# Patient Record
Sex: FEMALE | Race: WHITE | NOT HISPANIC OR LATINO | URBAN - METROPOLITAN AREA
[De-identification: names, ages, dates, MRNs, and addresses within clinical notes are randomized per-mention and may not be internally consistent; named-entity substitution may affect disease eponyms.]

---

## 2017-04-13 ENCOUNTER — APPOINTMENT (RX ONLY)
Dept: URBAN - METROPOLITAN AREA CLINIC 23 | Facility: CLINIC | Age: 77
Setting detail: DERMATOLOGY
End: 2017-04-13

## 2017-04-13 DIAGNOSIS — L57.0 ACTINIC KERATOSIS: ICD-10-CM

## 2017-04-13 DIAGNOSIS — Z85.828 PERSONAL HISTORY OF OTHER MALIGNANT NEOPLASM OF SKIN: ICD-10-CM

## 2017-04-13 DIAGNOSIS — L81.4 OTHER MELANIN HYPERPIGMENTATION: ICD-10-CM

## 2017-04-13 DIAGNOSIS — D22 MELANOCYTIC NEVI: ICD-10-CM

## 2017-04-13 DIAGNOSIS — L82.1 OTHER SEBORRHEIC KERATOSIS: ICD-10-CM

## 2017-04-13 PROBLEM — E78.5 HYPERLIPIDEMIA, UNSPECIFIED: Status: ACTIVE | Noted: 2017-04-13

## 2017-04-13 PROBLEM — D22.5 MELANOCYTIC NEVI OF TRUNK: Status: ACTIVE | Noted: 2017-04-13

## 2017-04-13 PROBLEM — L55.1 SUNBURN OF SECOND DEGREE: Status: ACTIVE | Noted: 2017-04-13

## 2017-04-13 PROBLEM — Z85.3 PERSONAL HISTORY OF MALIGNANT NEOPLASM OF BREAST: Status: ACTIVE | Noted: 2017-04-13

## 2017-04-13 PROBLEM — J30.1 ALLERGIC RHINITIS DUE TO POLLEN: Status: ACTIVE | Noted: 2017-04-13

## 2017-04-13 PROCEDURE — ? DEFER

## 2017-04-13 PROCEDURE — ? LIQUID NITROGEN

## 2017-04-13 PROCEDURE — 17000 DESTRUCT PREMALG LESION: CPT

## 2017-04-13 PROCEDURE — ? COUNSELING

## 2017-04-13 PROCEDURE — 17003 DESTRUCT PREMALG LES 2-14: CPT

## 2017-04-13 PROCEDURE — ? OTHER

## 2017-04-13 PROCEDURE — 99213 OFFICE O/P EST LOW 20 MIN: CPT | Mod: 25

## 2017-04-13 ASSESSMENT — LOCATION SIMPLE DESCRIPTION DERM
LOCATION SIMPLE: RIGHT FOREHEAD
LOCATION SIMPLE: RIGHT FOREARM
LOCATION SIMPLE: LEFT UPPER BACK
LOCATION SIMPLE: RIGHT UPPER BACK
LOCATION SIMPLE: LEFT BUTTOCK
LOCATION SIMPLE: LEFT FOREARM
LOCATION SIMPLE: LEFT FOREHEAD
LOCATION SIMPLE: RIGHT CHEEK
LOCATION SIMPLE: LEFT PRETIBIAL REGION
LOCATION SIMPLE: LEFT CHEEK
LOCATION SIMPLE: CHEST
LOCATION SIMPLE: RIGHT FOREARM
LOCATION SIMPLE: LEFT WRIST
LOCATION SIMPLE: UPPER BACK
LOCATION SIMPLE: NOSE
LOCATION SIMPLE: RIGHT PRETIBIAL REGION

## 2017-04-13 ASSESSMENT — LOCATION ZONE DERM
LOCATION ZONE: NOSE
LOCATION ZONE: LEG
LOCATION ZONE: FACE
LOCATION ZONE: TRUNK
LOCATION ZONE: ARM
LOCATION ZONE: ARM

## 2017-04-13 ASSESSMENT — LOCATION DETAILED DESCRIPTION DERM
LOCATION DETAILED: LEFT DISTAL DORSAL FOREARM
LOCATION DETAILED: NASAL TIP
LOCATION DETAILED: SUPERIOR THORACIC SPINE
LOCATION DETAILED: LEFT CENTRAL BUCCAL CHEEK
LOCATION DETAILED: RIGHT SUPERIOR MEDIAL UPPER BACK
LOCATION DETAILED: LEFT INFERIOR FOREHEAD
LOCATION DETAILED: RIGHT DISTAL PRETIBIAL REGION
LOCATION DETAILED: RIGHT DISTAL DORSAL FOREARM
LOCATION DETAILED: LEFT INFERIOR MEDIAL UPPER BACK
LOCATION DETAILED: RIGHT DISTAL DORSAL FOREARM
LOCATION DETAILED: RIGHT PROXIMAL DORSAL FOREARM
LOCATION DETAILED: LEFT DORSAL WRIST
LOCATION DETAILED: LEFT PROXIMAL PRETIBIAL REGION
LOCATION DETAILED: LEFT FOREHEAD
LOCATION DETAILED: RIGHT CENTRAL BUCCAL CHEEK
LOCATION DETAILED: UPPER STERNUM
LOCATION DETAILED: RIGHT FOREHEAD
LOCATION DETAILED: LEFT BUTTOCK

## 2017-04-13 NOTE — PROCEDURE: DEFER
Instructions (Optional): Pt declines treatment today due to Easter holiday
Procedure To Be Performed At Next Visit: Liquid nitrogen
Detail Level: Detailed

## 2017-04-13 NOTE — PROCEDURE: OTHER
Other (Free Text): LN
Detail Level: Simple
Other (Free Text): Photo of sites to cryo
Note Text (......Xxx Chief Complaint.): This diagnosis correlates with the

## 2017-04-13 NOTE — PROCEDURE: LIQUID NITROGEN
Render Post-Care Instructions In Note?: no
Number Of Freeze-Thaw Cycles: 1 freeze-thaw cycle
Post-Care Instructions: I reviewed with the patient in detail post-care instructions. Patient is to wear sunprotection, and avoid picking at any of the treated lesions. Pt may apply Vaseline to crusted or scabbing areas.
Duration Of Freeze Thaw-Cycle (Seconds): 4
Detail Level: Simple
Consent: The patient's consent was obtained including but not limited to risks of crusting, scabbing, blistering, scarring, darker or lighter pigmentary change, recurrence, incomplete removal and infection.

## 2017-05-03 ENCOUNTER — APPOINTMENT (RX ONLY)
Dept: URBAN - METROPOLITAN AREA CLINIC 23 | Facility: CLINIC | Age: 77
Setting detail: DERMATOLOGY
End: 2017-05-03

## 2017-05-03 DIAGNOSIS — L57.0 ACTINIC KERATOSIS: ICD-10-CM

## 2017-05-03 PROBLEM — L55.1 SUNBURN OF SECOND DEGREE: Status: ACTIVE | Noted: 2017-05-03

## 2017-05-03 PROCEDURE — 17003 DESTRUCT PREMALG LES 2-14: CPT

## 2017-05-03 PROCEDURE — 17000 DESTRUCT PREMALG LESION: CPT

## 2017-05-03 PROCEDURE — ? LIQUID NITROGEN

## 2017-05-03 PROCEDURE — ? COUNSELING

## 2017-05-03 ASSESSMENT — LOCATION SIMPLE DESCRIPTION DERM
LOCATION SIMPLE: NOSE
LOCATION SIMPLE: RIGHT FOREARM
LOCATION SIMPLE: RIGHT FOREHEAD

## 2017-05-03 ASSESSMENT — LOCATION DETAILED DESCRIPTION DERM
LOCATION DETAILED: NASAL SUPRATIP
LOCATION DETAILED: RIGHT LATERAL FOREHEAD
LOCATION DETAILED: RIGHT DISTAL DORSAL FOREARM

## 2017-05-03 ASSESSMENT — LOCATION ZONE DERM
LOCATION ZONE: FACE
LOCATION ZONE: NOSE
LOCATION ZONE: ARM

## 2017-05-03 NOTE — PROCEDURE: LIQUID NITROGEN
Post-Care Instructions: I reviewed with the patient in detail post-care instructions. Patient is to wear sunprotection, and avoid picking at any of the treated lesions. Pt may apply Vaseline to crusted or scabbing areas.
Consent: The patient's consent was obtained including but not limited to risks of crusting, scabbing, blistering, scarring, darker or lighter pigmentary change, recurrence, incomplete removal and infection.
Number Of Freeze-Thaw Cycles: 1 freeze-thaw cycle
Detail Level: Simple
Render Post-Care Instructions In Note?: no
Duration Of Freeze Thaw-Cycle (Seconds): 4

## 2017-07-28 PROBLEM — I77.9 BILATERAL CAROTID ARTERY DISEASE (HCC): Status: ACTIVE | Noted: 2017-07-28

## 2017-08-29 PROBLEM — M25.571 CHRONIC PAIN OF RIGHT ANKLE: Status: ACTIVE | Noted: 2017-08-29

## 2017-08-29 PROBLEM — G89.29 CHRONIC PAIN OF RIGHT ANKLE: Status: ACTIVE | Noted: 2017-08-29

## 2017-10-17 ENCOUNTER — APPOINTMENT (RX ONLY)
Dept: URBAN - METROPOLITAN AREA CLINIC 23 | Facility: CLINIC | Age: 77
Setting detail: DERMATOLOGY
End: 2017-10-17

## 2017-10-17 DIAGNOSIS — L82.1 OTHER SEBORRHEIC KERATOSIS: ICD-10-CM

## 2017-10-17 DIAGNOSIS — L81.4 OTHER MELANIN HYPERPIGMENTATION: ICD-10-CM

## 2017-10-17 DIAGNOSIS — D22 MELANOCYTIC NEVI: ICD-10-CM

## 2017-10-17 DIAGNOSIS — Z85.828 PERSONAL HISTORY OF OTHER MALIGNANT NEOPLASM OF SKIN: ICD-10-CM

## 2017-10-17 DIAGNOSIS — D485 NEOPLASM OF UNCERTAIN BEHAVIOR OF SKIN: ICD-10-CM

## 2017-10-17 PROBLEM — D48.5 NEOPLASM OF UNCERTAIN BEHAVIOR OF SKIN: Status: ACTIVE | Noted: 2017-10-17

## 2017-10-17 PROBLEM — L85.3 XEROSIS CUTIS: Status: ACTIVE | Noted: 2017-10-17

## 2017-10-17 PROBLEM — M12.9 ARTHROPATHY, UNSPECIFIED: Status: ACTIVE | Noted: 2017-10-17

## 2017-10-17 PROBLEM — J30.1 ALLERGIC RHINITIS DUE TO POLLEN: Status: ACTIVE | Noted: 2017-10-17

## 2017-10-17 PROBLEM — D22.5 MELANOCYTIC NEVI OF TRUNK: Status: ACTIVE | Noted: 2017-10-17

## 2017-10-17 PROCEDURE — ? COUNSELING

## 2017-10-17 PROCEDURE — 11101: CPT

## 2017-10-17 PROCEDURE — ? BIOPSY BY SHAVE METHOD

## 2017-10-17 PROCEDURE — 99214 OFFICE O/P EST MOD 30 MIN: CPT | Mod: 25

## 2017-10-17 PROCEDURE — 11100: CPT

## 2017-10-17 ASSESSMENT — LOCATION DETAILED DESCRIPTION DERM
LOCATION DETAILED: RIGHT CENTRAL BUCCAL CHEEK
LOCATION DETAILED: LEFT INFERIOR FOREHEAD
LOCATION DETAILED: RIGHT DISTAL PRETIBIAL REGION
LOCATION DETAILED: LEFT POSTERIOR SHOULDER
LOCATION DETAILED: LEFT CENTRAL BUCCAL CHEEK
LOCATION DETAILED: LEFT DISTAL DORSAL FOREARM
LOCATION DETAILED: UPPER STERNUM
LOCATION DETAILED: LEFT BUTTOCK
LOCATION DETAILED: RIGHT PROXIMAL DORSAL FOREARM
LOCATION DETAILED: LEFT FOREHEAD
LOCATION DETAILED: LEFT INFERIOR MEDIAL MALAR CHEEK
LOCATION DETAILED: LEFT INFERIOR MEDIAL UPPER BACK
LOCATION DETAILED: SUPERIOR THORACIC SPINE
LOCATION DETAILED: RIGHT SUPERIOR MEDIAL UPPER BACK
LOCATION DETAILED: LEFT DORSAL WRIST

## 2017-10-17 ASSESSMENT — LOCATION SIMPLE DESCRIPTION DERM
LOCATION SIMPLE: RIGHT CHEEK
LOCATION SIMPLE: LEFT SHOULDER
LOCATION SIMPLE: LEFT UPPER BACK
LOCATION SIMPLE: LEFT WRIST
LOCATION SIMPLE: LEFT BUTTOCK
LOCATION SIMPLE: LEFT FOREARM
LOCATION SIMPLE: LEFT CHEEK
LOCATION SIMPLE: RIGHT UPPER BACK
LOCATION SIMPLE: CHEST
LOCATION SIMPLE: UPPER BACK
LOCATION SIMPLE: RIGHT FOREARM
LOCATION SIMPLE: LEFT FOREHEAD
LOCATION SIMPLE: RIGHT PRETIBIAL REGION

## 2017-10-17 ASSESSMENT — LOCATION ZONE DERM
LOCATION ZONE: FACE
LOCATION ZONE: LEG
LOCATION ZONE: ARM
LOCATION ZONE: TRUNK

## 2017-10-17 NOTE — PROCEDURE: BIOPSY BY SHAVE METHOD
Size Of Lesion In Cm: 0
Electrodesiccation Text: The wound bed was treated with electrodesiccation after the biopsy was performed.
Detail Level: Detailed
Bill For Surgical Tray: no
Cryotherapy Text: The wound bed was treated with cryotherapy after the biopsy was performed.
Dressing: bandage
Billing Type: Third-Party Bill
Anesthesia Volume In Cc: 0.5
Wound Care: Petrolatum
Anesthesia Type: 1% lidocaine with 1:100,000 epinephrine and a 1:6 solution of 8.4% sodium bicarbonate
Silver Nitrate Text: The wound bed was treated with silver nitrate after the biopsy was performed.
Hemostasis: Aluminum Chloride
Type Of Destruction Used: Curettage
Biopsy Type: H and E
Curettage Text: The wound bed was treated with curettage after the biopsy was performed.
Electrodesiccation And Curettage Text: The wound bed was treated with electrodesiccation and curettage after the biopsy was performed.
Biopsy Method: Dermablade

## 2017-10-23 ENCOUNTER — RX ONLY (OUTPATIENT)
Age: 77
Setting detail: RX ONLY
End: 2017-10-23

## 2017-10-23 RX ORDER — FLUOROURACIL 2 G/40G
CREAM TOPICAL
Qty: 1 | Refills: 1 | Status: ERX

## 2018-04-17 ENCOUNTER — APPOINTMENT (RX ONLY)
Dept: URBAN - METROPOLITAN AREA CLINIC 23 | Facility: CLINIC | Age: 78
Setting detail: DERMATOLOGY
End: 2018-04-17

## 2018-04-17 DIAGNOSIS — D485 NEOPLASM OF UNCERTAIN BEHAVIOR OF SKIN: ICD-10-CM

## 2018-04-17 DIAGNOSIS — D22 MELANOCYTIC NEVI: ICD-10-CM

## 2018-04-17 DIAGNOSIS — L738 OTHER SPECIFIED DISEASES OF HAIR AND HAIR FOLLICLES: ICD-10-CM

## 2018-04-17 DIAGNOSIS — L73.9 FOLLICULAR DISORDER, UNSPECIFIED: ICD-10-CM

## 2018-04-17 DIAGNOSIS — L57.0 ACTINIC KERATOSIS: ICD-10-CM

## 2018-04-17 DIAGNOSIS — L663 OTHER SPECIFIED DISEASES OF HAIR AND HAIR FOLLICLES: ICD-10-CM

## 2018-04-17 DIAGNOSIS — L82.1 OTHER SEBORRHEIC KERATOSIS: ICD-10-CM

## 2018-04-17 DIAGNOSIS — L81.4 OTHER MELANIN HYPERPIGMENTATION: ICD-10-CM

## 2018-04-17 DIAGNOSIS — Z85.828 PERSONAL HISTORY OF OTHER MALIGNANT NEOPLASM OF SKIN: ICD-10-CM

## 2018-04-17 PROBLEM — D48.5 NEOPLASM OF UNCERTAIN BEHAVIOR OF SKIN: Status: ACTIVE | Noted: 2018-04-17

## 2018-04-17 PROBLEM — L85.3 XEROSIS CUTIS: Status: ACTIVE | Noted: 2018-04-17

## 2018-04-17 PROBLEM — D22.5 MELANOCYTIC NEVI OF TRUNK: Status: ACTIVE | Noted: 2018-04-17

## 2018-04-17 PROBLEM — M12.9 ARTHROPATHY, UNSPECIFIED: Status: ACTIVE | Noted: 2018-04-17

## 2018-04-17 PROBLEM — L02.02 FURUNCLE OF FACE: Status: ACTIVE | Noted: 2018-04-17

## 2018-04-17 PROBLEM — L55.1 SUNBURN OF SECOND DEGREE: Status: ACTIVE | Noted: 2018-04-17

## 2018-04-17 PROBLEM — I10 ESSENTIAL (PRIMARY) HYPERTENSION: Status: ACTIVE | Noted: 2018-04-17

## 2018-04-17 PROCEDURE — 17000 DESTRUCT PREMALG LESION: CPT

## 2018-04-17 PROCEDURE — 11100: CPT | Mod: 59

## 2018-04-17 PROCEDURE — ? BIOPSY BY SHAVE METHOD

## 2018-04-17 PROCEDURE — ? LIQUID NITROGEN

## 2018-04-17 PROCEDURE — ? COUNSELING

## 2018-04-17 PROCEDURE — 99213 OFFICE O/P EST LOW 20 MIN: CPT | Mod: 25

## 2018-04-17 ASSESSMENT — LOCATION DETAILED DESCRIPTION DERM
LOCATION DETAILED: LEFT INFERIOR CENTRAL MALAR CHEEK
LOCATION DETAILED: LEFT MEDIAL MALAR CHEEK
LOCATION DETAILED: LEFT DORSAL WRIST
LOCATION DETAILED: LEFT DISTAL DORSAL FOREARM
LOCATION DETAILED: UPPER STERNUM
LOCATION DETAILED: LEFT INFERIOR FOREHEAD
LOCATION DETAILED: RIGHT PROXIMAL DORSAL FOREARM
LOCATION DETAILED: LEFT BUTTOCK
LOCATION DETAILED: RIGHT DISTAL PRETIBIAL REGION
LOCATION DETAILED: SUPERIOR THORACIC SPINE
LOCATION DETAILED: LEFT MID-UPPER BACK
LOCATION DETAILED: LEFT SUPERIOR MEDIAL BUCCAL CHEEK
LOCATION DETAILED: RIGHT CENTRAL BUCCAL CHEEK
LOCATION DETAILED: RIGHT SUPERIOR MEDIAL UPPER BACK
LOCATION DETAILED: LEFT INFERIOR MEDIAL UPPER BACK
LOCATION DETAILED: LEFT FOREHEAD
LOCATION DETAILED: LEFT CENTRAL BUCCAL CHEEK
LOCATION DETAILED: RIGHT SUPERIOR UPPER BACK

## 2018-04-17 ASSESSMENT — LOCATION SIMPLE DESCRIPTION DERM
LOCATION SIMPLE: RIGHT UPPER BACK
LOCATION SIMPLE: CHEST
LOCATION SIMPLE: LEFT FOREHEAD
LOCATION SIMPLE: RIGHT PRETIBIAL REGION
LOCATION SIMPLE: RIGHT FOREARM
LOCATION SIMPLE: LEFT UPPER BACK
LOCATION SIMPLE: RIGHT CHEEK
LOCATION SIMPLE: LEFT BUTTOCK
LOCATION SIMPLE: LEFT WRIST
LOCATION SIMPLE: UPPER BACK
LOCATION SIMPLE: LEFT FOREARM
LOCATION SIMPLE: LEFT CHEEK

## 2018-04-17 ASSESSMENT — LOCATION ZONE DERM
LOCATION ZONE: LEG
LOCATION ZONE: TRUNK
LOCATION ZONE: ARM
LOCATION ZONE: FACE

## 2018-04-17 NOTE — PROCEDURE: LIQUID NITROGEN
Consent: The patient's consent was obtained including but not limited to risks of crusting, scabbing, blistering, scarring, darker or lighter pigmentary change, recurrence, incomplete removal and infection.
Render Post-Care Instructions In Note?: no
Duration Of Freeze Thaw-Cycle (Seconds): 5
Post-Care Instructions: I reviewed with the patient in detail post-care instructions. Patient is to wear sunprotection, and avoid picking at any of the treated lesions. Pt may apply Vaseline to crusted or scabbing areas.
Detail Level: Simple
Number Of Freeze-Thaw Cycles: 2 freeze-thaw cycles

## 2018-04-17 NOTE — PROCEDURE: BIOPSY BY SHAVE METHOD
Destruction After The Procedure: No
Electrodesiccation And Curettage Text: The wound bed was treated with electrodesiccation and curettage after the biopsy was performed.
Biopsy Method: Dermablade
X Size Of Lesion In Cm: 0
Cryotherapy Text: The wound bed was treated with cryotherapy after the biopsy was performed.
Anesthesia Type: 1% lidocaine with 1:100,000 epinephrine and a 1:6 solution of 8.4% sodium bicarbonate
Wound Care: Petrolatum
Type Of Destruction Used: Curettage
Was A Bandage Applied: Yes
Anesthesia Volume In Cc: 0.5
Electrodesiccation Text: The wound bed was treated with electrodesiccation after the biopsy was performed.
Billing Type: Third-Party Bill
Biopsy Type: H and E
Curettage Text: The wound bed was treated with curettage after the biopsy was performed.
Dressing: bandage
Detail Level: Detailed
Silver Nitrate Text: The wound bed was treated with silver nitrate after the biopsy was performed.
Hemostasis: Aluminum Chloride

## 2018-10-16 ENCOUNTER — APPOINTMENT (RX ONLY)
Dept: URBAN - METROPOLITAN AREA CLINIC 23 | Facility: CLINIC | Age: 78
Setting detail: DERMATOLOGY
End: 2018-10-16

## 2018-10-16 DIAGNOSIS — L81.4 OTHER MELANIN HYPERPIGMENTATION: ICD-10-CM

## 2018-10-16 DIAGNOSIS — Z85.828 PERSONAL HISTORY OF OTHER MALIGNANT NEOPLASM OF SKIN: ICD-10-CM

## 2018-10-16 DIAGNOSIS — L57.0 ACTINIC KERATOSIS: ICD-10-CM

## 2018-10-16 DIAGNOSIS — L82.1 OTHER SEBORRHEIC KERATOSIS: ICD-10-CM

## 2018-10-16 DIAGNOSIS — D22 MELANOCYTIC NEVI: ICD-10-CM

## 2018-10-16 PROBLEM — I10 ESSENTIAL (PRIMARY) HYPERTENSION: Status: ACTIVE | Noted: 2018-10-16

## 2018-10-16 PROBLEM — Z85.3 PERSONAL HISTORY OF MALIGNANT NEOPLASM OF BREAST: Status: ACTIVE | Noted: 2018-10-16

## 2018-10-16 PROBLEM — L85.3 XEROSIS CUTIS: Status: ACTIVE | Noted: 2018-10-16

## 2018-10-16 PROBLEM — L55.1 SUNBURN OF SECOND DEGREE: Status: ACTIVE | Noted: 2018-10-16

## 2018-10-16 PROBLEM — J30.1 ALLERGIC RHINITIS DUE TO POLLEN: Status: ACTIVE | Noted: 2018-10-16

## 2018-10-16 PROBLEM — D22.5 MELANOCYTIC NEVI OF TRUNK: Status: ACTIVE | Noted: 2018-10-16

## 2018-10-16 PROBLEM — M12.9 ARTHROPATHY, UNSPECIFIED: Status: ACTIVE | Noted: 2018-10-16

## 2018-10-16 PROCEDURE — 17000 DESTRUCT PREMALG LESION: CPT

## 2018-10-16 PROCEDURE — ? LIQUID NITROGEN

## 2018-10-16 PROCEDURE — ? COUNSELING

## 2018-10-16 PROCEDURE — 17003 DESTRUCT PREMALG LES 2-14: CPT

## 2018-10-16 PROCEDURE — 99214 OFFICE O/P EST MOD 30 MIN: CPT | Mod: 25

## 2018-10-16 ASSESSMENT — LOCATION DETAILED DESCRIPTION DERM
LOCATION DETAILED: LEFT CENTRAL BUCCAL CHEEK
LOCATION DETAILED: RIGHT DISTAL PRETIBIAL REGION
LOCATION DETAILED: SUPERIOR THORACIC SPINE
LOCATION DETAILED: RIGHT SUPERIOR MEDIAL UPPER BACK
LOCATION DETAILED: LEFT SUPERIOR MEDIAL BUCCAL CHEEK
LOCATION DETAILED: 2ND WEB SPACE RIGHT HAND
LOCATION DETAILED: UPPER STERNUM
LOCATION DETAILED: RIGHT CENTRAL BUCCAL CHEEK
LOCATION DETAILED: LEFT BUTTOCK
LOCATION DETAILED: LEFT INFERIOR MEDIAL UPPER BACK
LOCATION DETAILED: NASAL SUPRATIP
LOCATION DETAILED: LEFT DISTAL DORSAL FOREARM
LOCATION DETAILED: RIGHT INFERIOR CENTRAL MALAR CHEEK
LOCATION DETAILED: LEFT FOREHEAD
LOCATION DETAILED: RIGHT PROXIMAL DORSAL FOREARM
LOCATION DETAILED: LEFT INFERIOR FOREHEAD
LOCATION DETAILED: LEFT DORSAL WRIST

## 2018-10-16 ASSESSMENT — LOCATION SIMPLE DESCRIPTION DERM
LOCATION SIMPLE: LEFT FOREHEAD
LOCATION SIMPLE: LEFT BUTTOCK
LOCATION SIMPLE: RIGHT HAND
LOCATION SIMPLE: LEFT FOREARM
LOCATION SIMPLE: LEFT CHEEK
LOCATION SIMPLE: LEFT WRIST
LOCATION SIMPLE: UPPER BACK
LOCATION SIMPLE: NOSE
LOCATION SIMPLE: LEFT UPPER BACK
LOCATION SIMPLE: RIGHT FOREARM
LOCATION SIMPLE: CHEST
LOCATION SIMPLE: RIGHT CHEEK
LOCATION SIMPLE: RIGHT UPPER BACK
LOCATION SIMPLE: RIGHT PRETIBIAL REGION

## 2018-10-16 ASSESSMENT — LOCATION ZONE DERM
LOCATION ZONE: TRUNK
LOCATION ZONE: ARM
LOCATION ZONE: LEG
LOCATION ZONE: HAND
LOCATION ZONE: FACE
LOCATION ZONE: NOSE

## 2018-10-16 NOTE — PROCEDURE: LIQUID NITROGEN
Consent: The patient's consent was obtained including but not limited to risks of crusting, scabbing, blistering, scarring, darker or lighter pigmentary change, recurrence, incomplete removal and infection.
Duration Of Freeze Thaw-Cycle (Seconds): 4
Render Post-Care Instructions In Note?: no
Number Of Freeze-Thaw Cycles: 1 freeze-thaw cycle
Detail Level: Simple
Post-Care Instructions: I reviewed with the patient in detail post-care instructions. Patient is to wear sunprotection, and avoid picking at any of the treated lesions. Pt may apply Vaseline to crusted or scabbing areas.

## 2019-04-16 ENCOUNTER — APPOINTMENT (RX ONLY)
Dept: URBAN - METROPOLITAN AREA CLINIC 23 | Facility: CLINIC | Age: 79
Setting detail: DERMATOLOGY
End: 2019-04-16

## 2019-04-16 DIAGNOSIS — L81.4 OTHER MELANIN HYPERPIGMENTATION: ICD-10-CM

## 2019-04-16 DIAGNOSIS — Z85.828 PERSONAL HISTORY OF OTHER MALIGNANT NEOPLASM OF SKIN: ICD-10-CM

## 2019-04-16 DIAGNOSIS — D485 NEOPLASM OF UNCERTAIN BEHAVIOR OF SKIN: ICD-10-CM

## 2019-04-16 DIAGNOSIS — D22 MELANOCYTIC NEVI: ICD-10-CM

## 2019-04-16 DIAGNOSIS — L82.1 OTHER SEBORRHEIC KERATOSIS: ICD-10-CM

## 2019-04-16 PROBLEM — L57.0 ACTINIC KERATOSIS: Status: ACTIVE | Noted: 2019-04-16

## 2019-04-16 PROBLEM — D48.5 NEOPLASM OF UNCERTAIN BEHAVIOR OF SKIN: Status: ACTIVE | Noted: 2019-04-16

## 2019-04-16 PROBLEM — D22.5 MELANOCYTIC NEVI OF TRUNK: Status: ACTIVE | Noted: 2019-04-16

## 2019-04-16 PROBLEM — Z85.3 PERSONAL HISTORY OF MALIGNANT NEOPLASM OF BREAST: Status: ACTIVE | Noted: 2019-04-16

## 2019-04-16 PROBLEM — J30.1 ALLERGIC RHINITIS DUE TO POLLEN: Status: ACTIVE | Noted: 2019-04-16

## 2019-04-16 PROBLEM — M12.9 ARTHROPATHY, UNSPECIFIED: Status: ACTIVE | Noted: 2019-04-16

## 2019-04-16 PROCEDURE — ? BIOPSY BY SHAVE METHOD

## 2019-04-16 PROCEDURE — 11103 TANGNTL BX SKIN EA SEP/ADDL: CPT

## 2019-04-16 PROCEDURE — 11102 TANGNTL BX SKIN SINGLE LES: CPT

## 2019-04-16 PROCEDURE — 99213 OFFICE O/P EST LOW 20 MIN: CPT | Mod: 25

## 2019-04-16 PROCEDURE — ? COUNSELING

## 2019-04-16 ASSESSMENT — LOCATION DETAILED DESCRIPTION DERM
LOCATION DETAILED: RIGHT CENTRAL ZYGOMA
LOCATION DETAILED: LEFT FOREHEAD
LOCATION DETAILED: UPPER STERNUM
LOCATION DETAILED: RIGHT PROXIMAL DORSAL FOREARM
LOCATION DETAILED: LEFT DISTAL DORSAL FOREARM
LOCATION DETAILED: LEFT SUPERIOR MEDIAL BUCCAL CHEEK
LOCATION DETAILED: LEFT INFERIOR MEDIAL UPPER BACK
LOCATION DETAILED: RIGHT SUPERIOR MEDIAL UPPER BACK
LOCATION DETAILED: LEFT BUTTOCK
LOCATION DETAILED: LEFT CENTRAL BUCCAL CHEEK
LOCATION DETAILED: SUPERIOR THORACIC SPINE
LOCATION DETAILED: LEFT INFERIOR FOREHEAD
LOCATION DETAILED: LEFT SUPERIOR MEDIAL UPPER BACK
LOCATION DETAILED: LEFT DORSAL WRIST
LOCATION DETAILED: RIGHT CENTRAL BUCCAL CHEEK

## 2019-04-16 ASSESSMENT — LOCATION SIMPLE DESCRIPTION DERM
LOCATION SIMPLE: LEFT UPPER BACK
LOCATION SIMPLE: UPPER BACK
LOCATION SIMPLE: RIGHT ZYGOMA
LOCATION SIMPLE: LEFT BUTTOCK
LOCATION SIMPLE: LEFT WRIST
LOCATION SIMPLE: LEFT FOREARM
LOCATION SIMPLE: LEFT FOREHEAD
LOCATION SIMPLE: CHEST
LOCATION SIMPLE: RIGHT FOREARM
LOCATION SIMPLE: RIGHT UPPER BACK
LOCATION SIMPLE: RIGHT CHEEK
LOCATION SIMPLE: LEFT CHEEK

## 2019-04-16 ASSESSMENT — LOCATION ZONE DERM
LOCATION ZONE: TRUNK
LOCATION ZONE: FACE
LOCATION ZONE: ARM

## 2019-04-16 NOTE — PROCEDURE: BIOPSY BY SHAVE METHOD
Cryotherapy Text: The wound bed was treated with cryotherapy after the biopsy was performed.
Electrodesiccation Text: The wound bed was treated with electrodesiccation after the biopsy was performed.
Destruction After The Procedure: No
Biopsy Type: H and E
Hemostasis: Aluminum Chloride
Type Of Destruction Used: Curettage
X Size Of Lesion In Cm: 0
Was A Bandage Applied: Yes
Silver Nitrate Text: The wound bed was treated with silver nitrate after the biopsy was performed.
Wound Care: Petrolatum
Curettage Text: The wound bed was treated with curettage after the biopsy was performed.
Anesthesia Volume In Cc: 0.5
Anesthesia Type: 1% lidocaine with 1:100,000 epinephrine and a 1:6 solution of 8.4% sodium bicarbonate
Biopsy Method: Dermablade
Billing Type: Third-Party Bill
Electrodesiccation And Curettage Text: The wound bed was treated with electrodesiccation and curettage after the biopsy was performed.
Accession #: [id number]
Depth Of Biopsy: dermis
Detail Level: Detailed
Dressing: bandage

## 2019-05-09 ENCOUNTER — APPOINTMENT (RX ONLY)
Dept: URBAN - METROPOLITAN AREA CLINIC 23 | Facility: CLINIC | Age: 79
Setting detail: DERMATOLOGY
End: 2019-05-09

## 2019-05-09 PROBLEM — C44.519 BASAL CELL CARCINOMA OF SKIN OF OTHER PART OF TRUNK: Status: ACTIVE | Noted: 2019-05-09

## 2019-05-09 PROBLEM — C44.319 BASAL CELL CARCINOMA OF SKIN OF OTHER PARTS OF FACE: Status: ACTIVE | Noted: 2019-05-09

## 2019-05-09 PROBLEM — E78.5 HYPERLIPIDEMIA, UNSPECIFIED: Status: ACTIVE | Noted: 2019-05-09

## 2019-05-09 PROCEDURE — ? OTHER

## 2019-05-09 PROCEDURE — 17281 DSTR MAL LS F/E/E/N/L/M .6-1: CPT

## 2019-05-09 PROCEDURE — 17262 DSTRJ MAL LES T/A/L 1.1-2.0: CPT

## 2019-05-09 PROCEDURE — ? CURETTAGE AND DESTRUCTION

## 2019-05-09 NOTE — PROCEDURE: CURETTAGE AND DESTRUCTION
Biopsy Photograph Reviewed: Yes
Add Intralesional Injection: No
Detail Level: Detailed
Post-Care Instructions: I reviewed with the patient in detail post-care instructions. Patient is to keep the area dry for 48 hours, and not to engage in any swimming until the area is healed. Should the patient develop any fevers, chills, bleeding, severe pain patient will contact the office immediately.
Bill As A Line Item Or As Units: Line Item
Size Of Lesion In Cm: 0.8
Size Of Lesion In Cm: 0.6
Size Of Lesion After Curettage: 0.7
Number Of Curettages: 3
What Was Performed First?: Curettage
Cautery Type: electrodesiccation
Additional Information: (Optional): The wound was cleaned, and a pressure dressing was applied.  The patient received detailed post-op instructions.
Size Of Lesion After Curettage: 1.2
Consent was obtained from the patient. The risks, benefits and alternatives to therapy were discussed in detail. Specifically, the risks of infection, scarring, bleeding, prolonged wound healing, nerve injury, incomplete removal, allergy to anesthesia and recurrence were addressed. Alternatives to ED&C, such as: surgical removal and XRT were also discussed.  Prior to the procedure, the treatment site was clearly identified and confirmed by the patient. All components of Universal Protocol/PAUSE Rule completed.
Anesthesia Type: 1% lidocaine with 1:100,000 epinephrine and a 1:10 solution of 8.4% sodium bicarbonate
Total Volume (Ccs): 1

## 2019-05-09 NOTE — PROCEDURE: OTHER
Other (Free Text): She desires the simplest procedure possible on her face
Detail Level: Simple
Note Text (......Xxx Chief Complaint.): This diagnosis correlates with the

## 2019-10-15 ENCOUNTER — APPOINTMENT (RX ONLY)
Dept: URBAN - METROPOLITAN AREA CLINIC 23 | Facility: CLINIC | Age: 79
Setting detail: DERMATOLOGY
End: 2019-10-15

## 2019-10-15 DIAGNOSIS — L81.4 OTHER MELANIN HYPERPIGMENTATION: ICD-10-CM

## 2019-10-15 DIAGNOSIS — L82.1 OTHER SEBORRHEIC KERATOSIS: ICD-10-CM

## 2019-10-15 DIAGNOSIS — Z85.828 PERSONAL HISTORY OF OTHER MALIGNANT NEOPLASM OF SKIN: ICD-10-CM

## 2019-10-15 DIAGNOSIS — D485 NEOPLASM OF UNCERTAIN BEHAVIOR OF SKIN: ICD-10-CM

## 2019-10-15 DIAGNOSIS — L57.0 ACTINIC KERATOSIS: ICD-10-CM

## 2019-10-15 DIAGNOSIS — D22 MELANOCYTIC NEVI: ICD-10-CM

## 2019-10-15 DIAGNOSIS — L70.8 OTHER ACNE: ICD-10-CM

## 2019-10-15 PROBLEM — D22.5 MELANOCYTIC NEVI OF TRUNK: Status: ACTIVE | Noted: 2019-10-15

## 2019-10-15 PROBLEM — I10 ESSENTIAL (PRIMARY) HYPERTENSION: Status: ACTIVE | Noted: 2019-10-15

## 2019-10-15 PROBLEM — L55.1 SUNBURN OF SECOND DEGREE: Status: ACTIVE | Noted: 2019-10-15

## 2019-10-15 PROBLEM — L85.3 XEROSIS CUTIS: Status: ACTIVE | Noted: 2019-10-15

## 2019-10-15 PROBLEM — D48.5 NEOPLASM OF UNCERTAIN BEHAVIOR OF SKIN: Status: ACTIVE | Noted: 2019-10-15

## 2019-10-15 PROCEDURE — ? LIQUID NITROGEN

## 2019-10-15 PROCEDURE — ? OTHER

## 2019-10-15 PROCEDURE — 17003 DESTRUCT PREMALG LES 2-14: CPT

## 2019-10-15 PROCEDURE — ? BIOPSY BY SHAVE METHOD

## 2019-10-15 PROCEDURE — ? COUNSELING

## 2019-10-15 PROCEDURE — 17000 DESTRUCT PREMALG LESION: CPT | Mod: 59

## 2019-10-15 PROCEDURE — 99214 OFFICE O/P EST MOD 30 MIN: CPT | Mod: 25

## 2019-10-15 PROCEDURE — 11102 TANGNTL BX SKIN SINGLE LES: CPT

## 2019-10-15 ASSESSMENT — LOCATION SIMPLE DESCRIPTION DERM
LOCATION SIMPLE: RIGHT TEMPLE
LOCATION SIMPLE: SUBMENTAL CHIN
LOCATION SIMPLE: LEFT CHEEK
LOCATION SIMPLE: RIGHT CHEEK
LOCATION SIMPLE: RIGHT PRETIBIAL REGION
LOCATION SIMPLE: LEFT BUTTOCK
LOCATION SIMPLE: UPPER BACK
LOCATION SIMPLE: LEFT HAND
LOCATION SIMPLE: LEFT WRIST
LOCATION SIMPLE: RIGHT UPPER BACK
LOCATION SIMPLE: RIGHT FOREARM
LOCATION SIMPLE: LEFT UPPER BACK
LOCATION SIMPLE: LEFT FOREARM
LOCATION SIMPLE: LEFT FOREHEAD
LOCATION SIMPLE: CHEST

## 2019-10-15 ASSESSMENT — LOCATION DETAILED DESCRIPTION DERM
LOCATION DETAILED: LEFT FOREHEAD
LOCATION DETAILED: LEFT DORSAL WRIST
LOCATION DETAILED: RIGHT SUPERIOR MEDIAL UPPER BACK
LOCATION DETAILED: RIGHT PROXIMAL DORSAL FOREARM
LOCATION DETAILED: UPPER STERNUM
LOCATION DETAILED: LEFT DISTAL DORSAL FOREARM
LOCATION DETAILED: RIGHT DISTAL PRETIBIAL REGION
LOCATION DETAILED: RIGHT CENTRAL TEMPLE
LOCATION DETAILED: LEFT CENTRAL BUCCAL CHEEK
LOCATION DETAILED: LEFT INFERIOR FOREHEAD
LOCATION DETAILED: RIGHT DISTAL RADIAL DORSAL FOREARM
LOCATION DETAILED: LEFT SUPERIOR MEDIAL BUCCAL CHEEK
LOCATION DETAILED: LEFT RADIAL DORSAL HAND
LOCATION DETAILED: SUPERIOR THORACIC SPINE
LOCATION DETAILED: LEFT BUTTOCK
LOCATION DETAILED: RIGHT CENTRAL BUCCAL CHEEK
LOCATION DETAILED: SUBMENTAL CHIN
LOCATION DETAILED: LEFT ULNAR DORSAL HAND
LOCATION DETAILED: LEFT INFERIOR MEDIAL UPPER BACK

## 2019-10-15 ASSESSMENT — LOCATION ZONE DERM
LOCATION ZONE: ARM
LOCATION ZONE: LEG
LOCATION ZONE: TRUNK
LOCATION ZONE: HAND
LOCATION ZONE: FACE

## 2019-10-15 NOTE — PROCEDURE: OTHER
Detail Level: Simple
Other (Free Text): Leave area alone but don’t forget it’s there. Clinically looks like an inflamed hair follicle
Note Text (......Xxx Chief Complaint.): This diagnosis correlates with the

## 2019-10-15 NOTE — PROCEDURE: BIOPSY BY SHAVE METHOD
Wound Care: Petrolatum
Destruction After The Procedure: No
Biopsy Method: Dermablade
Biopsy Type: H and E
Silver Nitrate Text: The wound bed was treated with silver nitrate after the biopsy was performed.
Curettage Text: The wound bed was treated with curettage after the biopsy was performed.
Was A Bandage Applied: Yes
Depth Of Biopsy: dermis
Detail Level: Detailed
Anesthesia Volume In Cc: 0.5
Hemostasis: Aluminum Chloride
Accession #: [id number]
Dressing: bandage
Anticipated Plan (Based On Presumed Biopsy Results): MOHS with [facility]
Additional Anesthesia Volume In Cc (Will Not Render If 0): 0
Electrodesiccation And Curettage Text: The wound bed was treated with electrodesiccation and curettage after the biopsy was performed.
Cryotherapy Text: The wound bed was treated with cryotherapy after the biopsy was performed.
Billing Type: Third-Party Bill
Anesthesia Type: 1% lidocaine with 1:100,000 epinephrine and a 1:6 solution of 8.4% sodium bicarbonate
Type Of Destruction Used: Curettage
Electrodesiccation Text: The wound bed was treated with electrodesiccation after the biopsy was performed.

## 2019-10-15 NOTE — PROCEDURE: LIQUID NITROGEN
Render Post-Care Instructions In Note?: no
Consent: The patient's consent was obtained including but not limited to risks of crusting, scabbing, blistering, scarring, darker or lighter pigmentary change, recurrence, incomplete removal and infection.
Number Of Freeze-Thaw Cycles: 1 freeze-thaw cycle
Duration Of Freeze Thaw-Cycle (Seconds): 4
Detail Level: Simple
Post-Care Instructions: I reviewed with the patient in detail post-care instructions. Patient is to wear sunprotection, and avoid picking at any of the treated lesions. Pt may apply Vaseline to crusted or scabbing areas.

## 2019-11-21 ENCOUNTER — APPOINTMENT (RX ONLY)
Dept: URBAN - METROPOLITAN AREA CLINIC 24 | Facility: CLINIC | Age: 79
Setting detail: DERMATOLOGY
End: 2019-11-21

## 2019-11-21 PROBLEM — C44.319 BASAL CELL CARCINOMA OF SKIN OF OTHER PARTS OF FACE: Status: ACTIVE | Noted: 2019-11-21

## 2019-11-21 PROBLEM — L85.3 XEROSIS CUTIS: Status: ACTIVE | Noted: 2019-11-21

## 2019-11-21 PROCEDURE — ? EXCISION

## 2019-11-21 PROCEDURE — 12052 INTMD RPR FACE/MM 2.6-5.0 CM: CPT

## 2019-11-21 PROCEDURE — ? OTHER

## 2019-11-21 PROCEDURE — 11642 EXC F/E/E/N/L MAL+MRG 1.1-2: CPT

## 2019-11-21 NOTE — PROCEDURE: EXCISION
Did You Provide Opioid Counseling: No
Complex Repair And Skin Substitute Graft Text: The defect edges were debeveled with a #15 scalpel blade.  The primary defect was closed partially with a complex linear closure.  Given the location of the remaining defect, shape of the defect and the proximity to free margins a skin substitute graft was deemed most appropriate to repair the remaining defect.  The graft was trimmed to fit the size of the remaining defect.  The graft was then placed in the primary defect, oriented appropriately, and sutured into place.
O-T Plasty Text: The defect edges were debeveled with a #15 scalpel blade.  Given the location of the defect, shape of the defect and the proximity to free margins an O-T plasty was deemed most appropriate.  Using a sterile surgical marker, an appropriate O-T plasty was drawn incorporating the defect and placing the expected incisions within the relaxed skin tension lines where possible.    The area thus outlined was incised deep to adipose tissue with a #15 scalpel blade.  The skin margins were undermined to an appropriate distance in all directions utilizing iris scissors.
Cheek Interpolation Flap Text: A decision was made to reconstruct the defect utilizing an interpolation axial flap and a staged reconstruction.  A telfa template was made of the defect.  This telfa template was then used to outline the Cheek Interpolation flap.  The donor area for the pedicle flap was then injected with anesthesia.  The flap was excised through the skin and subcutaneous tissue down to the layer of the underlying musculature.  The interpolation flap was carefully excised within this deep plane to maintain its blood supply.  The edges of the donor site were undermined.   The donor site was closed in a primary fashion.  The pedicle was then rotated into position and sutured.  Once the tube was sutured into place, adequate blood supply was confirmed with blanching and refill.  The pedicle was then wrapped with xeroform gauze and dressed appropriately with a telfa and gauze bandage to ensure continued blood supply and protect the attached pedicle.
X Size Of Lesion In Cm (Optional): 0
Lazy S Intermediate Repair Preamble Text (Leave Blank If You Do Not Want): Undermining was performed with blunt dissection.
Billing Type: Third-Party Bill
Complex Repair And Transposition Flap Text: The defect edges were debeveled with a #15 scalpel blade.  The primary defect was closed partially with a complex linear closure.  Given the location of the remaining defect, shape of the defect and the proximity to free margins a transposition flap was deemed most appropriate for complete closure of the defect.  Using a sterile surgical marker, an appropriate advancement flap was drawn incorporating the defect and placing the expected incisions within the relaxed skin tension lines where possible.    The area thus outlined was incised deep to adipose tissue with a #15 scalpel blade.  The skin margins were undermined to an appropriate distance in all directions utilizing iris scissors.
Complex Repair And Split-Thickness Skin Graft Text: The defect edges were debeveled with a #15 scalpel blade.  The primary defect was closed partially with a complex linear closure.  Given the location of the defect, shape of the defect and the proximity to free margins a split thickness skin graft was deemed most appropriate to repair the remaining defect.  The graft was trimmed to fit the size of the remaining defect.  The graft was then placed in the primary defect, oriented appropriately, and sutured into place.
Complex Repair And Rhombic Flap Text: The defect edges were debeveled with a #15 scalpel blade.  The primary defect was closed partially with a complex linear closure.  Given the location of the remaining defect, shape of the defect and the proximity to free margins a rhombic flap was deemed most appropriate for complete closure of the defect.  Using a sterile surgical marker, an appropriate advancement flap was drawn incorporating the defect and placing the expected incisions within the relaxed skin tension lines where possible.    The area thus outlined was incised deep to adipose tissue with a #15 scalpel blade.  The skin margins were undermined to an appropriate distance in all directions utilizing iris scissors.
Ftsg Text: The defect edges were debeveled with a #15 scalpel blade.  Given the location of the defect, shape of the defect and the proximity to free margins a full thickness skin graft was deemed most appropriate.  Using a sterile surgical marker, the primary defect shape was transferred to the donor site. The area thus outlined was incised deep to adipose tissue with a #15 scalpel blade.  The harvested graft was then trimmed of adipose tissue until only dermis and epidermis was left.  The skin margins of the secondary defect were undermined to an appropriate distance in all directions utilizing iris scissors.  The secondary defect was closed with interrupted buried subcutaneous sutures.  The skin edges were then re-apposed with running  sutures.  The skin graft was then placed in the primary defect and oriented appropriately.
Lazy S Complex Repair Preamble Text (Leave Blank If You Do Not Want): Extensive wide undermining was performed.
Complex Repair And Tissue Cultured Epidermal Autograft Text: The defect edges were debeveled with a #15 scalpel blade.  The primary defect was closed partially with a complex linear closure.  Given the location of the defect, shape of the defect and the proximity to free margins an tissue cultured epidermal autograft was deemed most appropriate to repair the remaining defect.  The graft was trimmed to fit the size of the remaining defect.  The graft was then placed in the primary defect, oriented appropriately, and sutured into place.
Lip Wedge Excision Repair Text: Given the location of the defect and the proximity to free margins a full thickness wedge repair was deemed most appropriate.  Using a sterile surgical marker, the appropriate repair was drawn incorporating the defect and placing the expected incisions perpendicular to the vermilion border.  The vermilion border was also meticulously outlined to ensure appropriate reapproximation during the repair.  The area thus outlined was incised through and through with a #15 scalpel blade.  The muscularis and dermis were reaproximated with deep sutures following hemostasis. Care was taken to realign the vermilion border before proceeding with the superficial closure.  Once the vermilion was realigned the superfical and mucosal closure was finished.
Complex Repair And Dorsal Nasal Flap Text: The defect edges were debeveled with a #15 scalpel blade.  The primary defect was closed partially with a complex linear closure.  Given the location of the remaining defect, shape of the defect and the proximity to free margins a dorsal nasal flap was deemed most appropriate for complete closure of the defect.  Using a sterile surgical marker, an appropriate flap was drawn incorporating the defect and placing the expected incisions within the relaxed skin tension lines where possible.    The area thus outlined was incised deep to adipose tissue with a #15 scalpel blade.  The skin margins were undermined to an appropriate distance in all directions utilizing iris scissors.
Skin Substitute Text: The defect edges were debeveled with a #15 scalpel blade.  Given the location of the defect, shape of the defect and the proximity to free margins a skin substitute graft was deemed most appropriate.  The graft material was trimmed to fit the size of the defect. The graft was then placed in the primary defect and oriented appropriately.
Scalpel Size: 15 blade
Advancement Flap (Double) Text: The defect edges were debeveled with a #15 scalpel blade.  Given the location of the defect and the proximity to free margins a double advancement flap was deemed most appropriate.  Using a sterile surgical marker, the appropriate advancement flaps were drawn incorporating the defect and placing the expected incisions within the relaxed skin tension lines where possible.    The area thus outlined was incised deep to adipose tissue with a #15 scalpel blade.  The skin margins were undermined to an appropriate distance in all directions utilizing iris scissors.
Complex Repair And Rotation Flap Text: The defect edges were debeveled with a #15 scalpel blade.  The primary defect was closed partially with a complex linear closure.  Given the location of the remaining defect, shape of the defect and the proximity to free margins a rotation flap was deemed most appropriate for complete closure of the defect.  Using a sterile surgical marker, an appropriate advancement flap was drawn incorporating the defect and placing the expected incisions within the relaxed skin tension lines where possible.    The area thus outlined was incised deep to adipose tissue with a #15 scalpel blade.  The skin margins were undermined to an appropriate distance in all directions utilizing iris scissors.
Paramedian Forehead Flap Text: A decision was made to reconstruct the defect utilizing an interpolation axial flap and a staged reconstruction.  A telfa template was made of the defect.  This telfa template was then used to outline the paramedian forehead pedicle flap.  The donor area for the pedicle flap was then injected with anesthesia.  The flap was excised through the skin and subcutaneous tissue down to the layer of the underlying musculature.  The pedicle flap was carefully excised within this deep plane to maintain its blood supply.  The edges of the donor site were undermined.   The donor site was closed in a primary fashion.  The pedicle was then rotated into position and sutured.  Once the tube was sutured into place, adequate blood supply was confirmed with blanching and refill.  The pedicle was then wrapped with xeroform gauze and dressed appropriately with a telfa and gauze bandage to ensure continued blood supply and protect the attached pedicle.
Hatchet Flap Text: The defect edges were debeveled with a #15 scalpel blade.  Given the location of the defect, shape of the defect and the proximity to free margins a hatchet flap was deemed most appropriate.  Using a sterile surgical marker, an appropriate hatchet flap was drawn incorporating the defect and placing the expected incisions within the relaxed skin tension lines where possible.    The area thus outlined was incised deep to adipose tissue with a #15 scalpel blade.  The skin margins were undermined to an appropriate distance in all directions utilizing iris scissors.
O-L Flap Text: The defect edges were debeveled with a #15 scalpel blade.  Given the location of the defect, shape of the defect and the proximity to free margins an O-L flap was deemed most appropriate.  Using a sterile surgical marker, an appropriate advancement flap was drawn incorporating the defect and placing the expected incisions within the relaxed skin tension lines where possible.    The area thus outlined was incised deep to adipose tissue with a #15 scalpel blade.  The skin margins were undermined to an appropriate distance in all directions utilizing iris scissors.
Island Pedicle Flap-Requiring Vessel Identification Text: The defect edges were debeveled with a #15 scalpel blade.  Given the location of the defect, shape of the defect and the proximity to free margins an island pedicle advancement flap was deemed most appropriate.  Using a sterile surgical marker, an appropriate advancement flap was drawn, based on the axial vessel mentioned above, incorporating the defect, outlining the appropriate donor tissue and placing the expected incisions within the relaxed skin tension lines where possible.    The area thus outlined was incised deep to adipose tissue with a #15 scalpel blade.  The skin margins were undermined to an appropriate distance in all directions around the primary defect and laterally outward around the island pedicle utilizing iris scissors.  There was minimal undermining beneath the pedicle flap.
Anesthesia Type: 1% lidocaine with 1:100,000 epinephrine and 408mcg clindamycin/ml and a 1:10 solution of 8.4% sodium bicarbonate
Banner Transposition Flap Text: The defect edges were debeveled with a #15 scalpel blade.  Given the location of the defect and the proximity to free margins a Banner transposition flap was deemed most appropriate.  Using a sterile surgical marker, an appropriate flap drawn around the defect. The area thus outlined was incised deep to adipose tissue with a #15 scalpel blade.  The skin margins were undermined to an appropriate distance in all directions utilizing iris scissors.
Complex Repair And O-T Advancement Flap Text: The defect edges were debeveled with a #15 scalpel blade.  The primary defect was closed partially with a complex linear closure.  Given the location of the remaining defect, shape of the defect and the proximity to free margins an O-T advancement flap was deemed most appropriate for complete closure of the defect.  Using a sterile surgical marker, an appropriate advancement flap was drawn incorporating the defect and placing the expected incisions within the relaxed skin tension lines where possible.    The area thus outlined was incised deep to adipose tissue with a #15 scalpel blade.  The skin margins were undermined to an appropriate distance in all directions utilizing iris scissors.
Complex Repair And Xenograft Text: The defect edges were debeveled with a #15 scalpel blade.  The primary defect was closed partially with a complex linear closure.  Given the location of the defect, shape of the defect and the proximity to free margins a xenograft was deemed most appropriate to repair the remaining defect.  The graft was trimmed to fit the size of the remaining defect.  The graft was then placed in the primary defect, oriented appropriately, and sutured into place.
Crescentic Advancement Flap Text: The defect edges were debeveled with a #15 scalpel blade.  Given the location of the defect and the proximity to free margins a crescentic advancement flap was deemed most appropriate.  Using a sterile surgical marker, the appropriate advancement flap was drawn incorporating the defect and placing the expected incisions within the relaxed skin tension lines where possible.    The area thus outlined was incised deep to adipose tissue with a #15 scalpel blade.  The skin margins were undermined to an appropriate distance in all directions utilizing iris scissors.
Excision Depth: adipose tissue
Purse String (Simple) Text: Given the location of the defect and the characteristics of the surrounding skin a purse string simple closure was deemed most appropriate.  Undermining was performed circumferentially around the surgical defect.  A purse string suture was then placed and tightened.
Epidermal Closure Graft Donor Site (Optional): simple interrupted
V-Y Plasty Text: The defect edges were debeveled with a #15 scalpel blade.  Given the location of the defect, shape of the defect and the proximity to free margins an V-Y advancement flap was deemed most appropriate.  Using a sterile surgical marker, an appropriate advancement flap was drawn incorporating the defect and placing the expected incisions within the relaxed skin tension lines where possible.    The area thus outlined was incised deep to adipose tissue with a #15 scalpel blade.  The skin margins were undermined to an appropriate distance in all directions utilizing iris scissors.
Dermal Autograft Text: The defect edges were debeveled with a #15 scalpel blade.  Given the location of the defect, shape of the defect and the proximity to free margins a dermal autograft was deemed most appropriate.  Using a sterile surgical marker, the primary defect shape was transferred to the donor site. The area thus outlined was incised deep to adipose tissue with a #15 scalpel blade.  The harvested graft was then trimmed of adipose and epidermal tissue until only dermis was left.  The skin graft was then placed in the primary defect and oriented appropriately.
Show Curettage Variables: Yes
Elliptical Excision Additional Text (Leave Blank If You Do Not Want): The margin was drawn around the clinically apparent lesion.  An elliptical shape was then drawn on the skin incorporating the lesion and margins.  Incisions were then made along these lines to the appropriate tissue plane and the lesion was extirpated.
Melolabial Interpolation Flap Text: A decision was made to reconstruct the defect utilizing an interpolation axial flap and a staged reconstruction.  A telfa template was made of the defect.  This telfa template was then used to outline the melolabial interpolation flap.  The donor area for the pedicle flap was then injected with anesthesia.  The flap was excised through the skin and subcutaneous tissue down to the layer of the underlying musculature.  The pedicle flap was carefully excised within this deep plane to maintain its blood supply.  The edges of the donor site were undermined.   The donor site was closed in a primary fashion.  The pedicle was then rotated into position and sutured.  Once the tube was sutured into place, adequate blood supply was confirmed with blanching and refill.  The pedicle was then wrapped with xeroform gauze and dressed appropriately with a telfa and gauze bandage to ensure continued blood supply and protect the attached pedicle.
Complex Repair And Modified Advancement Flap Text: The defect edges were debeveled with a #15 scalpel blade.  The primary defect was closed partially with a complex linear closure.  Given the location of the remaining defect, shape of the defect and the proximity to free margins a modified advancement flap was deemed most appropriate for complete closure of the defect.  Using a sterile surgical marker, an appropriate advancement flap was drawn incorporating the defect and placing the expected incisions within the relaxed skin tension lines where possible.    The area thus outlined was incised deep to adipose tissue with a #15 scalpel blade.  The skin margins were undermined to an appropriate distance in all directions utilizing iris scissors.
Accession #: [id number]
Hemostasis: Electrocautery
Advancement-Rotation Flap Text: The defect edges were debeveled with a #15 scalpel blade.  Given the location of the defect, shape of the defect and the proximity to free margins an advancement-rotation flap was deemed most appropriate.  Using a sterile surgical marker, an appropriate flap was drawn incorporating the defect and placing the expected incisions within the relaxed skin tension lines where possible. The area thus outlined was incised deep to adipose tissue with a #15 scalpel blade.  The skin margins were undermined to an appropriate distance in all directions utilizing iris scissors.
Intermediate / Complex Repair - Final Wound Length In Cm: 4
Rotation Flap Text: The defect edges were debeveled with a #15 scalpel blade.  Given the location of the defect, shape of the defect and the proximity to free margins a rotation flap was deemed most appropriate.  Using a sterile surgical marker, an appropriate rotation flap was drawn incorporating the defect and placing the expected incisions within the relaxed skin tension lines where possible.    The area thus outlined was incised deep to adipose tissue with a #15 scalpel blade.  The skin margins were undermined to an appropriate distance in all directions utilizing iris scissors.
Composite Graft Text: The defect edges were debeveled with a #15 scalpel blade.  Given the location of the defect, shape of the defect, the proximity to free margins and the fact the defect was full thickness a composite graft was deemed most appropriate.  The defect was outline and then transferred to the donor site.  A full thickness graft was then excised from the donor site. The graft was then placed in the primary defect, oriented appropriately and then sutured into place.  The secondary defect was then repaired using a primary closure.
Interpolation Flap Text: A decision was made to reconstruct the defect utilizing an interpolation axial flap and a staged reconstruction.  A telfa template was made of the defect.  This telfa template was then used to outline the interpolation flap.  The donor area for the pedicle flap was then injected with anesthesia.  The flap was excised through the skin and subcutaneous tissue down to the layer of the underlying musculature.  The interpolation flap was carefully excised within this deep plane to maintain its blood supply.  The edges of the donor site were undermined.   The donor site was closed in a primary fashion.  The pedicle was then rotated into position and sutured.  Once the tube was sutured into place, adequate blood supply was confirmed with blanching and refill.  The pedicle was then wrapped with xeroform gauze and dressed appropriately with a telfa and gauze bandage to ensure continued blood supply and protect the attached pedicle.
Epidermal Closure: running
Repair Performed By Another Provider Text (Leave Blank If You Do Not Want): After the tissue was excised the defect was repaired by another provider.
Bi-Rhombic Flap Text: The defect edges were debeveled with a #15 scalpel blade.  Given the location of the defect and the proximity to free margins a bi-rhombic flap was deemed most appropriate.  Using a sterile surgical marker, an appropriate rhombic flap was drawn incorporating the defect. The area thus outlined was incised deep to adipose tissue with a #15 scalpel blade.  The skin margins were undermined to an appropriate distance in all directions utilizing iris scissors.
Complex Repair And Double Advancement Flap Text: The defect edges were debeveled with a #15 scalpel blade.  The primary defect was closed partially with a complex linear closure.  Given the location of the remaining defect, shape of the defect and the proximity to free margins a double advancement flap was deemed most appropriate for complete closure of the defect.  Using a sterile surgical marker, an appropriate advancement flap was drawn incorporating the defect and placing the expected incisions within the relaxed skin tension lines where possible.    The area thus outlined was incised deep to adipose tissue with a #15 scalpel blade.  The skin margins were undermined to an appropriate distance in all directions utilizing iris scissors.
S Plasty Text: Given the location and shape of the defect, and the orientation of relaxed skin tension lines, an S-plasty was deemed most appropriate for repair.  Using a sterile surgical marker, the appropriate outline of the S-plasty was drawn, incorporating the defect and placing the expected incisions within the relaxed skin tension lines where possible.  The area thus outlined was incised deep to adipose tissue with a #15 scalpel blade.  The skin margins were undermined to an appropriate distance in all directions utilizing iris scissors. The skin flaps were advanced over the defect.  The opposing margins were then approximated with interrupted buried subcutaneous sutures.
Muscle Hinge Flap Text: The defect edges were debeveled with a #15 scalpel blade.  Given the size, depth and location of the defect and the proximity to free margins a muscle hinge flap was deemed most appropriate.  Using a sterile surgical marker, an appropriate hinge flap was drawn incorporating the defect. The area thus outlined was incised with a #15 scalpel blade.  The skin margins were undermined to an appropriate distance in all directions utilizing iris scissors.
Mucosal Advancement Flap Text: Given the location of the defect, shape of the defect and the proximity to free margins a mucosal advancement flap was deemed most appropriate. Incisions were made with a 15 blade scalpel in the appropriate fashion along the cutaneous vermilion border and the mucosal lip. The remaining actinically damaged mucosal tissue was excised.  The mucosal advancement flap was then elevated to the gingival sulcus with care taken to preserve the neurovascular structures and advanced into the primary defect. Care was taken to ensure that precise realignment of the vermilion border was achieved.
Double O-Z Flap Text: The defect edges were debeveled with a #15 scalpel blade.  Given the location of the defect, shape of the defect and the proximity to free margins a Double O-Z flap was deemed most appropriate.  Using a sterile surgical marker, an appropriate transposition flap was drawn incorporating the defect and placing the expected incisions within the relaxed skin tension lines where possible. The area thus outlined was incised deep to adipose tissue with a #15 scalpel blade.  The skin margins were undermined to an appropriate distance in all directions utilizing iris scissors.
Trilobed Flap Text: The defect edges were debeveled with a #15 scalpel blade.  Given the location of the defect and the proximity to free margins a trilobed flap was deemed most appropriate.  Using a sterile surgical marker, an appropriate trilobed flap drawn around the defect.    The area thus outlined was incised deep to adipose tissue with a #15 scalpel blade.  The skin margins were undermined to an appropriate distance in all directions utilizing iris scissors.
Bilobed Flap Text: The defect edges were debeveled with a #15 scalpel blade.  Given the location of the defect and the proximity to free margins a bilobe flap was deemed most appropriate.  Using a sterile surgical marker, an appropriate bilobe flap drawn around the defect.    The area thus outlined was incised deep to adipose tissue with a #15 scalpel blade.  The skin margins were undermined to an appropriate distance in all directions utilizing iris scissors.
Advancement Flap (Single) Text: The defect edges were debeveled with a #15 scalpel blade.  Given the location of the defect and the proximity to free margins a single advancement flap was deemed most appropriate.  Using a sterile surgical marker, an appropriate advancement flap was drawn incorporating the defect and placing the expected incisions within the relaxed skin tension lines where possible.    The area thus outlined was incised deep to adipose tissue with a #15 scalpel blade.  The skin margins were undermined to an appropriate distance in all directions utilizing iris scissors.
Cartilage Graft Text: The defect edges were debeveled with a #15 scalpel blade.  Given the location of the defect, shape of the defect, the fact the defect involved a full thickness cartilage defect a cartilage graft was deemed most appropriate.  An appropriate donor site was identified, cleansed, and anesthetized. The cartilage graft was then harvested and transferred to the recipient site, oriented appropriately and then sutured into place.  The secondary defect was then repaired using a primary closure.
Additional Anesthesia Volume In Cc: 6
Where Do You Want The Question To Include Opioid Counseling Located?: Case Summary Tab
Positioning (Leave Blank If You Do Not Want): The patient was placed in a comfortable position exposing the surgical site.
Z Plasty Text: The lesion was extirpated to the level of the fat with a #15 scalpel blade.  Given the location of the defect, shape of the defect and the proximity to free margins a Z-plasty was deemed most appropriate for repair.  Using a sterile surgical marker, the appropriate transposition arms of the Z-plasty were drawn incorporating the defect and placing the expected incisions within the relaxed skin tension lines where possible.    The area thus outlined was incised deep to adipose tissue with a #15 scalpel blade.  The skin margins were undermined to an appropriate distance in all directions utilizing iris scissors.  The opposing transposition arms were then transposed into place in opposite direction and anchored with interrupted buried subcutaneous sutures.
Epidermal Sutures: 5-0 Prolene
Saucerization Excision Additional Text (Leave Blank If You Do Not Want): The margin was drawn around the clinically apparent lesion.  Incisions were then made along these lines, in a tangential fashion, to the appropriate tissue plane and the lesion was extirpated.
Mercedes Flap Text: The defect edges were debeveled with a #15 scalpel blade.  Given the location of the defect, shape of the defect and the proximity to free margins a Mercedes flap was deemed most appropriate.  Using a sterile surgical marker, an appropriate advancement flap was drawn incorporating the defect and placing the expected incisions within the relaxed skin tension lines where possible. The area thus outlined was incised deep to adipose tissue with a #15 scalpel blade.  The skin margins were undermined to an appropriate distance in all directions utilizing iris scissors.
O-T Advancement Flap Text: The defect edges were debeveled with a #15 scalpel blade.  Given the location of the defect, shape of the defect and the proximity to free margins an O-T advancement flap was deemed most appropriate.  Using a sterile surgical marker, an appropriate advancement flap was drawn incorporating the defect and placing the expected incisions within the relaxed skin tension lines where possible.    The area thus outlined was incised deep to adipose tissue with a #15 scalpel blade.  The skin margins were undermined to an appropriate distance in all directions utilizing iris scissors.
Detail Level: Detailed
Dorsal Nasal Flap Text: The defect edges were debeveled with a #15 scalpel blade.  Given the location of the defect and the proximity to free margins a dorsal nasal flap was deemed most appropriate.  Using a sterile surgical marker, an appropriate dorsal nasal flap was drawn around the defect.    The area thus outlined was incised deep to adipose tissue with a #15 scalpel blade.  The skin margins were undermined to an appropriate distance in all directions utilizing iris scissors.
Complex Repair And A-T Advancement Flap Text: The defect edges were debeveled with a #15 scalpel blade.  The primary defect was closed partially with a complex linear closure.  Given the location of the remaining defect, shape of the defect and the proximity to free margins an A-T advancement flap was deemed most appropriate for complete closure of the defect.  Using a sterile surgical marker, an appropriate advancement flap was drawn incorporating the defect and placing the expected incisions within the relaxed skin tension lines where possible.    The area thus outlined was incised deep to adipose tissue with a #15 scalpel blade.  The skin margins were undermined to an appropriate distance in all directions utilizing iris scissors.
Consent was obtained from the patient. The risks and benefits to therapy were discussed in detail. Specifically, the risks of infection, scarring, bleeding, prolonged wound healing, incomplete removal, allergy to anesthesia, nerve injury and recurrence were addressed. Prior to the procedure, the treatment site was clearly identified and confirmed by the patient. All components of Universal Protocol/PAUSE Rule completed.
Posterior Auricular Interpolation Flap Text: A decision was made to reconstruct the defect utilizing an interpolation axial flap and a staged reconstruction.  A telfa template was made of the defect.  This telfa template was then used to outline the posterior auricular interpolation flap.  The donor area for the pedicle flap was then injected with anesthesia.  The flap was excised through the skin and subcutaneous tissue down to the layer of the underlying musculature.  The pedicle flap was carefully excised within this deep plane to maintain its blood supply.  The edges of the donor site were undermined.   The donor site was closed in a primary fashion.  The pedicle was then rotated into position and sutured.  Once the tube was sutured into place, adequate blood supply was confirmed with blanching and refill.  The pedicle was then wrapped with xeroform gauze and dressed appropriately with a telfa and gauze bandage to ensure continued blood supply and protect the attached pedicle.
Complex Repair And Melolabial Flap Text: The defect edges were debeveled with a #15 scalpel blade.  The primary defect was closed partially with a complex linear closure.  Given the location of the remaining defect, shape of the defect and the proximity to free margins a melolabial flap was deemed most appropriate for complete closure of the defect.  Using a sterile surgical marker, an appropriate advancement flap was drawn incorporating the defect and placing the expected incisions within the relaxed skin tension lines where possible.    The area thus outlined was incised deep to adipose tissue with a #15 scalpel blade.  The skin margins were undermined to an appropriate distance in all directions utilizing iris scissors.
Partial Purse String (Intermediate) Text: Given the location of the defect and the characteristics of the surrounding skin an intermediate purse string closure was deemed most appropriate.  Undermining was performed circumferentially around the surgical defect.  A purse string suture was then placed and tightened. Wound tension of the circular defect prevented complete closure of the wound.
Size Of Lesion In Cm: 0.8
Deep Sutures: 5-0 Vicryl
No Repair - Repaired With Adjacent Surgical Defect Text (Leave Blank If You Do Not Want): After the excision the defect was repaired concurrently with another surgical defect which was in close approximation.
Purse String (Intermediate) Text: Given the location of the defect and the characteristics of the surrounding skin a purse string intermediate closure was deemed most appropriate.  Undermining was performed circumfirentially around the surgical defect.  A purse string suture was then placed and tightened.
Information: Selecting Yes will display possible errors in your note based on the variables you have selected. This validation is only offered as a suggestion for you. PLEASE NOTE THAT THE VALIDATION TEXT WILL BE REMOVED WHEN YOU FINALIZE YOUR NOTE. IF YOU WANT TO FAX A PRELIMINARY NOTE YOU WILL NEED TO TOGGLE THIS TO 'NO' IF YOU DO NOT WANT IT IN YOUR FAXED NOTE.
Mastoid Interpolation Flap Text: A decision was made to reconstruct the defect utilizing an interpolation axial flap and a staged reconstruction.  A telfa template was made of the defect.  This telfa template was then used to outline the mastoid interpolation flap.  The donor area for the pedicle flap was then injected with anesthesia.  The flap was excised through the skin and subcutaneous tissue down to the layer of the underlying musculature.  The pedicle flap was carefully excised within this deep plane to maintain its blood supply.  The edges of the donor site were undermined.   The donor site was closed in a primary fashion.  The pedicle was then rotated into position and sutured.  Once the tube was sutured into place, adequate blood supply was confirmed with blanching and refill.  The pedicle was then wrapped with xeroform gauze and dressed appropriately with a telfa and gauze bandage to ensure continued blood supply and protect the attached pedicle.
Wound Care: Petrolatum
Complex Repair And Dermal Autograft Text: The defect edges were debeveled with a #15 scalpel blade.  The primary defect was closed partially with a complex linear closure.  Given the location of the defect, shape of the defect and the proximity to free margins an dermal autograft was deemed most appropriate to repair the remaining defect.  The graft was trimmed to fit the size of the remaining defect.  The graft was then placed in the primary defect, oriented appropriately, and sutured into place.
Slit Excision Additional Text (Leave Blank If You Do Not Want): A linear line was drawn on the skin overlying the lesion. An incision was made slowly until the lesion was visualized.  Once visualized, the lesion was removed with blunt dissection.
Post-Care Instructions: I reviewed with the patient in detail post-care instructions. Patient is not to engage in any heavy lifting, exercise, or swimming for the next 14 days. Should the patient develop any fevers, chills, bleeding, severe pain patient will contact the office immediately.
Keystone Flap Text: The defect edges were debeveled with a #15 scalpel blade.  Given the location of the defect, shape of the defect a keystone flap was deemed most appropriate.  Using a sterile surgical marker, an appropriate keystone flap was drawn incorporating the defect, outlining the appropriate donor tissue and placing the expected incisions within the relaxed skin tension lines where possible. The area thus outlined was incised deep to adipose tissue with a #15 scalpel blade.  The skin margins were undermined to an appropriate distance in all directions around the primary defect and laterally outward around the flap utilizing iris scissors.
Chonodrocutaneous Helical Advancement Flap Text: The defect edges were debeveled with a #15 scalpel blade.  Given the location of the defect and the proximity to free margins a chondrocutaneous helical advancement flap was deemed most appropriate.  Using a sterile surgical marker, the appropriate advancement flap was drawn incorporating the defect and placing the expected incisions within the relaxed skin tension lines where possible.    The area thus outlined was incised deep to adipose tissue with a #15 scalpel blade.  The skin margins were undermined to an appropriate distance in all directions utilizing iris scissors.
Melolabial Transposition Flap Text: The defect edges were debeveled with a #15 scalpel blade.  Given the location of the defect and the proximity to free margins a melolabial flap was deemed most appropriate.  Using a sterile surgical marker, an appropriate melolabial transposition flap was drawn incorporating the defect.    The area thus outlined was incised deep to adipose tissue with a #15 scalpel blade.  The skin margins were undermined to an appropriate distance in all directions utilizing iris scissors.
Pre-Excision Curettage Text (Leave Blank If You Do Not Want): Prior to drawing the surgical margin the visible lesion was removed with electrodesiccation and curettage to clearly define the lesion size.
Bilobed Transposition Flap Text: The defect edges were debeveled with a #15 scalpel blade.  Given the location of the defect and the proximity to free margins a bilobed transposition flap was deemed most appropriate.  Using a sterile surgical marker, an appropriate bilobe flap drawn around the defect.    The area thus outlined was incised deep to adipose tissue with a #15 scalpel blade.  The skin margins were undermined to an appropriate distance in all directions utilizing iris scissors.
Modified Advancement Flap Text: The defect edges were debeveled with a #15 scalpel blade.  Given the location of the defect, shape of the defect and the proximity to free margins a modified advancement flap was deemed most appropriate.  Using a sterile surgical marker, an appropriate advancement flap was drawn incorporating the defect and placing the expected incisions within the relaxed skin tension lines where possible.    The area thus outlined was incised deep to adipose tissue with a #15 scalpel blade.  The skin margins were undermined to an appropriate distance in all directions utilizing iris scissors.
Excisional Biopsy Additional Text (Leave Blank If You Do Not Want): The margin was drawn around the clinically apparent lesion. An elliptical shape was then drawn on the skin incorporating the lesion and margins.  Incisions were then made along these lines to the appropriate tissue plane and the lesion was extirpated.
Island Pedicle Flap With Canthal Suspension Text: The defect edges were debeveled with a #15 scalpel blade.  Given the location of the defect, shape of the defect and the proximity to free margins an island pedicle advancement flap was deemed most appropriate.  Using a sterile surgical marker, an appropriate advancement flap was drawn incorporating the defect, outlining the appropriate donor tissue and placing the expected incisions within the relaxed skin tension lines where possible. The area thus outlined was incised deep to adipose tissue with a #15 scalpel blade.  The skin margins were undermined to an appropriate distance in all directions around the primary defect and laterally outward around the island pedicle utilizing iris scissors.  There was minimal undermining beneath the pedicle flap. A suspension suture was placed in the canthal tendon to prevent tension and prevent ectropion.
Complex Repair And M Plasty Text: The defect edges were debeveled with a #15 scalpel blade.  The primary defect was closed partially with a complex linear closure.  Given the location of the remaining defect, shape of the defect and the proximity to free margins an M plasty was deemed most appropriate for complete closure of the defect.  Using a sterile surgical marker, an appropriate advancement flap was drawn incorporating the defect and placing the expected incisions within the relaxed skin tension lines where possible.    The area thus outlined was incised deep to adipose tissue with a #15 scalpel blade.  The skin margins were undermined to an appropriate distance in all directions utilizing iris scissors.
Complex Repair And Single Advancement Flap Text: The defect edges were debeveled with a #15 scalpel blade.  The primary defect was closed partially with a complex linear closure.  Given the location of the remaining defect, shape of the defect and the proximity to free margins a single advancement flap was deemed most appropriate for complete closure of the defect.  Using a sterile surgical marker, an appropriate advancement flap was drawn incorporating the defect and placing the expected incisions within the relaxed skin tension lines where possible.    The area thus outlined was incised deep to adipose tissue with a #15 scalpel blade.  The skin margins were undermined to an appropriate distance in all directions utilizing iris scissors.
Fusiform Excision Additional Text (Leave Blank If You Do Not Want): The margin was drawn around the clinically apparent lesion.  A fusiform shape was then drawn on the skin incorporating the lesion and margins.  Incisions were then made along these lines to the appropriate tissue plane and the lesion was extirpated.
Perilesional Excision Additional Text (Leave Blank If You Do Not Want): The margin was drawn around the clinically apparent lesion. Incisions were then made along these lines to the appropriate tissue plane and the lesion was extirpated.
Dressing: pressure dressing
Star Wedge Flap Text: The defect edges were debeveled with a #15 scalpel blade.  Given the location of the defect, shape of the defect and the proximity to free margins a star wedge flap was deemed most appropriate.  Using a sterile surgical marker, an appropriate rotation flap was drawn incorporating the defect and placing the expected incisions within the relaxed skin tension lines where possible. The area thus outlined was incised deep to adipose tissue with a #15 scalpel blade.  The skin margins were undermined to an appropriate distance in all directions utilizing iris scissors.
Double O-Z Plasty Text: The defect edges were debeveled with a #15 scalpel blade.  Given the location of the defect, shape of the defect and the proximity to free margins a Double O-Z plasty (double transposition flap) was deemed most appropriate.  Using a sterile surgical marker, the appropriate transposition flaps were drawn incorporating the defect and placing the expected incisions within the relaxed skin tension lines where possible. The area thus outlined was incised deep to adipose tissue with a #15 scalpel blade.  The skin margins were undermined to an appropriate distance in all directions utilizing iris scissors.  Hemostasis was achieved with electrocautery.  The flaps were then transposed into place, one clockwise and the other counterclockwise, and anchored with interrupted buried subcutaneous sutures.
Rhombic Flap Text: The defect edges were debeveled with a #15 scalpel blade.  Given the location of the defect and the proximity to free margins a rhombic flap was deemed most appropriate.  Using a sterile surgical marker, an appropriate rhombic flap was drawn incorporating the defect.    The area thus outlined was incised deep to adipose tissue with a #15 scalpel blade.  The skin margins were undermined to an appropriate distance in all directions utilizing iris scissors.
Xenograft Text: The defect edges were debeveled with a #15 scalpel blade.  Given the location of the defect, shape of the defect and the proximity to free margins a xenograft was deemed most appropriate.  The graft was then trimmed to fit the size of the defect.  The graft was then placed in the primary defect and oriented appropriately.
Complex Repair And Epidermal Autograft Text: The defect edges were debeveled with a #15 scalpel blade.  The primary defect was closed partially with a complex linear closure.  Given the location of the defect, shape of the defect and the proximity to free margins an epidermal autograft was deemed most appropriate to repair the remaining defect.  The graft was trimmed to fit the size of the remaining defect.  The graft was then placed in the primary defect, oriented appropriately, and sutured into place.
Helical Rim Advancement Flap Text: The defect edges were debeveled with a #15 blade scalpel.  Given the location of the defect and the proximity to free margins (helical rim) a double helical rim advancement flap was deemed most appropriate.  Using a sterile surgical marker, the appropriate advancement flaps were drawn incorporating the defect and placing the expected incisions between the helical rim and antihelix where possible.  The area thus outlined was incised through and through with a #15 scalpel blade.  With a skin hook and iris scissors, the flaps were gently and sharply undermined and freed up.
Partial Purse String (Simple) Text: Given the location of the defect and the characteristics of the surrounding skin a simple purse string closure was deemed most appropriate.  Undermining was performed circumferentially around the surgical defect.  A purse string suture was then placed and tightened. Wound tension of the circular defect prevented complete closure of the wound.
Size Of Margin In Cm: 0.3
Complex Repair And W Plasty Text: The defect edges were debeveled with a #15 scalpel blade.  The primary defect was closed partially with a complex linear closure.  Given the location of the remaining defect, shape of the defect and the proximity to free margins a W plasty was deemed most appropriate for complete closure of the defect.  Using a sterile surgical marker, an appropriate advancement flap was drawn incorporating the defect and placing the expected incisions within the relaxed skin tension lines where possible.    The area thus outlined was incised deep to adipose tissue with a #15 scalpel blade.  The skin margins were undermined to an appropriate distance in all directions utilizing iris scissors.
Double Island Pedicle Flap Text: The defect edges were debeveled with a #15 scalpel blade.  Given the location of the defect, shape of the defect and the proximity to free margins a double island pedicle advancement flap was deemed most appropriate.  Using a sterile surgical marker, an appropriate advancement flap was drawn incorporating the defect, outlining the appropriate donor tissue and placing the expected incisions within the relaxed skin tension lines where possible.    The area thus outlined was incised deep to adipose tissue with a #15 scalpel blade.  The skin margins were undermined to an appropriate distance in all directions around the primary defect and laterally outward around the island pedicle utilizing iris scissors.  There was minimal undermining beneath the pedicle flap.
Transposition Flap Text: The defect edges were debeveled with a #15 scalpel blade.  Given the location of the defect and the proximity to free margins a transposition flap was deemed most appropriate.  Using a sterile surgical marker, an appropriate transposition flap was drawn incorporating the defect.    The area thus outlined was incised deep to adipose tissue with a #15 scalpel blade.  The skin margins were undermined to an appropriate distance in all directions utilizing iris scissors.
W Plasty Text: The lesion was extirpated to the level of the fat with a #15 scalpel blade.  Given the location of the defect, shape of the defect and the proximity to free margins a W-plasty was deemed most appropriate for repair.  Using a sterile surgical marker, the appropriate transposition arms of the W-plasty were drawn incorporating the defect and placing the expected incisions within the relaxed skin tension lines where possible.    The area thus outlined was incised deep to adipose tissue with a #15 scalpel blade.  The skin margins were undermined to an appropriate distance in all directions utilizing iris scissors.  The opposing transposition arms were then transposed into place in opposite direction and anchored with interrupted buried subcutaneous sutures.
A-T Advancement Flap Text: The defect edges were debeveled with a #15 scalpel blade.  Given the location of the defect, shape of the defect and the proximity to free margins an A-T advancement flap was deemed most appropriate.  Using a sterile surgical marker, an appropriate advancement flap was drawn incorporating the defect and placing the expected incisions within the relaxed skin tension lines where possible.    The area thus outlined was incised deep to adipose tissue with a #15 scalpel blade.  The skin margins were undermined to an appropriate distance in all directions utilizing iris scissors.
Estimated Blood Loss (Cc): minimal
Rhomboid Transposition Flap Text: The defect edges were debeveled with a #15 scalpel blade.  Given the location of the defect and the proximity to free margins a rhomboid transposition flap was deemed most appropriate.  Using a sterile surgical marker, an appropriate rhomboid flap was drawn incorporating the defect.    The area thus outlined was incised deep to adipose tissue with a #15 scalpel blade.  The skin margins were undermined to an appropriate distance in all directions utilizing iris scissors.
Cheek-To-Nose Interpolation Flap Text: A decision was made to reconstruct the defect utilizing an interpolation axial flap and a staged reconstruction.  A telfa template was made of the defect.  This telfa template was then used to outline the Cheek-To-Nose Interpolation flap.  The donor area for the pedicle flap was then injected with anesthesia.  The flap was excised through the skin and subcutaneous tissue down to the layer of the underlying musculature.  The interpolation flap was carefully excised within this deep plane to maintain its blood supply.  The edges of the donor site were undermined.   The donor site was closed in a primary fashion.  The pedicle was then rotated into position and sutured.  Once the tube was sutured into place, adequate blood supply was confirmed with blanching and refill.  The pedicle was then wrapped with xeroform gauze and dressed appropriately with a telfa and gauze bandage to ensure continued blood supply and protect the attached pedicle.
O-Z Flap Text: The defect edges were debeveled with a #15 scalpel blade.  Given the location of the defect, shape of the defect and the proximity to free margins an O-Z flap was deemed most appropriate.  Using a sterile surgical marker, an appropriate transposition flap was drawn incorporating the defect and placing the expected incisions within the relaxed skin tension lines where possible. The area thus outlined was incised deep to adipose tissue with a #15 scalpel blade.  The skin margins were undermined to an appropriate distance in all directions utilizing iris scissors.
Tissue Cultured Epidermal Autograft Text: The defect edges were debeveled with a #15 scalpel blade.  Given the location of the defect, shape of the defect and the proximity to free margins a tissue cultured epidermal autograft was deemed most appropriate.  The graft was then trimmed to fit the size of the defect.  The graft was then placed in the primary defect and oriented appropriately.
Path Notes (To The Dermatopathologist): Please check margins.
Split-Thickness Skin Graft Text: The defect edges were debeveled with a #15 scalpel blade.  Given the location of the defect, shape of the defect and the proximity to free margins a split thickness skin graft was deemed most appropriate.  Using a sterile surgical marker, the primary defect shape was transferred to the donor site. The split thickness graft was then harvested.  The skin graft was then placed in the primary defect and oriented appropriately.
Alar Island Pedicle Flap Text: The defect edges were debeveled with a #15 scalpel blade.  Given the location of the defect, shape of the defect and the proximity to the alar rim an island pedicle advancement flap was deemed most appropriate.  Using a sterile surgical marker, an appropriate advancement flap was drawn incorporating the defect, outlining the appropriate donor tissue and placing the expected incisions within the nasal ala running parallel to the alar rim. The area thus outlined was incised with a #15 scalpel blade.  The skin margins were undermined minimally to an appropriate distance in all directions around the primary defect and laterally outward around the island pedicle utilizing iris scissors.  There was minimal undermining beneath the pedicle flap.
Complex Repair And Z Plasty Text: The defect edges were debeveled with a #15 scalpel blade.  The primary defect was closed partially with a complex linear closure.  Given the location of the remaining defect, shape of the defect and the proximity to free margins a Z plasty was deemed most appropriate for complete closure of the defect.  Using a sterile surgical marker, an appropriate advancement flap was drawn incorporating the defect and placing the expected incisions within the relaxed skin tension lines where possible.    The area thus outlined was incised deep to adipose tissue with a #15 scalpel blade.  The skin margins were undermined to an appropriate distance in all directions utilizing iris scissors.
Complex Repair And O-L Flap Text: The defect edges were debeveled with a #15 scalpel blade.  The primary defect was closed partially with a complex linear closure.  Given the location of the remaining defect, shape of the defect and the proximity to free margins an O-L flap was deemed most appropriate for complete closure of the defect.  Using a sterile surgical marker, an appropriate flap was drawn incorporating the defect and placing the expected incisions within the relaxed skin tension lines where possible.    The area thus outlined was incised deep to adipose tissue with a #15 scalpel blade.  The skin margins were undermined to an appropriate distance in all directions utilizing iris scissors.
Suture Removal: 14 days
Excision Method: Elliptical
Bilateral Helical Rim Advancement Flap Text: The defect edges were debeveled with a #15 blade scalpel.  Given the location of the defect and the proximity to free margins (helical rim) a bilateral helical rim advancement flap was deemed most appropriate.  Using a sterile surgical marker, the appropriate advancement flaps were drawn incorporating the defect and placing the expected incisions between the helical rim and antihelix where possible.  The area thus outlined was incised through and through with a #15 scalpel blade.  With a skin hook and iris scissors, the flaps were gently and sharply undermined and freed up.
Repair Type: Intermediate
Curvilinear Excision Additional Text (Leave Blank If You Do Not Want): The margin was drawn around the clinically apparent lesion.  A curvilinear shape was then drawn on the skin incorporating the lesion and margins.  Incisions were then made along these lines to the appropriate tissue plane and the lesion was extirpated.
Island Pedicle Flap Text: The defect edges were debeveled with a #15 scalpel blade.  Given the location of the defect, shape of the defect and the proximity to free margins an island pedicle advancement flap was deemed most appropriate.  Using a sterile surgical marker, an appropriate advancement flap was drawn incorporating the defect, outlining the appropriate donor tissue and placing the expected incisions within the relaxed skin tension lines where possible.    The area thus outlined was incised deep to adipose tissue with a #15 scalpel blade.  The skin margins were undermined to an appropriate distance in all directions around the primary defect and laterally outward around the island pedicle utilizing iris scissors.  There was minimal undermining beneath the pedicle flap.
O-Z Plasty Text: The defect edges were debeveled with a #15 scalpel blade.  Given the location of the defect, shape of the defect and the proximity to free margins an O-Z plasty (double transposition flap) was deemed most appropriate.  Using a sterile surgical marker, the appropriate transposition flaps were drawn incorporating the defect and placing the expected incisions within the relaxed skin tension lines where possible.    The area thus outlined was incised deep to adipose tissue with a #15 scalpel blade.  The skin margins were undermined to an appropriate distance in all directions utilizing iris scissors.  Hemostasis was achieved with electrocautery.  The flaps were then transposed into place, one clockwise and the other counterclockwise, and anchored with interrupted buried subcutaneous sutures.
Epidermal Autograft Text: The defect edges were debeveled with a #15 scalpel blade.  Given the location of the defect, shape of the defect and the proximity to free margins an epidermal autograft was deemed most appropriate.  Using a sterile surgical marker, the primary defect shape was transferred to the donor site. The epidermal graft was then harvested.  The skin graft was then placed in the primary defect and oriented appropriately.
Complex Repair And Bilobe Flap Text: The defect edges were debeveled with a #15 scalpel blade.  The primary defect was closed partially with a complex linear closure.  Given the location of the remaining defect, shape of the defect and the proximity to free margins a bilobe flap was deemed most appropriate for complete closure of the defect.  Using a sterile surgical marker, an appropriate advancement flap was drawn incorporating the defect and placing the expected incisions within the relaxed skin tension lines where possible.    The area thus outlined was incised deep to adipose tissue with a #15 scalpel blade.  The skin margins were undermined to an appropriate distance in all directions utilizing iris scissors.
Spiral Flap Text: The defect edges were debeveled with a #15 scalpel blade.  Given the location of the defect, shape of the defect and the proximity to free margins a spiral flap was deemed most appropriate.  Using a sterile surgical marker, an appropriate rotation flap was drawn incorporating the defect and placing the expected incisions within the relaxed skin tension lines where possible. The area thus outlined was incised deep to adipose tissue with a #15 scalpel blade.  The skin margins were undermined to an appropriate distance in all directions utilizing iris scissors.
Ear Star Wedge Flap Text: The defect edges were debeveled with a #15 blade scalpel.  Given the location of the defect and the proximity to free margins (helical rim) an ear star wedge flap was deemed most appropriate.  Using a sterile surgical marker, the appropriate flap was drawn incorporating the defect and placing the expected incisions between the helical rim and antihelix where possible.  The area thus outlined was incised through and through with a #15 scalpel blade.
H Plasty Text: Given the location of the defect, shape of the defect and the proximity to free margins a H-plasty was deemed most appropriate for repair.  Using a sterile surgical marker, the appropriate advancement arms of the H-plasty were drawn incorporating the defect and placing the expected incisions within the relaxed skin tension lines where possible. The area thus outlined was incised deep to adipose tissue with a #15 scalpel blade. The skin margins were undermined to an appropriate distance in all directions utilizing iris scissors.  The opposing advancement arms were then advanced into place in opposite direction and anchored with interrupted buried subcutaneous sutures.
V-Y Flap Text: The defect edges were debeveled with a #15 scalpel blade.  Given the location of the defect, shape of the defect and the proximity to free margins a V-Y flap was deemed most appropriate.  Using a sterile surgical marker, an appropriate advancement flap was drawn incorporating the defect and placing the expected incisions within the relaxed skin tension lines where possible.    The area thus outlined was incised deep to adipose tissue with a #15 scalpel blade.  The skin margins were undermined to an appropriate distance in all directions utilizing iris scissors.
Anesthesia Type: 1% lidocaine with epinephrine and a 1:10 solution of 8.4% sodium bicarbonate
Complex Repair And Double M Plasty Text: The defect edges were debeveled with a #15 scalpel blade.  The primary defect was closed partially with a complex linear closure.  Given the location of the remaining defect, shape of the defect and the proximity to free margins a double M plasty was deemed most appropriate for complete closure of the defect.  Using a sterile surgical marker, an appropriate advancement flap was drawn incorporating the defect and placing the expected incisions within the relaxed skin tension lines where possible.    The area thus outlined was incised deep to adipose tissue with a #15 scalpel blade.  The skin margins were undermined to an appropriate distance in all directions utilizing iris scissors.
Complex Repair And V-Y Plasty Text: The defect edges were debeveled with a #15 scalpel blade.  The primary defect was closed partially with a complex linear closure.  Given the location of the remaining defect, shape of the defect and the proximity to free margins a V-Y plasty was deemed most appropriate for complete closure of the defect.  Using a sterile surgical marker, an appropriate advancement flap was drawn incorporating the defect and placing the expected incisions within the relaxed skin tension lines where possible.    The area thus outlined was incised deep to adipose tissue with a #15 scalpel blade.  The skin margins were undermined to an appropriate distance in all directions utilizing iris scissors.
Burow's Advancement Flap Text: The defect edges were debeveled with a #15 scalpel blade.  Given the location of the defect and the proximity to free margins a Burow's advancement flap was deemed most appropriate.  Using a sterile surgical marker, the appropriate advancement flap was drawn incorporating the defect and placing the expected incisions within the relaxed skin tension lines where possible.    The area thus outlined was incised deep to adipose tissue with a #15 scalpel blade.  The skin margins were undermined to an appropriate distance in all directions utilizing iris scissors.
Complex Repair And Ftsg Text: The defect edges were debeveled with a #15 scalpel blade.  The primary defect was closed partially with a complex linear closure.  Given the location of the defect, shape of the defect and the proximity to free margins a full thickness skin graft was deemed most appropriate to repair the remaining defect.  The graft was trimmed to fit the size of the remaining defect.  The graft was then placed in the primary defect, oriented appropriately, and sutured into place.
Home Suture Removal Text: Patient was provided a home suture removal kit and will remove their sutures at home.  If they have any questions or difficulties they will call the office.

## 2019-11-21 NOTE — PROCEDURE: OTHER
Other (Free Text): Discussed mohs she would prefer a less time intensive procedure
Note Text (......Xxx Chief Complaint.): This diagnosis correlates with the
Detail Level: Simple

## 2019-11-27 ENCOUNTER — APPOINTMENT (RX ONLY)
Dept: URBAN - METROPOLITAN AREA CLINIC 23 | Facility: CLINIC | Age: 79
Setting detail: DERMATOLOGY
End: 2019-11-27

## 2019-11-27 DIAGNOSIS — Z48.01 ENCOUNTER FOR CHANGE OR REMOVAL OF SURGICAL WOUND DRESSING: ICD-10-CM

## 2019-11-27 PROCEDURE — ? SUTURE REMOVAL (GLOBAL PERIOD)

## 2019-11-27 ASSESSMENT — LOCATION SIMPLE DESCRIPTION DERM: LOCATION SIMPLE: RIGHT TEMPLE

## 2019-11-27 ASSESSMENT — LOCATION ZONE DERM: LOCATION ZONE: FACE

## 2019-11-27 ASSESSMENT — LOCATION DETAILED DESCRIPTION DERM: LOCATION DETAILED: RIGHT MID TEMPLE

## 2019-11-27 NOTE — PROCEDURE: SUTURE REMOVAL (GLOBAL PERIOD)
Add 77844 Cpt? (Important Note: In 2017 The Use Of 29202 Is Being Tracked By Cms To Determine Future Global Period Reimbursement For Global Periods): no
Detail Level: Detailed

## 2020-06-30 ENCOUNTER — APPOINTMENT (RX ONLY)
Dept: URBAN - METROPOLITAN AREA CLINIC 23 | Facility: CLINIC | Age: 80
Setting detail: DERMATOLOGY
End: 2020-06-30

## 2020-06-30 DIAGNOSIS — L82.1 OTHER SEBORRHEIC KERATOSIS: ICD-10-CM

## 2020-06-30 DIAGNOSIS — D22 MELANOCYTIC NEVI: ICD-10-CM

## 2020-06-30 DIAGNOSIS — D485 NEOPLASM OF UNCERTAIN BEHAVIOR OF SKIN: ICD-10-CM

## 2020-06-30 DIAGNOSIS — Z85.828 PERSONAL HISTORY OF OTHER MALIGNANT NEOPLASM OF SKIN: ICD-10-CM

## 2020-06-30 DIAGNOSIS — L81.4 OTHER MELANIN HYPERPIGMENTATION: ICD-10-CM

## 2020-06-30 PROBLEM — Z85.3 PERSONAL HISTORY OF MALIGNANT NEOPLASM OF BREAST: Status: ACTIVE | Noted: 2020-06-30

## 2020-06-30 PROBLEM — L57.0 ACTINIC KERATOSIS: Status: ACTIVE | Noted: 2020-06-30

## 2020-06-30 PROBLEM — I10 ESSENTIAL (PRIMARY) HYPERTENSION: Status: ACTIVE | Noted: 2020-06-30

## 2020-06-30 PROBLEM — D22.5 MELANOCYTIC NEVI OF TRUNK: Status: ACTIVE | Noted: 2020-06-30

## 2020-06-30 PROBLEM — D48.5 NEOPLASM OF UNCERTAIN BEHAVIOR OF SKIN: Status: ACTIVE | Noted: 2020-06-30

## 2020-06-30 PROBLEM — J30.1 ALLERGIC RHINITIS DUE TO POLLEN: Status: ACTIVE | Noted: 2020-06-30

## 2020-06-30 PROBLEM — E78.5 HYPERLIPIDEMIA, UNSPECIFIED: Status: ACTIVE | Noted: 2020-06-30

## 2020-06-30 PROCEDURE — ? BIOPSY BY SHAVE METHOD

## 2020-06-30 PROCEDURE — 99213 OFFICE O/P EST LOW 20 MIN: CPT | Mod: 25

## 2020-06-30 PROCEDURE — 69100 BIOPSY OF EXTERNAL EAR: CPT

## 2020-06-30 PROCEDURE — ? COUNSELING

## 2020-06-30 ASSESSMENT — LOCATION DETAILED DESCRIPTION DERM
LOCATION DETAILED: LEFT SUPERIOR MEDIAL BUCCAL CHEEK
LOCATION DETAILED: LEFT DORSAL WRIST
LOCATION DETAILED: RIGHT CENTRAL TEMPLE
LOCATION DETAILED: LEFT BUTTOCK
LOCATION DETAILED: RIGHT SUPERIOR MEDIAL UPPER BACK
LOCATION DETAILED: SUPERIOR THORACIC SPINE
LOCATION DETAILED: LEFT FOREHEAD
LOCATION DETAILED: UPPER STERNUM
LOCATION DETAILED: LEFT INFERIOR MEDIAL UPPER BACK
LOCATION DETAILED: RIGHT DISTAL PRETIBIAL REGION
LOCATION DETAILED: RIGHT CENTRAL BUCCAL CHEEK
LOCATION DETAILED: RIGHT PROXIMAL DORSAL FOREARM
LOCATION DETAILED: LEFT CENTRAL BUCCAL CHEEK
LOCATION DETAILED: RIGHT ANTIHELIX
LOCATION DETAILED: LEFT INFERIOR FOREHEAD
LOCATION DETAILED: LEFT DISTAL DORSAL FOREARM

## 2020-06-30 ASSESSMENT — LOCATION SIMPLE DESCRIPTION DERM
LOCATION SIMPLE: LEFT WRIST
LOCATION SIMPLE: RIGHT EAR
LOCATION SIMPLE: RIGHT PRETIBIAL REGION
LOCATION SIMPLE: RIGHT UPPER BACK
LOCATION SIMPLE: LEFT FOREHEAD
LOCATION SIMPLE: LEFT FOREARM
LOCATION SIMPLE: LEFT CHEEK
LOCATION SIMPLE: RIGHT FOREARM
LOCATION SIMPLE: LEFT BUTTOCK
LOCATION SIMPLE: LEFT UPPER BACK
LOCATION SIMPLE: UPPER BACK
LOCATION SIMPLE: RIGHT CHEEK
LOCATION SIMPLE: CHEST
LOCATION SIMPLE: RIGHT TEMPLE

## 2020-06-30 ASSESSMENT — LOCATION ZONE DERM
LOCATION ZONE: TRUNK
LOCATION ZONE: FACE
LOCATION ZONE: EAR
LOCATION ZONE: LEG
LOCATION ZONE: ARM

## 2020-06-30 NOTE — PROCEDURE: BIOPSY BY SHAVE METHOD
Validate Note Data (See Information Below): Yes
Anesthesia Volume In Cc: 0.5
Hide Accession Number?: No
Electrodesiccation Text: The wound bed was treated with electrodesiccation after the biopsy was performed.
Silver Nitrate Text: The wound bed was treated with silver nitrate after the biopsy was performed.
Accession #: [id number]
Wound Care: Petrolatum
Hemostasis: Aluminum Chloride
Billing Type: Third-Party Bill
Curettage Text: The wound bed was treated with curettage after the biopsy was performed.
Size Of Lesion In Cm: 0
Information: Selecting Yes will display possible errors in your note based on the variables you have selected. This validation is only offered as a suggestion for you. PLEASE NOTE THAT THE VALIDATION TEXT WILL BE REMOVED WHEN YOU FINALIZE YOUR NOTE. IF YOU WANT TO FAX A PRELIMINARY NOTE YOU WILL NEED TO TOGGLE THIS TO 'NO' IF YOU DO NOT WANT IT IN YOUR FAXED NOTE.
Cryotherapy Text: The wound bed was treated with cryotherapy after the biopsy was performed.
Detail Level: Detailed
Dressing: bandage
Type Of Destruction Used: Curettage
Electrodesiccation And Curettage Text: The wound bed was treated with electrodesiccation and curettage after the biopsy was performed.
Anesthesia Type: 1% lidocaine with 1:100,000 epinephrine and a 1:6 solution of 8.4% sodium bicarbonate
Biopsy Method: Dermablade
Depth Of Biopsy: dermis
Biopsy Type: H and E

## 2020-09-01 ENCOUNTER — APPOINTMENT (RX ONLY)
Dept: URBAN - METROPOLITAN AREA CLINIC 23 | Facility: CLINIC | Age: 80
Setting detail: DERMATOLOGY
End: 2020-09-01

## 2020-09-01 PROBLEM — C44.212 BASAL CELL CARCINOMA OF SKIN OF RIGHT EAR AND EXTERNAL AURICULAR CANAL: Status: ACTIVE | Noted: 2020-09-01

## 2020-09-01 PROBLEM — L55.1 SUNBURN OF SECOND DEGREE: Status: ACTIVE | Noted: 2020-09-01

## 2020-09-01 PROCEDURE — 13152 CMPLX RPR E/N/E/L 2.6-7.5 CM: CPT

## 2020-09-01 PROCEDURE — 17311 MOHS 1 STAGE H/N/HF/G: CPT

## 2020-09-01 PROCEDURE — ? MOHS SURGERY

## 2020-09-01 PROCEDURE — 17312 MOHS ADDL STAGE: CPT

## 2020-09-01 NOTE — PROCEDURE: MOHS SURGERY
Quadrants Reporting?: 0
O-Z Plasty Text: The defect edges were debeveled with a #15 scalpel blade.  Given the location of the defect, shape of the defect and the proximity to free margins an O-Z plasty (double transposition flap) was deemed most appropriate.  Using a sterile surgical marker, the appropriate transposition flaps were drawn incorporating the defect and placing the expected incisions within the relaxed skin tension lines where possible.    The area thus outlined was incised deep to adipose tissue with a #15 scalpel blade.  The skin margins were undermined to an appropriate distance in all directions utilizing iris scissors.  Hemostasis was achieved with electrocautery.  The flaps were then transposed into place, one clockwise and the other counterclockwise, and anchored with interrupted buried subcutaneous sutures.
Closure 3 Information: This tab is for additional flaps and grafts above and beyond our usual structured repairs.  Please note if you enter information here it will not currently bill and you will need to add the billing information manually.
Oculoplastic Surgeon Procedure Text (C): After obtaining clear surgical margins the patient was sent to oculoplastics for surgical repair.  The patient understands they will receive post-surgical care and follow-up from the referring physician's office.
Asc Procedure Text (A): After obtaining clear surgical margins the patient was sent to an ASC for surgical repair.  The patient understands they will receive post-surgical care and follow-up from the ASC physician.
Quadrant Reporting?: no
Hatchet Flap Text: The defect edges were debeveled with a #15 scalpel blade.  Given the location of the defect, shape of the defect and the proximity to free margins a hatchet flap was deemed most appropriate.  Using a sterile surgical marker, an appropriate hatchet flap was drawn incorporating the defect and placing the expected incisions within the relaxed skin tension lines where possible.    The area thus outlined was incised deep to adipose tissue with a #15 scalpel blade.  The skin margins were undermined to an appropriate distance in all directions utilizing iris scissors.
Surgeon/Pathologist Verbiage (Will Incorporate Name Of Surgeon From Intro If Not Blank): operated in two distinct and integrated capacities as the surgeon and pathologist.
Surgeon: Martin Carrizales MD
Rhomboid Transposition Flap Text: The defect edges were debeveled with a #15 scalpel blade.  Given the location of the defect and the proximity to free margins a rhomboid transposition flap was deemed most appropriate.  Using a sterile surgical marker, an appropriate rhomboid flap was drawn incorporating the defect.    The area thus outlined was incised deep to adipose tissue with a #15 scalpel blade.  The skin margins were undermined to an appropriate distance in all directions utilizing iris scissors.
Intermediate Repair Preamble Text (Leave Blank If You Do Not Want): Undermining was performed with blunt dissection.
Stage 3: Additional Anesthesia Type: 1% lidocaine with epinephrine
Subsequent Stages Histo Method Verbiage: Using a similar technique to that described above, a thin layer of tissue was removed from all areas where tumor was visible on the previous stage.  The tissue was again oriented, mapped, dyed, and processed as above.
Mid-Level Procedure Text (B): After obtaining clear surgical margins the patient was sent to a mid-level provider for surgical repair.  The patient understands they will receive post-surgical care and follow-up from the mid-level provider.
Show Special Stain Variables In The Stage Tabs: Yes
Alar Island Pedicle Flap Text: The defect edges were debeveled with a #15 scalpel blade.  Given the location of the defect, shape of the defect and the proximity to the alar rim an island pedicle advancement flap was deemed most appropriate.  Using a sterile surgical marker, an appropriate advancement flap was drawn incorporating the defect, outlining the appropriate donor tissue and placing the expected incisions within the nasal ala running parallel to the alar rim. The area thus outlined was incised with a #15 scalpel blade.  The skin margins were undermined minimally to an appropriate distance in all directions around the primary defect and laterally outward around the island pedicle utilizing iris scissors.  There was minimal undermining beneath the pedicle flap.
Retention Suture Text: Retention sutures were placed to support the closure and prevent dehiscence.
Bilobed Flap Text: The defect edges were debeveled with a #15 scalpel blade.  Given the location of the defect and the proximity to free margins a bilobe flap was deemed most appropriate.  Using a sterile surgical marker, an appropriate bilobe flap drawn around the defect.    The area thus outlined was incised deep to adipose tissue with a #15 scalpel blade.  The skin margins were undermined to an appropriate distance in all directions utilizing iris scissors.
Island Pedicle Flap Text: The defect edges were debeveled with a #15 scalpel blade.  Given the location of the defect, shape of the defect and the proximity to free margins an island pedicle advancement flap was deemed most appropriate.  Using a sterile surgical marker, an appropriate advancement flap was drawn incorporating the defect, outlining the appropriate donor tissue and placing the expected incisions within the relaxed skin tension lines where possible.    The area thus outlined was incised deep to adipose tissue with a #15 scalpel blade.  The skin margins were undermined to an appropriate distance in all directions around the primary defect and laterally outward around the island pedicle utilizing iris scissors.  There was minimal undermining beneath the pedicle flap.
Hemigard Postcare Instructions: The HEMIGARD strips are to remain completely dry for at least 5-7 days.
Bcc Histology Text: There were numerous aggregates of basaloid cells.
O-T Advancement Flap Text: The defect edges were debeveled with a #15 scalpel blade.  Given the location of the defect, shape of the defect and the proximity to free margins an O-T advancement flap was deemed most appropriate.  Using a sterile surgical marker, an appropriate advancement flap was drawn incorporating the defect and placing the expected incisions within the relaxed skin tension lines where possible.    The area thus outlined was incised deep to adipose tissue with a #15 scalpel blade.  The skin margins were undermined to an appropriate distance in all directions utilizing iris scissors.
Plastic Surgeon Procedure Text (A): After obtaining clear surgical margins the patient was sent to plastics for surgical repair.  The patient understands they will receive post-surgical care and follow-up from the referring physician's office.
Special Stains Stage 1 - Results: Base On Clearance Noted Above
V-Y Flap Text: The defect edges were debeveled with a #15 scalpel blade.  Given the location of the defect, shape of the defect and the proximity to free margins a V-Y flap was deemed most appropriate.  Using a sterile surgical marker, an appropriate advancement flap was drawn incorporating the defect and placing the expected incisions within the relaxed skin tension lines where possible.    The area thus outlined was incised deep to adipose tissue with a #15 scalpel blade.  The skin margins were undermined to an appropriate distance in all directions utilizing iris scissors.
Referring Physician (Optional): Tee
Mart-1 - Negative Histology Text: MART-1 staining demonstrates a normal density and pattern of melanocytes along the dermal-epidermal junction. The surgical margins are negative for tumor cells.
Bilateral Helical Rim Advancement Flap Text: The defect edges were debeveled with a #15 blade scalpel.  Given the location of the defect and the proximity to free margins (helical rim) a bilateral helical rim advancement flap was deemed most appropriate.  Using a sterile surgical marker, the appropriate advancement flaps were drawn incorporating the defect and placing the expected incisions between the helical rim and antihelix where possible.  The area thus outlined was incised through and through with a #15 scalpel blade.  With a skin hook and iris scissors, the flaps were gently and sharply undermined and freed up.
Burow's Graft Text: The defect edges were debeveled with a #15 scalpel blade.  Given the location of the defect, shape of the defect, the proximity to free margins and the presence of a standing cone deformity a Burow's skin graft was deemed most appropriate. The standing cone was removed and this tissue was then trimmed to the shape of the primary defect. The adipose tissue was also removed until only dermis and epidermis were left.  The skin margins of the secondary defect were undermined to an appropriate distance in all directions utilizing iris scissors.  The secondary defect was closed with interrupted buried subcutaneous sutures.  The skin edges were then re-apposed with running  sutures.  The skin graft was then placed in the primary defect and oriented appropriately.
Otolaryngologist Procedure Text (B): After obtaining clear surgical margins the patient was sent to otolaryngology for surgical repair.  The patient understands they will receive post-surgical care and follow-up from the referring physician's office.
Paramedian Forehead Flap Text: A decision was made to reconstruct the defect utilizing an interpolation axial flap and a staged reconstruction.  A telfa template was made of the defect.  This telfa template was then used to outline the paramedian forehead pedicle flap.  The donor area for the pedicle flap was then injected with anesthesia.  The flap was excised through the skin and subcutaneous tissue down to the layer of the underlying musculature.  The pedicle flap was carefully excised within this deep plane to maintain its blood supply.  The edges of the donor site were undermined.   The donor site was closed in a primary fashion.  The pedicle was then rotated into position and sutured.  Once the tube was sutured into place, adequate blood supply was confirmed with blanching and refill.  The pedicle was then wrapped with xeroform gauze and dressed appropriately with a telfa and gauze bandage to ensure continued blood supply and protect the attached pedicle.
Epidermal Autograft Text: The defect edges were debeveled with a #15 scalpel blade.  Given the location of the defect, shape of the defect and the proximity to free margins an epidermal autograft was deemed most appropriate.  Using a sterile surgical marker, the primary defect shape was transferred to the donor site. The epidermal graft was then harvested.  The skin graft was then placed in the primary defect and oriented appropriately.
Z Plasty Text: The lesion was extirpated to the level of the fat with a #15 scalpel blade.  Given the location of the defect, shape of the defect and the proximity to free margins a Z-plasty was deemed most appropriate for repair.  Using a sterile surgical marker, the appropriate transposition arms of the Z-plasty were drawn incorporating the defect and placing the expected incisions within the relaxed skin tension lines where possible.    The area thus outlined was incised deep to adipose tissue with a #15 scalpel blade.  The skin margins were undermined to an appropriate distance in all directions utilizing iris scissors.  The opposing transposition arms were then transposed into place in opposite direction and anchored with interrupted buried subcutaneous sutures.
Provider Procedure Text (A): After obtaining clear surgical margins the defect was repaired by another provider.
Epidermal Closure: running and interrupted
Previous Accession (Optional): [id number]
Partial Purse String (Simple) Text: Given the location of the defect and the characteristics of the surrounding skin a simple purse string closure was deemed most appropriate.  Undermining was performed circumfirentially around the surgical defect.  A purse string suture was then placed and tightened. Wound tension only allowed a partial closure of the circular defect.
Was The Patient On Physician Recommended Anticoagulation Therapy?: Please Select the Appropriate Response
A-T Advancement Flap Text: The defect edges were debeveled with a #15 scalpel blade.  Given the location of the defect, shape of the defect and the proximity to free margins an A-T advancement flap was deemed most appropriate.  Using a sterile surgical marker, an appropriate advancement flap was drawn incorporating the defect and placing the expected incisions within the relaxed skin tension lines where possible.    The area thus outlined was incised deep to adipose tissue with a #15 scalpel blade.  The skin margins were undermined to an appropriate distance in all directions utilizing iris scissors.
No Repair - Repaired With Adjacent Surgical Defect Text (Leave Blank If You Do Not Want): After obtaining clear surgical margins the defect was repaired concurrently with another surgical defect which was in close approximation.
Estimated Blood Loss (Cc): minimal
Where Do You Want The Question To Include Opioid Counseling Located?: Case Summary Tab
H Plasty Text: Given the location of the defect, shape of the defect and the proximity to free margins a H-plasty was deemed most appropriate for repair.  Using a sterile surgical marker, the appropriate advancement arms of the H-plasty were drawn incorporating the defect and placing the expected incisions within the relaxed skin tension lines where possible. The area thus outlined was incised deep to adipose tissue with a #15 scalpel blade. The skin margins were undermined to an appropriate distance in all directions utilizing iris scissors.  The opposing advancement arms were then advanced into place in opposite direction and anchored with interrupted buried subcutaneous sutures.
Donor Site Anesthesia Type: same as repair anesthesia
Unna Boot Text: An Unna boot was placed to help immobilize the limb and facilitate more rapid healing.
Suturegard Intro: Intraoperative tissue expansion was performed, utilizing the SUTUREGARD device, in order to reduce wound tension.
Anesthesia Volume In Cc: 2
Complex Requirements: Involvement Of Free Margin?: Helical Rim
Epidermal Closure Graft Donor Site (Optional): running
Crescentic Complex Repair Preamble Text (Leave Blank If You Do Not Want): Extensive wide undermining was performed.
Consent 1/Introductory Paragraph: The rationale for Mohs was explained to the patient and consent was obtained. The risks, benefits and alternatives to therapy were discussed in detail. Specifically, the risks of infection, scarring, bleeding, prolonged wound healing, incomplete removal, allergy to anesthesia, nerve injury (both sensory which can be permanent and motor which is permanent) and recurrence were addressed. Prior to the procedure, the treatment site was clearly identified and confirmed by the patient.
Spiral Flap Text: The defect edges were debeveled with a #15 scalpel blade.  Given the location of the defect, shape of the defect and the proximity to free margins a spiral flap was deemed most appropriate.  Using a sterile surgical marker, an appropriate rotation flap was drawn incorporating the defect and placing the expected incisions within the relaxed skin tension lines where possible. The area thus outlined was incised deep to adipose tissue with a #15 scalpel blade.  The skin margins were undermined to an appropriate distance in all directions utilizing iris scissors.
Crescentic Advancement Flap Text: The defect edges were debeveled with a #15 scalpel blade.  Given the location of the defect and the proximity to free margins a crescentic advancement flap was deemed most appropriate.  Using a sterile surgical marker, the appropriate advancement flap was drawn incorporating the defect and placing the expected incisions within the relaxed skin tension lines where possible.    The area thus outlined was incised deep to adipose tissue with a #15 scalpel blade.  The skin margins were undermined to an appropriate distance in all directions utilizing iris scissors.
Consent 2/Introductory Paragraph: Mohs surgery was explained to the patient and consent was obtained. The risks, benefits and alternatives to therapy were discussed in detail. Specifically, the risks of infection, scarring, bleeding, prolonged wound healing, incomplete removal, allergy to anesthesia, nerve injury and recurrence were addressed. Prior to the procedure, the treatment site was clearly identified and confirmed by the patient. All components of Universal Protocol/PAUSE Rule completed.
Ear Wedge Repair Text: A wedge excision was completed by carrying down an excision through the full thickness of the ear and cartilage with an inward facing Burow's triangle. The wound was then closed in a layered fashion.
Rotation Flap Text: The defect edges were debeveled with a #15 scalpel blade.  Given the location of the defect, shape of the defect and the proximity to free margins a rotation flap was deemed most appropriate.  Using a sterile surgical marker, an appropriate rotation flap was drawn incorporating the defect and placing the expected incisions within the relaxed skin tension lines where possible.    The area thus outlined was incised deep to adipose tissue with a #15 scalpel blade.  The skin margins were undermined to an appropriate distance in all directions utilizing iris scissors.
Interpolation Flap Text: A decision was made to reconstruct the defect utilizing an interpolation axial flap and a staged reconstruction.  A telfa template was made of the defect.  This telfa template was then used to outline the interpolation flap.  The donor area for the pedicle flap was then injected with anesthesia.  The flap was excised through the skin and subcutaneous tissue down to the layer of the underlying musculature.  The interpolation flap was carefully excised within this deep plane to maintain its blood supply.  The edges of the donor site were undermined.   The donor site was closed in a primary fashion.  The pedicle was then rotated into position and sutured.  Once the tube was sutured into place, adequate blood supply was confirmed with blanching and refill.  The pedicle was then wrapped with xeroform gauze and dressed appropriately with a telfa and gauze bandage to ensure continued blood supply and protect the attached pedicle.
Anesthesia Type: 1% lidocaine with 1:100,000 epinephrine
Complex Repair And Graft Additional Text (Will Appearing After The Standard Complex Repair Text): The complex repair was not sufficient to completely close the primary defect. The remaining additional defect was repaired with the graft mentioned below.
Transposition Flap Text: The defect edges were debeveled with a #15 scalpel blade.  Given the location of the defect and the proximity to free margins a transposition flap was deemed most appropriate.  Using a sterile surgical marker, an appropriate transposition flap was drawn incorporating the defect.    The area thus outlined was incised deep to adipose tissue with a #15 scalpel blade.  The skin margins were undermined to an appropriate distance in all directions utilizing iris scissors.
Location Indication Override (Is Already Calculated Based On Selected Body Location): Area H
Area L Indication Text: Tumors in this location are included in Area L (trunk and extremities).  Mohs surgery is indicated for larger tumors, 2 cm or larger, in these anatomic locations.
Peng Advancement Flap Text: The defect edges were debeveled with a #15 scalpel blade.  Given the location of the defect, shape of the defect and the proximity to free margins a Peng advancement flap was deemed most appropriate.  Using a sterile surgical marker, an appropriate advancement flap was drawn incorporating the defect and placing the expected incisions within the relaxed skin tension lines where possible. The area thus outlined was incised deep to adipose tissue with a #15 scalpel blade.  The skin margins were undermined to an appropriate distance in all directions utilizing iris scissors.
Mohs Method Verbiage: An incision at a 45 degree angle following the standard Mohs approach was done and the specimen was harvested as a microscopic controlled layer.
Area H Indication Text: Tumors in this location are included in Area H (eyelids, eyebrows, nose, lips, chin, ear, pre-auricular, post-auricular, temple, genitalia, hands, feet, ankles and areola).  Tissue conservation is critical in these anatomic locations.
Advancement-Rotation Flap Text: The defect edges were debeveled with a #15 scalpel blade.  Given the location of the defect, shape of the defect and the proximity to free margins an advancement-rotation flap was deemed most appropriate.  Using a sterile surgical marker, an appropriate flap was drawn incorporating the defect and placing the expected incisions within the relaxed skin tension lines where possible. The area thus outlined was incised deep to adipose tissue with a #15 scalpel blade.  The skin margins were undermined to an appropriate distance in all directions utilizing iris scissors.
Consent (Nose)/Introductory Paragraph: The rationale for Mohs was explained to the patient and consent was obtained. The risks, benefits and alternatives to therapy were discussed in detail. Specifically, the risks of nasal deformity, changes in the flow of air through the nose, infection, scarring, bleeding, prolonged wound healing, incomplete removal, allergy to anesthesia, nerve injury and recurrence were addressed. Prior to the procedure, the treatment site was clearly identified and confirmed by the patient. All components of Universal Protocol/PAUSE Rule completed.
Bi-Rhombic Flap Text: The defect edges were debeveled with a #15 scalpel blade.  Given the location of the defect and the proximity to free margins a bi-rhombic flap was deemed most appropriate.  Using a sterile surgical marker, an appropriate rhombic flap was drawn incorporating the defect. The area thus outlined was incised deep to adipose tissue with a #15 scalpel blade.  The skin margins were undermined to an appropriate distance in all directions utilizing iris scissors.
Advancement Flap (Double) Text: The defect edges were debeveled with a #15 scalpel blade.  Given the location of the defect and the proximity to free margins a double advancement flap was deemed most appropriate.  Using a sterile surgical marker, the appropriate advancement flaps were drawn incorporating the defect and placing the expected incisions within the relaxed skin tension lines where possible.    The area thus outlined was incised deep to adipose tissue with a #15 scalpel blade.  The skin margins were undermined to an appropriate distance in all directions utilizing iris scissors.
Tarsorrhaphy Text: A tarsorrhaphy was performed using Frost sutures.
Ear Star Wedge Flap Text: The defect edges were debeveled with a #15 blade scalpel.  Given the location of the defect and the proximity to free margins (helical rim) an ear star wedge flap was deemed most appropriate.  Using a sterile surgical marker, the appropriate flap was drawn incorporating the defect and placing the expected incisions between the helical rim and antihelix where possible.  The area thus outlined was incised through and through with a #15 scalpel blade.
Cartilage Graft Text: The defect edges were debeveled with a #15 scalpel blade.  Given the location of the defect, shape of the defect, the fact the defect involved a full thickness cartilage defect a cartilage graft was deemed most appropriate.  An appropriate donor site was identified, cleansed, and anesthetized. The cartilage graft was then harvested and transferred to the recipient site, oriented appropriately and then sutured into place.  The secondary defect was then repaired using a primary closure.
Consent Type: Consent 1 (Standard)
Bcc Infiltrative Histology Text: There were numerous aggregates of basaloid cells demonstrating an infiltrative pattern.
Surgeon Performing Repair (Optional): Dr Carrizales
Anesthesia Volume In Cc: 2.5
Hemostasis: Electrocautery
Dressing (No Sutures): dry sterile dressing
Cheiloplasty (Less Than 50%) Text: A decision was made to reconstruct the defect with a  cheiloplasty.  The defect was undermined extensively.  Additional obicularis oris muscle was excised with a 15 blade scalpel.  The defect was converted into a full thickness wedge, of less than 50% of the vertical height of the lip, to facilite a better cosmetic result.  Small vessels were then tied off with 5-0 monocyrl. The obicularis oris, superficial fascia, adipose and dermis were then reapproximated.  After the deeper layers were approximated the epidermis was reapproximated with particular care given to realign the vermillion border.
Cheek Interpolation Flap Text: A decision was made to reconstruct the defect utilizing an interpolation axial flap and a staged reconstruction.  A telfa template was made of the defect.  This telfa template was then used to outline the Cheek Interpolation flap.  The donor area for the pedicle flap was then injected with anesthesia.  The flap was excised through the skin and subcutaneous tissue down to the layer of the underlying musculature.  The interpolation flap was carefully excised within this deep plane to maintain its blood supply.  The edges of the donor site were undermined.   The donor site was closed in a primary fashion.  The pedicle was then rotated into position and sutured.  Once the tube was sutured into place, adequate blood supply was confirmed with blanching and refill.  The pedicle was then wrapped with xeroform gauze and dressed appropriately with a telfa and gauze bandage to ensure continued blood supply and protect the attached pedicle.
Alternatives Discussed Intro (Do Not Add Period): I discussed alternative treatments to Mohs surgery and specifically discussed the risks and benefits of
Information: Selecting Yes will display possible errors in your note based on the variables you have selected. This validation is only offered as a suggestion for you. PLEASE NOTE THAT THE VALIDATION TEXT WILL BE REMOVED WHEN YOU FINALIZE YOUR NOTE. IF YOU WANT TO FAX A PRELIMINARY NOTE YOU WILL NEED TO TOGGLE THIS TO 'NO' IF YOU DO NOT WANT IT IN YOUR FAXED NOTE.
Number Of Stages: 3
Epidermal Sutures: 5-0 Fast Absorbing Gut
V-Y Plasty Text: The defect edges were debeveled with a #15 scalpel blade.  Given the location of the defect, shape of the defect and the proximity to free margins an V-Y advancement flap was deemed most appropriate.  Using a sterile surgical marker, an appropriate advancement flap was drawn incorporating the defect and placing the expected incisions within the relaxed skin tension lines where possible.    The area thus outlined was incised deep to adipose tissue with a #15 scalpel blade.  The skin margins were undermined to an appropriate distance in all directions utilizing iris scissors.
Helical Rim Text: The closure involved the helical rim.
Partial Purse String (Intermediate) Text: Given the location of the defect and the characteristics of the surrounding skin an intermediate purse string closure was deemed most appropriate.  Undermining was performed circumfirentially around the surgical defect.  A purse string suture was then placed and tightened. Wound tension only allowed a partial closure of the circular defect.
Dermal Autograft Text: The defect edges were debeveled with a #15 scalpel blade.  Given the location of the defect, shape of the defect and the proximity to free margins a dermal autograft was deemed most appropriate.  Using a sterile surgical marker, the primary defect shape was transferred to the donor site. The area thus outlined was incised deep to adipose tissue with a #15 scalpel blade.  The harvested graft was then trimmed of adipose and epidermal tissue until only dermis was left.  The skin graft was then placed in the primary defect and oriented appropriately.
Island Pedicle Flap-Requiring Vessel Identification Text: The defect edges were debeveled with a #15 scalpel blade.  Given the location of the defect, shape of the defect and the proximity to free margins an island pedicle advancement flap was deemed most appropriate.  Using a sterile surgical marker, an appropriate advancement flap was drawn, based on the axial vessel mentioned above, incorporating the defect, outlining the appropriate donor tissue and placing the expected incisions within the relaxed skin tension lines where possible.    The area thus outlined was incised deep to adipose tissue with a #15 scalpel blade.  The skin margins were undermined to an appropriate distance in all directions around the primary defect and laterally outward around the island pedicle utilizing iris scissors.  There was minimal undermining beneath the pedicle flap.
Home Suture Removal Text: Patient was provided instructions on removing sutures and will remove their sutures at home.  If they have any questions or difficulties they will call the office.
O-L Flap Text: The defect edges were debeveled with a #15 scalpel blade.  Given the location of the defect, shape of the defect and the proximity to free margins an O-L flap was deemed most appropriate.  Using a sterile surgical marker, an appropriate advancement flap was drawn incorporating the defect and placing the expected incisions within the relaxed skin tension lines where possible.    The area thus outlined was incised deep to adipose tissue with a #15 scalpel blade.  The skin margins were undermined to an appropriate distance in all directions utilizing iris scissors.
Same Histology In Subsequent Stages Text: The pattern and morphology of the tumor is as described in the first stage.
Graft Donor Site Epidermal Sutures (Optional): 5-0 Prolene
Muscle Hinge Flap Text: The defect edges were debeveled with a #15 scalpel blade.  Given the size, depth and location of the defect and the proximity to free margins a muscle hinge flap was deemed most appropriate.  Using a sterile surgical marker, an appropriate hinge flap was drawn incorporating the defect. The area thus outlined was incised with a #15 scalpel blade.  The skin margins were undermined to an appropriate distance in all directions utilizing iris scissors.
Graft Donor Site Dermal Sutures (Optional): 3-0 Vicryl
Full Thickness Lip Wedge Repair (Flap) Text: Given the location of the defect and the proximity to free margins a full thickness wedge repair was deemed most appropriate.  Using a sterile surgical marker, the appropriate repair was drawn incorporating the defect and placing the expected incisions perpendicular to the vermillion border.  The vermillion border was also meticulously outlined to ensure appropriate reapproximation during the repair.  The area thus outlined was incised through and through with a #15 scalpel blade.  The muscularis and dermis were reaproximated with deep sutures following hemostasis. Care was taken to realign the vermillion border before proceeding with the superficial closure.  Once the vermillion was realigned the superfical and mucosal closure was finished.
Melolabial Interpolation Flap Text: A decision was made to reconstruct the defect utilizing an interpolation axial flap and a staged reconstruction.  A telfa template was made of the defect.  This telfa template was then used to outline the melolabial interpolation flap.  The donor area for the pedicle flap was then injected with anesthesia.  The flap was excised through the skin and subcutaneous tissue down to the layer of the underlying musculature.  The pedicle flap was carefully excised within this deep plane to maintain its blood supply.  The edges of the donor site were undermined.   The donor site was closed in a primary fashion.  The pedicle was then rotated into position and sutured.  Once the tube was sutured into place, adequate blood supply was confirmed with blanching and refill.  The pedicle was then wrapped with xeroform gauze and dressed appropriately with a telfa and gauze bandage to ensure continued blood supply and protect the attached pedicle.
Wound Care (No Sutures): Petrolatum
Nostril Rim Text: The closure involved the nostril rim.
Xenograft Text: The defect edges were debeveled with a #15 scalpel blade.  Given the location of the defect, shape of the defect and the proximity to free margins a xenograft was deemed most appropriate.  The graft was then trimmed to fit the size of the defect.  The graft was then placed in the primary defect and oriented appropriately.
Consent (Spinal Accessory)/Introductory Paragraph: The rationale for Mohs was explained to the patient and consent was obtained. The risks, benefits and alternatives to therapy were discussed in detail. Specifically, the risks of damage to the spinal accessory nerve, infection, scarring, bleeding, prolonged wound healing, incomplete removal, allergy to anesthesia, and recurrence were addressed. Prior to the procedure, the treatment site was clearly identified and confirmed by the patient. All components of Universal Protocol/PAUSE Rule completed.
Complex Repair And Flap Additional Text (Will Appearing After The Standard Complex Repair Text): The complex repair was not sufficient to completely close the primary defect. The remaining additional defect was repaired with the flap mentioned below.
Trilobed Flap Text: The defect edges were debeveled with a #15 scalpel blade.  Given the location of the defect and the proximity to free margins a trilobed flap was deemed most appropriate.  Using a sterile surgical marker, an appropriate trilobed flap drawn around the defect.    The area thus outlined was incised deep to adipose tissue with a #15 scalpel blade.  The skin margins were undermined to an appropriate distance in all directions utilizing iris scissors.
Composite Graft Text: The defect edges were debeveled with a #15 scalpel blade.  Given the location of the defect, shape of the defect, the proximity to free margins and the fact the defect was full thickness a composite graft was deemed most appropriate.  The defect was outline and then transferred to the donor site.  A full thickness graft was then excised from the donor site. The graft was then placed in the primary defect, oriented appropriately and then sutured into place.  The secondary defect was then repaired using a primary closure.
Cheiloplasty (Complex) Text: A decision was made to reconstruct the defect with a  cheiloplasty.  The defect was undermined extensively.  Additional obicularis oris muscle was excised with a 15 blade scalpel.  The defect was converted into a full thickness wedge to facilite a better cosmetic result.  Small vessels were then tied off with 5-0 monocyrl. The obicularis oris, superficial fascia, adipose and dermis were then reapproximated.  After the deeper layers were approximated the epidermis was reapproximated with particular care given to realign the vermillion border.
Retention Suture Bite Size: 3 mm
Island Pedicle Flap With Canthal Suspension Text: The defect edges were debeveled with a #15 scalpel blade.  Given the location of the defect, shape of the defect and the proximity to free margins an island pedicle advancement flap was deemed most appropriate.  Using a sterile surgical marker, an appropriate advancement flap was drawn incorporating the defect, outlining the appropriate donor tissue and placing the expected incisions within the relaxed skin tension lines where possible. The area thus outlined was incised deep to adipose tissue with a #15 scalpel blade.  The skin margins were undermined to an appropriate distance in all directions around the primary defect and laterally outward around the island pedicle utilizing iris scissors.  There was minimal undermining beneath the pedicle flap. A suspension suture was placed in the canthal tendon to prevent tension and prevent ectropion.
Hemigard Retention Suture: 2-0 Nylon
Length To Time In Minutes Device Was In Place: 10
Mart-1 - Positive Histology Text: MART-1 staining demonstrates areas of higher density and clustering of melanocytes with Pagetoid spread upwards within the epidermis. The surgical margins are positive for tumor cells.
Cheek-To-Nose Interpolation Flap Text: A decision was made to reconstruct the defect utilizing an interpolation axial flap and a staged reconstruction.  A telfa template was made of the defect.  This telfa template was then used to outline the Cheek-To-Nose Interpolation flap.  The donor area for the pedicle flap was then injected with anesthesia.  The flap was excised through the skin and subcutaneous tissue down to the layer of the underlying musculature.  The interpolation flap was carefully excised within this deep plane to maintain its blood supply.  The edges of the donor site were undermined.   The donor site was closed in a primary fashion.  The pedicle was then rotated into position and sutured.  Once the tube was sutured into place, adequate blood supply was confirmed with blanching and refill.  The pedicle was then wrapped with xeroform gauze and dressed appropriately with a telfa and gauze bandage to ensure continued blood supply and protect the attached pedicle.
Mercedes Flap Text: The defect edges were debeveled with a #15 scalpel blade.  Given the location of the defect, shape of the defect and the proximity to free margins a Mercedes flap was deemed most appropriate.  Using a sterile surgical marker, an appropriate advancement flap was drawn incorporating the defect and placing the expected incisions within the relaxed skin tension lines where possible. The area thus outlined was incised deep to adipose tissue with a #15 scalpel blade.  The skin margins were undermined to an appropriate distance in all directions utilizing iris scissors.
Primary Defect Length In Cm (Final Defect Size - Required For Flaps/Grafts): 3.2
Localized Dermabrasion With Wire Brush Text: The patient was draped in routine manner.  Localized dermabrasion using 3 x 17 mm wire brush was performed in routine manner to papillary dermis. This spot dermabrasion is being performed to complete skin cancer reconstruction. It also will eliminate the other sun damaged precancerous cells that are known to be part of the regional effect of a lifetime's worth of sun exposure. This localized dermabrasion is therapeutic and should not be considered cosmetic in any regard.
Debridement Text: The wound edges were debrided prior to proceeding with the closure to facilitate wound healing.
Skin Substitute Text: The defect edges were debeveled with a #15 scalpel blade.  Given the location of the defect, shape of the defect and the proximity to free margins a skin substitute graft was deemed most appropriate.  The graft material was trimmed to fit the size of the defect. The graft was then placed in the primary defect and oriented appropriately.
Consent (Ear)/Introductory Paragraph: The rationale for Mohs was explained to the patient and consent was obtained. The risks, benefits and alternatives to therapy were discussed in detail. Specifically, the risks of ear deformity, infection, scarring, bleeding, prolonged wound healing, incomplete removal, allergy to anesthesia, nerve injury and recurrence were addressed. Prior to the procedure, the treatment site was clearly identified and confirmed by the patient. All components of Universal Protocol/PAUSE Rule completed.
Tumor Depth: Less than 6mm from granular layer and no invasion beyond the subcutaneous fat
Melolabial Transposition Flap Text: The defect edges were debeveled with a #15 scalpel blade.  Given the location of the defect and the proximity to free margins a melolabial flap was deemed most appropriate.  Using a sterile surgical marker, an appropriate melolabial transposition flap was drawn incorporating the defect.    The area thus outlined was incised deep to adipose tissue with a #15 scalpel blade.  The skin margins were undermined to an appropriate distance in all directions utilizing iris scissors.
Advancement Flap (Single) Text: The defect edges were debeveled with a #15 scalpel blade.  Given the location of the defect and the proximity to free margins a single advancement flap was deemed most appropriate.  Using a sterile surgical marker, an appropriate advancement flap was drawn incorporating the defect and placing the expected incisions within the relaxed skin tension lines where possible.    The area thus outlined was incised deep to adipose tissue with a #15 scalpel blade.  The skin margins were undermined to an appropriate distance in all directions utilizing iris scissors.
Mauc Instructions: By selecting yes to the question below the MAUC number will be added into the note.  This will be calculated automatically based on the diagnosis chosen, the size entered, the body zone selected (H,M,L) and the specific indications you chose. You will also have the option to override the Mohs AUC if you disagree with the automatically calculated number and this option is found in the Case Summary tab.
Simple / Intermediate / Complex Repair - Final Wound Length In Cm: 4.2
No Residual Tumor Seen Histology Text: There were no malignant cells seen in the sections examined.
Eye Protection Verbiage: Before proceeding with the stage, a plastic scleral shield was inserted. The globe was anesthetized with a few drops of 1% lidocaine with 1:100,000 epinephrine. Then, an appropriate sized scleral shield was chosen and coated with lacrilube ointment. The shield was gently inserted and left in place for the duration of each stage. After the stage was completed, the shield was gently removed.
Mastoid Interpolation Flap Text: A decision was made to reconstruct the defect utilizing an interpolation axial flap and a staged reconstruction.  A telfa template was made of the defect.  This telfa template was then used to outline the mastoid interpolation flap.  The donor area for the pedicle flap was then injected with anesthesia.  The flap was excised through the skin and subcutaneous tissue down to the layer of the underlying musculature.  The pedicle flap was carefully excised within this deep plane to maintain its blood supply.  The edges of the donor site were undermined.   The donor site was closed in a primary fashion.  The pedicle was then rotated into position and sutured.  Once the tube was sutured into place, adequate blood supply was confirmed with blanching and refill.  The pedicle was then wrapped with xeroform gauze and dressed appropriately with a telfa and gauze bandage to ensure continued blood supply and protect the attached pedicle.
Modified Advancement Flap Text: The defect edges were debeveled with a #15 scalpel blade.  Given the location of the defect, shape of the defect and the proximity to free margins a modified advancement flap was deemed most appropriate.  Using a sterile surgical marker, an appropriate advancement flap was drawn incorporating the defect and placing the expected incisions within the relaxed skin tension lines where possible.    The area thus outlined was incised deep to adipose tissue with a #15 scalpel blade.  The skin margins were undermined to an appropriate distance in all directions utilizing iris scissors.
O-T Plasty Text: The defect edges were debeveled with a #15 scalpel blade.  Given the location of the defect, shape of the defect and the proximity to free margins an O-T plasty was deemed most appropriate.  Using a sterile surgical marker, an appropriate O-T plasty was drawn incorporating the defect and placing the expected incisions within the relaxed skin tension lines where possible.    The area thus outlined was incised deep to adipose tissue with a #15 scalpel blade.  The skin margins were undermined to an appropriate distance in all directions utilizing iris scissors.
Dorsal Nasal Flap Text: The defect edges were debeveled with a #15 scalpel blade.  Given the location of the defect and the proximity to free margins a dorsal nasal flap was deemed most appropriate.  Using a sterile surgical marker, an appropriate dorsal nasal flap was drawn around the defect.    The area thus outlined was incised deep to adipose tissue with a #15 scalpel blade.  The skin margins were undermined to an appropriate distance in all directions utilizing iris scissors.
Ftsg Text: The defect edges were debeveled with a #15 scalpel blade.  Given the location of the defect, shape of the defect and the proximity to free margins a full thickness skin graft was deemed most appropriate.  Using a sterile surgical marker, the primary defect shape was transferred to the donor site. The area thus outlined was incised deep to adipose tissue with a #15 scalpel blade.  The harvested graft was then trimmed of adipose tissue until only dermis and epidermis was left.  The skin margins of the secondary defect were undermined to an appropriate distance in all directions utilizing iris scissors.  The secondary defect was closed with interrupted buried subcutaneous sutures.  The skin edges were then re-apposed with running  sutures.  The skin graft was then placed in the primary defect and oriented appropriately.
Purse String (Simple) Text: Given the location of the defect and the characteristics of the surrounding skin a pursestring closure was deemed most appropriate.  Undermining was performed circumfirentially around the surgical defect.  A purstring suture was then placed and tightened.
Non-Graft Cartilage Fenestration Text: The cartilage was fenestrated with a 2mm punch biopsy to help facilitate healing.
O-Z Flap Text: The defect edges were debeveled with a #15 scalpel blade.  Given the location of the defect, shape of the defect and the proximity to free margins an O-Z flap was deemed most appropriate.  Using a sterile surgical marker, an appropriate transposition flap was drawn incorporating the defect and placing the expected incisions within the relaxed skin tension lines where possible. The area thus outlined was incised deep to adipose tissue with a #15 scalpel blade.  The skin margins were undermined to an appropriate distance in all directions utilizing iris scissors.
Mohs Case Number: [id number]
Number Of Hemigard Strips Per Side: 1
Detail Level: Detailed
Consent (Lip)/Introductory Paragraph: The rationale for Mohs was explained to the patient and consent was obtained. The risks, benefits and alternatives to therapy were discussed in detail. Specifically, the risks of lip deformity, changes in the oral aperture, infection, scarring, bleeding, prolonged wound healing, incomplete removal, allergy to anesthesia, nerve injury and recurrence were addressed. Prior to the procedure, the treatment site was clearly identified and confirmed by the patient. All components of Universal Protocol/PAUSE Rule completed.
Consent (Scalp)/Introductory Paragraph: The rationale for Mohs was explained to the patient and consent was obtained. The risks, benefits and alternatives to therapy were discussed in detail. Specifically, the risks of changes in hair growth pattern secondary to repair, infection, scarring, bleeding, prolonged wound healing, incomplete removal, allergy to anesthesia, nerve injury and recurrence were addressed. Prior to the procedure, the treatment site was clearly identified and confirmed by the patient. All components of Universal Protocol/PAUSE Rule completed.
Post-Care Instructions: I reviewed with the patient in detail post-care instructions. Patient is not to engage in any heavy lifting, exercise, or swimming for the next 14 days. Should the patient develop any fevers, chills, bleeding, severe pain patient will contact the office immediately.
Tumor Debulked?: curette
Area M Indication Text: Tumors in this location are included in Area M (cheek, forehead, scalp, neck, jawline and pretibial skin).  Mohs surgery is indicated for tumors 1 cm or larger in these anatomic locations.
Mucosal Advancement Flap Text: Given the location of the defect, shape of the defect and the proximity to free margins a mucosal advancement flap was deemed most appropriate. Incisions were made with a 15 blade scalpel in the appropriate fashion along the cutaneous vermillion border and the mucosal lip. The remaining actinically damaged mucosal tissue was excised.  The mucosal advancement flap was then elevated to the gingival sulcus with care taken to preserve the neurovascular structures and advanced into the primary defect. Care was taken to ensure that precise realignment of the vermillion border was achieved.
W Plasty Text: The lesion was extirpated to the level of the fat with a #15 scalpel blade.  Given the location of the defect, shape of the defect and the proximity to free margins a W-plasty was deemed most appropriate for repair.  Using a sterile surgical marker, the appropriate transposition arms of the W-plasty were drawn incorporating the defect and placing the expected incisions within the relaxed skin tension lines where possible.    The area thus outlined was incised deep to adipose tissue with a #15 scalpel blade.  The skin margins were undermined to an appropriate distance in all directions utilizing iris scissors.  The opposing transposition arms were then transposed into place in opposite direction and anchored with interrupted buried subcutaneous sutures.
Manual Repair Warning Statement: We plan on removing the manually selected variable below in favor of our much easier automatic structured text blocks found in the previous tab. We decided to do this to help make the flow better and give you the full power of structured data. Manual selection is never going to be ideal in our platform and I would encourage you to avoid using manual selection from this point on, especially since I will be sunsetting this feature. It is important that you do one of two things with the customized text below. First, you can save all of the text in a word file so you can have it for future reference. Second, transfer the text to the appropriate area in the Library tab. Lastly, if there is a flap or graft type which we do not have you need to let us know right away so I can add it in before the variable is hidden. No need to panic, we plan to give you roughly 6 months to make the change.
Repair Anesthesia Method: local infiltration
Primary Defect Width In Cm (Final Defect Size - Required For Flaps/Grafts): 1.1
Postop Diagnosis: same
Secondary Intention Text (Leave Blank If You Do Not Want): The defect will heal with secondary intention.
Hemigard Intro: Due to skin fragility and wound tension, it was decided to use HEMIGARD adhesive retention suture devices to permit a linear closure. The skin was cleaned and dried for a 6cm distance away from the wound. Excessive hair, if present, was removed to allow for adhesion.
Deep Sutures: 5-0 Vicryl
Closure 2 Information: This tab is for additional flaps and grafts, including complex repair and grafts and complex repair and flaps. You can also specify a different location for the additional defect, if the location is the same you do not need to select a new one. We will insert the automated text for the repair you select below just as we do for solitary flaps and grafts. Please note that at this time if you select a location with a different insurance zone you will need to override the ICD10 and CPT if appropriate.
Bilobed Transposition Flap Text: The defect edges were debeveled with a #15 scalpel blade.  Given the location of the defect and the proximity to free margins a bilobed transposition flap was deemed most appropriate.  Using a sterile surgical marker, an appropriate bilobe flap drawn around the defect.    The area thus outlined was incised deep to adipose tissue with a #15 scalpel blade.  The skin margins were undermined to an appropriate distance in all directions utilizing iris scissors.
Consent 3/Introductory Paragraph: I gave the patient a chance to ask questions they had about the procedure.  Following this I explained the Mohs procedure and consent was obtained. The risks, benefits and alternatives to therapy were discussed in detail. Specifically, the risks of infection, scarring, bleeding, prolonged wound healing, incomplete removal, allergy to anesthesia, nerve injury and recurrence were addressed. Prior to the procedure, the treatment site was clearly identified and confirmed by the patient. All components of Universal Protocol/PAUSE Rule completed.
Tissue Cultured Epidermal Autograft Text: The defect edges were debeveled with a #15 scalpel blade.  Given the location of the defect, shape of the defect and the proximity to free margins a tissue cultured epidermal autograft was deemed most appropriate.  The graft was then trimmed to fit the size of the defect.  The graft was then placed in the primary defect and oriented appropriately.
Mohs Rapid Report Verbiage: The area of clinically evident tumor was marked with skin marking ink and appropriately hatched.  The initial incision was made following the Mohs approach through the skin.  The specimen was taken to the lab, divided into the necessary number of pieces, chromacoded and processed according to the Mohs protocol.  This was repeated in successive stages until a tumor free defect was achieved.
Consent (Temporal Branch)/Introductory Paragraph: The rationale for Mohs was explained to the patient and consent was obtained. The risks, benefits and alternatives to therapy were discussed in detail. Specifically, the risks of damage to the temporal branch of the facial nerve, infection, scarring, bleeding, prolonged wound healing, incomplete removal, allergy to anesthesia, and recurrence were addressed. Prior to the procedure, the treatment site was clearly identified and confirmed by the patient. All components of Universal Protocol/PAUSE Rule completed.
Repair Type: Complex Repair
Rhombic Flap Text: The defect edges were debeveled with a #15 scalpel blade.  Given the location of the defect and the proximity to free margins a rhombic flap was deemed most appropriate.  Using a sterile surgical marker, an appropriate for rhombic flap was drawn incorporating the defect.    The area thus outlined was incised deep to adipose tissue with a #15 scalpel blade.  The skin margins were undermined to an appropriate distance in all directions utilizing iris scissors.
Mohs Histo Method Verbiage: The section(s) were then chromacoded and processed in the Mohs lab using the Mohs protocol and submitted for frozen section.
Keystone Flap Text: The defect edges were debeveled with a #15 scalpel blade.  Given the location of the defect, shape of the defect a keystone flap was deemed most appropriate.  Using a sterile surgical marker, an appropriate keystone flap was drawn incorporating the defect, outlining the appropriate donor tissue and placing the expected incisions within the relaxed skin tension lines where possible. The area thus outlined was incised deep to adipose tissue with a #15 scalpel blade.  The skin margins were undermined to an appropriate distance in all directions around the primary defect and laterally outward around the flap utilizing iris scissors.
Inflammation Suggestive Of Cancer Camouflage Histology Text: There was a dense lymphocytic infiltrate which prevented adequate histologic evaluation of adjacent structures.
Double Island Pedicle Flap Text: The defect edges were debeveled with a #15 scalpel blade.  Given the location of the defect, shape of the defect and the proximity to free margins a double island pedicle advancement flap was deemed most appropriate.  Using a sterile surgical marker, an appropriate advancement flap was drawn incorporating the defect, outlining the appropriate donor tissue and placing the expected incisions within the relaxed skin tension lines where possible.    The area thus outlined was incised deep to adipose tissue with a #15 scalpel blade.  The skin margins were undermined to an appropriate distance in all directions around the primary defect and laterally outward around the island pedicle utilizing iris scissors.  There was minimal undermining beneath the pedicle flap.
Graft Cartilage Fenestration Text: The cartilage was fenestrated with a 2mm punch biopsy to help facilitate graft survival and healing.
Suturegard Body: The suture ends were repeatedly re-tightened and re-clamped to achieve the desired tissue expansion.
Zygomaticofacial Flap Text: Given the location of the defect, shape of the defect and the proximity to free margins a zygomaticofacial flap was deemed most appropriate for repair.  Using a sterile surgical marker, the appropriate flap was drawn incorporating the defect and placing the expected incisions within the relaxed skin tension lines where possible. The area thus outlined was incised deep to adipose tissue with a #15 scalpel blade with preservation of a vascular pedicle.  The skin margins were undermined to an appropriate distance in all directions utilizing iris scissors.  The flap was then placed into the defect and anchored with interrupted buried subcutaneous sutures.
Medical Necessity Statement: Based on my medical judgement, Mohs surgery is the most appropriate treatment for this cancer compared to other treatments.
Undermining Type: Entire Wound
Chonodrocutaneous Helical Advancement Flap Text: The defect edges were debeveled with a #15 scalpel blade.  Given the location of the defect and the proximity to free margins a chondrocutaneous helical advancement flap was deemed most appropriate.  Using a sterile surgical marker, the appropriate advancement flap was drawn incorporating the defect and placing the expected incisions within the relaxed skin tension lines where possible.    The area thus outlined was incised deep to adipose tissue with a #15 scalpel blade.  The skin margins were undermined to an appropriate distance in all directions utilizing iris scissors.
Split-Thickness Skin Graft Text: The defect edges were debeveled with a #15 scalpel blade.  Given the location of the defect, shape of the defect and the proximity to free margins a split thickness skin graft was deemed most appropriate.  Using a sterile surgical marker, the primary defect shape was transferred to the donor site. The split thickness graft was then harvested.  The skin graft was then placed in the primary defect and oriented appropriately.
Staging Info: By selecting yes to the question above you will include information on AJCC 8 tumor staging in your Mohs note. Information on tumor staging will be automatically added for SCCs on the head and neck. AJCC 8 includes tumor size, tumor depth, perineural involvement and bone invasion.
Posterior Auricular Interpolation Flap Text: A decision was made to reconstruct the defect utilizing an interpolation axial flap and a staged reconstruction.  A telfa template was made of the defect.  This telfa template was then used to outline the posterior auricular interpolation flap.  The donor area for the pedicle flap was then injected with anesthesia.  The flap was excised through the skin and subcutaneous tissue down to the layer of the underlying musculature.  The pedicle flap was carefully excised within this deep plane to maintain its blood supply.  The edges of the donor site were undermined.   The donor site was closed in a primary fashion.  The pedicle was then rotated into position and sutured.  Once the tube was sutured into place, adequate blood supply was confirmed with blanching and refill.  The pedicle was then wrapped with xeroform gauze and dressed appropriately with a telfa and gauze bandage to ensure continued blood supply and protect the attached pedicle.
Dressing: pressure dressing
Banner Transposition Flap Text: The defect edges were debeveled with a #15 scalpel blade.  Given the location of the defect and the proximity to free margins a Banner transposition flap was deemed most appropriate.  Using a sterile surgical marker, an appropriate flap drawn around the defect. The area thus outlined was incised deep to adipose tissue with a #15 scalpel blade.  The skin margins were undermined to an appropriate distance in all directions utilizing iris scissors.
Purse String (Intermediate) Text: Given the location of the defect and the characteristics of the surrounding skin a pursestring intermediate closure was deemed most appropriate.  Undermining was performed circumfirentially around the surgical defect.  A purstring suture was then placed and tightened.
Burow's Advancement Flap Text: The defect edges were debeveled with a #15 scalpel blade.  Given the location of the defect and the proximity to free margins a Burow's advancement flap was deemed most appropriate.  Using a sterile surgical marker, the appropriate advancement flap was drawn incorporating the defect and placing the expected incisions within the relaxed skin tension lines where possible.    The area thus outlined was incised deep to adipose tissue with a #15 scalpel blade.  The skin margins were undermined to an appropriate distance in all directions utilizing iris scissors.
Star Wedge Flap Text: The defect edges were debeveled with a #15 scalpel blade.  Given the location of the defect, shape of the defect and the proximity to free margins a star wedge flap was deemed most appropriate.  Using a sterile surgical marker, an appropriate rotation flap was drawn incorporating the defect and placing the expected incisions within the relaxed skin tension lines where possible. The area thus outlined was incised deep to adipose tissue with a #15 scalpel blade.  The skin margins were undermined to an appropriate distance in all directions utilizing iris scissors.
Pain Refusal Text: I offered to prescribe pain medication but the patient refused to take this medication.
Consent (Near Eyelid Margin)/Introductory Paragraph: The rationale for Mohs was explained to the patient and consent was obtained. The risks, benefits and alternatives to therapy were discussed in detail. Specifically, the risks of ectropion or eyelid deformity, infection, scarring, bleeding, prolonged wound healing, incomplete removal, allergy to anesthesia, nerve injury and recurrence were addressed. Prior to the procedure, the treatment site was clearly identified and confirmed by the patient. All components of Universal Protocol/PAUSE Rule completed.
Name Of Cartilage Exposed (Optional): auricular cartilage
Double O-Z Plasty Text: The defect edges were debeveled with a #15 scalpel blade.  Given the location of the defect, shape of the defect and the proximity to free margins a Double O-Z plasty (double transposition flap) was deemed most appropriate.  Using a sterile surgical marker, the appropriate transposition flaps were drawn incorporating the defect and placing the expected incisions within the relaxed skin tension lines where possible. The area thus outlined was incised deep to adipose tissue with a #15 scalpel blade.  The skin margins were undermined to an appropriate distance in all directions utilizing iris scissors.  Hemostasis was achieved with electrocautery.  The flaps were then transposed into place, one clockwise and the other counterclockwise, and anchored with interrupted buried subcutaneous sutures.
Complex Requirements: Exposure Of Vital Structure?: Cartilage
Double O-Z Flap Text: The defect edges were debeveled with a #15 scalpel blade.  Given the location of the defect, shape of the defect and the proximity to free margins a Double O-Z flap was deemed most appropriate.  Using a sterile surgical marker, an appropriate transposition flap was drawn incorporating the defect and placing the expected incisions within the relaxed skin tension lines where possible. The area thus outlined was incised deep to adipose tissue with a #15 scalpel blade.  The skin margins were undermined to an appropriate distance in all directions utilizing iris scissors.
Vermilion Border Text: The closure involved the vermilion border.
Consent (Marginal Mandibular)/Introductory Paragraph: The rationale for Mohs was explained to the patient and consent was obtained. The risks, benefits and alternatives to therapy were discussed in detail. Specifically, the risks of damage to the marginal mandibular branch of the facial nerve, infection, scarring, bleeding, prolonged wound healing, incomplete removal, allergy to anesthesia, and recurrence were addressed. Prior to the procedure, the treatment site was clearly identified and confirmed by the patient. All components of Universal Protocol/PAUSE Rule completed.
Helical Rim Advancement Flap Text: The defect edges were debeveled with a #15 blade scalpel.  Given the location of the defect and the proximity to free margins (helical rim) a double helical rim advancement flap was deemed most appropriate.  Using a sterile surgical marker, the appropriate advancement flaps were drawn incorporating the defect and placing the expected incisions between the helical rim and antihelix where possible.  The area thus outlined was incised through and through with a #15 scalpel blade.  With a skin hook and iris scissors, the flaps were gently and sharply undermined and freed up.

## 2020-09-02 ENCOUNTER — APPOINTMENT (RX ONLY)
Dept: URBAN - METROPOLITAN AREA CLINIC 24 | Facility: CLINIC | Age: 80
Setting detail: DERMATOLOGY
End: 2020-09-02

## 2020-09-02 DIAGNOSIS — L76.21 POSTPROCEDURAL HEMORRHAGE OF SKIN AND SUBCUTANEOUS TISSUE FOLLOWING A DERMATOLOGIC PROCEDURE: ICD-10-CM

## 2020-09-02 PROCEDURE — ? OTHER

## 2020-09-02 PROCEDURE — 99024 POSTOP FOLLOW-UP VISIT: CPT

## 2020-09-02 ASSESSMENT — LOCATION DETAILED DESCRIPTION DERM: LOCATION DETAILED: RIGHT ANTIHELIX

## 2020-09-02 ASSESSMENT — LOCATION ZONE DERM: LOCATION ZONE: EAR

## 2020-09-02 ASSESSMENT — LOCATION SIMPLE DESCRIPTION DERM: LOCATION SIMPLE: RIGHT EAR

## 2020-09-02 NOTE — PROCEDURE: OTHER
Other (Free Text): Aluminum chloride used and new pressure dressing applied.
Note Text (......Xxx Chief Complaint.): This diagnosis correlates with the
Detail Level: Detailed

## 2020-09-03 ENCOUNTER — APPOINTMENT (RX ONLY)
Dept: URBAN - METROPOLITAN AREA CLINIC 23 | Facility: CLINIC | Age: 80
Setting detail: DERMATOLOGY
End: 2020-09-03

## 2020-09-03 DIAGNOSIS — Z48.01 ENCOUNTER FOR CHANGE OR REMOVAL OF SURGICAL WOUND DRESSING: ICD-10-CM

## 2020-09-03 PROCEDURE — 99024 POSTOP FOLLOW-UP VISIT: CPT

## 2020-09-15 ENCOUNTER — APPOINTMENT (RX ONLY)
Dept: URBAN - METROPOLITAN AREA CLINIC 23 | Facility: CLINIC | Age: 80
Setting detail: DERMATOLOGY
End: 2020-09-15

## 2020-09-15 VITALS — TEMPERATURE: 97.6 F

## 2020-09-15 DIAGNOSIS — Z48.01 ENCOUNTER FOR CHANGE OR REMOVAL OF SURGICAL WOUND DRESSING: ICD-10-CM

## 2020-09-15 PROCEDURE — 99024 POSTOP FOLLOW-UP VISIT: CPT

## 2020-09-15 ASSESSMENT — LOCATION SIMPLE DESCRIPTION DERM: LOCATION SIMPLE: RIGHT EAR

## 2020-09-15 ASSESSMENT — LOCATION ZONE DERM: LOCATION ZONE: EAR

## 2020-09-15 ASSESSMENT — LOCATION DETAILED DESCRIPTION DERM: LOCATION DETAILED: RIGHT SCAPHA

## 2020-12-22 ENCOUNTER — APPOINTMENT (RX ONLY)
Dept: URBAN - METROPOLITAN AREA CLINIC 23 | Facility: CLINIC | Age: 80
Setting detail: DERMATOLOGY
End: 2020-12-22

## 2020-12-22 DIAGNOSIS — D22 MELANOCYTIC NEVI: ICD-10-CM

## 2020-12-22 DIAGNOSIS — Z85.828 PERSONAL HISTORY OF OTHER MALIGNANT NEOPLASM OF SKIN: ICD-10-CM

## 2020-12-22 DIAGNOSIS — L82.1 OTHER SEBORRHEIC KERATOSIS: ICD-10-CM

## 2020-12-22 DIAGNOSIS — D485 NEOPLASM OF UNCERTAIN BEHAVIOR OF SKIN: ICD-10-CM

## 2020-12-22 DIAGNOSIS — L81.4 OTHER MELANIN HYPERPIGMENTATION: ICD-10-CM

## 2020-12-22 PROBLEM — D22.5 MELANOCYTIC NEVI OF TRUNK: Status: ACTIVE | Noted: 2020-12-22

## 2020-12-22 PROBLEM — D48.5 NEOPLASM OF UNCERTAIN BEHAVIOR OF SKIN: Status: ACTIVE | Noted: 2020-12-22

## 2020-12-22 PROBLEM — M12.9 ARTHROPATHY, UNSPECIFIED: Status: ACTIVE | Noted: 2020-12-22

## 2020-12-22 PROCEDURE — ? COUNSELING

## 2020-12-22 PROCEDURE — 99213 OFFICE O/P EST LOW 20 MIN: CPT | Mod: 25

## 2020-12-22 PROCEDURE — 11102 TANGNTL BX SKIN SINGLE LES: CPT

## 2020-12-22 PROCEDURE — ? BIOPSY BY SHAVE METHOD

## 2020-12-22 ASSESSMENT — LOCATION SIMPLE DESCRIPTION DERM
LOCATION SIMPLE: RIGHT CHEEK
LOCATION SIMPLE: UPPER BACK
LOCATION SIMPLE: RIGHT UPPER BACK
LOCATION SIMPLE: CHEST
LOCATION SIMPLE: LEFT FOREHEAD
LOCATION SIMPLE: RIGHT EAR
LOCATION SIMPLE: RIGHT PRETIBIAL REGION
LOCATION SIMPLE: LEFT CHEEK
LOCATION SIMPLE: RIGHT TEMPLE
LOCATION SIMPLE: RIGHT FOREARM
LOCATION SIMPLE: LEFT FOREARM
LOCATION SIMPLE: LEFT WRIST
LOCATION SIMPLE: LEFT UPPER BACK
LOCATION SIMPLE: LEFT BUTTOCK

## 2020-12-22 ASSESSMENT — LOCATION DETAILED DESCRIPTION DERM
LOCATION DETAILED: RIGHT SUPERIOR MEDIAL UPPER BACK
LOCATION DETAILED: LEFT INFERIOR MEDIAL UPPER BACK
LOCATION DETAILED: LEFT INFERIOR FOREHEAD
LOCATION DETAILED: LEFT FOREHEAD
LOCATION DETAILED: RIGHT CENTRAL BUCCAL CHEEK
LOCATION DETAILED: LEFT INFERIOR MEDIAL FOREHEAD
LOCATION DETAILED: RIGHT ANTIHELIX
LOCATION DETAILED: RIGHT CENTRAL TEMPLE
LOCATION DETAILED: SUPERIOR THORACIC SPINE
LOCATION DETAILED: LEFT CENTRAL BUCCAL CHEEK
LOCATION DETAILED: RIGHT DISTAL PRETIBIAL REGION
LOCATION DETAILED: LEFT DISTAL DORSAL FOREARM
LOCATION DETAILED: LEFT SUPERIOR MEDIAL BUCCAL CHEEK
LOCATION DETAILED: RIGHT PROXIMAL DORSAL FOREARM
LOCATION DETAILED: LEFT BUTTOCK
LOCATION DETAILED: UPPER STERNUM
LOCATION DETAILED: LEFT DORSAL WRIST

## 2020-12-22 ASSESSMENT — LOCATION ZONE DERM
LOCATION ZONE: TRUNK
LOCATION ZONE: ARM
LOCATION ZONE: EAR
LOCATION ZONE: LEG
LOCATION ZONE: FACE

## 2020-12-22 NOTE — PROCEDURE: BIOPSY BY SHAVE METHOD
Destruction After The Procedure: No
Hemostasis: Aluminum Chloride
Was A Bandage Applied: Yes
X Size Of Lesion In Cm: 0
Information: Selecting Yes will display possible errors in your note based on the variables you have selected. This validation is only offered as a suggestion for you. PLEASE NOTE THAT THE VALIDATION TEXT WILL BE REMOVED WHEN YOU FINALIZE YOUR NOTE. IF YOU WANT TO FAX A PRELIMINARY NOTE YOU WILL NEED TO TOGGLE THIS TO 'NO' IF YOU DO NOT WANT IT IN YOUR FAXED NOTE.
Electrodesiccation Text: The wound bed was treated with electrodesiccation after the biopsy was performed.
Accession #: [id number]
Electrodesiccation And Curettage Text: The wound bed was treated with electrodesiccation and curettage after the biopsy was performed.
Biopsy Method: Dermablade
Anesthesia Volume In Cc: 0.5
Biopsy Type: H and E
Silver Nitrate Text: The wound bed was treated with silver nitrate after the biopsy was performed.
Depth Of Biopsy: dermis
Billing Type: Third-Party Bill
Anesthesia Type: 1% lidocaine with 1:100,000 epinephrine and a 1:6 solution of 8.4% sodium bicarbonate
Curettage Text: The wound bed was treated with curettage after the biopsy was performed.
Dressing: bandage
Wound Care: Petrolatum
Detail Level: Detailed
Type Of Destruction Used: Curettage
Cryotherapy Text: The wound bed was treated with cryotherapy after the biopsy was performed.

## 2020-12-29 ENCOUNTER — RX ONLY (OUTPATIENT)
Age: 80
Setting detail: RX ONLY
End: 2020-12-29

## 2020-12-29 RX ORDER — CLINDAMYCIN HYDROCHLORIDE 300 MG/1
CAPSULE ORAL
Qty: 4 | Refills: 0 | Status: ERX | COMMUNITY
Start: 2020-12-29

## 2021-01-19 ENCOUNTER — APPOINTMENT (RX ONLY)
Dept: URBAN - METROPOLITAN AREA CLINIC 23 | Facility: CLINIC | Age: 81
Setting detail: DERMATOLOGY
End: 2021-01-19

## 2021-01-19 PROBLEM — C44.319 BASAL CELL CARCINOMA OF SKIN OF OTHER PARTS OF FACE: Status: ACTIVE | Noted: 2021-01-19

## 2021-01-19 PROCEDURE — 12052 INTMD RPR FACE/MM 2.6-5.0 CM: CPT

## 2021-01-19 PROCEDURE — ? MOHS SURGERY

## 2021-01-19 PROCEDURE — 17311 MOHS 1 STAGE H/N/HF/G: CPT

## 2021-01-19 NOTE — PROCEDURE: MOHS SURGERY
Include Mohsaiq Anticoagulation Items In The Validation Algorithm?: Yes
Referred To Otolaryngology For Closure Text (Leave Blank If You Do Not Want): After obtaining clear surgical margins the patient was sent to otolaryngology for surgical repair.  The patient understands they will receive post-surgical care and follow-up from the referring physician's office.
Mart-1 - Negative Histology Text: MART-1 staining demonstrates a normal density and pattern of melanocytes along the dermal-epidermal junction. The surgical margins are negative for tumor cells.
M-Plasty Intermediate Repair Preamble Text (Leave Blank If You Do Not Want): Undermining was performed with blunt dissection.
Additional Anesthesia Volume In Cc: 0
Is There Clinical Or Radiological Involvement Of A Named Nerve (Microscopic Perineural Invasion Handled In Stages Tab)?: No
Asc Procedure Text (B): After obtaining clear surgical margins the patient was sent to an ASC for surgical repair.  The patient understands they will receive post-surgical care and follow-up from the ASC physician.
Manual Repair Warning Statement: We plan on removing the manually selected variable below in favor of our much easier automatic structured text blocks found in the previous tab. We decided to do this to help make the flow better and give you the full power of structured data. Manual selection is never going to be ideal in our platform and I would encourage you to avoid using manual selection from this point on, especially since I will be sunsetting this feature. It is important that you do one of two things with the customized text below. First, you can save all of the text in a word file so you can have it for future reference. Second, transfer the text to the appropriate area in the Library tab. Lastly, if there is a flap or graft type which we do not have you need to let us know right away so I can add it in before the variable is hidden. No need to panic, we plan to give you roughly 6 months to make the change.
Stage 10: Additional Anesthesia Type: 1% lidocaine with epinephrine
Mauc Instructions: By selecting yes to the question below the MAUC number will be added into the note.  This will be calculated automatically based on the diagnosis chosen, the size entered, the body zone selected (H,M,L) and the specific indications you chose. You will also have the option to override the Mohs AUC if you disagree with the automatically calculated number and this option is found in the Case Summary tab.
Consent 3/Introductory Paragraph: I gave the patient a chance to ask questions they had about the procedure.  Following this I explained the Mohs procedure and consent was obtained. The risks, benefits and alternatives to therapy were discussed in detail. Specifically, the risks of infection, scarring, bleeding, prolonged wound healing, incomplete removal, allergy to anesthesia, nerve injury and recurrence were addressed. Prior to the procedure, the treatment site was clearly identified and confirmed by the patient. All components of Universal Protocol/PAUSE Rule completed.
Special Stains Stage 3 - Results: Base On Clearance Noted Above
Burow's Advancement Flap Text: The defect edges were debeveled with a #15 scalpel blade.  Given the location of the defect and the proximity to free margins a Burow's advancement flap was deemed most appropriate.  Using a sterile surgical marker, the appropriate advancement flap was drawn incorporating the defect and placing the expected incisions within the relaxed skin tension lines where possible.    The area thus outlined was incised deep to adipose tissue with a #15 scalpel blade.  The skin margins were undermined to an appropriate distance in all directions utilizing iris scissors.
Postop Diagnosis: same
Orbicularis Oris Muscle Flap Text: The defect edges were debeveled with a #15 scalpel blade.  Given that the defect affected the competency of the oral sphincter an obicularis oris muscle flap was deemed most appropriate to restore this competency and normal muscle function.  Using a sterile surgical marker, an appropriate flap was drawn incorporating the defect. The area thus outlined was incised with a #15 scalpel blade.
Consent (Spinal Accessory)/Introductory Paragraph: The rationale for Mohs was explained to the patient and consent was obtained. The risks, benefits and alternatives to therapy were discussed in detail. Specifically, the risks of damage to the spinal accessory nerve, infection, scarring, bleeding, prolonged wound healing, incomplete removal, allergy to anesthesia, and recurrence were addressed. Prior to the procedure, the treatment site was clearly identified and confirmed by the patient. All components of Universal Protocol/PAUSE Rule completed.
Rotation Flap Text: The defect edges were debeveled with a #15 scalpel blade.  Given the location of the defect, shape of the defect and the proximity to free margins a rotation flap was deemed most appropriate.  Using a sterile surgical marker, an appropriate rotation flap was drawn incorporating the defect and placing the expected incisions within the relaxed skin tension lines where possible.    The area thus outlined was incised deep to adipose tissue with a #15 scalpel blade.  The skin margins were undermined to an appropriate distance in all directions utilizing iris scissors.
Alternatives Discussed Intro (Do Not Add Period): I discussed alternative treatments to Mohs surgery and specifically discussed the risks and benefits of
Provider Procedure Text (C): After obtaining clear surgical margins the defect was repaired by another provider.
Hemigard Intro: Due to skin fragility and wound tension, it was decided to use HEMIGARD adhesive retention suture devices to permit a linear closure. The skin was cleaned and dried for a 6cm distance away from the wound. Excessive hair, if present, was removed to allow for adhesion.
M-Plasty Complex Repair Preamble Text (Leave Blank If You Do Not Want): Extensive wide undermining was performed.
Ear Wedge Repair Text: A wedge excision was completed by carrying down an excision through the full thickness of the ear and cartilage with an inward facing Burow's triangle. The wound was then closed in a layered fashion.
Interpolation Flap Text: A decision was made to reconstruct the defect utilizing an interpolation axial flap and a staged reconstruction.  A telfa template was made of the defect.  This telfa template was then used to outline the interpolation flap.  The donor area for the pedicle flap was then injected with anesthesia.  The flap was excised through the skin and subcutaneous tissue down to the layer of the underlying musculature.  The interpolation flap was carefully excised within this deep plane to maintain its blood supply.  The edges of the donor site were undermined.   The donor site was closed in a primary fashion.  The pedicle was then rotated into position and sutured.  Once the tube was sutured into place, adequate blood supply was confirmed with blanching and refill.  The pedicle was then wrapped with xeroform gauze and dressed appropriately with a telfa and gauze bandage to ensure continued blood supply and protect the attached pedicle.
V-Y Plasty Text: The defect edges were debeveled with a #15 scalpel blade.  Given the location of the defect, shape of the defect and the proximity to free margins an V-Y advancement flap was deemed most appropriate.  Using a sterile surgical marker, an appropriate advancement flap was drawn incorporating the defect and placing the expected incisions within the relaxed skin tension lines where possible.    The area thus outlined was incised deep to adipose tissue with a #15 scalpel blade.  The skin margins were undermined to an appropriate distance in all directions utilizing iris scissors.
Mid-Level Procedure Text (D): After obtaining clear surgical margins the patient was sent to a mid-level provider for surgical repair.  The patient understands they will receive post-surgical care and follow-up from the mid-level provider.
Alar Island Pedicle Flap Text: The defect edges were debeveled with a #15 scalpel blade.  Given the location of the defect, shape of the defect and the proximity to the alar rim an island pedicle advancement flap was deemed most appropriate.  Using a sterile surgical marker, an appropriate advancement flap was drawn incorporating the defect, outlining the appropriate donor tissue and placing the expected incisions within the nasal ala running parallel to the alar rim. The area thus outlined was incised with a #15 scalpel blade.  The skin margins were undermined minimally to an appropriate distance in all directions around the primary defect and laterally outward around the island pedicle utilizing iris scissors.  There was minimal undermining beneath the pedicle flap.
Surgeon/Pathologist Verbiage (Will Incorporate Name Of Surgeon From Intro If Not Blank): operated in two distinct and integrated capacities as the surgeon and pathologist.
Location Indication Override (Is Already Calculated Based On Selected Body Location): Area M
Tumor Depth: Less than 6mm from granular layer and no invasion beyond the subcutaneous fat
Mohs Case Number: [id number]
Plastic Surgeon Procedure Text (E): After obtaining clear surgical margins the patient was sent to plastics for surgical repair.  The patient understands they will receive post-surgical care and follow-up from the referring physician's office.
Partial Purse String (Intermediate) Text: Given the location of the defect and the characteristics of the surrounding skin an intermediate purse string closure was deemed most appropriate.  Undermining was performed circumfirentially around the surgical defect.  A purse string suture was then placed and tightened. Wound tension only allowed a partial closure of the circular defect.
Helical Rim Advancement Flap Text: The defect edges were debeveled with a #15 blade scalpel.  Given the location of the defect and the proximity to free margins (helical rim) a double helical rim advancement flap was deemed most appropriate.  Using a sterile surgical marker, the appropriate advancement flaps were drawn incorporating the defect and placing the expected incisions between the helical rim and antihelix where possible.  The area thus outlined was incised through and through with a #15 scalpel blade.  With a skin hook and iris scissors, the flaps were gently and sharply undermined and freed up.
Advancement-Rotation Flap Text: The defect edges were debeveled with a #15 scalpel blade.  Given the location of the defect, shape of the defect and the proximity to free margins an advancement-rotation flap was deemed most appropriate.  Using a sterile surgical marker, an appropriate flap was drawn incorporating the defect and placing the expected incisions within the relaxed skin tension lines where possible. The area thus outlined was incised deep to adipose tissue with a #15 scalpel blade.  The skin margins were undermined to an appropriate distance in all directions utilizing iris scissors.
Helical Rim Text: The closure involved the helical rim.
Graft Donor Site Dermal Sutures (Optional): 3-0 Vicryl
Oculoplastic Surgeon Procedure Text (C): After obtaining clear surgical margins the patient was sent to oculoplastics for surgical repair.  The patient understands they will receive post-surgical care and follow-up from the referring physician's office.
Debridement Text: The wound edges were debrided prior to proceeding with the closure to facilitate wound healing.
Area M Indication Text: Tumors in this location are included in Area M (cheek, forehead, scalp, neck, jawline and pretibial skin).  Mohs surgery is indicated for tumors 1 cm or larger in these anatomic locations.
A-T Advancement Flap Text: The defect edges were debeveled with a #15 scalpel blade.  Given the location of the defect, shape of the defect and the proximity to free margins an A-T advancement flap was deemed most appropriate.  Using a sterile surgical marker, an appropriate advancement flap was drawn incorporating the defect and placing the expected incisions within the relaxed skin tension lines where possible.    The area thus outlined was incised deep to adipose tissue with a #15 scalpel blade.  The skin margins were undermined to an appropriate distance in all directions utilizing iris scissors.
Previous Accession (Optional): [id number]
Graft Cartilage Fenestration Text: The cartilage was fenestrated with a 2mm punch biopsy to help facilitate graft survival and healing.
Repair Hemostasis (Optional): Electrocautery
Repair Anesthesia Method: local infiltration
Number Of Hemigard Strips Per Side: 1
Inflammation Suggestive Of Cancer Camouflage Histology Text: There was a dense lymphocytic infiltrate which prevented adequate histologic evaluation of adjacent structures.
Consent (Nose)/Introductory Paragraph: The rationale for Mohs was explained to the patient and consent was obtained. The risks, benefits and alternatives to therapy were discussed in detail. Specifically, the risks of nasal deformity, changes in the flow of air through the nose, infection, scarring, bleeding, prolonged wound healing, incomplete removal, allergy to anesthesia, nerve injury and recurrence were addressed. Prior to the procedure, the treatment site was clearly identified and confirmed by the patient. All components of Universal Protocol/PAUSE Rule completed.
Transposition Flap Text: The defect edges were debeveled with a #15 scalpel blade.  Given the location of the defect and the proximity to free margins a transposition flap was deemed most appropriate.  Using a sterile surgical marker, an appropriate transposition flap was drawn incorporating the defect.    The area thus outlined was incised deep to adipose tissue with a #15 scalpel blade.  The skin margins were undermined to an appropriate distance in all directions utilizing iris scissors.
Trilobed Flap Text: The defect edges were debeveled with a #15 scalpel blade.  Given the location of the defect and the proximity to free margins a trilobed flap was deemed most appropriate.  Using a sterile surgical marker, an appropriate trilobed flap drawn around the defect.    The area thus outlined was incised deep to adipose tissue with a #15 scalpel blade.  The skin margins were undermined to an appropriate distance in all directions utilizing iris scissors.
Surgeon: Martin Carrizales MD
Primary Defect Length In Cm (Final Defect Size - Required For Flaps/Grafts): 0.9
Complex Repair And Flap Additional Text (Will Appearing After The Standard Complex Repair Text): The complex repair was not sufficient to completely close the primary defect. The remaining additional defect was repaired with the flap mentioned below.
Split-Thickness Skin Graft Text: The defect edges were debeveled with a #15 scalpel blade.  Given the location of the defect, shape of the defect and the proximity to free margins a split thickness skin graft was deemed most appropriate.  Using a sterile surgical marker, the primary defect shape was transferred to the donor site. The split thickness graft was then harvested.  The skin graft was then placed in the primary defect and oriented appropriately.
Posterior Auricular Interpolation Flap Text: A decision was made to reconstruct the defect utilizing an interpolation axial flap and a staged reconstruction.  A telfa template was made of the defect.  This telfa template was then used to outline the posterior auricular interpolation flap.  The donor area for the pedicle flap was then injected with anesthesia.  The flap was excised through the skin and subcutaneous tissue down to the layer of the underlying musculature.  The pedicle flap was carefully excised within this deep plane to maintain its blood supply.  The edges of the donor site were undermined.   The donor site was closed in a primary fashion.  The pedicle was then rotated into position and sutured.  Once the tube was sutured into place, adequate blood supply was confirmed with blanching and refill.  The pedicle was then wrapped with xeroform gauze and dressed appropriately with a telfa and gauze bandage to ensure continued blood supply and protect the attached pedicle.
Z Plasty Text: The lesion was extirpated to the level of the fat with a #15 scalpel blade.  Given the location of the defect, shape of the defect and the proximity to free margins a Z-plasty was deemed most appropriate for repair.  Using a sterile surgical marker, the appropriate transposition arms of the Z-plasty were drawn incorporating the defect and placing the expected incisions within the relaxed skin tension lines where possible.    The area thus outlined was incised deep to adipose tissue with a #15 scalpel blade.  The skin margins were undermined to an appropriate distance in all directions utilizing iris scissors.  The opposing transposition arms were then transposed into place in opposite direction and anchored with interrupted buried subcutaneous sutures.
Keystone Flap Text: The defect edges were debeveled with a #15 scalpel blade.  Given the location of the defect, shape of the defect a keystone flap was deemed most appropriate.  Using a sterile surgical marker, an appropriate keystone flap was drawn incorporating the defect, outlining the appropriate donor tissue and placing the expected incisions within the relaxed skin tension lines where possible. The area thus outlined was incised deep to adipose tissue with a #15 scalpel blade.  The skin margins were undermined to an appropriate distance in all directions around the primary defect and laterally outward around the flap utilizing iris scissors.
Consent (Near Eyelid Margin)/Introductory Paragraph: The rationale for Mohs was explained to the patient and consent was obtained. The risks, benefits and alternatives to therapy were discussed in detail. Specifically, the risks of ectropion or eyelid deformity, infection, scarring, bleeding, prolonged wound healing, incomplete removal, allergy to anesthesia, nerve injury and recurrence were addressed. Prior to the procedure, the treatment site was clearly identified and confirmed by the patient. All components of Universal Protocol/PAUSE Rule completed.
Spiral Flap Text: The defect edges were debeveled with a #15 scalpel blade.  Given the location of the defect, shape of the defect and the proximity to free margins a spiral flap was deemed most appropriate.  Using a sterile surgical marker, an appropriate rotation flap was drawn incorporating the defect and placing the expected incisions within the relaxed skin tension lines where possible. The area thus outlined was incised deep to adipose tissue with a #15 scalpel blade.  The skin margins were undermined to an appropriate distance in all directions utilizing iris scissors.
Same Histology In Subsequent Stages Text: The pattern and morphology of the tumor is as described in the first stage.
Melolabial Transposition Flap Text: The defect edges were debeveled with a #15 scalpel blade.  Given the location of the defect and the proximity to free margins a melolabial flap was deemed most appropriate.  Using a sterile surgical marker, an appropriate melolabial transposition flap was drawn incorporating the defect.    The area thus outlined was incised deep to adipose tissue with a #15 scalpel blade.  The skin margins were undermined to an appropriate distance in all directions utilizing iris scissors.
Melolabial Interpolation Flap Text: A decision was made to reconstruct the defect utilizing an interpolation axial flap and a staged reconstruction.  A telfa template was made of the defect.  This telfa template was then used to outline the melolabial interpolation flap.  The donor area for the pedicle flap was then injected with anesthesia.  The flap was excised through the skin and subcutaneous tissue down to the layer of the underlying musculature.  The pedicle flap was carefully excised within this deep plane to maintain its blood supply.  The edges of the donor site were undermined.   The donor site was closed in a primary fashion.  The pedicle was then rotated into position and sutured.  Once the tube was sutured into place, adequate blood supply was confirmed with blanching and refill.  The pedicle was then wrapped with xeroform gauze and dressed appropriately with a telfa and gauze bandage to ensure continued blood supply and protect the attached pedicle.
Suturegard Intro: Intraoperative tissue expansion was performed, utilizing the SUTUREGARD device, in order to reduce wound tension.
H Plasty Text: Given the location of the defect, shape of the defect and the proximity to free margins a H-plasty was deemed most appropriate for repair.  Using a sterile surgical marker, the appropriate advancement arms of the H-plasty were drawn incorporating the defect and placing the expected incisions within the relaxed skin tension lines where possible. The area thus outlined was incised deep to adipose tissue with a #15 scalpel blade. The skin margins were undermined to an appropriate distance in all directions utilizing iris scissors.  The opposing advancement arms were then advanced into place in opposite direction and anchored with interrupted buried subcutaneous sutures.
Double Island Pedicle Flap Text: The defect edges were debeveled with a #15 scalpel blade.  Given the location of the defect, shape of the defect and the proximity to free margins a double island pedicle advancement flap was deemed most appropriate.  Using a sterile surgical marker, an appropriate advancement flap was drawn incorporating the defect, outlining the appropriate donor tissue and placing the expected incisions within the relaxed skin tension lines where possible.    The area thus outlined was incised deep to adipose tissue with a #15 scalpel blade.  The skin margins were undermined to an appropriate distance in all directions around the primary defect and laterally outward around the island pedicle utilizing iris scissors.  There was minimal undermining beneath the pedicle flap.
Mohs Histo Method Verbiage: The section(s) were then chromacoded and processed in the Mohs lab using the Mohs protocol and submitted for frozen section.
Full Thickness Lip Wedge Repair (Flap) Text: Given the location of the defect and the proximity to free margins a full thickness wedge repair was deemed most appropriate.  Using a sterile surgical marker, the appropriate repair was drawn incorporating the defect and placing the expected incisions perpendicular to the vermillion border.  The vermillion border was also meticulously outlined to ensure appropriate reapproximation during the repair.  The area thus outlined was incised through and through with a #15 scalpel blade.  The muscularis and dermis were reaproximated with deep sutures following hemostasis. Care was taken to realign the vermillion border before proceeding with the superficial closure.  Once the vermillion was realigned the superfical and mucosal closure was finished.
Epidermal Closure Graft Donor Site (Optional): running
Vermilion Border Text: The closure involved the vermilion border.
Localized Dermabrasion With Wire Brush Text: The patient was draped in routine manner.  Localized dermabrasion using 3 x 17 mm wire brush was performed in routine manner to papillary dermis. This spot dermabrasion is being performed to complete skin cancer reconstruction. It also will eliminate the other sun damaged precancerous cells that are known to be part of the regional effect of a lifetime's worth of sun exposure. This localized dermabrasion is therapeutic and should not be considered cosmetic in any regard.
Skin Substitute Text: The defect edges were debeveled with a #15 scalpel blade.  Given the location of the defect, shape of the defect and the proximity to free margins a skin substitute graft was deemed most appropriate.  The graft material was trimmed to fit the size of the defect. The graft was then placed in the primary defect and oriented appropriately.
Primary Defect Width In Cm (Final Defect Size - Required For Flaps/Grafts): 0.6
Consent Type: Consent 1 (Standard)
O-T Advancement Flap Text: The defect edges were debeveled with a #15 scalpel blade.  Given the location of the defect, shape of the defect and the proximity to free margins an O-T advancement flap was deemed most appropriate.  Using a sterile surgical marker, an appropriate advancement flap was drawn incorporating the defect and placing the expected incisions within the relaxed skin tension lines where possible.    The area thus outlined was incised deep to adipose tissue with a #15 scalpel blade.  The skin margins were undermined to an appropriate distance in all directions utilizing iris scissors.
Subsequent Stages Histo Method Verbiage: Using a similar technique to that described above, a thin layer of tissue was removed from all areas where tumor was visible on the previous stage.  The tissue was again oriented, mapped, dyed, and processed as above.
Hemigard Retention Suture: 2-0 Nylon
Bilateral Helical Rim Advancement Flap Text: The defect edges were debeveled with a #15 blade scalpel.  Given the location of the defect and the proximity to free margins (helical rim) a bilateral helical rim advancement flap was deemed most appropriate.  Using a sterile surgical marker, the appropriate advancement flaps were drawn incorporating the defect and placing the expected incisions between the helical rim and antihelix where possible.  The area thus outlined was incised through and through with a #15 scalpel blade.  With a skin hook and iris scissors, the flaps were gently and sharply undermined and freed up.
Bcc Histology Text: There were numerous aggregates of basaloid cells.
Area L Indication Text: Tumors in this location are included in Area L (trunk and extremities).  Mohs surgery is indicated for larger tumors, 2 cm or larger, in these anatomic locations.
Bilobed Flap Text: The defect edges were debeveled with a #15 scalpel blade.  Given the location of the defect and the proximity to free margins a bilobe flap was deemed most appropriate.  Using a sterile surgical marker, an appropriate bilobe flap drawn around the defect.    The area thus outlined was incised deep to adipose tissue with a #15 scalpel blade.  The skin margins were undermined to an appropriate distance in all directions utilizing iris scissors.
Secondary Intention Text (Leave Blank If You Do Not Want): The defect will heal with secondary intention.
Dressing (No Sutures): dry sterile dressing
Dressing: pressure dressing
Burow's Graft Text: The defect edges were debeveled with a #15 scalpel blade.  Given the location of the defect, shape of the defect, the proximity to free margins and the presence of a standing cone deformity a Burow's skin graft was deemed most appropriate. The standing cone was removed and this tissue was then trimmed to the shape of the primary defect. The adipose tissue was also removed until only dermis and epidermis were left.  The skin margins of the secondary defect were undermined to an appropriate distance in all directions utilizing iris scissors.  The secondary defect was closed with interrupted buried subcutaneous sutures.  The skin edges were then re-apposed with running  sutures.  The skin graft was then placed in the primary defect and oriented appropriately.
Consent (Lip)/Introductory Paragraph: The rationale for Mohs was explained to the patient and consent was obtained. The risks, benefits and alternatives to therapy were discussed in detail. Specifically, the risks of lip deformity, changes in the oral aperture, infection, scarring, bleeding, prolonged wound healing, incomplete removal, allergy to anesthesia, nerve injury and recurrence were addressed. Prior to the procedure, the treatment site was clearly identified and confirmed by the patient. All components of Universal Protocol/PAUSE Rule completed.
Muscle Hinge Flap Text: The defect edges were debeveled with a #15 scalpel blade.  Given the size, depth and location of the defect and the proximity to free margins a muscle hinge flap was deemed most appropriate.  Using a sterile surgical marker, an appropriate hinge flap was drawn incorporating the defect. The area thus outlined was incised with a #15 scalpel blade.  The skin margins were undermined to an appropriate distance in all directions utilizing iris scissors.
Mucosal Advancement Flap Text: Given the location of the defect, shape of the defect and the proximity to free margins a mucosal advancement flap was deemed most appropriate. Incisions were made with a 15 blade scalpel in the appropriate fashion along the cutaneous vermillion border and the mucosal lip. The remaining actinically damaged mucosal tissue was excised.  The mucosal advancement flap was then elevated to the gingival sulcus with care taken to preserve the neurovascular structures and advanced into the primary defect. Care was taken to ensure that precise realignment of the vermillion border was achieved.
Double O-Z Flap Text: The defect edges were debeveled with a #15 scalpel blade.  Given the location of the defect, shape of the defect and the proximity to free margins a Double O-Z flap was deemed most appropriate.  Using a sterile surgical marker, an appropriate transposition flap was drawn incorporating the defect and placing the expected incisions within the relaxed skin tension lines where possible. The area thus outlined was incised deep to adipose tissue with a #15 scalpel blade.  The skin margins were undermined to an appropriate distance in all directions utilizing iris scissors.
Complex Repair And Graft Additional Text (Will Appearing After The Standard Complex Repair Text): The complex repair was not sufficient to completely close the primary defect. The remaining additional defect was repaired with the graft mentioned below.
Purse String (Simple) Text: Given the location of the defect and the characteristics of the surrounding skin a pursestring closure was deemed most appropriate.  Undermining was performed circumfirentially around the surgical defect.  A purstring suture was then placed and tightened.
Paramedian Forehead Flap Text: A decision was made to reconstruct the defect utilizing an interpolation axial flap and a staged reconstruction.  A telfa template was made of the defect.  This telfa template was then used to outline the paramedian forehead pedicle flap.  The donor area for the pedicle flap was then injected with anesthesia.  The flap was excised through the skin and subcutaneous tissue down to the layer of the underlying musculature.  The pedicle flap was carefully excised within this deep plane to maintain its blood supply.  The edges of the donor site were undermined.   The donor site was closed in a primary fashion.  The pedicle was then rotated into position and sutured.  Once the tube was sutured into place, adequate blood supply was confirmed with blanching and refill.  The pedicle was then wrapped with xeroform gauze and dressed appropriately with a telfa and gauze bandage to ensure continued blood supply and protect the attached pedicle.
Zygomaticofacial Flap Text: Given the location of the defect, shape of the defect and the proximity to free margins a zygomaticofacial flap was deemed most appropriate for repair.  Using a sterile surgical marker, the appropriate flap was drawn incorporating the defect and placing the expected incisions within the relaxed skin tension lines where possible. The area thus outlined was incised deep to adipose tissue with a #15 scalpel blade with preservation of a vascular pedicle.  The skin margins were undermined to an appropriate distance in all directions utilizing iris scissors.  The flap was then placed into the defect and anchored with interrupted buried subcutaneous sutures.
O-T Plasty Text: The defect edges were debeveled with a #15 scalpel blade.  Given the location of the defect, shape of the defect and the proximity to free margins an O-T plasty was deemed most appropriate.  Using a sterile surgical marker, an appropriate O-T plasty was drawn incorporating the defect and placing the expected incisions within the relaxed skin tension lines where possible.    The area thus outlined was incised deep to adipose tissue with a #15 scalpel blade.  The skin margins were undermined to an appropriate distance in all directions utilizing iris scissors.
Dorsal Nasal Flap Text: The defect edges were debeveled with a #15 scalpel blade.  Given the location of the defect and the proximity to free margins a dorsal nasal flap was deemed most appropriate.  Using a sterile surgical marker, an appropriate dorsal nasal flap was drawn around the defect.    The area thus outlined was incised deep to adipose tissue with a #15 scalpel blade.  The skin margins were undermined to an appropriate distance in all directions utilizing iris scissors.
Estimated Blood Loss (Cc): minimal
Anesthesia Volume In Cc: 0.8
Epidermal Autograft Text: The defect edges were debeveled with a #15 scalpel blade.  Given the location of the defect, shape of the defect and the proximity to free margins an epidermal autograft was deemed most appropriate.  Using a sterile surgical marker, the primary defect shape was transferred to the donor site. The epidermal graft was then harvested.  The skin graft was then placed in the primary defect and oriented appropriately.
Island Pedicle Flap-Requiring Vessel Identification Text: The defect edges were debeveled with a #15 scalpel blade.  Given the location of the defect, shape of the defect and the proximity to free margins an island pedicle advancement flap was deemed most appropriate.  Using a sterile surgical marker, an appropriate advancement flap was drawn, based on the axial vessel mentioned above, incorporating the defect, outlining the appropriate donor tissue and placing the expected incisions within the relaxed skin tension lines where possible.    The area thus outlined was incised deep to adipose tissue with a #15 scalpel blade.  The skin margins were undermined to an appropriate distance in all directions around the primary defect and laterally outward around the island pedicle utilizing iris scissors.  There was minimal undermining beneath the pedicle flap.
Simple / Intermediate / Complex Repair - Final Wound Length In Cm: 2.7
Graft Donor Site Epidermal Sutures (Optional): 5-0 Prolene
Retention Suture Text: Retention sutures were placed to support the closure and prevent dehiscence.
Ftsg Text: The defect edges were debeveled with a #15 scalpel blade.  Given the location of the defect, shape of the defect and the proximity to free margins a full thickness skin graft was deemed most appropriate.  Using a sterile surgical marker, the primary defect shape was transferred to the donor site. The area thus outlined was incised deep to adipose tissue with a #15 scalpel blade.  The harvested graft was then trimmed of adipose tissue until only dermis and epidermis was left.  The skin margins of the secondary defect were undermined to an appropriate distance in all directions utilizing iris scissors.  The secondary defect was closed with interrupted buried subcutaneous sutures.  The skin edges were then re-apposed with running  sutures.  The skin graft was then placed in the primary defect and oriented appropriately.
Mastoid Interpolation Flap Text: A decision was made to reconstruct the defect utilizing an interpolation axial flap and a staged reconstruction.  A telfa template was made of the defect.  This telfa template was then used to outline the mastoid interpolation flap.  The donor area for the pedicle flap was then injected with anesthesia.  The flap was excised through the skin and subcutaneous tissue down to the layer of the underlying musculature.  The pedicle flap was carefully excised within this deep plane to maintain its blood supply.  The edges of the donor site were undermined.   The donor site was closed in a primary fashion.  The pedicle was then rotated into position and sutured.  Once the tube was sutured into place, adequate blood supply was confirmed with blanching and refill.  The pedicle was then wrapped with xeroform gauze and dressed appropriately with a telfa and gauze bandage to ensure continued blood supply and protect the attached pedicle.
W Plasty Text: The lesion was extirpated to the level of the fat with a #15 scalpel blade.  Given the location of the defect, shape of the defect and the proximity to free margins a W-plasty was deemed most appropriate for repair.  Using a sterile surgical marker, the appropriate transposition arms of the W-plasty were drawn incorporating the defect and placing the expected incisions within the relaxed skin tension lines where possible.    The area thus outlined was incised deep to adipose tissue with a #15 scalpel blade.  The skin margins were undermined to an appropriate distance in all directions utilizing iris scissors.  The opposing transposition arms were then transposed into place in opposite direction and anchored with interrupted buried subcutaneous sutures.
Wound Care (No Sutures): Petrolatum
Cartilage Graft Text: The defect edges were debeveled with a #15 scalpel blade.  Given the location of the defect, shape of the defect, the fact the defect involved a full thickness cartilage defect a cartilage graft was deemed most appropriate.  An appropriate donor site was identified, cleansed, and anesthetized. The cartilage graft was then harvested and transferred to the recipient site, oriented appropriately and then sutured into place.  The secondary defect was then repaired using a primary closure.
Nostril Rim Text: The closure involved the nostril rim.
Tarsorrhaphy Text: A tarsorrhaphy was performed using Frost sutures.
Referring Physician (Optional): Tee
Tissue Cultured Epidermal Autograft Text: The defect edges were debeveled with a #15 scalpel blade.  Given the location of the defect, shape of the defect and the proximity to free margins a tissue cultured epidermal autograft was deemed most appropriate.  The graft was then trimmed to fit the size of the defect.  The graft was then placed in the primary defect and oriented appropriately.
Brow Lift Text: A midfrontal incision was made medially to the defect to allow access to the tissues just superior to the left eyebrow. Following careful dissection inferiorly in a supraperiosteal plane to the level of the left eyebrow, several 3-0 monocryl sutures were used to resuspend the eyebrow orbicularis oculi muscular unit to the superior frontal bone periosteum. This resulted in an appropriate reapproximation of static eyebrow symmetry and correction of the left brow ptosis.
Unna Boot Text: An Unna boot was placed to help immobilize the limb and facilitate more rapid healing.
Is There Documentation In The Chart Showing Discussion Of Changes With Another Physician?: Please Select the Appropriate Response
O-L Flap Text: The defect edges were debeveled with a #15 scalpel blade.  Given the location of the defect, shape of the defect and the proximity to free margins an O-L flap was deemed most appropriate.  Using a sterile surgical marker, an appropriate advancement flap was drawn incorporating the defect and placing the expected incisions within the relaxed skin tension lines where possible.    The area thus outlined was incised deep to adipose tissue with a #15 scalpel blade.  The skin margins were undermined to an appropriate distance in all directions utilizing iris scissors.
Advancement Flap (Single) Text: The defect edges were debeveled with a #15 scalpel blade.  Given the location of the defect and the proximity to free margins a single advancement flap was deemed most appropriate.  Using a sterile surgical marker, an appropriate advancement flap was drawn incorporating the defect and placing the expected incisions within the relaxed skin tension lines where possible.    The area thus outlined was incised deep to adipose tissue with a #15 scalpel blade.  The skin margins were undermined to an appropriate distance in all directions utilizing iris scissors.
No Repair - Repaired With Adjacent Surgical Defect Text (Leave Blank If You Do Not Want): After obtaining clear surgical margins the defect was repaired concurrently with another surgical defect which was in close approximation.
Anesthesia Volume In Cc: 2
Mohs Rapid Report Verbiage: The area of clinically evident tumor was marked with skin marking ink and appropriately hatched.  The initial incision was made following the Mohs approach through the skin.  The specimen was taken to the lab, divided into the necessary number of pieces, chromacoded and processed according to the Mohs protocol.  This was repeated in successive stages until a tumor free defect was achieved.
Ear Star Wedge Flap Text: The defect edges were debeveled with a #15 blade scalpel.  Given the location of the defect and the proximity to free margins (helical rim) an ear star wedge flap was deemed most appropriate.  Using a sterile surgical marker, the appropriate flap was drawn incorporating the defect and placing the expected incisions between the helical rim and antihelix where possible.  The area thus outlined was incised through and through with a #15 scalpel blade.
Tumor Debulked?: curette
Bilobed Transposition Flap Text: The defect edges were debeveled with a #15 scalpel blade.  Given the location of the defect and the proximity to free margins a bilobed transposition flap was deemed most appropriate.  Using a sterile surgical marker, an appropriate bilobe flap drawn around the defect.    The area thus outlined was incised deep to adipose tissue with a #15 scalpel blade.  The skin margins were undermined to an appropriate distance in all directions utilizing iris scissors.
Nasal Turnover Hinge Flap Text: The defect edges were debeveled with a #15 scalpel blade.  Given the size, depth, location of the defect and the defect being full thickness a nasal turnover hinge flap was deemed most appropriate.  Using a sterile surgical marker, an appropriate hinge flap was drawn incorporating the defect. The area thus outlined was incised with a #15 scalpel blade. The flap was designed to recreate the nasal mucosal lining and the alar rim. The skin margins were undermined to an appropriate distance in all directions utilizing iris scissors.
Consent (Scalp)/Introductory Paragraph: The rationale for Mohs was explained to the patient and consent was obtained. The risks, benefits and alternatives to therapy were discussed in detail. Specifically, the risks of changes in hair growth pattern secondary to repair, infection, scarring, bleeding, prolonged wound healing, incomplete removal, allergy to anesthesia, nerve injury and recurrence were addressed. Prior to the procedure, the treatment site was clearly identified and confirmed by the patient. All components of Universal Protocol/PAUSE Rule completed.
Cheiloplasty (Less Than 50%) Text: A decision was made to reconstruct the defect with a  cheiloplasty.  The defect was undermined extensively.  Additional obicularis oris muscle was excised with a 15 blade scalpel.  The defect was converted into a full thickness wedge, of less than 50% of the vertical height of the lip, to facilite a better cosmetic result.  Small vessels were then tied off with 5-0 monocyrl. The obicularis oris, superficial fascia, adipose and dermis were then reapproximated.  After the deeper layers were approximated the epidermis was reapproximated with particular care given to realign the vermillion border.
Cheek Interpolation Flap Text: A decision was made to reconstruct the defect utilizing an interpolation axial flap and a staged reconstruction.  A telfa template was made of the defect.  This telfa template was then used to outline the Cheek Interpolation flap.  The donor area for the pedicle flap was then injected with anesthesia.  The flap was excised through the skin and subcutaneous tissue down to the layer of the underlying musculature.  The interpolation flap was carefully excised within this deep plane to maintain its blood supply.  The edges of the donor site were undermined.   The donor site was closed in a primary fashion.  The pedicle was then rotated into position and sutured.  Once the tube was sutured into place, adequate blood supply was confirmed with blanching and refill.  The pedicle was then wrapped with xeroform gauze and dressed appropriately with a telfa and gauze bandage to ensure continued blood supply and protect the attached pedicle.
Consent (Temporal Branch)/Introductory Paragraph: The rationale for Mohs was explained to the patient and consent was obtained. The risks, benefits and alternatives to therapy were discussed in detail. Specifically, the risks of damage to the temporal branch of the facial nerve, infection, scarring, bleeding, prolonged wound healing, incomplete removal, allergy to anesthesia, and recurrence were addressed. Prior to the procedure, the treatment site was clearly identified and confirmed by the patient. All components of Universal Protocol/PAUSE Rule completed.
Closure 4 Information: This tab is for additional flaps and grafts above and beyond our usual structured repairs.  Please note if you enter information here it will not currently bill and you will need to add the billing information manually.
Peng Advancement Flap Text: The defect edges were debeveled with a #15 scalpel blade.  Given the location of the defect, shape of the defect and the proximity to free margins a Peng advancement flap was deemed most appropriate.  Using a sterile surgical marker, an appropriate advancement flap was drawn incorporating the defect and placing the expected incisions within the relaxed skin tension lines where possible. The area thus outlined was incised deep to adipose tissue with a #15 scalpel blade.  The skin margins were undermined to an appropriate distance in all directions utilizing iris scissors.
V-Y Flap Text: The defect edges were debeveled with a #15 scalpel blade.  Given the location of the defect, shape of the defect and the proximity to free margins a V-Y flap was deemed most appropriate.  Using a sterile surgical marker, an appropriate advancement flap was drawn incorporating the defect and placing the expected incisions within the relaxed skin tension lines where possible.    The area thus outlined was incised deep to adipose tissue with a #15 scalpel blade.  The skin margins were undermined to an appropriate distance in all directions utilizing iris scissors.
Consent 1/Introductory Paragraph: The rationale for Mohs was explained to the patient and consent was obtained. The risks, benefits and alternatives to therapy were discussed in detail. Specifically, the risks of infection, scarring, bleeding, prolonged wound healing, incomplete removal, allergy to anesthesia, nerve injury (both sensory which can be permanent and motor which is permanent) and recurrence were addressed. Prior to the procedure, the treatment site was clearly identified and confirmed by the patient.
Mercedes Flap Text: The defect edges were debeveled with a #15 scalpel blade.  Given the location of the defect, shape of the defect and the proximity to free margins a Mercedes flap was deemed most appropriate.  Using a sterile surgical marker, an appropriate advancement flap was drawn incorporating the defect and placing the expected incisions within the relaxed skin tension lines where possible. The area thus outlined was incised deep to adipose tissue with a #15 scalpel blade.  The skin margins were undermined to an appropriate distance in all directions utilizing iris scissors.
Purse String (Intermediate) Text: Given the location of the defect and the characteristics of the surrounding skin a pursestring intermediate closure was deemed most appropriate.  Undermining was performed circumfirentially around the surgical defect.  A purstring suture was then placed and tightened.
Information: Selecting Yes will display possible errors in your note based on the variables you have selected. This validation is only offered as a suggestion for you. PLEASE NOTE THAT THE VALIDATION TEXT WILL BE REMOVED WHEN YOU FINALIZE YOUR NOTE. IF YOU WANT TO FAX A PRELIMINARY NOTE YOU WILL NEED TO TOGGLE THIS TO 'NO' IF YOU DO NOT WANT IT IN YOUR FAXED NOTE.
Bcc Infiltrative Histology Text: There were numerous aggregates of basaloid cells demonstrating an infiltrative pattern.
O-Z Plasty Text: The defect edges were debeveled with a #15 scalpel blade.  Given the location of the defect, shape of the defect and the proximity to free margins an O-Z plasty (double transposition flap) was deemed most appropriate.  Using a sterile surgical marker, the appropriate transposition flaps were drawn incorporating the defect and placing the expected incisions within the relaxed skin tension lines where possible.    The area thus outlined was incised deep to adipose tissue with a #15 scalpel blade.  The skin margins were undermined to an appropriate distance in all directions utilizing iris scissors.  Hemostasis was achieved with electrocautery.  The flaps were then transposed into place, one clockwise and the other counterclockwise, and anchored with interrupted buried subcutaneous sutures.
Island Pedicle Flap Text: The defect edges were debeveled with a #15 scalpel blade.  Given the location of the defect, shape of the defect and the proximity to free margins an island pedicle advancement flap was deemed most appropriate.  Using a sterile surgical marker, an appropriate advancement flap was drawn incorporating the defect, outlining the appropriate donor tissue and placing the expected incisions within the relaxed skin tension lines where possible.    The area thus outlined was incised deep to adipose tissue with a #15 scalpel blade.  The skin margins were undermined to an appropriate distance in all directions around the primary defect and laterally outward around the island pedicle utilizing iris scissors.  There was minimal undermining beneath the pedicle flap.
Eye Protection Verbiage: Before proceeding with the stage, a plastic scleral shield was inserted. The globe was anesthetized with a few drops of 1% lidocaine with 1:100,000 epinephrine. Then, an appropriate sized scleral shield was chosen and coated with lacrilube ointment. The shield was gently inserted and left in place for the duration of each stage. After the stage was completed, the shield was gently removed.
Repair Type: Intermediate Layered Repair
Deep Sutures: 5-0 Vicryl
Rhomboid Transposition Flap Text: The defect edges were debeveled with a #15 scalpel blade.  Given the location of the defect and the proximity to free margins a rhomboid transposition flap was deemed most appropriate.  Using a sterile surgical marker, an appropriate rhomboid flap was drawn incorporating the defect.    The area thus outlined was incised deep to adipose tissue with a #15 scalpel blade.  The skin margins were undermined to an appropriate distance in all directions utilizing iris scissors.
Dermal Autograft Text: The defect edges were debeveled with a #15 scalpel blade.  Given the location of the defect, shape of the defect and the proximity to free margins a dermal autograft was deemed most appropriate.  Using a sterile surgical marker, the primary defect shape was transferred to the donor site. The area thus outlined was incised deep to adipose tissue with a #15 scalpel blade.  The harvested graft was then trimmed of adipose and epidermal tissue until only dermis was left.  The skin graft was then placed in the primary defect and oriented appropriately.
Mart-1 - Positive Histology Text: MART-1 staining demonstrates areas of higher density and clustering of melanocytes with Pagetoid spread upwards within the epidermis. The surgical margins are positive for tumor cells.
Closure 2 Information: This tab is for additional flaps and grafts, including complex repair and grafts and complex repair and flaps. You can also specify a different location for the additional defect, if the location is the same you do not need to select a new one. We will insert the automated text for the repair you select below just as we do for solitary flaps and grafts. Please note that at this time if you select a location with a different insurance zone you will need to override the ICD10 and CPT if appropriate.
Hemigard Postcare Instructions: The HEMIGARD strips are to remain completely dry for at least 5-7 days.
Chonodrocutaneous Helical Advancement Flap Text: The defect edges were debeveled with a #15 scalpel blade.  Given the location of the defect and the proximity to free margins a chondrocutaneous helical advancement flap was deemed most appropriate.  Using a sterile surgical marker, the appropriate advancement flap was drawn incorporating the defect and placing the expected incisions within the relaxed skin tension lines where possible.    The area thus outlined was incised deep to adipose tissue with a #15 scalpel blade.  The skin margins were undermined to an appropriate distance in all directions utilizing iris scissors.
Retention Suture Bite Size: 3 mm
Composite Graft Text: The defect edges were debeveled with a #15 scalpel blade.  Given the location of the defect, shape of the defect, the proximity to free margins and the fact the defect was full thickness a composite graft was deemed most appropriate.  The defect was outline and then transferred to the donor site.  A full thickness graft was then excised from the donor site. The graft was then placed in the primary defect, oriented appropriately and then sutured into place.  The secondary defect was then repaired using a primary closure.
Xenograft Text: The defect edges were debeveled with a #15 scalpel blade.  Given the location of the defect, shape of the defect and the proximity to free margins a xenograft was deemed most appropriate.  The graft was then trimmed to fit the size of the defect.  The graft was then placed in the primary defect and oriented appropriately.
Home Suture Removal Text: Patient was provided instructions on removing sutures and will remove their sutures at home.  If they have any questions or difficulties they will call the office.
O-Z Flap Text: The defect edges were debeveled with a #15 scalpel blade.  Given the location of the defect, shape of the defect and the proximity to free margins an O-Z flap was deemed most appropriate.  Using a sterile surgical marker, an appropriate transposition flap was drawn incorporating the defect and placing the expected incisions within the relaxed skin tension lines where possible. The area thus outlined was incised deep to adipose tissue with a #15 scalpel blade.  The skin margins were undermined to an appropriate distance in all directions utilizing iris scissors.
Post-Care Instructions: I reviewed with the patient in detail post-care instructions. Patient is not to engage in any heavy lifting, exercise, or swimming for the next 14 days. Should the patient develop any fevers, chills, bleeding, severe pain patient will contact the office immediately.
Advancement Flap (Double) Text: The defect edges were debeveled with a #15 scalpel blade.  Given the location of the defect and the proximity to free margins a double advancement flap was deemed most appropriate.  Using a sterile surgical marker, the appropriate advancement flaps were drawn incorporating the defect and placing the expected incisions within the relaxed skin tension lines where possible.    The area thus outlined was incised deep to adipose tissue with a #15 scalpel blade.  The skin margins were undermined to an appropriate distance in all directions utilizing iris scissors.
Staging Info: By selecting yes to the question above you will include information on AJCC 8 tumor staging in your Mohs note. Information on tumor staging will be automatically added for SCCs on the head and neck. AJCC 8 includes tumor size, tumor depth, perineural involvement and bone invasion.
Banner Transposition Flap Text: The defect edges were debeveled with a #15 scalpel blade.  Given the location of the defect and the proximity to free margins a Banner transposition flap was deemed most appropriate.  Using a sterile surgical marker, an appropriate flap drawn around the defect. The area thus outlined was incised deep to adipose tissue with a #15 scalpel blade.  The skin margins were undermined to an appropriate distance in all directions utilizing iris scissors.
Consent 2/Introductory Paragraph: Mohs surgery was explained to the patient and consent was obtained. The risks, benefits and alternatives to therapy were discussed in detail. Specifically, the risks of infection, scarring, bleeding, prolonged wound healing, incomplete removal, allergy to anesthesia, nerve injury and recurrence were addressed. Prior to the procedure, the treatment site was clearly identified and confirmed by the patient. All components of Universal Protocol/PAUSE Rule completed.
Where Do You Want The Question To Include Opioid Counseling Located?: Case Summary Tab
Nasalis-Muscle-Based Myocutaneous Island Pedicle Flap Text: Using a #15 blade, an incision was made around the donor flap to the level of the nasalis muscle. Wide lateral undermining was then performed in both the subcutaneous plane above the nasalis muscle, and in a submuscular plane just above periosteum. This allowed the formation of a free nasalis muscle axial pedicle (based on the angular artery) which was still attached to the actual cutaneous flap, increasing its mobility and vascular viability. Hemostasis was obtained with pinpoint electrocoagulation. The flap was mobilized into position and the pivotal anchor points positioned and stabilized with buried interrupted sutures. Subcutaneous and dermal tissues were closed in a multilayered fashion with sutures. Tissue redundancies were excised, and the epidermal edges were apposed without significant tension and sutured with sutures.
Detail Level: Detailed
Surgeon Performing Repair (Optional): Dr Carrizales
Consent (Marginal Mandibular)/Introductory Paragraph: The rationale for Mohs was explained to the patient and consent was obtained. The risks, benefits and alternatives to therapy were discussed in detail. Specifically, the risks of damage to the marginal mandibular branch of the facial nerve, infection, scarring, bleeding, prolonged wound healing, incomplete removal, allergy to anesthesia, and recurrence were addressed. Prior to the procedure, the treatment site was clearly identified and confirmed by the patient. All components of Universal Protocol/PAUSE Rule completed.
Hatchet Flap Text: The defect edges were debeveled with a #15 scalpel blade.  Given the location of the defect, shape of the defect and the proximity to free margins a hatchet flap was deemed most appropriate.  Using a sterile surgical marker, an appropriate hatchet flap was drawn incorporating the defect and placing the expected incisions within the relaxed skin tension lines where possible.    The area thus outlined was incised deep to adipose tissue with a #15 scalpel blade.  The skin margins were undermined to an appropriate distance in all directions utilizing iris scissors.
Suturegard Body: The suture ends were repeatedly re-tightened and re-clamped to achieve the desired tissue expansion.
Undermining Type: Entire Wound
Cheiloplasty (Complex) Text: A decision was made to reconstruct the defect with a  cheiloplasty.  The defect was undermined extensively.  Additional obicularis oris muscle was excised with a 15 blade scalpel.  The defect was converted into a full thickness wedge to facilite a better cosmetic result.  Small vessels were then tied off with 5-0 monocyrl. The obicularis oris, superficial fascia, adipose and dermis were then reapproximated.  After the deeper layers were approximated the epidermis was reapproximated with particular care given to realign the vermillion border.
Cheek-To-Nose Interpolation Flap Text: A decision was made to reconstruct the defect utilizing an interpolation axial flap and a staged reconstruction.  A telfa template was made of the defect.  This telfa template was then used to outline the Cheek-To-Nose Interpolation flap.  The donor area for the pedicle flap was then injected with anesthesia.  The flap was excised through the skin and subcutaneous tissue down to the layer of the underlying musculature.  The interpolation flap was carefully excised within this deep plane to maintain its blood supply.  The edges of the donor site were undermined.   The donor site was closed in a primary fashion.  The pedicle was then rotated into position and sutured.  Once the tube was sutured into place, adequate blood supply was confirmed with blanching and refill.  The pedicle was then wrapped with xeroform gauze and dressed appropriately with a telfa and gauze bandage to ensure continued blood supply and protect the attached pedicle.
Double O-Z Plasty Text: The defect edges were debeveled with a #15 scalpel blade.  Given the location of the defect, shape of the defect and the proximity to free margins a Double O-Z plasty (double transposition flap) was deemed most appropriate.  Using a sterile surgical marker, the appropriate transposition flaps were drawn incorporating the defect and placing the expected incisions within the relaxed skin tension lines where possible. The area thus outlined was incised deep to adipose tissue with a #15 scalpel blade.  The skin margins were undermined to an appropriate distance in all directions utilizing iris scissors.  Hemostasis was achieved with electrocautery.  The flaps were then transposed into place, one clockwise and the other counterclockwise, and anchored with interrupted buried subcutaneous sutures.
Medical Necessity Statement: Based on my medical judgement, Mohs surgery is the most appropriate treatment for this cancer compared to other treatments.
Modified Advancement Flap Text: The defect edges were debeveled with a #15 scalpel blade.  Given the location of the defect, shape of the defect and the proximity to free margins a modified advancement flap was deemed most appropriate.  Using a sterile surgical marker, an appropriate advancement flap was drawn incorporating the defect and placing the expected incisions within the relaxed skin tension lines where possible.    The area thus outlined was incised deep to adipose tissue with a #15 scalpel blade.  The skin margins were undermined to an appropriate distance in all directions utilizing iris scissors.
Partial Purse String (Simple) Text: Given the location of the defect and the characteristics of the surrounding skin a simple purse string closure was deemed most appropriate.  Undermining was performed circumfirentially around the surgical defect.  A purse string suture was then placed and tightened. Wound tension only allowed a partial closure of the circular defect.
Island Pedicle Flap With Canthal Suspension Text: The defect edges were debeveled with a #15 scalpel blade.  Given the location of the defect, shape of the defect and the proximity to free margins an island pedicle advancement flap was deemed most appropriate.  Using a sterile surgical marker, an appropriate advancement flap was drawn incorporating the defect, outlining the appropriate donor tissue and placing the expected incisions within the relaxed skin tension lines where possible. The area thus outlined was incised deep to adipose tissue with a #15 scalpel blade.  The skin margins were undermined to an appropriate distance in all directions around the primary defect and laterally outward around the island pedicle utilizing iris scissors.  There was minimal undermining beneath the pedicle flap. A suspension suture was placed in the canthal tendon to prevent tension and prevent ectropion.
Mohs Method Verbiage: An incision at a 45 degree angle following the standard Mohs approach was done and the specimen was harvested as a microscopic controlled layer.
Epidermal Sutures: 5-0 Plain Gut
Length To Time In Minutes Device Was In Place: 10
Pain Refusal Text: I offered to prescribe pain medication but the patient refused to take this medication.
Bi-Rhombic Flap Text: The defect edges were debeveled with a #15 scalpel blade.  Given the location of the defect and the proximity to free margins a bi-rhombic flap was deemed most appropriate.  Using a sterile surgical marker, an appropriate rhombic flap was drawn incorporating the defect. The area thus outlined was incised deep to adipose tissue with a #15 scalpel blade.  The skin margins were undermined to an appropriate distance in all directions utilizing iris scissors.
Donor Site Anesthesia Type: same as repair anesthesia
Crescentic Advancement Flap Text: The defect edges were debeveled with a #15 scalpel blade.  Given the location of the defect and the proximity to free margins a crescentic advancement flap was deemed most appropriate.  Using a sterile surgical marker, the appropriate advancement flap was drawn incorporating the defect and placing the expected incisions within the relaxed skin tension lines where possible.    The area thus outlined was incised deep to adipose tissue with a #15 scalpel blade.  The skin margins were undermined to an appropriate distance in all directions utilizing iris scissors.
Non-Graft Cartilage Fenestration Text: The cartilage was fenestrated with a 2mm punch biopsy to help facilitate healing.
Area H Indication Text: Tumors in this location are included in Area H (eyelids, eyebrows, nose, lips, chin, ear, pre-auricular, post-auricular, temple, genitalia, hands, feet, ankles and areola).  Tissue conservation is critical in these anatomic locations.
No Residual Tumor Seen Histology Text: There were no malignant cells seen in the sections examined.
Rhombic Flap Text: The defect edges were debeveled with a #15 scalpel blade.  Given the location of the defect and the proximity to free margins a rhombic flap was deemed most appropriate.  Using a sterile surgical marker, an appropriate for rhombic flap was drawn incorporating the defect.    The area thus outlined was incised deep to adipose tissue with a #15 scalpel blade.  The skin margins were undermined to an appropriate distance in all directions utilizing iris scissors.
Consent (Ear)/Introductory Paragraph: The rationale for Mohs was explained to the patient and consent was obtained. The risks, benefits and alternatives to therapy were discussed in detail. Specifically, the risks of ear deformity, infection, scarring, bleeding, prolonged wound healing, incomplete removal, allergy to anesthesia, nerve injury and recurrence were addressed. Prior to the procedure, the treatment site was clearly identified and confirmed by the patient. All components of Universal Protocol/PAUSE Rule completed.
Star Wedge Flap Text: The defect edges were debeveled with a #15 scalpel blade.  Given the location of the defect, shape of the defect and the proximity to free margins a star wedge flap was deemed most appropriate.  Using a sterile surgical marker, an appropriate rotation flap was drawn incorporating the defect and placing the expected incisions within the relaxed skin tension lines where possible. The area thus outlined was incised deep to adipose tissue with a #15 scalpel blade.  The skin margins were undermined to an appropriate distance in all directions utilizing iris scissors.

## 2021-06-24 ENCOUNTER — APPOINTMENT (RX ONLY)
Dept: URBAN - METROPOLITAN AREA CLINIC 23 | Facility: CLINIC | Age: 81
Setting detail: DERMATOLOGY
End: 2021-06-24

## 2021-06-24 DIAGNOSIS — D485 NEOPLASM OF UNCERTAIN BEHAVIOR OF SKIN: ICD-10-CM

## 2021-06-24 DIAGNOSIS — L57.8 OTHER SKIN CHANGES DUE TO CHRONIC EXPOSURE TO NONIONIZING RADIATION: ICD-10-CM

## 2021-06-24 DIAGNOSIS — D22 MELANOCYTIC NEVI: ICD-10-CM

## 2021-06-24 DIAGNOSIS — Z85.828 PERSONAL HISTORY OF OTHER MALIGNANT NEOPLASM OF SKIN: ICD-10-CM

## 2021-06-24 DIAGNOSIS — L82.1 OTHER SEBORRHEIC KERATOSIS: ICD-10-CM

## 2021-06-24 PROBLEM — I10 ESSENTIAL (PRIMARY) HYPERTENSION: Status: ACTIVE | Noted: 2021-06-24

## 2021-06-24 PROBLEM — D48.5 NEOPLASM OF UNCERTAIN BEHAVIOR OF SKIN: Status: ACTIVE | Noted: 2021-06-24

## 2021-06-24 PROBLEM — L85.3 XEROSIS CUTIS: Status: ACTIVE | Noted: 2021-06-24

## 2021-06-24 PROBLEM — E78.5 HYPERLIPIDEMIA, UNSPECIFIED: Status: ACTIVE | Noted: 2021-06-24

## 2021-06-24 PROBLEM — D22.5 MELANOCYTIC NEVI OF TRUNK: Status: ACTIVE | Noted: 2021-06-24

## 2021-06-24 PROCEDURE — 11103 TANGNTL BX SKIN EA SEP/ADDL: CPT

## 2021-06-24 PROCEDURE — ? BIOPSY BY SHAVE METHOD

## 2021-06-24 PROCEDURE — ? COUNSELING

## 2021-06-24 PROCEDURE — 11102 TANGNTL BX SKIN SINGLE LES: CPT

## 2021-06-24 PROCEDURE — 99213 OFFICE O/P EST LOW 20 MIN: CPT | Mod: 25

## 2021-06-24 ASSESSMENT — LOCATION DETAILED DESCRIPTION DERM
LOCATION DETAILED: RIGHT ANTIHELIX
LOCATION DETAILED: RIGHT CENTRAL BUCCAL CHEEK
LOCATION DETAILED: LEFT SUPERIOR MEDIAL UPPER BACK
LOCATION DETAILED: LEFT INFERIOR MEDIAL UPPER BACK
LOCATION DETAILED: UPPER STERNUM
LOCATION DETAILED: MID TRAPEZIAL NECK
LOCATION DETAILED: LEFT SUPERIOR MEDIAL BUCCAL CHEEK
LOCATION DETAILED: LEFT CENTRAL BUCCAL CHEEK
LOCATION DETAILED: RIGHT CENTRAL TEMPLE
LOCATION DETAILED: LEFT LATERAL SUBMANDIBULAR CHEEK
LOCATION DETAILED: LEFT BUTTOCK
LOCATION DETAILED: LEFT INFERIOR MEDIAL FOREHEAD
LOCATION DETAILED: LEFT DORSAL WRIST
LOCATION DETAILED: LEFT INFERIOR FOREHEAD
LOCATION DETAILED: LEFT FOREHEAD
LOCATION DETAILED: RIGHT RIB CAGE

## 2021-06-24 ASSESSMENT — LOCATION SIMPLE DESCRIPTION DERM
LOCATION SIMPLE: LEFT UPPER BACK
LOCATION SIMPLE: LEFT CHEEK
LOCATION SIMPLE: CHEST
LOCATION SIMPLE: RIGHT EAR
LOCATION SIMPLE: TRAPEZIAL NECK
LOCATION SIMPLE: LEFT WRIST
LOCATION SIMPLE: LEFT FOREHEAD
LOCATION SIMPLE: RIGHT CHEEK
LOCATION SIMPLE: RIGHT TEMPLE
LOCATION SIMPLE: LEFT BUTTOCK
LOCATION SIMPLE: ABDOMEN

## 2021-06-24 ASSESSMENT — LOCATION ZONE DERM
LOCATION ZONE: ARM
LOCATION ZONE: TRUNK
LOCATION ZONE: NECK
LOCATION ZONE: FACE
LOCATION ZONE: EAR

## 2021-06-24 NOTE — PROCEDURE: BIOPSY BY SHAVE METHOD
Size Of Lesion In Cm: 0
Detail Level: Detailed
Destruction After The Procedure: No
Curettage Text: The wound bed was treated with curettage after the biopsy was performed.
Dressing: bandage
Anesthesia Volume In Cc: 0.5
Billing Type: Third-Party Bill
Was A Bandage Applied: Yes
Electrodesiccation Text: The wound bed was treated with electrodesiccation after the biopsy was performed.
Hemostasis: Aluminum Chloride
Biopsy Type: H and E
Electrodesiccation And Curettage Text: The wound bed was treated with electrodesiccation and curettage after the biopsy was performed.
Information: Selecting Yes will display possible errors in your note based on the variables you have selected. This validation is only offered as a suggestion for you. PLEASE NOTE THAT THE VALIDATION TEXT WILL BE REMOVED WHEN YOU FINALIZE YOUR NOTE. IF YOU WANT TO FAX A PRELIMINARY NOTE YOU WILL NEED TO TOGGLE THIS TO 'NO' IF YOU DO NOT WANT IT IN YOUR FAXED NOTE.
Accession #: [id number]
Silver Nitrate Text: The wound bed was treated with silver nitrate after the biopsy was performed.
Biopsy Method: Dermablade
Depth Of Biopsy: dermis
Wound Care: Petrolatum
Anesthesia Type: 1% lidocaine with 1:100,000 epinephrine and a 1:6 solution of 8.4% sodium bicarbonate
Cryotherapy Text: The wound bed was treated with cryotherapy after the biopsy was performed.
Type Of Destruction Used: Curettage

## 2021-06-30 ENCOUNTER — RX ONLY (OUTPATIENT)
Age: 81
Setting detail: RX ONLY
End: 2021-06-30

## 2021-06-30 RX ORDER — CLINDAMYCIN HYDROCHLORIDE 300 MG/1
CAPSULE ORAL
Qty: 4 | Refills: 0 | Status: ERX | COMMUNITY
Start: 2021-06-30

## 2021-07-13 ENCOUNTER — APPOINTMENT (RX ONLY)
Dept: URBAN - METROPOLITAN AREA CLINIC 23 | Facility: CLINIC | Age: 81
Setting detail: DERMATOLOGY
End: 2021-07-13

## 2021-07-13 PROBLEM — C44.319 BASAL CELL CARCINOMA OF SKIN OF OTHER PARTS OF FACE: Status: ACTIVE | Noted: 2021-07-13

## 2021-07-13 PROCEDURE — 12052 INTMD RPR FACE/MM 2.6-5.0 CM: CPT

## 2021-07-13 PROCEDURE — ? EXCISION

## 2021-07-13 PROCEDURE — 11642 EXC F/E/E/N/L MAL+MRG 1.1-2: CPT

## 2021-07-13 NOTE — PROCEDURE: EXCISION
Where Do You Want The Question To Include Opioid Counseling Located?: Case Summary Tab
Validate Note Data (See Information Below): Yes
Anesthesia Type: 1% lidocaine with epinephrine and a 1:10 solution of 8.4% sodium bicarbonate
Validate Previous Accession (Can Hide Previous Accession In Settings Tab): No
Detail Level: Detailed
Excision Method: Elliptical
O-T Plasty Text: The defect edges were debeveled with a #15 scalpel blade.  Given the location of the defect, shape of the defect and the proximity to free margins an O-T plasty was deemed most appropriate.  Using a sterile surgical marker, an appropriate O-T plasty was drawn incorporating the defect and placing the expected incisions within the relaxed skin tension lines where possible.    The area thus outlined was incised deep to adipose tissue with a #15 scalpel blade.  The skin margins were undermined to an appropriate distance in all directions utilizing iris scissors.
Nostril Rim Text: The closure involved the nostril rim.
Dorsal Nasal Flap Text: The defect edges were debeveled with a #15 scalpel blade.  Given the location of the defect and the proximity to free margins a dorsal nasal flap was deemed most appropriate.  Using a sterile surgical marker, an appropriate dorsal nasal flap was drawn around the defect.    The area thus outlined was incised deep to adipose tissue with a #15 scalpel blade.  The skin margins were undermined to an appropriate distance in all directions utilizing iris scissors.
Helical Rim Advancement Flap Text: The defect edges were debeveled with a #15 blade scalpel.  Given the location of the defect and the proximity to free margins (helical rim) a double helical rim advancement flap was deemed most appropriate.  Using a sterile surgical marker, the appropriate advancement flaps were drawn incorporating the defect and placing the expected incisions between the helical rim and antihelix where possible.  The area thus outlined was incised through and through with a #15 scalpel blade.  With a skin hook and iris scissors, the flaps were gently and sharply undermined and freed up.
Nasal Turnover Hinge Flap Text: The defect edges were debeveled with a #15 scalpel blade.  Given the size, depth, location of the defect and the defect being full thickness a nasal turnover hinge flap was deemed most appropriate.  Using a sterile surgical marker, an appropriate hinge flap was drawn incorporating the defect. The area thus outlined was incised with a #15 scalpel blade. The flap was designed to recreate the nasal mucosal lining and the alar rim. The skin margins were undermined to an appropriate distance in all directions utilizing iris scissors.
Hemigard Postcare Instructions: The HEMIGARD strips are to remain completely dry for at least 5-7 days.
Advancement-Rotation Flap Text: The defect edges were debeveled with a #15 scalpel blade.  Given the location of the defect, shape of the defect and the proximity to free margins an advancement-rotation flap was deemed most appropriate.  Using a sterile surgical marker, an appropriate flap was drawn incorporating the defect and placing the expected incisions within the relaxed skin tension lines where possible. The area thus outlined was incised deep to adipose tissue with a #15 scalpel blade.  The skin margins were undermined to an appropriate distance in all directions utilizing iris scissors.
Crescentic Advancement Flap Text: The defect edges were debeveled with a #15 scalpel blade.  Given the location of the defect and the proximity to free margins a crescentic advancement flap was deemed most appropriate.  Using a sterile surgical marker, the appropriate advancement flap was drawn incorporating the defect and placing the expected incisions within the relaxed skin tension lines where possible.    The area thus outlined was incised deep to adipose tissue with a #15 scalpel blade.  The skin margins were undermined to an appropriate distance in all directions utilizing iris scissors.
Curvilinear Excision Additional Text (Leave Blank If You Do Not Want): The margin was drawn around the clinically apparent lesion.  A curvilinear shape was then drawn on the skin incorporating the lesion and margins.  Incisions were then made along these lines to the appropriate tissue plane and the lesion was extirpated.
Lazy S Complex Repair Preamble Text (Leave Blank If You Do Not Want): Extensive wide undermining was performed.
Retention Suture Text: Retention sutures were placed to support the closure and prevent dehiscence.
Complex Repair And A-T Advancement Flap Text: The defect edges were debeveled with a #15 scalpel blade.  The primary defect was closed partially with a complex linear closure.  Given the location of the remaining defect, shape of the defect and the proximity to free margins an A-T advancement flap was deemed most appropriate for complete closure of the defect.  Using a sterile surgical marker, an appropriate advancement flap was drawn incorporating the defect and placing the expected incisions within the relaxed skin tension lines where possible.    The area thus outlined was incised deep to adipose tissue with a #15 scalpel blade.  The skin margins were undermined to an appropriate distance in all directions utilizing iris scissors.
Purse String (Intermediate) Text: Given the location of the defect and the characteristics of the surrounding skin a pursestring intermediate closure was deemed most appropriate.  Undermining was performed circumfirentially around the surgical defect.  A purstring suture was then placed and tightened.
Cartilage Graft Text: The defect edges were debeveled with a #15 scalpel blade.  Given the location of the defect, shape of the defect, the fact the defect involved a full thickness cartilage defect a cartilage graft was deemed most appropriate.  An appropriate donor site was identified, cleansed, and anesthetized. The cartilage graft was then harvested and transferred to the recipient site, oriented appropriately and then sutured into place.  The secondary defect was then repaired using a primary closure.
Complex Repair And Skin Substitute Graft Text: The defect edges were debeveled with a #15 scalpel blade.  The primary defect was closed partially with a complex linear closure.  Given the location of the remaining defect, shape of the defect and the proximity to free margins a skin substitute graft was deemed most appropriate to repair the remaining defect.  The graft was trimmed to fit the size of the remaining defect.  The graft was then placed in the primary defect, oriented appropriately, and sutured into place.
Complex Repair And Ftsg Text: The defect edges were debeveled with a #15 scalpel blade.  The primary defect was closed partially with a complex linear closure.  Given the location of the defect, shape of the defect and the proximity to free margins a full thickness skin graft was deemed most appropriate to repair the remaining defect.  The graft was trimmed to fit the size of the remaining defect.  The graft was then placed in the primary defect, oriented appropriately, and sutured into place.
Complex Repair And Transposition Flap Text: The defect edges were debeveled with a #15 scalpel blade.  The primary defect was closed partially with a complex linear closure.  Given the location of the remaining defect, shape of the defect and the proximity to free margins a transposition flap was deemed most appropriate for complete closure of the defect.  Using a sterile surgical marker, an appropriate advancement flap was drawn incorporating the defect and placing the expected incisions within the relaxed skin tension lines where possible.    The area thus outlined was incised deep to adipose tissue with a #15 scalpel blade.  The skin margins were undermined to an appropriate distance in all directions utilizing iris scissors.
Suturegard Body: The suture ends were repeatedly re-tightened and re-clamped to achieve the desired tissue expansion.
V-Y Plasty Text: The defect edges were debeveled with a #15 scalpel blade.  Given the location of the defect, shape of the defect and the proximity to free margins an V-Y advancement flap was deemed most appropriate.  Using a sterile surgical marker, an appropriate advancement flap was drawn incorporating the defect and placing the expected incisions within the relaxed skin tension lines where possible.    The area thus outlined was incised deep to adipose tissue with a #15 scalpel blade.  The skin margins were undermined to an appropriate distance in all directions utilizing iris scissors.
Alar Island Pedicle Flap Text: The defect edges were debeveled with a #15 scalpel blade.  Given the location of the defect, shape of the defect and the proximity to the alar rim an island pedicle advancement flap was deemed most appropriate.  Using a sterile surgical marker, an appropriate advancement flap was drawn incorporating the defect, outlining the appropriate donor tissue and placing the expected incisions within the nasal ala running parallel to the alar rim. The area thus outlined was incised with a #15 scalpel blade.  The skin margins were undermined minimally to an appropriate distance in all directions around the primary defect and laterally outward around the island pedicle utilizing iris scissors.  There was minimal undermining beneath the pedicle flap.
Banner Transposition Flap Text: The defect edges were debeveled with a #15 scalpel blade.  Given the location of the defect and the proximity to free margins a Banner transposition flap was deemed most appropriate.  Using a sterile surgical marker, an appropriate flap drawn around the defect. The area thus outlined was incised deep to adipose tissue with a #15 scalpel blade.  The skin margins were undermined to an appropriate distance in all directions utilizing iris scissors.
Melolabial Transposition Flap Text: The defect edges were debeveled with a #15 scalpel blade.  Given the location of the defect and the proximity to free margins a melolabial flap was deemed most appropriate.  Using a sterile surgical marker, an appropriate melolabial transposition flap was drawn incorporating the defect.    The area thus outlined was incised deep to adipose tissue with a #15 scalpel blade.  The skin margins were undermined to an appropriate distance in all directions utilizing iris scissors.
Size Of Lesion In Cm: 0.6
Interpolation Flap Text: A decision was made to reconstruct the defect utilizing an interpolation axial flap and a staged reconstruction.  A telfa template was made of the defect.  This telfa template was then used to outline the interpolation flap.  The donor area for the pedicle flap was then injected with anesthesia.  The flap was excised through the skin and subcutaneous tissue down to the layer of the underlying musculature.  The interpolation flap was carefully excised within this deep plane to maintain its blood supply.  The edges of the donor site were undermined.   The donor site was closed in a primary fashion.  The pedicle was then rotated into position and sutured.  Once the tube was sutured into place, adequate blood supply was confirmed with blanching and refill.  The pedicle was then wrapped with xeroform gauze and dressed appropriately with a telfa and gauze bandage to ensure continued blood supply and protect the attached pedicle.
O-L Flap Text: The defect edges were debeveled with a #15 scalpel blade.  Given the location of the defect, shape of the defect and the proximity to free margins an O-L flap was deemed most appropriate.  Using a sterile surgical marker, an appropriate advancement flap was drawn incorporating the defect and placing the expected incisions within the relaxed skin tension lines where possible.    The area thus outlined was incised deep to adipose tissue with a #15 scalpel blade.  The skin margins were undermined to an appropriate distance in all directions utilizing iris scissors.
Advancement Flap (Single) Text: The defect edges were debeveled with a #15 scalpel blade.  Given the location of the defect and the proximity to free margins a single advancement flap was deemed most appropriate.  Using a sterile surgical marker, an appropriate advancement flap was drawn incorporating the defect and placing the expected incisions within the relaxed skin tension lines where possible.    The area thus outlined was incised deep to adipose tissue with a #15 scalpel blade.  The skin margins were undermined to an appropriate distance in all directions utilizing iris scissors.
Home Suture Removal Text: Patient was provided a home suture removal kit and will remove their sutures at home.  If they have any questions or difficulties they will call the office.
Saucerization Excision Additional Text (Leave Blank If You Do Not Want): The margin was drawn around the clinically apparent lesion.  Incisions were then made along these lines, in a tangential fashion, to the appropriate tissue plane and the lesion was extirpated.
Crescentic Intermediate Repair Preamble Text (Leave Blank If You Do Not Want): Undermining was performed with blunt dissection.
Staged Advancement Flap Text: The defect edges were debeveled with a #15 scalpel blade.  Given the location of the defect, shape of the defect and the proximity to free margins a staged advancement flap was deemed most appropriate.  Using a sterile surgical marker, an appropriate advancement flap was drawn incorporating the defect and placing the expected incisions within the relaxed skin tension lines where possible. The area thus outlined was incised deep to adipose tissue with a #15 scalpel blade.  The skin margins were undermined to an appropriate distance in all directions utilizing iris scissors.
Mucosal Advancement Flap Text: Given the location of the defect, shape of the defect and the proximity to free margins a mucosal advancement flap was deemed most appropriate. Incisions were made with a 15 blade scalpel in the appropriate fashion along the cutaneous vermillion border and the mucosal lip. The remaining actinically damaged mucosal tissue was excised.  The mucosal advancement flap was then elevated to the gingival sulcus with care taken to preserve the neurovascular structures and advanced into the primary defect. Care was taken to ensure that precise realignment of the vermillion border was achieved.
Hemigard Retention Suture: 2-0 Nylon
Retention Suture Bite Size: 3 mm
Complex Repair And Double M Plasty Text: The defect edges were debeveled with a #15 scalpel blade.  The primary defect was closed partially with a complex linear closure.  Given the location of the remaining defect, shape of the defect and the proximity to free margins a double M plasty was deemed most appropriate for complete closure of the defect.  Using a sterile surgical marker, an appropriate advancement flap was drawn incorporating the defect and placing the expected incisions within the relaxed skin tension lines where possible.    The area thus outlined was incised deep to adipose tissue with a #15 scalpel blade.  The skin margins were undermined to an appropriate distance in all directions utilizing iris scissors.
Pre-Excision Curettage Text (Leave Blank If You Do Not Want): Prior to drawing the surgical margin the visible lesion was removed with electrodesiccation and curettage to clearly define the lesion size.
Complex Repair And Epidermal Autograft Text: The defect edges were debeveled with a #15 scalpel blade.  The primary defect was closed partially with a complex linear closure.  Given the location of the defect, shape of the defect and the proximity to free margins an epidermal autograft was deemed most appropriate to repair the remaining defect.  The graft was trimmed to fit the size of the remaining defect.  The graft was then placed in the primary defect, oriented appropriately, and sutured into place.
Complex Repair And Bilobe Flap Text: The defect edges were debeveled with a #15 scalpel blade.  The primary defect was closed partially with a complex linear closure.  Given the location of the remaining defect, shape of the defect and the proximity to free margins a bilobe flap was deemed most appropriate for complete closure of the defect.  Using a sterile surgical marker, an appropriate advancement flap was drawn incorporating the defect and placing the expected incisions within the relaxed skin tension lines where possible.    The area thus outlined was incised deep to adipose tissue with a #15 scalpel blade.  The skin margins were undermined to an appropriate distance in all directions utilizing iris scissors.
Partial Purse String (Simple) Text: Given the location of the defect and the characteristics of the surrounding skin a simple purse string closure was deemed most appropriate.  Undermining was performed circumferentially around the surgical defect.  A purse string suture was then placed and tightened. Wound tension of the circular defect prevented complete closure of the wound.
Dermal Autograft Text: The defect edges were debeveled with a #15 scalpel blade.  Given the location of the defect, shape of the defect and the proximity to free margins a dermal autograft was deemed most appropriate.  Using a sterile surgical marker, the primary defect shape was transferred to the donor site. The area thus outlined was incised deep to adipose tissue with a #15 scalpel blade.  The harvested graft was then trimmed of adipose and epidermal tissue until only dermis was left.  The skin graft was then placed in the primary defect and oriented appropriately.
Repair Performed By Another Provider Text (Leave Blank If You Do Not Want): After the tissue was excised the defect was repaired by another provider.
Fusiform Excision Additional Text (Leave Blank If You Do Not Want): The margin was drawn around the clinically apparent lesion.  A fusiform shape was then drawn on the skin incorporating the lesion and margins.  Incisions were then made along these lines to the appropriate tissue plane and the lesion was extirpated.
Composite Graft Text: The defect edges were debeveled with a #15 scalpel blade.  Given the location of the defect, shape of the defect, the proximity to free margins and the fact the defect was full thickness a composite graft was deemed most appropriate.  The defect was outline and then transferred to the donor site.  A full thickness graft was then excised from the donor site. The graft was then placed in the primary defect, oriented appropriately and then sutured into place.  The secondary defect was then repaired using a primary closure.
Complex Repair And Burow's Graft Text: The defect edges were debeveled with a #15 scalpel blade.  The primary defect was closed partially with a complex linear closure.  Given the location of the defect, shape of the defect, the proximity to free margins and the presence of a standing cone deformity a Burow's graft was deemed most appropriate to repair the remaining defect.  The graft was trimmed to fit the size of the remaining defect.  The graft was then placed in the primary defect, oriented appropriately, and sutured into place.
Complex Repair And V-Y Plasty Text: The defect edges were debeveled with a #15 scalpel blade.  The primary defect was closed partially with a complex linear closure.  Given the location of the remaining defect, shape of the defect and the proximity to free margins a V-Y plasty was deemed most appropriate for complete closure of the defect.  Using a sterile surgical marker, an appropriate advancement flap was drawn incorporating the defect and placing the expected incisions within the relaxed skin tension lines where possible.    The area thus outlined was incised deep to adipose tissue with a #15 scalpel blade.  The skin margins were undermined to an appropriate distance in all directions utilizing iris scissors.
Complex Repair And O-T Advancement Flap Text: The defect edges were debeveled with a #15 scalpel blade.  The primary defect was closed partially with a complex linear closure.  Given the location of the remaining defect, shape of the defect and the proximity to free margins an O-T advancement flap was deemed most appropriate for complete closure of the defect.  Using a sterile surgical marker, an appropriate advancement flap was drawn incorporating the defect and placing the expected incisions within the relaxed skin tension lines where possible.    The area thus outlined was incised deep to adipose tissue with a #15 scalpel blade.  The skin margins were undermined to an appropriate distance in all directions utilizing iris scissors.
Purse String (Simple) Text: Given the location of the defect and the characteristics of the surrounding skin a purse string simple closure was deemed most appropriate.  Undermining was performed circumferentially around the surgical defect.  A purse string suture was then placed and tightened.
Primary Defect Length (In Cm): 0
X Size Of Lesion In Cm (Optional): 0.4
Double Island Pedicle Flap Text: The defect edges were debeveled with a #15 scalpel blade.  Given the location of the defect, shape of the defect and the proximity to free margins a double island pedicle advancement flap was deemed most appropriate.  Using a sterile surgical marker, an appropriate advancement flap was drawn incorporating the defect, outlining the appropriate donor tissue and placing the expected incisions within the relaxed skin tension lines where possible.    The area thus outlined was incised deep to adipose tissue with a #15 scalpel blade.  The skin margins were undermined to an appropriate distance in all directions around the primary defect and laterally outward around the island pedicle utilizing iris scissors.  There was minimal undermining beneath the pedicle flap.
Bilobed Flap Text: The defect edges were debeveled with a #15 scalpel blade.  Given the location of the defect and the proximity to free margins a bilobe flap was deemed most appropriate.  Using a sterile surgical marker, an appropriate bilobe flap drawn around the defect.    The area thus outlined was incised deep to adipose tissue with a #15 scalpel blade.  The skin margins were undermined to an appropriate distance in all directions utilizing iris scissors.
Rhombic Flap Text: The defect edges were debeveled with a #15 scalpel blade.  Given the location of the defect and the proximity to free margins a rhombic flap was deemed most appropriate.  Using a sterile surgical marker, an appropriate rhombic flap was drawn incorporating the defect.    The area thus outlined was incised deep to adipose tissue with a #15 scalpel blade.  The skin margins were undermined to an appropriate distance in all directions utilizing iris scissors.
Star Wedge Flap Text: The defect edges were debeveled with a #15 scalpel blade.  Given the location of the defect, shape of the defect and the proximity to free margins a star wedge flap was deemed most appropriate.  Using a sterile surgical marker, an appropriate rotation flap was drawn incorporating the defect and placing the expected incisions within the relaxed skin tension lines where possible. The area thus outlined was incised deep to adipose tissue with a #15 scalpel blade.  The skin margins were undermined to an appropriate distance in all directions utilizing iris scissors.
Intermediate / Complex Repair - Final Wound Length In Cm: 3
Melolabial Interpolation Flap Text: A decision was made to reconstruct the defect utilizing an interpolation axial flap and a staged reconstruction.  A telfa template was made of the defect.  This telfa template was then used to outline the melolabial interpolation flap.  The donor area for the pedicle flap was then injected with anesthesia.  The flap was excised through the skin and subcutaneous tissue down to the layer of the underlying musculature.  The pedicle flap was carefully excised within this deep plane to maintain its blood supply.  The edges of the donor site were undermined.   The donor site was closed in a primary fashion.  The pedicle was then rotated into position and sutured.  Once the tube was sutured into place, adequate blood supply was confirmed with blanching and refill.  The pedicle was then wrapped with xeroform gauze and dressed appropriately with a telfa and gauze bandage to ensure continued blood supply and protect the attached pedicle.
H Plasty Text: Given the location of the defect, shape of the defect and the proximity to free margins a H-plasty was deemed most appropriate for repair.  Using a sterile surgical marker, the appropriate advancement arms of the H-plasty were drawn incorporating the defect and placing the expected incisions within the relaxed skin tension lines where possible. The area thus outlined was incised deep to adipose tissue with a #15 scalpel blade. The skin margins were undermined to an appropriate distance in all directions utilizing iris scissors.  The opposing advancement arms were then advanced into place in opposite direction and anchored with interrupted buried subcutaneous sutures.
Peng Advancement Flap Text: The defect edges were debeveled with a #15 scalpel blade.  Given the location of the defect, shape of the defect and the proximity to free margins a Peng advancement flap was deemed most appropriate.  Using a sterile surgical marker, an appropriate advancement flap was drawn incorporating the defect and placing the expected incisions within the relaxed skin tension lines where possible. The area thus outlined was incised deep to adipose tissue with a #15 scalpel blade.  The skin margins were undermined to an appropriate distance in all directions utilizing iris scissors.
Advancement Flap (Double) Text: The defect edges were debeveled with a #15 scalpel blade.  Given the location of the defect and the proximity to free margins a double advancement flap was deemed most appropriate.  Using a sterile surgical marker, the appropriate advancement flaps were drawn incorporating the defect and placing the expected incisions within the relaxed skin tension lines where possible.    The area thus outlined was incised deep to adipose tissue with a #15 scalpel blade.  The skin margins were undermined to an appropriate distance in all directions utilizing iris scissors.
Slit Excision Additional Text (Leave Blank If You Do Not Want): A linear line was drawn on the skin overlying the lesion. An incision was made slowly until the lesion was visualized.  Once visualized, the lesion was removed with blunt dissection.
Scalpel Size: 15 blade
O-Z Flap Text: The defect edges were debeveled with a #15 scalpel blade.  Given the location of the defect, shape of the defect and the proximity to free margins an O-Z flap was deemed most appropriate.  Using a sterile surgical marker, an appropriate transposition flap was drawn incorporating the defect and placing the expected incisions within the relaxed skin tension lines where possible. The area thus outlined was incised deep to adipose tissue with a #15 scalpel blade.  The skin margins were undermined to an appropriate distance in all directions utilizing iris scissors.
Complex Repair And W Plasty Text: The defect edges were debeveled with a #15 scalpel blade.  The primary defect was closed partially with a complex linear closure.  Given the location of the remaining defect, shape of the defect and the proximity to free margins a W plasty was deemed most appropriate for complete closure of the defect.  Using a sterile surgical marker, an appropriate advancement flap was drawn incorporating the defect and placing the expected incisions within the relaxed skin tension lines where possible.    The area thus outlined was incised deep to adipose tissue with a #15 scalpel blade.  The skin margins were undermined to an appropriate distance in all directions utilizing iris scissors.
Complex Repair And Melolabial Flap Text: The defect edges were debeveled with a #15 scalpel blade.  The primary defect was closed partially with a complex linear closure.  Given the location of the remaining defect, shape of the defect and the proximity to free margins a melolabial flap was deemed most appropriate for complete closure of the defect.  Using a sterile surgical marker, an appropriate advancement flap was drawn incorporating the defect and placing the expected incisions within the relaxed skin tension lines where possible.    The area thus outlined was incised deep to adipose tissue with a #15 scalpel blade.  The skin margins were undermined to an appropriate distance in all directions utilizing iris scissors.
Epidermal Closure Graft Donor Site (Optional): simple interrupted
Complex Repair And Dermal Autograft Text: The defect edges were debeveled with a #15 scalpel blade.  The primary defect was closed partially with a complex linear closure.  Given the location of the defect, shape of the defect and the proximity to free margins an dermal autograft was deemed most appropriate to repair the remaining defect.  The graft was trimmed to fit the size of the remaining defect.  The graft was then placed in the primary defect, oriented appropriately, and sutured into place.
Estimated Blood Loss (Cc): minimal
Complex Repair And Single Advancement Flap Text: The defect edges were debeveled with a #15 scalpel blade.  The primary defect was closed partially with a complex linear closure.  Given the location of the remaining defect, shape of the defect and the proximity to free margins a single advancement flap was deemed most appropriate for complete closure of the defect.  Using a sterile surgical marker, an appropriate advancement flap was drawn incorporating the defect and placing the expected incisions within the relaxed skin tension lines where possible.    The area thus outlined was incised deep to adipose tissue with a #15 scalpel blade.  The skin margins were undermined to an appropriate distance in all directions utilizing iris scissors.
Skin Substitute Text: The defect edges were debeveled with a #15 scalpel blade.  Given the location of the defect, shape of the defect and the proximity to free margins a skin substitute graft was deemed most appropriate.  The graft material was trimmed to fit the size of the defect. The graft was then placed in the primary defect and oriented appropriately.
Ftsg Text: The defect edges were debeveled with a #15 scalpel blade.  Given the location of the defect, shape of the defect and the proximity to free margins a full thickness skin graft was deemed most appropriate.  Using a sterile surgical marker, the primary defect shape was transferred to the donor site. The area thus outlined was incised deep to adipose tissue with a #15 scalpel blade.  The harvested graft was then trimmed of adipose tissue until only dermis and epidermis was left.  The skin margins of the secondary defect were undermined to an appropriate distance in all directions utilizing iris scissors.  The secondary defect was closed with interrupted buried subcutaneous sutures.  The skin edges were then re-apposed with running  sutures.  The skin graft was then placed in the primary defect and oriented appropriately.
Paramedian Forehead Flap Text: A decision was made to reconstruct the defect utilizing an interpolation axial flap and a staged reconstruction.  A telfa template was made of the defect.  This telfa template was then used to outline the paramedian forehead pedicle flap.  The donor area for the pedicle flap was then injected with anesthesia.  The flap was excised through the skin and subcutaneous tissue down to the layer of the underlying musculature.  The pedicle flap was carefully excised within this deep plane to maintain its blood supply.  The edges of the donor site were undermined.   The donor site was closed in a primary fashion.  The pedicle was then rotated into position and sutured.  Once the tube was sutured into place, adequate blood supply was confirmed with blanching and refill.  The pedicle was then wrapped with xeroform gauze and dressed appropriately with a telfa and gauze bandage to ensure continued blood supply and protect the attached pedicle.
Zygomaticofacial Flap Text: Given the location of the defect, shape of the defect and the proximity to free margins a zygomaticofacial flap was deemed most appropriate for repair.  Using a sterile surgical marker, the appropriate flap was drawn incorporating the defect and placing the expected incisions within the relaxed skin tension lines where possible. The area thus outlined was incised deep to adipose tissue with a #15 scalpel blade with preservation of a vascular pedicle.  The skin margins were undermined to an appropriate distance in all directions utilizing iris scissors.  The flap was then placed into the defect and anchored with interrupted buried subcutaneous sutures.
Debridement Text: The wound edges were debrided prior to proceeding with the closure to facilitate wound healing.
Complex Repair And M Plasty Text: The defect edges were debeveled with a #15 scalpel blade.  The primary defect was closed partially with a complex linear closure.  Given the location of the remaining defect, shape of the defect and the proximity to free margins an M plasty was deemed most appropriate for complete closure of the defect.  Using a sterile surgical marker, an appropriate advancement flap was drawn incorporating the defect and placing the expected incisions within the relaxed skin tension lines where possible.    The area thus outlined was incised deep to adipose tissue with a #15 scalpel blade.  The skin margins were undermined to an appropriate distance in all directions utilizing iris scissors.
Complex Repair And O-L Flap Text: The defect edges were debeveled with a #15 scalpel blade.  The primary defect was closed partially with a complex linear closure.  Given the location of the remaining defect, shape of the defect and the proximity to free margins an O-L flap was deemed most appropriate for complete closure of the defect.  Using a sterile surgical marker, an appropriate flap was drawn incorporating the defect and placing the expected incisions within the relaxed skin tension lines where possible.    The area thus outlined was incised deep to adipose tissue with a #15 scalpel blade.  The skin margins were undermined to an appropriate distance in all directions utilizing iris scissors.
Partial Purse String (Intermediate) Text: Given the location of the defect and the characteristics of the surrounding skin an intermediate purse string closure was deemed most appropriate.  Undermining was performed circumferentially around the surgical defect.  A purse string suture was then placed and tightened. Wound tension of the circular defect prevented complete closure of the wound.
Epidermal Autograft Text: The defect edges were debeveled with a #15 scalpel blade.  Given the location of the defect, shape of the defect and the proximity to free margins an epidermal autograft was deemed most appropriate.  Using a sterile surgical marker, the primary defect shape was transferred to the donor site. The epidermal graft was then harvested.  The skin graft was then placed in the primary defect and oriented appropriately.
Size Of Margin In Cm: 0.3
Accession #: [id number]
Number Of Hemigard Strips Per Side: 1
Length To Time In Minutes Device Was In Place: 10
Rhomboid Transposition Flap Text: The defect edges were debeveled with a #15 scalpel blade.  Given the location of the defect and the proximity to free margins a rhomboid transposition flap was deemed most appropriate.  Using a sterile surgical marker, an appropriate rhomboid flap was drawn incorporating the defect.    The area thus outlined was incised deep to adipose tissue with a #15 scalpel blade.  The skin margins were undermined to an appropriate distance in all directions utilizing iris scissors.
Transposition Flap Text: The defect edges were debeveled with a #15 scalpel blade.  Given the location of the defect and the proximity to free margins a transposition flap was deemed most appropriate.  Using a sterile surgical marker, an appropriate transposition flap was drawn incorporating the defect.    The area thus outlined was incised deep to adipose tissue with a #15 scalpel blade.  The skin margins were undermined to an appropriate distance in all directions utilizing iris scissors.
Mastoid Interpolation Flap Text: A decision was made to reconstruct the defect utilizing an interpolation axial flap and a staged reconstruction.  A telfa template was made of the defect.  This telfa template was then used to outline the mastoid interpolation flap.  The donor area for the pedicle flap was then injected with anesthesia.  The flap was excised through the skin and subcutaneous tissue down to the layer of the underlying musculature.  The pedicle flap was carefully excised within this deep plane to maintain its blood supply.  The edges of the donor site were undermined.   The donor site was closed in a primary fashion.  The pedicle was then rotated into position and sutured.  Once the tube was sutured into place, adequate blood supply was confirmed with blanching and refill.  The pedicle was then wrapped with xeroform gauze and dressed appropriately with a telfa and gauze bandage to ensure continued blood supply and protect the attached pedicle.
W Plasty Text: The lesion was extirpated to the level of the fat with a #15 scalpel blade.  Given the location of the defect, shape of the defect and the proximity to free margins a W-plasty was deemed most appropriate for repair.  Using a sterile surgical marker, the appropriate transposition arms of the W-plasty were drawn incorporating the defect and placing the expected incisions within the relaxed skin tension lines where possible.    The area thus outlined was incised deep to adipose tissue with a #15 scalpel blade.  The skin margins were undermined to an appropriate distance in all directions utilizing iris scissors.  The opposing transposition arms were then transposed into place in opposite direction and anchored with interrupted buried subcutaneous sutures.
Excision Depth: adipose tissue
Island Pedicle Flap-Requiring Vessel Identification Text: The defect edges were debeveled with a #15 scalpel blade.  Given the location of the defect, shape of the defect and the proximity to free margins an island pedicle advancement flap was deemed most appropriate.  Using a sterile surgical marker, an appropriate advancement flap was drawn, based on the axial vessel mentioned above, incorporating the defect, outlining the appropriate donor tissue and placing the expected incisions within the relaxed skin tension lines where possible.    The area thus outlined was incised deep to adipose tissue with a #15 scalpel blade.  The skin margins were undermined to an appropriate distance in all directions around the primary defect and laterally outward around the island pedicle utilizing iris scissors.  There was minimal undermining beneath the pedicle flap.
Helical Rim Text: The closure involved the helical rim.
Billing Type: Third-Party Bill
Bilobed Transposition Flap Text: The defect edges were debeveled with a #15 scalpel blade.  Given the location of the defect and the proximity to free margins a bilobed transposition flap was deemed most appropriate.  Using a sterile surgical marker, an appropriate bilobe flap drawn around the defect.    The area thus outlined was incised deep to adipose tissue with a #15 scalpel blade.  The skin margins were undermined to an appropriate distance in all directions utilizing iris scissors.
Excisional Biopsy Additional Text (Leave Blank If You Do Not Want): The margin was drawn around the clinically apparent lesion.  An elliptical shape was then drawn on the skin incorporating the lesion and margins.  Incisions were then made along these lines to the appropriate tissue plane and the lesion was extirpated.
Double O-Z Flap Text: The defect edges were debeveled with a #15 scalpel blade.  Given the location of the defect, shape of the defect and the proximity to free margins a Double O-Z flap was deemed most appropriate.  Using a sterile surgical marker, an appropriate transposition flap was drawn incorporating the defect and placing the expected incisions within the relaxed skin tension lines where possible. The area thus outlined was incised deep to adipose tissue with a #15 scalpel blade.  The skin margins were undermined to an appropriate distance in all directions utilizing iris scissors.
Hatchet Flap Text: The defect edges were debeveled with a #15 scalpel blade.  Given the location of the defect, shape of the defect and the proximity to free margins a hatchet flap was deemed most appropriate.  Using a sterile surgical marker, an appropriate hatchet flap was drawn incorporating the defect and placing the expected incisions within the relaxed skin tension lines where possible.    The area thus outlined was incised deep to adipose tissue with a #15 scalpel blade.  The skin margins were undermined to an appropriate distance in all directions utilizing iris scissors.
Hemostasis: Electrodesiccation
Burow's Advancement Flap Text: The defect edges were debeveled with a #15 scalpel blade.  Given the location of the defect and the proximity to free margins a Burow's advancement flap was deemed most appropriate.  Using a sterile surgical marker, the appropriate advancement flap was drawn incorporating the defect and placing the expected incisions within the relaxed skin tension lines where possible.    The area thus outlined was incised deep to adipose tissue with a #15 scalpel blade.  The skin margins were undermined to an appropriate distance in all directions utilizing iris scissors.
Complex Repair And Z Plasty Text: The defect edges were debeveled with a #15 scalpel blade.  The primary defect was closed partially with a complex linear closure.  Given the location of the remaining defect, shape of the defect and the proximity to free margins a Z plasty was deemed most appropriate for complete closure of the defect.  Using a sterile surgical marker, an appropriate advancement flap was drawn incorporating the defect and placing the expected incisions within the relaxed skin tension lines where possible.    The area thus outlined was incised deep to adipose tissue with a #15 scalpel blade.  The skin margins were undermined to an appropriate distance in all directions utilizing iris scissors.
Complex Repair And Rotation Flap Text: The defect edges were debeveled with a #15 scalpel blade.  The primary defect was closed partially with a complex linear closure.  Given the location of the remaining defect, shape of the defect and the proximity to free margins a rotation flap was deemed most appropriate for complete closure of the defect.  Using a sterile surgical marker, an appropriate advancement flap was drawn incorporating the defect and placing the expected incisions within the relaxed skin tension lines where possible.    The area thus outlined was incised deep to adipose tissue with a #15 scalpel blade.  The skin margins were undermined to an appropriate distance in all directions utilizing iris scissors.
Complex Repair And Double Advancement Flap Text: The defect edges were debeveled with a #15 scalpel blade.  The primary defect was closed partially with a complex linear closure.  Given the location of the remaining defect, shape of the defect and the proximity to free margins a double advancement flap was deemed most appropriate for complete closure of the defect.  Using a sterile surgical marker, an appropriate advancement flap was drawn incorporating the defect and placing the expected incisions within the relaxed skin tension lines where possible.    The area thus outlined was incised deep to adipose tissue with a #15 scalpel blade.  The skin margins were undermined to an appropriate distance in all directions utilizing iris scissors.
Tissue Cultured Epidermal Autograft Text: The defect edges were debeveled with a #15 scalpel blade.  Given the location of the defect, shape of the defect and the proximity to free margins a tissue cultured epidermal autograft was deemed most appropriate.  The graft was then trimmed to fit the size of the defect.  The graft was then placed in the primary defect and oriented appropriately.
Complex Repair And Tissue Cultured Epidermal Autograft Text: The defect edges were debeveled with a #15 scalpel blade.  The primary defect was closed partially with a complex linear closure.  Given the location of the defect, shape of the defect and the proximity to free margins an tissue cultured epidermal autograft was deemed most appropriate to repair the remaining defect.  The graft was trimmed to fit the size of the remaining defect.  The graft was then placed in the primary defect, oriented appropriately, and sutured into place.
Undermining Type: Entire Wound
Split-Thickness Skin Graft Text: The defect edges were debeveled with a #15 scalpel blade.  Given the location of the defect, shape of the defect and the proximity to free margins a split thickness skin graft was deemed most appropriate.  Using a sterile surgical marker, the primary defect shape was transferred to the donor site. The split thickness graft was then harvested.  The skin graft was then placed in the primary defect and oriented appropriately.
Lip Wedge Excision Repair Text: Given the location of the defect and the proximity to free margins a full thickness wedge repair was deemed most appropriate.  Using a sterile surgical marker, the appropriate repair was drawn incorporating the defect and placing the expected incisions perpendicular to the vermillion border.  The vermillion border was also meticulously outlined to ensure appropriate reapproximation during the repair.  The area thus outlined was incised through and through with a #15 scalpel blade.  The muscularis and dermis were reaproximated with deep sutures following hemostasis. Care was taken to realign the vermillion border before proceeding with the superficial closure.  Once the vermillion was realigned the superfical and mucosal closure was finished.
Cheek Interpolation Flap Text: A decision was made to reconstruct the defect utilizing an interpolation axial flap and a staged reconstruction.  A telfa template was made of the defect.  This telfa template was then used to outline the Cheek Interpolation flap.  The donor area for the pedicle flap was then injected with anesthesia.  The flap was excised through the skin and subcutaneous tissue down to the layer of the underlying musculature.  The interpolation flap was carefully excised within this deep plane to maintain its blood supply.  The edges of the donor site were undermined.   The donor site was closed in a primary fashion.  The pedicle was then rotated into position and sutured.  Once the tube was sutured into place, adequate blood supply was confirmed with blanching and refill.  The pedicle was then wrapped with xeroform gauze and dressed appropriately with a telfa and gauze bandage to ensure continued blood supply and protect the attached pedicle.
O-Z Plasty Text: The defect edges were debeveled with a #15 scalpel blade.  Given the location of the defect, shape of the defect and the proximity to free margins an O-Z plasty (double transposition flap) was deemed most appropriate.  Using a sterile surgical marker, the appropriate transposition flaps were drawn incorporating the defect and placing the expected incisions within the relaxed skin tension lines where possible.    The area thus outlined was incised deep to adipose tissue with a #15 scalpel blade.  The skin margins were undermined to an appropriate distance in all directions utilizing iris scissors.  Hemostasis was achieved with electrocautery.  The flaps were then transposed into place, one clockwise and the other counterclockwise, and anchored with interrupted buried subcutaneous sutures.
Island Pedicle Flap Text: The defect edges were debeveled with a #15 scalpel blade.  Given the location of the defect, shape of the defect and the proximity to free margins an island pedicle advancement flap was deemed most appropriate.  Using a sterile surgical marker, an appropriate advancement flap was drawn incorporating the defect, outlining the appropriate donor tissue and placing the expected incisions within the relaxed skin tension lines where possible.    The area thus outlined was incised deep to adipose tissue with a #15 scalpel blade.  The skin margins were undermined to an appropriate distance in all directions around the primary defect and laterally outward around the island pedicle utilizing iris scissors.  There was minimal undermining beneath the pedicle flap.
Repair Type: Intermediate
A-T Advancement Flap Text: The defect edges were debeveled with a #15 scalpel blade.  Given the location of the defect, shape of the defect and the proximity to free margins an A-T advancement flap was deemed most appropriate.  Using a sterile surgical marker, an appropriate advancement flap was drawn incorporating the defect and placing the expected incisions within the relaxed skin tension lines where possible.    The area thus outlined was incised deep to adipose tissue with a #15 scalpel blade.  The skin margins were undermined to an appropriate distance in all directions utilizing iris scissors.
Dressing: pressure dressing
Bilateral Helical Rim Advancement Flap Text: The defect edges were debeveled with a #15 blade scalpel.  Given the location of the defect and the proximity to free margins (helical rim) a bilateral helical rim advancement flap was deemed most appropriate.  Using a sterile surgical marker, the appropriate advancement flaps were drawn incorporating the defect and placing the expected incisions between the helical rim and antihelix where possible.  The area thus outlined was incised through and through with a #15 scalpel blade.  With a skin hook and iris scissors, the flaps were gently and sharply undermined and freed up.
Mercedes Flap Text: The defect edges were debeveled with a #15 scalpel blade.  Given the location of the defect, shape of the defect and the proximity to free margins a Mercedes flap was deemed most appropriate.  Using a sterile surgical marker, an appropriate advancement flap was drawn incorporating the defect and placing the expected incisions within the relaxed skin tension lines where possible. The area thus outlined was incised deep to adipose tissue with a #15 scalpel blade.  The skin margins were undermined to an appropriate distance in all directions utilizing iris scissors.
Epidermal Closure: running
Nasalis-Muscle-Based Myocutaneous Island Pedicle Flap Text: Using a #15 blade, an incision was made around the donor flap to the level of the nasalis muscle. Wide lateral undermining was then performed in both the subcutaneous plane above the nasalis muscle, and in a submuscular plane just above periosteum. This allowed the formation of a free nasalis muscle axial pedicle (based on the angular artery) which was still attached to the actual cutaneous flap, increasing its mobility and vascular viability. Hemostasis was obtained with pinpoint electrocoagulation. The flap was mobilized into position and the pivotal anchor points positioned and stabilized with buried interrupted sutures. Subcutaneous and dermal tissues were closed in a multilayered fashion with sutures. Tissue redundancies were excised, and the epidermal edges were apposed without significant tension and sutured with sutures.
Epidermal Sutures: 5-0 Prolene
Rotation Flap Text: The defect edges were debeveled with a #15 scalpel blade.  Given the location of the defect, shape of the defect and the proximity to free margins a rotation flap was deemed most appropriate.  Using a sterile surgical marker, an appropriate rotation flap was drawn incorporating the defect and placing the expected incisions within the relaxed skin tension lines where possible.    The area thus outlined was incised deep to adipose tissue with a #15 scalpel blade.  The skin margins were undermined to an appropriate distance in all directions utilizing iris scissors.
Anesthesia Volume In Cc: 6
Posterior Auricular Interpolation Flap Text: A decision was made to reconstruct the defect utilizing an interpolation axial flap and a staged reconstruction.  A telfa template was made of the defect.  This telfa template was then used to outline the posterior auricular interpolation flap.  The donor area for the pedicle flap was then injected with anesthesia.  The flap was excised through the skin and subcutaneous tissue down to the layer of the underlying musculature.  The pedicle flap was carefully excised within this deep plane to maintain its blood supply.  The edges of the donor site were undermined.   The donor site was closed in a primary fashion.  The pedicle was then rotated into position and sutured.  Once the tube was sutured into place, adequate blood supply was confirmed with blanching and refill.  The pedicle was then wrapped with xeroform gauze and dressed appropriately with a telfa and gauze bandage to ensure continued blood supply and protect the attached pedicle.
Muscle Hinge Flap Text: The defect edges were debeveled with a #15 scalpel blade.  Given the size, depth and location of the defect and the proximity to free margins a muscle hinge flap was deemed most appropriate.  Using a sterile surgical marker, an appropriate hinge flap was drawn incorporating the defect. The area thus outlined was incised with a #15 scalpel blade.  The skin margins were undermined to an appropriate distance in all directions utilizing iris scissors.
Path Notes (To The Dermatopathologist): Please check Margins.
Z Plasty Text: The lesion was extirpated to the level of the fat with a #15 scalpel blade.  Given the location of the defect, shape of the defect and the proximity to free margins a Z-plasty was deemed most appropriate for repair.  Using a sterile surgical marker, the appropriate transposition arms of the Z-plasty were drawn incorporating the defect and placing the expected incisions within the relaxed skin tension lines where possible.    The area thus outlined was incised deep to adipose tissue with a #15 scalpel blade.  The skin margins were undermined to an appropriate distance in all directions utilizing iris scissors.  The opposing transposition arms were then transposed into place in opposite direction and anchored with interrupted buried subcutaneous sutures.
Keystone Flap Text: The defect edges were debeveled with a #15 scalpel blade.  Given the location of the defect, shape of the defect a keystone flap was deemed most appropriate.  Using a sterile surgical marker, an appropriate keystone flap was drawn incorporating the defect, outlining the appropriate donor tissue and placing the expected incisions within the relaxed skin tension lines where possible. The area thus outlined was incised deep to adipose tissue with a #15 scalpel blade.  The skin margins were undermined to an appropriate distance in all directions around the primary defect and laterally outward around the flap utilizing iris scissors.
Vermilion Border Text: The closure involved the vermilion border.
Trilobed Flap Text: The defect edges were debeveled with a #15 scalpel blade.  Given the location of the defect and the proximity to free margins a trilobed flap was deemed most appropriate.  Using a sterile surgical marker, an appropriate trilobed flap drawn around the defect.    The area thus outlined was incised deep to adipose tissue with a #15 scalpel blade.  The skin margins were undermined to an appropriate distance in all directions utilizing iris scissors.
Bi-Rhombic Flap Text: The defect edges were debeveled with a #15 scalpel blade.  Given the location of the defect and the proximity to free margins a bi-rhombic flap was deemed most appropriate.  Using a sterile surgical marker, an appropriate rhombic flap was drawn incorporating the defect. The area thus outlined was incised deep to adipose tissue with a #15 scalpel blade.  The skin margins were undermined to an appropriate distance in all directions utilizing iris scissors.
Hemigard Intro: Due to skin fragility and wound tension, it was decided to use HEMIGARD adhesive retention suture devices to permit a linear closure. The skin was cleaned and dried for a 6cm distance away from the wound. Excessive hair, if present, was removed to allow for adhesion.
V-Y Flap Text: The defect edges were debeveled with a #15 scalpel blade.  Given the location of the defect, shape of the defect and the proximity to free margins a V-Y flap was deemed most appropriate.  Using a sterile surgical marker, an appropriate advancement flap was drawn incorporating the defect and placing the expected incisions within the relaxed skin tension lines where possible.    The area thus outlined was incised deep to adipose tissue with a #15 scalpel blade.  The skin margins were undermined to an appropriate distance in all directions utilizing iris scissors.
Eye Clamp Note Details: An eye clamp was used during the procedure.
Chonodrocutaneous Helical Advancement Flap Text: The defect edges were debeveled with a #15 scalpel blade.  Given the location of the defect and the proximity to free margins a chondrocutaneous helical advancement flap was deemed most appropriate.  Using a sterile surgical marker, the appropriate advancement flap was drawn incorporating the defect and placing the expected incisions within the relaxed skin tension lines where possible.    The area thus outlined was incised deep to adipose tissue with a #15 scalpel blade.  The skin margins were undermined to an appropriate distance in all directions utilizing iris scissors.
Perilesional Excision Additional Text (Leave Blank If You Do Not Want): The margin was drawn around the clinically apparent lesion. Incisions were then made along these lines to the appropriate tissue plane and the lesion was extirpated.
Complex Repair And Rhombic Flap Text: The defect edges were debeveled with a #15 scalpel blade.  The primary defect was closed partially with a complex linear closure.  Given the location of the remaining defect, shape of the defect and the proximity to free margins a rhombic flap was deemed most appropriate for complete closure of the defect.  Using a sterile surgical marker, an appropriate advancement flap was drawn incorporating the defect and placing the expected incisions within the relaxed skin tension lines where possible.    The area thus outlined was incised deep to adipose tissue with a #15 scalpel blade.  The skin margins were undermined to an appropriate distance in all directions utilizing iris scissors.
Complex Repair And Modified Advancement Flap Text: The defect edges were debeveled with a #15 scalpel blade.  The primary defect was closed partially with a complex linear closure.  Given the location of the remaining defect, shape of the defect and the proximity to free margins a modified advancement flap was deemed most appropriate for complete closure of the defect.  Using a sterile surgical marker, an appropriate advancement flap was drawn incorporating the defect and placing the expected incisions within the relaxed skin tension lines where possible.    The area thus outlined was incised deep to adipose tissue with a #15 scalpel blade.  The skin margins were undermined to an appropriate distance in all directions utilizing iris scissors.
Xenograft Text: The defect edges were debeveled with a #15 scalpel blade.  Given the location of the defect, shape of the defect and the proximity to free margins a xenograft was deemed most appropriate.  The graft was then trimmed to fit the size of the defect.  The graft was then placed in the primary defect and oriented appropriately.
Burow's Graft Text: The defect edges were debeveled with a #15 scalpel blade.  Given the location of the defect, shape of the defect, the proximity to free margins and the presence of a standing cone deformity a Burow's skin graft was deemed most appropriate. The standing cone was removed and this tissue was then trimmed to the shape of the primary defect. The adipose tissue was also removed until only dermis and epidermis were left.  The skin margins of the secondary defect were undermined to an appropriate distance in all directions utilizing iris scissors.  The secondary defect was closed with interrupted buried subcutaneous sutures.  The skin edges were then re-apposed with running  sutures.  The skin graft was then placed in the primary defect and oriented appropriately.
Graft Donor Site Bandage (Optional-Leave Blank If You Don't Want In Note): Steri-strips and a pressure bandage were applied to the donor site.
Complex Repair And Dorsal Nasal Flap Text: The defect edges were debeveled with a #15 scalpel blade.  The primary defect was closed partially with a complex linear closure.  Given the location of the remaining defect, shape of the defect and the proximity to free margins a dorsal nasal flap was deemed most appropriate for complete closure of the defect.  Using a sterile surgical marker, an appropriate flap was drawn incorporating the defect and placing the expected incisions within the relaxed skin tension lines where possible.    The area thus outlined was incised deep to adipose tissue with a #15 scalpel blade.  The skin margins were undermined to an appropriate distance in all directions utilizing iris scissors.
Deep Sutures: 5-0 PolySyn
Suturegard Intro: Intraoperative tissue expansion was performed, utilizing the SUTUREGARD device, in order to reduce wound tension.
Wound Care: Petrolatum
Consent was obtained from the patient. The risks and benefits to therapy were discussed in detail. Specifically, the risks of infection, scarring, bleeding, prolonged wound healing, incomplete removal, allergy to anesthesia, nerve injury and recurrence were addressed. Prior to the procedure, the treatment site was clearly identified and confirmed by the patient. All components of Universal Protocol/PAUSE Rule completed.
Cheek-To-Nose Interpolation Flap Text: A decision was made to reconstruct the defect utilizing an interpolation axial flap and a staged reconstruction.  A telfa template was made of the defect.  This telfa template was then used to outline the Cheek-To-Nose Interpolation flap.  The donor area for the pedicle flap was then injected with anesthesia.  The flap was excised through the skin and subcutaneous tissue down to the layer of the underlying musculature.  The interpolation flap was carefully excised within this deep plane to maintain its blood supply.  The edges of the donor site were undermined.   The donor site was closed in a primary fashion.  The pedicle was then rotated into position and sutured.  Once the tube was sutured into place, adequate blood supply was confirmed with blanching and refill.  The pedicle was then wrapped with xeroform gauze and dressed appropriately with a telfa and gauze bandage to ensure continued blood supply and protect the attached pedicle.
Double O-Z Plasty Text: The defect edges were debeveled with a #15 scalpel blade.  Given the location of the defect, shape of the defect and the proximity to free margins a Double O-Z plasty (double transposition flap) was deemed most appropriate.  Using a sterile surgical marker, the appropriate transposition flaps were drawn incorporating the defect and placing the expected incisions within the relaxed skin tension lines where possible. The area thus outlined was incised deep to adipose tissue with a #15 scalpel blade.  The skin margins were undermined to an appropriate distance in all directions utilizing iris scissors.  Hemostasis was achieved with electrocautery.  The flaps were then transposed into place, one clockwise and the other counterclockwise, and anchored with interrupted buried subcutaneous sutures.
Island Pedicle Flap With Canthal Suspension Text: The defect edges were debeveled with a #15 scalpel blade.  Given the location of the defect, shape of the defect and the proximity to free margins an island pedicle advancement flap was deemed most appropriate.  Using a sterile surgical marker, an appropriate advancement flap was drawn incorporating the defect, outlining the appropriate donor tissue and placing the expected incisions within the relaxed skin tension lines where possible. The area thus outlined was incised deep to adipose tissue with a #15 scalpel blade.  The skin margins were undermined to an appropriate distance in all directions around the primary defect and laterally outward around the island pedicle utilizing iris scissors.  There was minimal undermining beneath the pedicle flap. A suspension suture was placed in the canthal tendon to prevent tension and prevent ectropion.
Ear Star Wedge Flap Text: The defect edges were debeveled with a #15 blade scalpel.  Given the location of the defect and the proximity to free margins (helical rim) an ear star wedge flap was deemed most appropriate.  Using a sterile surgical marker, the appropriate flap was drawn incorporating the defect and placing the expected incisions between the helical rim and antihelix where possible.  The area thus outlined was incised through and through with a #15 scalpel blade.
Information: Selecting Yes will display possible errors in your note based on the variables you have selected. This validation is only offered as a suggestion for you. PLEASE NOTE THAT THE VALIDATION TEXT WILL BE REMOVED WHEN YOU FINALIZE YOUR NOTE. IF YOU WANT TO FAX A PRELIMINARY NOTE YOU WILL NEED TO TOGGLE THIS TO 'NO' IF YOU DO NOT WANT IT IN YOUR FAXED NOTE.
O-T Advancement Flap Text: The defect edges were debeveled with a #15 scalpel blade.  Given the location of the defect, shape of the defect and the proximity to free margins an O-T advancement flap was deemed most appropriate.  Using a sterile surgical marker, an appropriate advancement flap was drawn incorporating the defect and placing the expected incisions within the relaxed skin tension lines where possible.    The area thus outlined was incised deep to adipose tissue with a #15 scalpel blade.  The skin margins were undermined to an appropriate distance in all directions utilizing iris scissors.
No Repair - Repaired With Adjacent Surgical Defect Text (Leave Blank If You Do Not Want): After the excision the defect was repaired concurrently with another surgical defect which was in close approximation.
Orbicularis Oris Muscle Flap Text: The defect edges were debeveled with a #15 scalpel blade.  Given that the defect affected the competency of the oral sphincter an obicularis oris muscle flap was deemed most appropriate to restore this competency and normal muscle function.  Using a sterile surgical marker, an appropriate flap was drawn incorporating the defect. The area thus outlined was incised with a #15 scalpel blade.
Spiral Flap Text: The defect edges were debeveled with a #15 scalpel blade.  Given the location of the defect, shape of the defect and the proximity to free margins a spiral flap was deemed most appropriate.  Using a sterile surgical marker, an appropriate rotation flap was drawn incorporating the defect and placing the expected incisions within the relaxed skin tension lines where possible. The area thus outlined was incised deep to adipose tissue with a #15 scalpel blade.  The skin margins were undermined to an appropriate distance in all directions utilizing iris scissors.
Modified Advancement Flap Text: The defect edges were debeveled with a #15 scalpel blade.  Given the location of the defect, shape of the defect and the proximity to free margins a modified advancement flap was deemed most appropriate.  Using a sterile surgical marker, an appropriate advancement flap was drawn incorporating the defect and placing the expected incisions within the relaxed skin tension lines where possible.    The area thus outlined was incised deep to adipose tissue with a #15 scalpel blade.  The skin margins were undermined to an appropriate distance in all directions utilizing iris scissors.
Suture Removal: 7 days
Complex Repair And Split-Thickness Skin Graft Text: The defect edges were debeveled with a #15 scalpel blade.  The primary defect was closed partially with a complex linear closure.  Given the location of the defect, shape of the defect and the proximity to free margins a split thickness skin graft was deemed most appropriate to repair the remaining defect.  The graft was trimmed to fit the size of the remaining defect.  The graft was then placed in the primary defect, oriented appropriately, and sutured into place.
Post-Care Instructions: I reviewed with the patient in detail post-care instructions. Patient is not to engage in any heavy lifting, exercise, or swimming for the next 14 days. Should the patient develop any fevers, chills, bleeding, severe pain patient will contact the office immediately.
Positioning (Leave Blank If You Do Not Want): The patient was placed in a comfortable position exposing the surgical site.

## 2021-07-20 ENCOUNTER — APPOINTMENT (RX ONLY)
Dept: URBAN - METROPOLITAN AREA CLINIC 23 | Facility: CLINIC | Age: 81
Setting detail: DERMATOLOGY
End: 2021-07-20

## 2021-07-20 DIAGNOSIS — Z48.02 ENCOUNTER FOR REMOVAL OF SUTURES: ICD-10-CM

## 2021-07-20 PROBLEM — Z85.3 PERSONAL HISTORY OF MALIGNANT NEOPLASM OF BREAST: Status: ACTIVE | Noted: 2021-07-20

## 2021-07-20 PROBLEM — J30.1 ALLERGIC RHINITIS DUE TO POLLEN: Status: ACTIVE | Noted: 2021-07-20

## 2021-07-20 PROBLEM — C44.519 BASAL CELL CARCINOMA OF SKIN OF OTHER PART OF TRUNK: Status: ACTIVE | Noted: 2021-07-20

## 2021-07-20 PROCEDURE — 12032 INTMD RPR S/A/T/EXT 2.6-7.5: CPT | Mod: 79

## 2021-07-20 PROCEDURE — 11602 EXC TR-EXT MAL+MARG 1.1-2 CM: CPT | Mod: 79

## 2021-07-20 PROCEDURE — ? EXCISION

## 2021-07-20 PROCEDURE — ? SUTURE REMOVAL (GLOBAL PERIOD)

## 2021-07-20 ASSESSMENT — LOCATION DETAILED DESCRIPTION DERM: LOCATION DETAILED: LEFT LATERAL SUBMANDIBULAR CHEEK

## 2021-07-20 ASSESSMENT — LOCATION SIMPLE DESCRIPTION DERM: LOCATION SIMPLE: LEFT CHEEK

## 2021-07-20 ASSESSMENT — LOCATION ZONE DERM: LOCATION ZONE: FACE

## 2021-07-20 NOTE — PROCEDURE: SUTURE REMOVAL (GLOBAL PERIOD)
Add 90533 Cpt? (Important Note: In 2017 The Use Of 30059 Is Being Tracked By Cms To Determine Future Global Period Reimbursement For Global Periods): no
Detail Level: Detailed

## 2021-07-20 NOTE — PROCEDURE: EXCISION
Crescentic Intermediate Repair Preamble Text (Leave Blank If You Do Not Want): Undermining was performed with blunt dissection.
Pre-Excision Curettage Text (Leave Blank If You Do Not Want): Prior to drawing the surgical margin the visible lesion was removed with electrodesiccation and curettage to clearly define the lesion size.
Complex Requirements: Debridement Of Wound Edges?: No
O-L Flap Text: The defect edges were debeveled with a #15 scalpel blade.  Given the location of the defect, shape of the defect and the proximity to free margins an O-L flap was deemed most appropriate.  Using a sterile surgical marker, an appropriate advancement flap was drawn incorporating the defect and placing the expected incisions within the relaxed skin tension lines where possible.    The area thus outlined was incised deep to adipose tissue with a #15 scalpel blade.  The skin margins were undermined to an appropriate distance in all directions utilizing iris scissors.
Advancement Flap (Single) Text: The defect edges were debeveled with a #15 scalpel blade.  Given the location of the defect and the proximity to free margins a single advancement flap was deemed most appropriate.  Using a sterile surgical marker, an appropriate advancement flap was drawn incorporating the defect and placing the expected incisions within the relaxed skin tension lines where possible.    The area thus outlined was incised deep to adipose tissue with a #15 scalpel blade.  The skin margins were undermined to an appropriate distance in all directions utilizing iris scissors.
Saucerization Excision Additional Text (Leave Blank If You Do Not Want): The margin was drawn around the clinically apparent lesion.  Incisions were then made along these lines, in a tangential fashion, to the appropriate tissue plane and the lesion was extirpated.
Complex Repair And Double M Plasty Text: The defect edges were debeveled with a #15 scalpel blade.  The primary defect was closed partially with a complex linear closure.  Given the location of the remaining defect, shape of the defect and the proximity to free margins a double M plasty was deemed most appropriate for complete closure of the defect.  Using a sterile surgical marker, an appropriate advancement flap was drawn incorporating the defect and placing the expected incisions within the relaxed skin tension lines where possible.    The area thus outlined was incised deep to adipose tissue with a #15 scalpel blade.  The skin margins were undermined to an appropriate distance in all directions utilizing iris scissors.
Complex Repair And Bilobe Flap Text: The defect edges were debeveled with a #15 scalpel blade.  The primary defect was closed partially with a complex linear closure.  Given the location of the remaining defect, shape of the defect and the proximity to free margins a bilobe flap was deemed most appropriate for complete closure of the defect.  Using a sterile surgical marker, an appropriate advancement flap was drawn incorporating the defect and placing the expected incisions within the relaxed skin tension lines where possible.    The area thus outlined was incised deep to adipose tissue with a #15 scalpel blade.  The skin margins were undermined to an appropriate distance in all directions utilizing iris scissors.
Partial Purse String (Simple) Text: Given the location of the defect and the characteristics of the surrounding skin a simple purse string closure was deemed most appropriate.  Undermining was performed circumferentially around the surgical defect.  A purse string suture was then placed and tightened. Wound tension of the circular defect prevented complete closure of the wound.
Dermal Autograft Text: The defect edges were debeveled with a #15 scalpel blade.  Given the location of the defect, shape of the defect and the proximity to free margins a dermal autograft was deemed most appropriate.  Using a sterile surgical marker, the primary defect shape was transferred to the donor site. The area thus outlined was incised deep to adipose tissue with a #15 scalpel blade.  The harvested graft was then trimmed of adipose and epidermal tissue until only dermis was left.  The skin graft was then placed in the primary defect and oriented appropriately.
Lip Wedge Excision Repair Text: Given the location of the defect and the proximity to free margins a full thickness wedge repair was deemed most appropriate.  Using a sterile surgical marker, the appropriate repair was drawn incorporating the defect and placing the expected incisions perpendicular to the vermilion border.  The vermilion border was also meticulously outlined to ensure appropriate reapproximation during the repair.  The area thus outlined was incised through and through with a #15 scalpel blade.  The muscularis and dermis were reaproximated with deep sutures following hemostasis. Care was taken to realign the vermilion border before proceeding with the superficial closure.  Once the vermilion was realigned the superfical and mucosal closure was finished.
Hemigard Retention Suture: 2-0 Nylon
Retention Suture Bite Size: 3 mm
Repair Type: Intermediate
Complex Repair And Epidermal Autograft Text: The defect edges were debeveled with a #15 scalpel blade.  The primary defect was closed partially with a complex linear closure.  Given the location of the defect, shape of the defect and the proximity to free margins an epidermal autograft was deemed most appropriate to repair the remaining defect.  The graft was trimmed to fit the size of the remaining defect.  The graft was then placed in the primary defect, oriented appropriately, and sutured into place.
Banner Transposition Flap Text: The defect edges were debeveled with a #15 scalpel blade.  Given the location of the defect and the proximity to free margins a Banner transposition flap was deemed most appropriate.  Using a sterile surgical marker, an appropriate flap drawn around the defect. The area thus outlined was incised deep to adipose tissue with a #15 scalpel blade.  The skin margins were undermined to an appropriate distance in all directions utilizing iris scissors.
Size Of Lesion In Cm: 1
Melolabial Transposition Flap Text: The defect edges were debeveled with a #15 scalpel blade.  Given the location of the defect and the proximity to free margins a melolabial flap was deemed most appropriate.  Using a sterile surgical marker, an appropriate melolabial transposition flap was drawn incorporating the defect.    The area thus outlined was incised deep to adipose tissue with a #15 scalpel blade.  The skin margins were undermined to an appropriate distance in all directions utilizing iris scissors.
Alar Island Pedicle Flap Text: The defect edges were debeveled with a #15 scalpel blade.  Given the location of the defect, shape of the defect and the proximity to the alar rim an island pedicle advancement flap was deemed most appropriate.  Using a sterile surgical marker, an appropriate advancement flap was drawn incorporating the defect, outlining the appropriate donor tissue and placing the expected incisions within the nasal ala running parallel to the alar rim. The area thus outlined was incised with a #15 scalpel blade.  The skin margins were undermined minimally to an appropriate distance in all directions around the primary defect and laterally outward around the island pedicle utilizing iris scissors.  There was minimal undermining beneath the pedicle flap.
Dressing: steri-strips and pressure dressing
V-Y Plasty Text: The defect edges were debeveled with a #15 scalpel blade.  Given the location of the defect, shape of the defect and the proximity to free margins an V-Y advancement flap was deemed most appropriate.  Using a sterile surgical marker, an appropriate advancement flap was drawn incorporating the defect and placing the expected incisions within the relaxed skin tension lines where possible.    The area thus outlined was incised deep to adipose tissue with a #15 scalpel blade.  The skin margins were undermined to an appropriate distance in all directions utilizing iris scissors.
Render Post-Care Instructions In Note?: yes
Vermilion Border Text: The closure involved the vermilion border.
Home Suture Removal Text: Patient was provided a home suture removal kit and will remove their sutures at home.  If they have any questions or difficulties they will call the office.
Staged Advancement Flap Text: The defect edges were debeveled with a #15 scalpel blade.  Given the location of the defect, shape of the defect and the proximity to free margins a staged advancement flap was deemed most appropriate.  Using a sterile surgical marker, an appropriate advancement flap was drawn incorporating the defect and placing the expected incisions within the relaxed skin tension lines where possible. The area thus outlined was incised deep to adipose tissue with a #15 scalpel blade.  The skin margins were undermined to an appropriate distance in all directions utilizing iris scissors.
Mercedes Flap Text: The defect edges were debeveled with a #15 scalpel blade.  Given the location of the defect, shape of the defect and the proximity to free margins a Mercedes flap was deemed most appropriate.  Using a sterile surgical marker, an appropriate advancement flap was drawn incorporating the defect and placing the expected incisions within the relaxed skin tension lines where possible. The area thus outlined was incised deep to adipose tissue with a #15 scalpel blade.  The skin margins were undermined to an appropriate distance in all directions utilizing iris scissors.
V-Y Flap Text: The defect edges were debeveled with a #15 scalpel blade.  Given the location of the defect, shape of the defect and the proximity to free margins a V-Y flap was deemed most appropriate.  Using a sterile surgical marker, an appropriate advancement flap was drawn incorporating the defect and placing the expected incisions within the relaxed skin tension lines where possible.    The area thus outlined was incised deep to adipose tissue with a #15 scalpel blade.  The skin margins were undermined to an appropriate distance in all directions utilizing iris scissors.
Chonodrocutaneous Helical Advancement Flap Text: The defect edges were debeveled with a #15 scalpel blade.  Given the location of the defect and the proximity to free margins a chondrocutaneous helical advancement flap was deemed most appropriate.  Using a sterile surgical marker, the appropriate advancement flap was drawn incorporating the defect and placing the expected incisions within the relaxed skin tension lines where possible.    The area thus outlined was incised deep to adipose tissue with a #15 scalpel blade.  The skin margins were undermined to an appropriate distance in all directions utilizing iris scissors.
Perilesional Excision Additional Text (Leave Blank If You Do Not Want): The margin was drawn around the clinically apparent lesion. Incisions were then made along these lines to the appropriate tissue plane and the lesion was extirpated.
Crescentic Complex Repair Preamble Text (Leave Blank If You Do Not Want): Extensive wide undermining was performed.
Hemigard Intro: Due to skin fragility and wound tension, it was decided to use HEMIGARD adhesive retention suture devices to permit a linear closure. The skin was cleaned and dried for a 6cm distance away from the wound. Excessive hair, if present, was removed to allow for adhesion.
Melolabial Interpolation Flap Text: A decision was made to reconstruct the defect utilizing an interpolation axial flap and a staged reconstruction.  A telfa template was made of the defect.  This telfa template was then used to outline the melolabial interpolation flap.  The donor area for the pedicle flap was then injected with anesthesia.  The flap was excised through the skin and subcutaneous tissue down to the layer of the underlying musculature.  The pedicle flap was carefully excised within this deep plane to maintain its blood supply.  The edges of the donor site were undermined.   The donor site was closed in a primary fashion.  The pedicle was then rotated into position and sutured.  Once the tube was sutured into place, adequate blood supply was confirmed with blanching and refill.  The pedicle was then wrapped with xeroform gauze and dressed appropriately with a telfa and gauze bandage to ensure continued blood supply and protect the attached pedicle.
Secondary Defect Length (In Cm): 0
Complex Repair And Dorsal Nasal Flap Text: The defect edges were debeveled with a #15 scalpel blade.  The primary defect was closed partially with a complex linear closure.  Given the location of the remaining defect, shape of the defect and the proximity to free margins a dorsal nasal flap was deemed most appropriate for complete closure of the defect.  Using a sterile surgical marker, an appropriate flap was drawn incorporating the defect and placing the expected incisions within the relaxed skin tension lines where possible.    The area thus outlined was incised deep to adipose tissue with a #15 scalpel blade.  The skin margins were undermined to an appropriate distance in all directions utilizing iris scissors.
Complex Repair And Rhombic Flap Text: The defect edges were debeveled with a #15 scalpel blade.  The primary defect was closed partially with a complex linear closure.  Given the location of the remaining defect, shape of the defect and the proximity to free margins a rhombic flap was deemed most appropriate for complete closure of the defect.  Using a sterile surgical marker, an appropriate advancement flap was drawn incorporating the defect and placing the expected incisions within the relaxed skin tension lines where possible.    The area thus outlined was incised deep to adipose tissue with a #15 scalpel blade.  The skin margins were undermined to an appropriate distance in all directions utilizing iris scissors.
Complex Repair And Modified Advancement Flap Text: The defect edges were debeveled with a #15 scalpel blade.  The primary defect was closed partially with a complex linear closure.  Given the location of the remaining defect, shape of the defect and the proximity to free margins a modified advancement flap was deemed most appropriate for complete closure of the defect.  Using a sterile surgical marker, an appropriate advancement flap was drawn incorporating the defect and placing the expected incisions within the relaxed skin tension lines where possible.    The area thus outlined was incised deep to adipose tissue with a #15 scalpel blade.  The skin margins were undermined to an appropriate distance in all directions utilizing iris scissors.
Anesthesia Volume In Cc: 9
Xenograft Text: The defect edges were debeveled with a #15 scalpel blade.  Given the location of the defect, shape of the defect and the proximity to free margins a xenograft was deemed most appropriate.  The graft was then trimmed to fit the size of the defect.  The graft was then placed in the primary defect and oriented appropriately.
Burow's Graft Text: The defect edges were debeveled with a #15 scalpel blade.  Given the location of the defect, shape of the defect, the proximity to free margins and the presence of a standing cone deformity a Burow's skin graft was deemed most appropriate. The standing cone was removed and this tissue was then trimmed to the shape of the primary defect. The adipose tissue was also removed until only dermis and epidermis were left.  The skin margins of the secondary defect were undermined to an appropriate distance in all directions utilizing iris scissors.  The secondary defect was closed with interrupted buried subcutaneous sutures.  The skin edges were then re-apposed with running  sutures.  The skin graft was then placed in the primary defect and oriented appropriately.
Muscle Hinge Flap Text: The defect edges were debeveled with a #15 scalpel blade.  Given the size, depth and location of the defect and the proximity to free margins a muscle hinge flap was deemed most appropriate.  Using a sterile surgical marker, an appropriate hinge flap was drawn incorporating the defect. The area thus outlined was incised with a #15 scalpel blade.  The skin margins were undermined to an appropriate distance in all directions utilizing iris scissors.
Trilobed Flap Text: The defect edges were debeveled with a #15 scalpel blade.  Given the location of the defect and the proximity to free margins a trilobed flap was deemed most appropriate.  Using a sterile surgical marker, an appropriate trilobed flap drawn around the defect.    The area thus outlined was incised deep to adipose tissue with a #15 scalpel blade.  The skin margins were undermined to an appropriate distance in all directions utilizing iris scissors.
Peng Advancement Flap Text: The defect edges were debeveled with a #15 scalpel blade.  Given the location of the defect, shape of the defect and the proximity to free margins a Peng advancement flap was deemed most appropriate.  Using a sterile surgical marker, an appropriate advancement flap was drawn incorporating the defect and placing the expected incisions within the relaxed skin tension lines where possible. The area thus outlined was incised deep to adipose tissue with a #15 scalpel blade.  The skin margins were undermined to an appropriate distance in all directions utilizing iris scissors.
Keystone Flap Text: The defect edges were debeveled with a #15 scalpel blade.  Given the location of the defect, shape of the defect a keystone flap was deemed most appropriate.  Using a sterile surgical marker, an appropriate keystone flap was drawn incorporating the defect, outlining the appropriate donor tissue and placing the expected incisions within the relaxed skin tension lines where possible. The area thus outlined was incised deep to adipose tissue with a #15 scalpel blade.  The skin margins were undermined to an appropriate distance in all directions around the primary defect and laterally outward around the flap utilizing iris scissors.
Path Notes (To The Dermatopathologist): Please check margins.
Z Plasty Text: The lesion was extirpated to the level of the fat with a #15 scalpel blade.  Given the location of the defect, shape of the defect and the proximity to free margins a Z-plasty was deemed most appropriate for repair.  Using a sterile surgical marker, the appropriate transposition arms of the Z-plasty were drawn incorporating the defect and placing the expected incisions within the relaxed skin tension lines where possible.    The area thus outlined was incised deep to adipose tissue with a #15 scalpel blade.  The skin margins were undermined to an appropriate distance in all directions utilizing iris scissors.  The opposing transposition arms were then transposed into place in opposite direction and anchored with interrupted buried subcutaneous sutures.
Bi-Rhombic Flap Text: The defect edges were debeveled with a #15 scalpel blade.  Given the location of the defect and the proximity to free margins a bi-rhombic flap was deemed most appropriate.  Using a sterile surgical marker, an appropriate rhombic flap was drawn incorporating the defect. The area thus outlined was incised deep to adipose tissue with a #15 scalpel blade.  The skin margins were undermined to an appropriate distance in all directions utilizing iris scissors.
Complex Repair And Melolabial Flap Text: The defect edges were debeveled with a #15 scalpel blade.  The primary defect was closed partially with a complex linear closure.  Given the location of the remaining defect, shape of the defect and the proximity to free margins a melolabial flap was deemed most appropriate for complete closure of the defect.  Using a sterile surgical marker, an appropriate advancement flap was drawn incorporating the defect and placing the expected incisions within the relaxed skin tension lines where possible.    The area thus outlined was incised deep to adipose tissue with a #15 scalpel blade.  The skin margins were undermined to an appropriate distance in all directions utilizing iris scissors.
Complex Repair And Single Advancement Flap Text: The defect edges were debeveled with a #15 scalpel blade.  The primary defect was closed partially with a complex linear closure.  Given the location of the remaining defect, shape of the defect and the proximity to free margins a single advancement flap was deemed most appropriate for complete closure of the defect.  Using a sterile surgical marker, an appropriate advancement flap was drawn incorporating the defect and placing the expected incisions within the relaxed skin tension lines where possible.    The area thus outlined was incised deep to adipose tissue with a #15 scalpel blade.  The skin margins were undermined to an appropriate distance in all directions utilizing iris scissors.
Skin Substitute Text: The defect edges were debeveled with a #15 scalpel blade.  Given the location of the defect, shape of the defect and the proximity to free margins a skin substitute graft was deemed most appropriate.  The graft material was trimmed to fit the size of the defect. The graft was then placed in the primary defect and oriented appropriately.
Ftsg Text: The defect edges were debeveled with a #15 scalpel blade.  Given the location of the defect, shape of the defect and the proximity to free margins a full thickness skin graft was deemed most appropriate.  Using a sterile surgical marker, the primary defect shape was transferred to the donor site. The area thus outlined was incised deep to adipose tissue with a #15 scalpel blade.  The harvested graft was then trimmed of adipose tissue until only dermis and epidermis was left.  The skin margins of the secondary defect were undermined to an appropriate distance in all directions utilizing iris scissors.  The secondary defect was closed with interrupted buried subcutaneous sutures.  The skin edges were then re-apposed with running  sutures.  The skin graft was then placed in the primary defect and oriented appropriately.
Cheek-To-Nose Interpolation Flap Text: A decision was made to reconstruct the defect utilizing an interpolation axial flap and a staged reconstruction.  A telfa template was made of the defect.  This telfa template was then used to outline the Cheek-To-Nose Interpolation flap.  The donor area for the pedicle flap was then injected with anesthesia.  The flap was excised through the skin and subcutaneous tissue down to the layer of the underlying musculature.  The interpolation flap was carefully excised within this deep plane to maintain its blood supply.  The edges of the donor site were undermined.   The donor site was closed in a primary fashion.  The pedicle was then rotated into position and sutured.  Once the tube was sutured into place, adequate blood supply was confirmed with blanching and refill.  The pedicle was then wrapped with xeroform gauze and dressed appropriately with a telfa and gauze bandage to ensure continued blood supply and protect the attached pedicle.
X Size Of Lesion In Cm (Optional): 0.9
Complex Repair And Dermal Autograft Text: The defect edges were debeveled with a #15 scalpel blade.  The primary defect was closed partially with a complex linear closure.  Given the location of the defect, shape of the defect and the proximity to free margins an dermal autograft was deemed most appropriate to repair the remaining defect.  The graft was trimmed to fit the size of the remaining defect.  The graft was then placed in the primary defect, oriented appropriately, and sutured into place.
Complex Repair And W Plasty Text: The defect edges were debeveled with a #15 scalpel blade.  The primary defect was closed partially with a complex linear closure.  Given the location of the remaining defect, shape of the defect and the proximity to free margins a W plasty was deemed most appropriate for complete closure of the defect.  Using a sterile surgical marker, an appropriate advancement flap was drawn incorporating the defect and placing the expected incisions within the relaxed skin tension lines where possible.    The area thus outlined was incised deep to adipose tissue with a #15 scalpel blade.  The skin margins were undermined to an appropriate distance in all directions utilizing iris scissors.
H Plasty Text: Given the location of the defect, shape of the defect and the proximity to free margins a H-plasty was deemed most appropriate for repair.  Using a sterile surgical marker, the appropriate advancement arms of the H-plasty were drawn incorporating the defect and placing the expected incisions within the relaxed skin tension lines where possible. The area thus outlined was incised deep to adipose tissue with a #15 scalpel blade. The skin margins were undermined to an appropriate distance in all directions utilizing iris scissors.  The opposing advancement arms were then advanced into place in opposite direction and anchored with interrupted buried subcutaneous sutures.
Bilobed Flap Text: The defect edges were debeveled with a #15 scalpel blade.  Given the location of the defect and the proximity to free margins a bilobe flap was deemed most appropriate.  Using a sterile surgical marker, an appropriate bilobe flap drawn around the defect.    The area thus outlined was incised deep to adipose tissue with a #15 scalpel blade.  The skin margins were undermined to an appropriate distance in all directions utilizing iris scissors.
Rhombic Flap Text: The defect edges were debeveled with a #15 scalpel blade.  Given the location of the defect and the proximity to free margins a rhombic flap was deemed most appropriate.  Using a sterile surgical marker, an appropriate rhombic flap was drawn incorporating the defect.    The area thus outlined was incised deep to adipose tissue with a #15 scalpel blade.  The skin margins were undermined to an appropriate distance in all directions utilizing iris scissors.
Star Wedge Flap Text: The defect edges were debeveled with a #15 scalpel blade.  Given the location of the defect, shape of the defect and the proximity to free margins a star wedge flap was deemed most appropriate.  Using a sterile surgical marker, an appropriate rotation flap was drawn incorporating the defect and placing the expected incisions within the relaxed skin tension lines where possible. The area thus outlined was incised deep to adipose tissue with a #15 scalpel blade.  The skin margins were undermined to an appropriate distance in all directions utilizing iris scissors.
Double Island Pedicle Flap Text: The defect edges were debeveled with a #15 scalpel blade.  Given the location of the defect, shape of the defect and the proximity to free margins a double island pedicle advancement flap was deemed most appropriate.  Using a sterile surgical marker, an appropriate advancement flap was drawn incorporating the defect, outlining the appropriate donor tissue and placing the expected incisions within the relaxed skin tension lines where possible.    The area thus outlined was incised deep to adipose tissue with a #15 scalpel blade.  The skin margins were undermined to an appropriate distance in all directions around the primary defect and laterally outward around the island pedicle utilizing iris scissors.  There was minimal undermining beneath the pedicle flap.
Modified Advancement Flap Text: The defect edges were debeveled with a #15 scalpel blade.  Given the location of the defect, shape of the defect and the proximity to free margins a modified advancement flap was deemed most appropriate.  Using a sterile surgical marker, an appropriate advancement flap was drawn incorporating the defect and placing the expected incisions within the relaxed skin tension lines where possible.    The area thus outlined was incised deep to adipose tissue with a #15 scalpel blade.  The skin margins were undermined to an appropriate distance in all directions utilizing iris scissors.
Crescentic Advancement Flap Text: The defect edges were debeveled with a #15 scalpel blade.  Given the location of the defect and the proximity to free margins a crescentic advancement flap was deemed most appropriate.  Using a sterile surgical marker, the appropriate advancement flap was drawn incorporating the defect and placing the expected incisions within the relaxed skin tension lines where possible.    The area thus outlined was incised deep to adipose tissue with a #15 scalpel blade.  The skin margins were undermined to an appropriate distance in all directions utilizing iris scissors.
Curvilinear Excision Additional Text (Leave Blank If You Do Not Want): The margin was drawn around the clinically apparent lesion.  A curvilinear shape was then drawn on the skin incorporating the lesion and margins.  Incisions were then made along these lines to the appropriate tissue plane and the lesion was extirpated.
Where Do You Want The Question To Include Opioid Counseling Located?: Case Summary Tab
Complex Repair And Ftsg Text: The defect edges were debeveled with a #15 scalpel blade.  The primary defect was closed partially with a complex linear closure.  Given the location of the defect, shape of the defect and the proximity to free margins a full thickness skin graft was deemed most appropriate to repair the remaining defect.  The graft was trimmed to fit the size of the remaining defect.  The graft was then placed in the primary defect, oriented appropriately, and sutured into place.
Hemigard Postcare Instructions: The HEMIGARD strips are to remain completely dry for at least 5-7 days.
Zygomaticofacial Flap Text: Given the location of the defect, shape of the defect and the proximity to free margins a zygomaticofacial flap was deemed most appropriate for repair.  Using a sterile surgical marker, the appropriate flap was drawn incorporating the defect and placing the expected incisions within the relaxed skin tension lines where possible. The area thus outlined was incised deep to adipose tissue with a #15 scalpel blade with preservation of a vascular pedicle.  The skin margins were undermined to an appropriate distance in all directions utilizing iris scissors.  The flap was then placed into the defect and anchored with interrupted buried subcutaneous sutures.
Complex Repair And Transposition Flap Text: The defect edges were debeveled with a #15 scalpel blade.  The primary defect was closed partially with a complex linear closure.  Given the location of the remaining defect, shape of the defect and the proximity to free margins a transposition flap was deemed most appropriate for complete closure of the defect.  Using a sterile surgical marker, an appropriate advancement flap was drawn incorporating the defect and placing the expected incisions within the relaxed skin tension lines where possible.    The area thus outlined was incised deep to adipose tissue with a #15 scalpel blade.  The skin margins were undermined to an appropriate distance in all directions utilizing iris scissors.
Complex Repair And A-T Advancement Flap Text: The defect edges were debeveled with a #15 scalpel blade.  The primary defect was closed partially with a complex linear closure.  Given the location of the remaining defect, shape of the defect and the proximity to free margins an A-T advancement flap was deemed most appropriate for complete closure of the defect.  Using a sterile surgical marker, an appropriate advancement flap was drawn incorporating the defect and placing the expected incisions within the relaxed skin tension lines where possible.    The area thus outlined was incised deep to adipose tissue with a #15 scalpel blade.  The skin margins were undermined to an appropriate distance in all directions utilizing iris scissors.
Purse String (Intermediate) Text: Given the location of the defect and the characteristics of the surrounding skin a purse string intermediate closure was deemed most appropriate.  Undermining was performed circumfirentially around the surgical defect.  A purse string suture was then placed and tightened.
Cartilage Graft Text: The defect edges were debeveled with a #15 scalpel blade.  Given the location of the defect, shape of the defect, the fact the defect involved a full thickness cartilage defect a cartilage graft was deemed most appropriate.  An appropriate donor site was identified, cleansed, and anesthetized. The cartilage graft was then harvested and transferred to the recipient site, oriented appropriately and then sutured into place.  The secondary defect was then repaired using a primary closure.
Mastoid Interpolation Flap Text: A decision was made to reconstruct the defect utilizing an interpolation axial flap and a staged reconstruction.  A telfa template was made of the defect.  This telfa template was then used to outline the mastoid interpolation flap.  The donor area for the pedicle flap was then injected with anesthesia.  The flap was excised through the skin and subcutaneous tissue down to the layer of the underlying musculature.  The pedicle flap was carefully excised within this deep plane to maintain its blood supply.  The edges of the donor site were undermined.   The donor site was closed in a primary fashion.  The pedicle was then rotated into position and sutured.  Once the tube was sutured into place, adequate blood supply was confirmed with blanching and refill.  The pedicle was then wrapped with xeroform gauze and dressed appropriately with a telfa and gauze bandage to ensure continued blood supply and protect the attached pedicle.
Hatchet Flap Text: The defect edges were debeveled with a #15 scalpel blade.  Given the location of the defect, shape of the defect and the proximity to free margins a hatchet flap was deemed most appropriate.  Using a sterile surgical marker, an appropriate hatchet flap was drawn incorporating the defect and placing the expected incisions within the relaxed skin tension lines where possible.    The area thus outlined was incised deep to adipose tissue with a #15 scalpel blade.  The skin margins were undermined to an appropriate distance in all directions utilizing iris scissors.
Anesthesia Type: 1% lidocaine with epinephrine and a 1:10 solution of 8.4% sodium bicarbonate
Dorsal Nasal Flap Text: The defect edges were debeveled with a #15 scalpel blade.  Given the location of the defect and the proximity to free margins a dorsal nasal flap was deemed most appropriate.  Using a sterile surgical marker, an appropriate dorsal nasal flap was drawn around the defect.    The area thus outlined was incised deep to adipose tissue with a #15 scalpel blade.  The skin margins were undermined to an appropriate distance in all directions utilizing iris scissors.
Detail Level: Detailed
O-T Plasty Text: The defect edges were debeveled with a #15 scalpel blade.  Given the location of the defect, shape of the defect and the proximity to free margins an O-T plasty was deemed most appropriate.  Using a sterile surgical marker, an appropriate O-T plasty was drawn incorporating the defect and placing the expected incisions within the relaxed skin tension lines where possible.    The area thus outlined was incised deep to adipose tissue with a #15 scalpel blade.  The skin margins were undermined to an appropriate distance in all directions utilizing iris scissors.
Hemostasis: Electrocautery
Excision Method: Elliptical
Helical Rim Advancement Flap Text: The defect edges were debeveled with a #15 blade scalpel.  Given the location of the defect and the proximity to free margins (helical rim) a double helical rim advancement flap was deemed most appropriate.  Using a sterile surgical marker, the appropriate advancement flaps were drawn incorporating the defect and placing the expected incisions between the helical rim and antihelix where possible.  The area thus outlined was incised through and through with a #15 scalpel blade.  With a skin hook and iris scissors, the flaps were gently and sharply undermined and freed up.
Nasal Turnover Hinge Flap Text: The defect edges were debeveled with a #15 scalpel blade.  Given the size, depth, location of the defect and the defect being full thickness a nasal turnover hinge flap was deemed most appropriate.  Using a sterile surgical marker, an appropriate hinge flap was drawn incorporating the defect. The area thus outlined was incised with a #15 scalpel blade. The flap was designed to recreate the nasal mucosal lining and the alar rim. The skin margins were undermined to an appropriate distance in all directions utilizing iris scissors.
Complex Repair And Tissue Cultured Epidermal Autograft Text: The defect edges were debeveled with a #15 scalpel blade.  The primary defect was closed partially with a complex linear closure.  Given the location of the defect, shape of the defect and the proximity to free margins an tissue cultured epidermal autograft was deemed most appropriate to repair the remaining defect.  The graft was trimmed to fit the size of the remaining defect.  The graft was then placed in the primary defect, oriented appropriately, and sutured into place.
Island Pedicle Flap-Requiring Vessel Identification Text: The defect edges were debeveled with a #15 scalpel blade.  Given the location of the defect, shape of the defect and the proximity to free margins an island pedicle advancement flap was deemed most appropriate.  Using a sterile surgical marker, an appropriate advancement flap was drawn, based on the axial vessel mentioned above, incorporating the defect, outlining the appropriate donor tissue and placing the expected incisions within the relaxed skin tension lines where possible.    The area thus outlined was incised deep to adipose tissue with a #15 scalpel blade.  The skin margins were undermined to an appropriate distance in all directions around the primary defect and laterally outward around the island pedicle utilizing iris scissors.  There was minimal undermining beneath the pedicle flap.
Rhomboid Transposition Flap Text: The defect edges were debeveled with a #15 scalpel blade.  Given the location of the defect and the proximity to free margins a rhomboid transposition flap was deemed most appropriate.  Using a sterile surgical marker, an appropriate rhomboid flap was drawn incorporating the defect.    The area thus outlined was incised deep to adipose tissue with a #15 scalpel blade.  The skin margins were undermined to an appropriate distance in all directions utilizing iris scissors.
Transposition Flap Text: The defect edges were debeveled with a #15 scalpel blade.  Given the location of the defect and the proximity to free margins a transposition flap was deemed most appropriate.  Using a sterile surgical marker, an appropriate transposition flap was drawn incorporating the defect.    The area thus outlined was incised deep to adipose tissue with a #15 scalpel blade.  The skin margins were undermined to an appropriate distance in all directions utilizing iris scissors.
W Plasty Text: The lesion was extirpated to the level of the fat with a #15 scalpel blade.  Given the location of the defect, shape of the defect and the proximity to free margins a W-plasty was deemed most appropriate for repair.  Using a sterile surgical marker, the appropriate transposition arms of the W-plasty were drawn incorporating the defect and placing the expected incisions within the relaxed skin tension lines where possible.    The area thus outlined was incised deep to adipose tissue with a #15 scalpel blade.  The skin margins were undermined to an appropriate distance in all directions utilizing iris scissors.  The opposing transposition arms were then transposed into place in opposite direction and anchored with interrupted buried subcutaneous sutures.
Mucosal Advancement Flap Text: Given the location of the defect, shape of the defect and the proximity to free margins a mucosal advancement flap was deemed most appropriate. Incisions were made with a 15 blade scalpel in the appropriate fashion along the cutaneous vermilion border and the mucosal lip. The remaining actinically damaged mucosal tissue was excised.  The mucosal advancement flap was then elevated to the gingival sulcus with care taken to preserve the neurovascular structures and advanced into the primary defect. Care was taken to ensure that precise realignment of the vermilion border was achieved.
Undermining Type: Entire Wound
Bilobed Transposition Flap Text: The defect edges were debeveled with a #15 scalpel blade.  Given the location of the defect and the proximity to free margins a bilobed transposition flap was deemed most appropriate.  Using a sterile surgical marker, an appropriate bilobe flap drawn around the defect.    The area thus outlined was incised deep to adipose tissue with a #15 scalpel blade.  The skin margins were undermined to an appropriate distance in all directions utilizing iris scissors.
A-T Advancement Flap Text: The defect edges were debeveled with a #15 scalpel blade.  Given the location of the defect, shape of the defect and the proximity to free margins an A-T advancement flap was deemed most appropriate.  Using a sterile surgical marker, an appropriate advancement flap was drawn incorporating the defect and placing the expected incisions within the relaxed skin tension lines where possible.    The area thus outlined was incised deep to adipose tissue with a #15 scalpel blade.  The skin margins were undermined to an appropriate distance in all directions utilizing iris scissors.
Repair Performed By Another Provider Text (Leave Blank If You Do Not Want): After the tissue was excised the defect was repaired by another provider.
Fusiform Excision Additional Text (Leave Blank If You Do Not Want): The margin was drawn around the clinically apparent lesion.  A fusiform shape was then drawn on the skin incorporating the lesion and margins.  Incisions were then made along these lines to the appropriate tissue plane and the lesion was extirpated.
Complex Repair And Burow's Graft Text: The defect edges were debeveled with a #15 scalpel blade.  The primary defect was closed partially with a complex linear closure.  Given the location of the defect, shape of the defect, the proximity to free margins and the presence of a standing cone deformity a Burow's graft was deemed most appropriate to repair the remaining defect.  The graft was trimmed to fit the size of the remaining defect.  The graft was then placed in the primary defect, oriented appropriately, and sutured into place.
Complex Repair And V-Y Plasty Text: The defect edges were debeveled with a #15 scalpel blade.  The primary defect was closed partially with a complex linear closure.  Given the location of the remaining defect, shape of the defect and the proximity to free margins a V-Y plasty was deemed most appropriate for complete closure of the defect.  Using a sterile surgical marker, an appropriate advancement flap was drawn incorporating the defect and placing the expected incisions within the relaxed skin tension lines where possible.    The area thus outlined was incised deep to adipose tissue with a #15 scalpel blade.  The skin margins were undermined to an appropriate distance in all directions utilizing iris scissors.
Complex Repair And O-T Advancement Flap Text: The defect edges were debeveled with a #15 scalpel blade.  The primary defect was closed partially with a complex linear closure.  Given the location of the remaining defect, shape of the defect and the proximity to free margins an O-T advancement flap was deemed most appropriate for complete closure of the defect.  Using a sterile surgical marker, an appropriate advancement flap was drawn incorporating the defect and placing the expected incisions within the relaxed skin tension lines where possible.    The area thus outlined was incised deep to adipose tissue with a #15 scalpel blade.  The skin margins were undermined to an appropriate distance in all directions utilizing iris scissors.
O-Z Plasty Text: The defect edges were debeveled with a #15 scalpel blade.  Given the location of the defect, shape of the defect and the proximity to free margins an O-Z plasty (double transposition flap) was deemed most appropriate.  Using a sterile surgical marker, the appropriate transposition flaps were drawn incorporating the defect and placing the expected incisions within the relaxed skin tension lines where possible.    The area thus outlined was incised deep to adipose tissue with a #15 scalpel blade.  The skin margins were undermined to an appropriate distance in all directions utilizing iris scissors.  Hemostasis was achieved with electrocautery.  The flaps were then transposed into place, one clockwise and the other counterclockwise, and anchored with interrupted buried subcutaneous sutures.
Purse String (Simple) Text: Given the location of the defect and the characteristics of the surrounding skin a purse string simple closure was deemed most appropriate.  Undermining was performed circumferentially around the surgical defect.  A purse string suture was then placed and tightened.
Cheek Interpolation Flap Text: A decision was made to reconstruct the defect utilizing an interpolation axial flap and a staged reconstruction.  A telfa template was made of the defect.  This telfa template was then used to outline the Cheek Interpolation flap.  The donor area for the pedicle flap was then injected with anesthesia.  The flap was excised through the skin and subcutaneous tissue down to the layer of the underlying musculature.  The interpolation flap was carefully excised within this deep plane to maintain its blood supply.  The edges of the donor site were undermined.   The donor site was closed in a primary fashion.  The pedicle was then rotated into position and sutured.  Once the tube was sutured into place, adequate blood supply was confirmed with blanching and refill.  The pedicle was then wrapped with xeroform gauze and dressed appropriately with a telfa and gauze bandage to ensure continued blood supply and protect the attached pedicle.
Composite Graft Text: The defect edges were debeveled with a #15 scalpel blade.  Given the location of the defect, shape of the defect, the proximity to free margins and the fact the defect was full thickness a composite graft was deemed most appropriate.  The defect was outline and then transferred to the donor site.  A full thickness graft was then excised from the donor site. The graft was then placed in the primary defect, oriented appropriately and then sutured into place.  The secondary defect was then repaired using a primary closure.
Posterior Auricular Interpolation Flap Text: A decision was made to reconstruct the defect utilizing an interpolation axial flap and a staged reconstruction.  A telfa template was made of the defect.  This telfa template was then used to outline the posterior auricular interpolation flap.  The donor area for the pedicle flap was then injected with anesthesia.  The flap was excised through the skin and subcutaneous tissue down to the layer of the underlying musculature.  The pedicle flap was carefully excised within this deep plane to maintain its blood supply.  The edges of the donor site were undermined.   The donor site was closed in a primary fashion.  The pedicle was then rotated into position and sutured.  Once the tube was sutured into place, adequate blood supply was confirmed with blanching and refill.  The pedicle was then wrapped with xeroform gauze and dressed appropriately with a telfa and gauze bandage to ensure continued blood supply and protect the attached pedicle.
Epidermal Sutures: 5-0 Prolene
Epidermal Closure: running
Island Pedicle Flap Text: The defect edges were debeveled with a #15 scalpel blade.  Given the location of the defect, shape of the defect and the proximity to free margins an island pedicle advancement flap was deemed most appropriate.  Using a sterile surgical marker, an appropriate advancement flap was drawn incorporating the defect, outlining the appropriate donor tissue and placing the expected incisions within the relaxed skin tension lines where possible.    The area thus outlined was incised deep to adipose tissue with a #15 scalpel blade.  The skin margins were undermined to an appropriate distance in all directions around the primary defect and laterally outward around the island pedicle utilizing iris scissors.  There was minimal undermining beneath the pedicle flap.
Bilateral Helical Rim Advancement Flap Text: The defect edges were debeveled with a #15 blade scalpel.  Given the location of the defect and the proximity to free margins (helical rim) a bilateral helical rim advancement flap was deemed most appropriate.  Using a sterile surgical marker, the appropriate advancement flaps were drawn incorporating the defect and placing the expected incisions between the helical rim and antihelix where possible.  The area thus outlined was incised through and through with a #15 scalpel blade.  With a skin hook and iris scissors, the flaps were gently and sharply undermined and freed up.
Nasalis-Muscle-Based Myocutaneous Island Pedicle Flap Text: Using a #15 blade, an incision was made around the donor flap to the level of the nasalis muscle. Wide lateral undermining was then performed in both the subcutaneous plane above the nasalis muscle, and in a submuscular plane just above periosteum. This allowed the formation of a free nasalis muscle axial pedicle (based on the angular artery) which was still attached to the actual cutaneous flap, increasing its mobility and vascular viability. Hemostasis was obtained with pinpoint electrocoagulation. The flap was mobilized into position and the pivotal anchor points positioned and stabilized with buried interrupted sutures. Subcutaneous and dermal tissues were closed in a multilayered fashion with sutures. Tissue redundancies were excised, and the epidermal edges were apposed without significant tension and sutured with sutures.
Rotation Flap Text: The defect edges were debeveled with a #15 scalpel blade.  Given the location of the defect, shape of the defect and the proximity to free margins a rotation flap was deemed most appropriate.  Using a sterile surgical marker, an appropriate rotation flap was drawn incorporating the defect and placing the expected incisions within the relaxed skin tension lines where possible.    The area thus outlined was incised deep to adipose tissue with a #15 scalpel blade.  The skin margins were undermined to an appropriate distance in all directions utilizing iris scissors.
Complex Repair And Xenograft Text: The defect edges were debeveled with a #15 scalpel blade.  The primary defect was closed partially with a complex linear closure.  Given the location of the defect, shape of the defect and the proximity to free margins a xenograft was deemed most appropriate to repair the remaining defect.  The graft was trimmed to fit the size of the remaining defect.  The graft was then placed in the primary defect, oriented appropriately, and sutured into place.
Intermediate / Complex Repair - Final Wound Length In Cm: 4.5
Scalpel Size: 15 blade
Suturegard Intro: Intraoperative tissue expansion was performed, utilizing the SUTUREGARD device, in order to reduce wound tension.
Epidermal Closure Graft Donor Site (Optional): simple interrupted
O-Z Flap Text: The defect edges were debeveled with a #15 scalpel blade.  Given the location of the defect, shape of the defect and the proximity to free margins an O-Z flap was deemed most appropriate.  Using a sterile surgical marker, an appropriate transposition flap was drawn incorporating the defect and placing the expected incisions within the relaxed skin tension lines where possible. The area thus outlined was incised deep to adipose tissue with a #15 scalpel blade.  The skin margins were undermined to an appropriate distance in all directions utilizing iris scissors.
Advancement Flap (Double) Text: The defect edges were debeveled with a #15 scalpel blade.  Given the location of the defect and the proximity to free margins a double advancement flap was deemed most appropriate.  Using a sterile surgical marker, the appropriate advancement flaps were drawn incorporating the defect and placing the expected incisions within the relaxed skin tension lines where possible.    The area thus outlined was incised deep to adipose tissue with a #15 scalpel blade.  The skin margins were undermined to an appropriate distance in all directions utilizing iris scissors.
Slit Excision Additional Text (Leave Blank If You Do Not Want): A linear line was drawn on the skin overlying the lesion. An incision was made slowly until the lesion was visualized.  Once visualized, the lesion was removed with blunt dissection.
Anesthesia Type: 1% lidocaine with 1:100,000 epinephrine and 408mcg clindamycin/ml and a 1:10 solution of 8.4% sodium bicarbonate
Eye Clamp Note Details: An eye clamp was used during the procedure.
No Repair - Repaired With Adjacent Surgical Defect Text (Leave Blank If You Do Not Want): After the excision the defect was repaired concurrently with another surgical defect which was in close approximation.
Estimated Blood Loss (Cc): minimal
Elliptical Excision Additional Text (Leave Blank If You Do Not Want): The margin was drawn around the clinically apparent lesion.  An elliptical shape was then drawn on the skin incorporating the lesion and margins.  Incisions were then made along these lines to the appropriate tissue plane and the lesion was extirpated.
Positioning (Leave Blank If You Do Not Want): The patient was placed in a comfortable position exposing the surgical site.
Anesthesia Volume In Cc: 6
O-T Advancement Flap Text: The defect edges were debeveled with a #15 scalpel blade.  Given the location of the defect, shape of the defect and the proximity to free margins an O-T advancement flap was deemed most appropriate.  Using a sterile surgical marker, an appropriate advancement flap was drawn incorporating the defect and placing the expected incisions within the relaxed skin tension lines where possible.    The area thus outlined was incised deep to adipose tissue with a #15 scalpel blade.  The skin margins were undermined to an appropriate distance in all directions utilizing iris scissors.
Complex Repair And Split-Thickness Skin Graft Text: The defect edges were debeveled with a #15 scalpel blade.  The primary defect was closed partially with a complex linear closure.  Given the location of the defect, shape of the defect and the proximity to free margins a split thickness skin graft was deemed most appropriate to repair the remaining defect.  The graft was trimmed to fit the size of the remaining defect.  The graft was then placed in the primary defect, oriented appropriately, and sutured into place.
Complex Repair And M Plasty Text: The defect edges were debeveled with a #15 scalpel blade.  The primary defect was closed partially with a complex linear closure.  Given the location of the remaining defect, shape of the defect and the proximity to free margins an M plasty was deemed most appropriate for complete closure of the defect.  Using a sterile surgical marker, an appropriate advancement flap was drawn incorporating the defect and placing the expected incisions within the relaxed skin tension lines where possible.    The area thus outlined was incised deep to adipose tissue with a #15 scalpel blade.  The skin margins were undermined to an appropriate distance in all directions utilizing iris scissors.
Complex Repair And O-L Flap Text: The defect edges were debeveled with a #15 scalpel blade.  The primary defect was closed partially with a complex linear closure.  Given the location of the remaining defect, shape of the defect and the proximity to free margins an O-L flap was deemed most appropriate for complete closure of the defect.  Using a sterile surgical marker, an appropriate flap was drawn incorporating the defect and placing the expected incisions within the relaxed skin tension lines where possible.    The area thus outlined was incised deep to adipose tissue with a #15 scalpel blade.  The skin margins were undermined to an appropriate distance in all directions utilizing iris scissors.
Deep Sutures: 3-0 PDS
Partial Purse String (Intermediate) Text: Given the location of the defect and the characteristics of the surrounding skin an intermediate purse string closure was deemed most appropriate.  Undermining was performed circumferentially around the surgical defect.  A purse string suture was then placed and tightened. Wound tension of the circular defect prevented complete closure of the wound.
Wound Care: Mastisol
Debridement Text: The wound edges were debrided prior to proceeding with the closure to facilitate wound healing.
Information: Selecting Yes will display possible errors in your note based on the variables you have selected. This validation is only offered as a suggestion for you. PLEASE NOTE THAT THE VALIDATION TEXT WILL BE REMOVED WHEN YOU FINALIZE YOUR NOTE. IF YOU WANT TO FAX A PRELIMINARY NOTE YOU WILL NEED TO TOGGLE THIS TO 'NO' IF YOU DO NOT WANT IT IN YOUR FAXED NOTE.
Ear Star Wedge Flap Text: The defect edges were debeveled with a #15 blade scalpel.  Given the location of the defect and the proximity to free margins (helical rim) an ear star wedge flap was deemed most appropriate.  Using a sterile surgical marker, the appropriate flap was drawn incorporating the defect and placing the expected incisions between the helical rim and antihelix where possible.  The area thus outlined was incised through and through with a #15 scalpel blade.
Length To Time In Minutes Device Was In Place: 10
Epidermal Autograft Text: The defect edges were debeveled with a #15 scalpel blade.  Given the location of the defect, shape of the defect and the proximity to free margins an epidermal autograft was deemed most appropriate.  Using a sterile surgical marker, the primary defect shape was transferred to the donor site. The epidermal graft was then harvested.  The skin graft was then placed in the primary defect and oriented appropriately.
Paramedian Forehead Flap Text: A decision was made to reconstruct the defect utilizing an interpolation axial flap and a staged reconstruction.  A telfa template was made of the defect.  This telfa template was then used to outline the paramedian forehead pedicle flap.  The donor area for the pedicle flap was then injected with anesthesia.  The flap was excised through the skin and subcutaneous tissue down to the layer of the underlying musculature.  The pedicle flap was carefully excised within this deep plane to maintain its blood supply.  The edges of the donor site were undermined.   The donor site was closed in a primary fashion.  The pedicle was then rotated into position and sutured.  Once the tube was sutured into place, adequate blood supply was confirmed with blanching and refill.  The pedicle was then wrapped with xeroform gauze and dressed appropriately with a telfa and gauze bandage to ensure continued blood supply and protect the attached pedicle.
Island Pedicle Flap With Canthal Suspension Text: The defect edges were debeveled with a #15 scalpel blade.  Given the location of the defect, shape of the defect and the proximity to free margins an island pedicle advancement flap was deemed most appropriate.  Using a sterile surgical marker, an appropriate advancement flap was drawn incorporating the defect, outlining the appropriate donor tissue and placing the expected incisions within the relaxed skin tension lines where possible. The area thus outlined was incised deep to adipose tissue with a #15 scalpel blade.  The skin margins were undermined to an appropriate distance in all directions around the primary defect and laterally outward around the island pedicle utilizing iris scissors.  There was minimal undermining beneath the pedicle flap. A suspension suture was placed in the canthal tendon to prevent tension and prevent ectropion.
Double O-Z Plasty Text: The defect edges were debeveled with a #15 scalpel blade.  Given the location of the defect, shape of the defect and the proximity to free margins a Double O-Z plasty (double transposition flap) was deemed most appropriate.  Using a sterile surgical marker, the appropriate transposition flaps were drawn incorporating the defect and placing the expected incisions within the relaxed skin tension lines where possible. The area thus outlined was incised deep to adipose tissue with a #15 scalpel blade.  The skin margins were undermined to an appropriate distance in all directions utilizing iris scissors.  Hemostasis was achieved with electrocautery.  The flaps were then transposed into place, one clockwise and the other counterclockwise, and anchored with interrupted buried subcutaneous sutures.
Consent was obtained from the patient. The risks and benefits to therapy were discussed in detail. Specifically, the risks of infection, scarring, bleeding, prolonged wound healing, incomplete removal, allergy to anesthesia, nerve injury and recurrence were addressed. Prior to the procedure, the treatment site was clearly identified and confirmed by the patient. All components of Universal Protocol/PAUSE Rule completed.
Nostril Rim Text: The closure involved the nostril rim.
Retention Suture Text: Retention sutures were placed to support the closure and prevent dehiscence.
Excision Depth: adipose tissue
Orbicularis Oris Muscle Flap Text: The defect edges were debeveled with a #15 scalpel blade.  Given that the defect affected the competency of the oral sphincter an obicularis oris muscle flap was deemed most appropriate to restore this competency and normal muscle function.  Using a sterile surgical marker, an appropriate flap was drawn incorporating the defect. The area thus outlined was incised with a #15 scalpel blade.
Spiral Flap Text: The defect edges were debeveled with a #15 scalpel blade.  Given the location of the defect, shape of the defect and the proximity to free margins a spiral flap was deemed most appropriate.  Using a sterile surgical marker, an appropriate rotation flap was drawn incorporating the defect and placing the expected incisions within the relaxed skin tension lines where possible. The area thus outlined was incised deep to adipose tissue with a #15 scalpel blade.  The skin margins were undermined to an appropriate distance in all directions utilizing iris scissors.
Advancement-Rotation Flap Text: The defect edges were debeveled with a #15 scalpel blade.  Given the location of the defect, shape of the defect and the proximity to free margins an advancement-rotation flap was deemed most appropriate.  Using a sterile surgical marker, an appropriate flap was drawn incorporating the defect and placing the expected incisions within the relaxed skin tension lines where possible. The area thus outlined was incised deep to adipose tissue with a #15 scalpel blade.  The skin margins were undermined to an appropriate distance in all directions utilizing iris scissors.
Complex Repair And Skin Substitute Graft Text: The defect edges were debeveled with a #15 scalpel blade.  The primary defect was closed partially with a complex linear closure.  Given the location of the remaining defect, shape of the defect and the proximity to free margins a skin substitute graft was deemed most appropriate to repair the remaining defect.  The graft was trimmed to fit the size of the remaining defect.  The graft was then placed in the primary defect, oriented appropriately, and sutured into place.
Post-Care Instructions: I reviewed with the patient in detail post-care instructions. Patient is not to engage in any heavy lifting, exercise, or swimming for the next 14 days. Should the patient develop any fevers, chills, bleeding, severe pain patient will contact the office immediately.
Double O-Z Flap Text: The defect edges were debeveled with a #15 scalpel blade.  Given the location of the defect, shape of the defect and the proximity to free margins a Double O-Z flap was deemed most appropriate.  Using a sterile surgical marker, an appropriate transposition flap was drawn incorporating the defect and placing the expected incisions within the relaxed skin tension lines where possible. The area thus outlined was incised deep to adipose tissue with a #15 scalpel blade.  The skin margins were undermined to an appropriate distance in all directions utilizing iris scissors.
Helical Rim Text: The closure involved the helical rim.
Suture Removal: 9 days
Billing Type: Third-Party Bill
Suturegard Body: The suture ends were repeatedly re-tightened and re-clamped to achieve the desired tissue expansion.
Burow's Advancement Flap Text: The defect edges were debeveled with a #15 scalpel blade.  Given the location of the defect and the proximity to free margins a Burow's advancement flap was deemed most appropriate.  Using a sterile surgical marker, the appropriate advancement flap was drawn incorporating the defect and placing the expected incisions within the relaxed skin tension lines where possible.    The area thus outlined was incised deep to adipose tissue with a #15 scalpel blade.  The skin margins were undermined to an appropriate distance in all directions utilizing iris scissors.
Excisional Biopsy Additional Text (Leave Blank If You Do Not Want): The margin was drawn around the clinically apparent lesion. An elliptical shape was then drawn on the skin incorporating the lesion and margins.  Incisions were then made along these lines to the appropriate tissue plane and the lesion was extirpated.
Tissue Cultured Epidermal Autograft Text: The defect edges were debeveled with a #15 scalpel blade.  Given the location of the defect, shape of the defect and the proximity to free margins a tissue cultured epidermal autograft was deemed most appropriate.  The graft was then trimmed to fit the size of the defect.  The graft was then placed in the primary defect and oriented appropriately.
Split-Thickness Skin Graft Text: The defect edges were debeveled with a #15 scalpel blade.  Given the location of the defect, shape of the defect and the proximity to free margins a split thickness skin graft was deemed most appropriate.  Using a sterile surgical marker, the primary defect shape was transferred to the donor site. The split thickness graft was then harvested.  The skin graft was then placed in the primary defect and oriented appropriately.
Interpolation Flap Text: A decision was made to reconstruct the defect utilizing an interpolation axial flap and a staged reconstruction.  A telfa template was made of the defect.  This telfa template was then used to outline the interpolation flap.  The donor area for the pedicle flap was then injected with anesthesia.  The flap was excised through the skin and subcutaneous tissue down to the layer of the underlying musculature.  The interpolation flap was carefully excised within this deep plane to maintain its blood supply.  The edges of the donor site were undermined.   The donor site was closed in a primary fashion.  The pedicle was then rotated into position and sutured.  Once the tube was sutured into place, adequate blood supply was confirmed with blanching and refill.  The pedicle was then wrapped with xeroform gauze and dressed appropriately with a telfa and gauze bandage to ensure continued blood supply and protect the attached pedicle.
Accession #: [id number]
Size Of Margin In Cm: 0.3
Complex Repair And Z Plasty Text: The defect edges were debeveled with a #15 scalpel blade.  The primary defect was closed partially with a complex linear closure.  Given the location of the remaining defect, shape of the defect and the proximity to free margins a Z plasty was deemed most appropriate for complete closure of the defect.  Using a sterile surgical marker, an appropriate advancement flap was drawn incorporating the defect and placing the expected incisions within the relaxed skin tension lines where possible.    The area thus outlined was incised deep to adipose tissue with a #15 scalpel blade.  The skin margins were undermined to an appropriate distance in all directions utilizing iris scissors.
Complex Repair And Rotation Flap Text: The defect edges were debeveled with a #15 scalpel blade.  The primary defect was closed partially with a complex linear closure.  Given the location of the remaining defect, shape of the defect and the proximity to free margins a rotation flap was deemed most appropriate for complete closure of the defect.  Using a sterile surgical marker, an appropriate advancement flap was drawn incorporating the defect and placing the expected incisions within the relaxed skin tension lines where possible.    The area thus outlined was incised deep to adipose tissue with a #15 scalpel blade.  The skin margins were undermined to an appropriate distance in all directions utilizing iris scissors.
Complex Repair And Double Advancement Flap Text: The defect edges were debeveled with a #15 scalpel blade.  The primary defect was closed partially with a complex linear closure.  Given the location of the remaining defect, shape of the defect and the proximity to free margins a double advancement flap was deemed most appropriate for complete closure of the defect.  Using a sterile surgical marker, an appropriate advancement flap was drawn incorporating the defect and placing the expected incisions within the relaxed skin tension lines where possible.    The area thus outlined was incised deep to adipose tissue with a #15 scalpel blade.  The skin margins were undermined to an appropriate distance in all directions utilizing iris scissors.

## 2021-07-29 ENCOUNTER — APPOINTMENT (RX ONLY)
Dept: URBAN - METROPOLITAN AREA CLINIC 23 | Facility: CLINIC | Age: 81
Setting detail: DERMATOLOGY
End: 2021-07-29

## 2021-07-29 DIAGNOSIS — Z48.02 ENCOUNTER FOR REMOVAL OF SUTURES: ICD-10-CM

## 2021-07-29 PROBLEM — M12.9 ARTHROPATHY, UNSPECIFIED: Status: ACTIVE | Noted: 2021-07-29

## 2021-07-29 PROBLEM — Z85.828 PERSONAL HISTORY OF OTHER MALIGNANT NEOPLASM OF SKIN: Status: ACTIVE | Noted: 2021-07-29

## 2021-07-29 PROCEDURE — 99024 POSTOP FOLLOW-UP VISIT: CPT

## 2021-07-29 PROCEDURE — ? SUTURE REMOVAL (GLOBAL PERIOD)

## 2021-07-29 ASSESSMENT — LOCATION DETAILED DESCRIPTION DERM: LOCATION DETAILED: LEFT BUTTOCK

## 2021-07-29 ASSESSMENT — LOCATION ZONE DERM: LOCATION ZONE: TRUNK

## 2021-07-29 ASSESSMENT — LOCATION SIMPLE DESCRIPTION DERM: LOCATION SIMPLE: LEFT BUTTOCK

## 2021-07-29 NOTE — PROCEDURE: SUTURE REMOVAL (GLOBAL PERIOD)
Detail Level: Detailed
Add 89444 Cpt? (Important Note: In 2017 The Use Of 49318 Is Being Tracked By Cms To Determine Future Global Period Reimbursement For Global Periods): no

## 2021-11-26 ENCOUNTER — TELEPHONE (OUTPATIENT)
Dept: CARDIOLOGY | Age: 81
End: 2021-11-26

## 2021-11-27 NOTE — TELEPHONE ENCOUNTER
2338 -- spoke with Bety Andres. States Pt wearing OP monitor. Pt with auto-triggered report. Pt had run of NSVT that started at 2205 CST and lasted 3min 45sec with rate 165bpm with spontaneous termination back to NSR. Per Valentino Andres, Pt did send a manual indication of palpitations approx 1min AFTER she was back in SR. States they contacted Pt and she reported feeling well. 2344 -- NP called and spoke with Pt. States that she really didn't notice the tachy-palpitations until she noted that her FitBit indicated a HR of 155. She denies any dizziness, syncope, CP, SOB etc. States she was sitting down watching TV when event occurred. Explained Pt with now a documented arrhythmia. She states that she has had AFib before and had an ablation 6 yrs ago. Discussed with Pt that this arrhythmia was from the bottom (ventricles) of the heart and these rhythms are more serious. Explained that should arrhythmia reoccur and/or sustain that she should seek medical evaluation. Stressed low-threshold for ED evaluation and Pt v/u. She states that these are the type events she has been having. States they usually occur about 1 week apart. Instructed her that NP would send a message to office to have someone f/u with her for next steps. Encouraged her to continue her meds and stay hydrated. Again instructed her to call EMS and go to ED for recurrent and/or sustained events. Pt v/u.        KEYONA Guevara

## 2021-11-29 PROBLEM — I47.29 NSVT (NONSUSTAINED VENTRICULAR TACHYCARDIA): Status: ACTIVE | Noted: 2021-11-29

## 2021-11-30 ENCOUNTER — HOSPITAL ENCOUNTER (OUTPATIENT)
Dept: LAB | Age: 81
Discharge: HOME | End: 2021-11-30
Payer: MEDICARE

## 2021-11-30 DIAGNOSIS — I47.29 NSVT (NONSUSTAINED VENTRICULAR TACHYCARDIA): ICD-10-CM

## 2021-11-30 LAB
ALBUMIN SERPL-MCNC: 4.1 G/DL (ref 3.2–4.6)
ALBUMIN/GLOB SERPL: 1.2 {RATIO} (ref 1.2–3.5)
ALP SERPL-CCNC: 80 U/L (ref 50–136)
ALT SERPL-CCNC: 24 U/L (ref 12–65)
ANION GAP SERPL CALC-SCNC: 9 MMOL/L (ref 7–16)
AST SERPL-CCNC: 22 U/L (ref 15–37)
BASOPHILS # BLD: 0.2 K/UL (ref 0–0.2)
BASOPHILS NFR BLD: 1 % (ref 0–2)
BILIRUB SERPL-MCNC: 0.9 MG/DL (ref 0.2–1.1)
BNP SERPL-MCNC: 555 PG/ML
BUN SERPL-MCNC: 30 MG/DL (ref 8–23)
CALCIUM SERPL-MCNC: 9.6 MG/DL (ref 8.3–10.4)
CHLORIDE SERPL-SCNC: 98 MMOL/L (ref 98–107)
CO2 SERPL-SCNC: 26 MMOL/L (ref 21–32)
CREAT SERPL-MCNC: 1.35 MG/DL (ref 0.6–1)
DIFFERENTIAL METHOD BLD: ABNORMAL
EOSINOPHIL # BLD: 0.2 K/UL (ref 0–0.8)
EOSINOPHIL NFR BLD: 1 % (ref 0.5–7.8)
ERYTHROCYTE [DISTWIDTH] IN BLOOD BY AUTOMATED COUNT: 16.8 % (ref 11.9–14.6)
GLOBULIN SER CALC-MCNC: 3.4 G/DL (ref 2.3–3.5)
GLUCOSE SERPL-MCNC: 119 MG/DL (ref 65–100)
HCT VFR BLD AUTO: 38.9 % (ref 35.8–46.3)
HGB BLD-MCNC: 12.8 G/DL (ref 11.7–15.4)
IMM GRANULOCYTES # BLD AUTO: 0.4 K/UL (ref 0–0.5)
IMM GRANULOCYTES NFR BLD AUTO: 2 % (ref 0–5)
LYMPHOCYTES # BLD: 1.9 K/UL (ref 0.5–4.6)
LYMPHOCYTES NFR BLD: 12 % (ref 13–44)
MAGNESIUM SERPL-MCNC: 1.9 MG/DL (ref 1.8–2.4)
MCH RBC QN AUTO: 34.3 PG (ref 26.1–32.9)
MCHC RBC AUTO-ENTMCNC: 32.9 G/DL (ref 31.4–35)
MCV RBC AUTO: 104.3 FL (ref 79.6–97.8)
MONOCYTES # BLD: 1.3 K/UL (ref 0.1–1.3)
MONOCYTES NFR BLD: 8 % (ref 4–12)
NEUTS SEG # BLD: 11.9 K/UL (ref 1.7–8.2)
NEUTS SEG NFR BLD: 75 % (ref 43–78)
NRBC # BLD: 0 K/UL (ref 0–0.2)
PLATELET # BLD AUTO: 469 K/UL (ref 150–450)
PMV BLD AUTO: 10.2 FL (ref 9.4–12.3)
POTASSIUM SERPL-SCNC: 4 MMOL/L (ref 3.5–5.1)
PROT SERPL-MCNC: 7.5 G/DL (ref 6.3–8.2)
RBC # BLD AUTO: 3.73 M/UL (ref 4.05–5.2)
SODIUM SERPL-SCNC: 133 MMOL/L (ref 136–145)
TSH SERPL DL<=0.005 MIU/L-ACNC: 2.59 UIU/ML (ref 0.36–3.74)
WBC # BLD AUTO: 16 K/UL (ref 4.3–11.1)

## 2021-11-30 PROCEDURE — 84443 ASSAY THYROID STIM HORMONE: CPT

## 2021-11-30 PROCEDURE — 83880 ASSAY OF NATRIURETIC PEPTIDE: CPT

## 2021-11-30 PROCEDURE — 80053 COMPREHEN METABOLIC PANEL: CPT

## 2021-11-30 PROCEDURE — 36415 COLL VENOUS BLD VENIPUNCTURE: CPT

## 2021-11-30 PROCEDURE — 83735 ASSAY OF MAGNESIUM: CPT

## 2021-11-30 PROCEDURE — 85025 COMPLETE CBC W/AUTO DIFF WBC: CPT

## 2021-12-01 NOTE — PROGRESS NOTES
Please call her, labs are ok, but want her to add Mag Oxide 400 daily as well. Continue with amiodarone as we reviewed. Echo as well. See me as planned. Call for issues in the meantime.    Thanks

## 2022-03-02 ENCOUNTER — TELEPHONE (OUTPATIENT)
Dept: CARDIAC CATH/INVASIVE PROCEDURES | Age: 82
End: 2022-03-02

## 2022-03-02 NOTE — TELEPHONE ENCOUNTER
Patient is scheduled for Watchman screening [facility] on 3/9/2022 with an arrival time of 0800. She will be contacted the afternoon prior to procedure with additional instructions. Watchman coordinator contact information provided.

## 2022-03-08 NOTE — PROGRESS NOTES
Patient pre-assessment complete for horowitz scheduled for TERI, arrival time 0800. Patient verified using . Patient instructed to bring a list of all home medications on the day of procedure. Patient instructed not to eat or drink anything after midnight. . Patient instructed to [address]. Instructed they can take all other medications excluding vitamins & supplements. Patient verbalizes understanding of all instructions & denies any questions at this time.

## 2022-03-09 ENCOUNTER — HOSPITAL ENCOUNTER (OUTPATIENT)
Dept: CARDIAC CATH/INVASIVE PROCEDURES | Age: 82
Discharge: HOME | End: 2022-03-09
Attending: INTERNAL MEDICINE | Admitting: INTERNAL MEDICINE
Payer: MEDICARE

## 2022-03-09 VITALS
SYSTOLIC BLOOD PRESSURE: 115 MMHG | OXYGEN SATURATION: 99 % | BODY MASS INDEX: 28.16 KG/M2 | DIASTOLIC BLOOD PRESSURE: 58 MMHG | HEART RATE: 60 BPM | HEIGHT: 65 IN | WEIGHT: 169 LBS

## 2022-03-09 DIAGNOSIS — I48.91 ATRIAL FIBRILLATION AND FLUTTER (HCC): ICD-10-CM

## 2022-03-09 DIAGNOSIS — I48.92 ATRIAL FIBRILLATION AND FLUTTER (HCC): ICD-10-CM

## 2022-03-09 DIAGNOSIS — I48.91 ATRIAL FIBRILLATION, UNSPECIFIED TYPE (HCC): ICD-10-CM

## 2022-03-09 LAB
ANION GAP SERPL CALC-SCNC: 7 MMOL/L (ref 7–16)
ATRIAL RATE: 67 BPM
BUN SERPL-MCNC: 29 MG/DL (ref 8–23)
CALCIUM SERPL-MCNC: 8.9 MG/DL (ref 8.3–10.4)
CALCULATED P AXIS, ECG09: 33 DEGREES
CALCULATED R AXIS, ECG10: -31 DEGREES
CALCULATED T AXIS, ECG11: 48 DEGREES
CHLORIDE SERPL-SCNC: 106 MMOL/L (ref 98–107)
CO2 SERPL-SCNC: 23 MMOL/L (ref 21–32)
CREAT SERPL-MCNC: 1.4 MG/DL (ref 0.6–1)
DIAGNOSIS, 93000: NORMAL
ERYTHROCYTE [DISTWIDTH] IN BLOOD BY AUTOMATED COUNT: 17.8 % (ref 11.9–14.6)
GLUCOSE SERPL-MCNC: 111 MG/DL (ref 65–100)
HCT VFR BLD AUTO: 37 % (ref 35.8–46.3)
HGB BLD-MCNC: 12.3 G/DL (ref 11.7–15.4)
MAGNESIUM SERPL-MCNC: 2.5 MG/DL (ref 1.8–2.4)
MCH RBC QN AUTO: 35.5 PG (ref 26.1–32.9)
MCHC RBC AUTO-ENTMCNC: 33.2 G/DL (ref 31.4–35)
MCV RBC AUTO: 106.9 FL (ref 79.6–97.8)
NRBC # BLD: 0 K/UL (ref 0–0.2)
P-R INTERVAL, ECG05: 194 MS
PLATELET # BLD AUTO: 596 K/UL (ref 150–450)
PMV BLD AUTO: 10.2 FL (ref 9.4–12.3)
POTASSIUM SERPL-SCNC: 5.4 MMOL/L (ref 3.5–5.1)
Q-T INTERVAL, ECG07: 464 MS
QRS DURATION, ECG06: 116 MS
QTC CALCULATION (BEZET), ECG08: 490 MS
RBC # BLD AUTO: 3.46 M/UL (ref 4.05–5.2)
SODIUM SERPL-SCNC: 136 MMOL/L (ref 136–145)
VENTRICULAR RATE, ECG03: 67 BPM
WBC # BLD AUTO: 14.4 K/UL (ref 4.3–11.1)

## 2022-03-09 PROCEDURE — 74011000250 HC RX REV CODE- 250: Performed by: INTERNAL MEDICINE

## 2022-03-09 PROCEDURE — 93005 ELECTROCARDIOGRAM TRACING: CPT | Performed by: INTERNAL MEDICINE

## 2022-03-09 PROCEDURE — 85027 COMPLETE CBC AUTOMATED: CPT

## 2022-03-09 PROCEDURE — 99152 MOD SED SAME PHYS/QHP 5/>YRS: CPT

## 2022-03-09 PROCEDURE — 80048 BASIC METABOLIC PNL TOTAL CA: CPT

## 2022-03-09 PROCEDURE — 76376 3D RENDER W/INTRP POSTPROCES: CPT

## 2022-03-09 PROCEDURE — 74011250636 HC RX REV CODE- 250/636: Performed by: INTERNAL MEDICINE

## 2022-03-09 PROCEDURE — 83735 ASSAY OF MAGNESIUM: CPT

## 2022-03-09 RX ORDER — SODIUM CHLORIDE 9 MG/ML
75 INJECTION, SOLUTION INTRAVENOUS CONTINUOUS
Status: DISCONTINUED | OUTPATIENT
Start: 2022-03-09 | End: 2022-03-09 | Stop reason: HOSPADM

## 2022-03-09 RX ORDER — FENTANYL CITRATE 50 UG/ML
25-50 INJECTION, SOLUTION INTRAMUSCULAR; INTRAVENOUS
Status: DISCONTINUED | OUTPATIENT
Start: 2022-03-09 | End: 2022-03-09 | Stop reason: HOSPADM

## 2022-03-09 RX ORDER — LIDOCAINE HYDROCHLORIDE 20 MG/ML
15 SOLUTION OROPHARYNGEAL AS NEEDED
Status: DISCONTINUED | OUTPATIENT
Start: 2022-03-09 | End: 2022-03-09 | Stop reason: HOSPADM

## 2022-03-09 RX ORDER — MIDAZOLAM HYDROCHLORIDE 1 MG/ML
.5-2 INJECTION, SOLUTION INTRAMUSCULAR; INTRAVENOUS
Status: DISCONTINUED | OUTPATIENT
Start: 2022-03-09 | End: 2022-03-09 | Stop reason: HOSPADM

## 2022-03-09 RX ADMIN — LIDOCAINE HYDROCHLORIDE 15 ML: 20 SOLUTION ORAL; TOPICAL at 09:53

## 2022-03-09 RX ADMIN — MIDAZOLAM HYDROCHLORIDE 2 MG: 1 INJECTION, SOLUTION INTRAMUSCULAR; INTRAVENOUS at 10:11

## 2022-03-09 RX ADMIN — FENTANYL CITRATE 25 MCG: 50 INJECTION, SOLUTION INTRAMUSCULAR; INTRAVENOUS at 10:12

## 2022-03-09 RX ADMIN — MIDAZOLAM HYDROCHLORIDE 2 MG: 1 INJECTION, SOLUTION INTRAMUSCULAR; INTRAVENOUS at 10:12

## 2022-03-09 RX ADMIN — MIDAZOLAM HYDROCHLORIDE 2 MG: 1 INJECTION, SOLUTION INTRAMUSCULAR; INTRAVENOUS at 10:15

## 2022-03-09 RX ADMIN — FENTANYL CITRATE 25 MCG: 50 INJECTION, SOLUTION INTRAMUSCULAR; INTRAVENOUS at 10:14

## 2022-03-09 RX ADMIN — FENTANYL CITRATE 25 MCG: 50 INJECTION, SOLUTION INTRAMUSCULAR; INTRAVENOUS at 10:11

## 2022-03-09 NOTE — PROGRESS NOTES
TERI completed. Patient numbed at 21 .   Patient given 6mg versed, 75mcg fentanyl, see eMAR  Patient tolerated procedure well  Will continue to monitor patient until ready for discharge

## 2022-03-09 NOTE — PROGRESS NOTES
Transesophageal Echo Note  - Indication: atrial fibrillation, pre-Watchman  - pt underwent successful TERI today in cath holding  - start 1011  - stop 1023  - sedation: 6 mg IV Midazolam, 75 mcg Fentanyl IV given by Aria Chowdhury RN under my direct supervision. Direct monitoring of vital signs and respiratory status throughout the procedure. - An independent trained observer pushed medications at my direction. We monitored the patient's level of consciousness and vital signs/physiologic status throughout the procedure duration (see start and stop times above). - No complications, pt in stable condition  - TERI Brief Findings: No BAILEY thrombus, multiple measurements for Watchman sizing made, 3D imaging of MV and Left atrium performed   - OK for oral intake at 1223  - No driving or heavy machine operation today.    - Results discussed with patient and family at bedside   - No medication changes

## 2022-03-09 NOTE — DISCHARGE INSTRUCTIONS
Sedation for a Medical Procedure: Care Instructions     You were given a sedative medication during your visit. While many of the effects will have worn   off before you leave; you may continue to feel some effects for several hours. Common side effects from sedation include:  · Feeling sleepy. (Your doctors and nurses will make sure you are not too sleepy to go home.)  · Nausea and vomiting. This usually does not last long. · Feeling tired. How can you care for yourself at home? Activity    · Don't do anything for 24 hours that requires attention to detail. It takes time for the medicine effects to completely wear off. · Do not make important legal decisions for 24 hours. · Do not sign any legal documents for 24 hours. · Do not drink alcohol today     · For your safety, you should not drive or operate heavy machinery for the remainder of the day     · Rest when you feel tired. Getting enough sleep will help you recover. Diet    · You can eat your normal diet, unless your doctor gives you other instructions. If your stomach is upset, try clear liquids and bland, low-fat foods like plain toast or rice. · Drink plenty of fluids (unless your doctor tells you not to). · Don't drink alcohol for 24 hours. Medicines    · Be safe with medicines. Read and follow all instructions on the label. · If the doctor gave you a prescription medicine for pain, take it as prescribed. · If you are not taking a prescription pain medicine, ask your doctor if you can take an over-the-counter medicine. · If you think your pain medicine is making you sick to your stomach:  · Take your medicine after meals (unless your doctor has told you not to). · Ask your doctor for a different pain medicine. AFTER YOU TRANSESOPHAGEAL ECHOCARDIOGRAM    Be sure someone else drives you home. You may feel drowsy for several hours. Do not eat or drink for at least two hours after your procedure.  Your throat will be numb and there is a risk you might have difficulty swallowing for a while. Be careful when you do eat or drink for the first time especially with hot fluids since you could easily burn your throat. Call your doctor if:    · You are bleeding from your throat or mouth. · You have trouble breathing all of a sudden. · You have chest pain or any pain that spreads to your neck, jaw, or arms. · You have questions or concerns. · You have a fever greater than 101°F.    Doctor: [facility] cardiology [phone]    Special Instructions:    No driving for 24 hours.   Do not eat or drink until 12:15 pm

## 2022-03-09 NOTE — PROGRESS NOTES
Patient received to [address] [place] [address] room # 16. Ambulatory from [facility]. Patient scheduled for TERI today with Dr Ivone Perez. Procedure reviewed & questions answered, voiced good understanding consent obtained & placed on chart. All medications and medical history reviewed. Will prep patient per orders. Patient & family updated on plan of care.

## 2022-03-09 NOTE — PROGRESS NOTES
Patient received to [address] [place] [address] room # 16  Ambulatory from [facility]. Patient scheduled for TERI today with Dr Figueroa Began. Procedure reviewed & questions answered, voiced good understanding consent obtained & placed on chart. All medications and medical history reviewed. Will prep patient per orders. Patient & family updated on plan of care. The patient has a fraility score of 3-MANAGING WELL, based on ability to perform ADLs by self.

## 2022-03-10 PROCEDURE — 93312 ECHO TRANSESOPHAGEAL: CPT | Performed by: INTERNAL MEDICINE

## 2022-03-10 PROCEDURE — 93319 3D ECHO IMG CGEN CAR ANOMAL: CPT | Performed by: INTERNAL MEDICINE

## 2022-03-10 PROCEDURE — 93320 DOPPLER ECHO COMPLETE: CPT | Performed by: INTERNAL MEDICINE

## 2022-03-10 PROCEDURE — 99152 MOD SED SAME PHYS/QHP 5/>YRS: CPT | Performed by: INTERNAL MEDICINE

## 2022-03-18 PROBLEM — I47.29 NSVT (NONSUSTAINED VENTRICULAR TACHYCARDIA) (HCC): Status: ACTIVE | Noted: 2021-11-29

## 2022-03-18 PROBLEM — M25.571 CHRONIC PAIN OF RIGHT ANKLE: Status: ACTIVE | Noted: 2017-08-29

## 2022-03-18 PROBLEM — I77.9 BILATERAL CAROTID ARTERY DISEASE (HCC): Status: ACTIVE | Noted: 2017-07-28

## 2022-03-18 PROBLEM — G89.29 CHRONIC PAIN OF RIGHT ANKLE: Status: ACTIVE | Noted: 2017-08-29

## 2022-04-13 NOTE — PROGRESS NOTES
Sallie Florence has been referred to the [address] [place] [address] [facility] for evaluation for Left Atrial Appendage Closure with the Watchman device for management of stroke risk resulting from non-valvular atrial fibrillation. Based on her past medical history of frequent nosebleeds, hematoma, and very easy bruising, it has been determined that she is a poor candidate for long-term oral anticoagulation, however may be tolerant of short term treatment needed for watchman implantation. Atrial Fibrillation CHADSVASC2 Score Stroke Risk:   78 y.o. > 76        +2    female Female +1   CHF HX: No    + 0   HTN HX: Yes    +1   Stroke/TIA/Thromboembolism Yes   +2   Vascular Disease HX: Yes    +1   Diabetes Mellitus No    + 0   CHADSVASC 2 Score 7      Annual Stroke Risk 11.2% - moderate- high     HAS-BLED Score     HTN Hx [x] 1 Point   Renal Disease  [x] 1 Point   Liver Disease [] [address] Hx [] 1 Point   Prior Major Bleeding or Predisposition [x] 1 Point   Labile INR [] 1 Point   Age > 65 Years Current: 78 y.o. [x] 1 Point   Rx Usage Predisposing to Bleeding [x] 1 Point   Alcohol Use (> or = 8 Drinks/wk) [] 1 Point   Total: UCHASBLED: Score 5 High Risk 9.1% Risk of major bleeding 12.50  Bleeds Per 100 pts years Alternatives to Anticoagulation can be considered       Dr. Kishore Flores and Dr. Leocadia Saint have both had a face to face conversation with this patient explaining atrial fibrillation, stroke risk with atrial fibrillation, and the different treatment plans as related to their elevated stroke risk with atrial fibrillation. Based on stroke risk, as shown with ZBC0XM1-KVBn score, and bleeding risk, as shown with HAS-BLED score, a shared decision has been made to pursue closure of the left atrial appendage as a safe and effective alternative to oral anticoagulation.

## 2022-04-15 ENCOUNTER — TELEPHONE (OUTPATIENT)
Dept: CARDIAC CATH/INVASIVE PROCEDURES | Age: 82
End: 2022-04-15

## 2022-04-15 NOTE — TELEPHONE ENCOUNTER
Patient pre-assessment complete for Chato Backers scheduled for 2022, arrival time 1100 am. Patient verified using . Patient instructed to bring all home medications in labeled bottles on the day of procedure. NPO status reinforced. Patient's last dose of eliquis will be on 2022. She will take amiodarone, asa 81 mg, and metoprolol with a small sip of water, the morning of his procedure. Patient instructed to HOLD all other medications and supplements. Patient verbalizes understanding of all instructions & denies any questions at this time. Patient has watchman contact info if he/she were to have any other questions.

## 2022-04-18 ENCOUNTER — TELEPHONE (OUTPATIENT)
Dept: CARDIAC CATH/INVASIVE PROCEDURES | Age: 82
End: 2022-04-18

## 2022-04-18 ENCOUNTER — HOSPITAL ENCOUNTER (OUTPATIENT)
Dept: LAB | Age: 82
Discharge: HOME | DRG: 274 | End: 2022-04-18
Payer: MEDICARE

## 2022-04-18 ENCOUNTER — ANESTHESIA EVENT (OUTPATIENT)
Dept: SURGERY | Age: 82
DRG: 274 | End: 2022-04-18
Payer: MEDICARE

## 2022-04-18 DIAGNOSIS — I48.0 PAROXYSMAL ATRIAL FIBRILLATION (HCC): ICD-10-CM

## 2022-04-18 DIAGNOSIS — I48.0 PAROXYSMAL ATRIAL FIBRILLATION (HCC): Primary | ICD-10-CM

## 2022-04-18 LAB
ALBUMIN SERPL-MCNC: 3.9 G/DL (ref 3.2–4.6)
ANION GAP SERPL CALC-SCNC: 9 MMOL/L (ref 7–16)
BASOPHILS # BLD: 0.1 K/UL (ref 0–0.2)
BASOPHILS NFR BLD: 1 % (ref 0–2)
BUN SERPL-MCNC: 42 MG/DL (ref 8–23)
CALCIUM SERPL-MCNC: 8.7 MG/DL (ref 8.3–10.4)
CHLORIDE SERPL-SCNC: 98 MMOL/L (ref 98–107)
CO2 SERPL-SCNC: 26 MMOL/L (ref 21–32)
CREAT SERPL-MCNC: 1.6 MG/DL (ref 0.6–1)
DIFFERENTIAL METHOD BLD: ABNORMAL
EOSINOPHIL # BLD: 0 K/UL (ref 0–0.8)
EOSINOPHIL NFR BLD: 0 % (ref 0.5–7.8)
ERYTHROCYTE [DISTWIDTH] IN BLOOD BY AUTOMATED COUNT: 17 % (ref 11.9–14.6)
GLUCOSE SERPL-MCNC: 98 MG/DL (ref 65–100)
HCT VFR BLD AUTO: 37 % (ref 35.8–46.3)
HGB BLD-MCNC: 12.6 G/DL (ref 11.7–15.4)
HISTORY CHECKED?,CKHIST: NORMAL
IMM GRANULOCYTES # BLD AUTO: 0.4 K/UL (ref 0–0.5)
IMM GRANULOCYTES NFR BLD AUTO: 3 % (ref 0–5)
INR PPP: 1.3
LYMPHOCYTES # BLD: 1.7 K/UL (ref 0.5–4.6)
LYMPHOCYTES NFR BLD: 10 % (ref 13–44)
MCH RBC QN AUTO: 35.3 PG (ref 26.1–32.9)
MCHC RBC AUTO-ENTMCNC: 34.1 G/DL (ref 31.4–35)
MCV RBC AUTO: 103.6 FL (ref 79.6–97.8)
MONOCYTES # BLD: 1.4 K/UL (ref 0.1–1.3)
MONOCYTES NFR BLD: 9 % (ref 4–12)
NEUTS SEG # BLD: 12.4 K/UL (ref 1.7–8.2)
NEUTS SEG NFR BLD: 78 % (ref 43–78)
NRBC # BLD: 0.02 K/UL (ref 0–0.2)
PLATELET # BLD AUTO: 620 K/UL (ref 150–450)
PMV BLD AUTO: 9.9 FL (ref 9.4–12.3)
POTASSIUM SERPL-SCNC: 4.4 MMOL/L (ref 3.5–5.1)
PROTHROMBIN TIME: 16.3 SEC (ref 12.6–14.5)
RBC # BLD AUTO: 3.57 M/UL (ref 4.05–5.2)
SODIUM SERPL-SCNC: 133 MMOL/L (ref 136–145)
WBC # BLD AUTO: 16 K/UL (ref 4.3–11.1)

## 2022-04-18 PROCEDURE — 82040 ASSAY OF SERUM ALBUMIN: CPT

## 2022-04-18 PROCEDURE — 80048 BASIC METABOLIC PNL TOTAL CA: CPT

## 2022-04-18 PROCEDURE — 85610 PROTHROMBIN TIME: CPT

## 2022-04-18 PROCEDURE — 85025 COMPLETE CBC W/AUTO DIFF WBC: CPT

## 2022-04-18 PROCEDURE — 86923 COMPATIBILITY TEST ELECTRIC: CPT

## 2022-04-18 PROCEDURE — 36415 COLL VENOUS BLD VENIPUNCTURE: CPT

## 2022-04-18 PROCEDURE — 86900 BLOOD TYPING SEROLOGIC ABO: CPT

## 2022-04-18 NOTE — TELEPHONE ENCOUNTER
Mrs. Samara Eastman was phoned with an updated arrival time of 10am on 4/19/2022. NPO status reinforced. Pt has Watchman coordinator contact information for questions or concerns.

## 2022-04-19 ENCOUNTER — HOSPITAL ENCOUNTER (INPATIENT)
Dept: CARDIAC CATH/INVASIVE PROCEDURES | Age: 82
Discharge: HOME | DRG: 274 | End: 2022-04-19
Attending: INTERNAL MEDICINE | Admitting: INTERNAL MEDICINE
Payer: MEDICARE

## 2022-04-19 ENCOUNTER — ANESTHESIA (OUTPATIENT)
Dept: SURGERY | Age: 82
DRG: 274 | End: 2022-04-19
Payer: MEDICARE

## 2022-04-19 ENCOUNTER — HOSPITAL ENCOUNTER (INPATIENT)
Age: 82
LOS: 1 days | Discharge: HOME | DRG: 274 | End: 2022-04-20
Attending: INTERNAL MEDICINE | Admitting: INTERNAL MEDICINE
Payer: MEDICARE

## 2022-04-19 DIAGNOSIS — I48.0 PAROXYSMAL A-FIB (HCC): ICD-10-CM

## 2022-04-19 DIAGNOSIS — I48.0 PAF (PAROXYSMAL ATRIAL FIBRILLATION) (HCC): ICD-10-CM

## 2022-04-19 DIAGNOSIS — Z95.818 PRESENCE OF WATCHMAN LEFT ATRIAL APPENDAGE CLOSURE DEVICE: ICD-10-CM

## 2022-04-19 LAB — ACT BLD: 208 SECS (ref 70–128)

## 2022-04-19 PROCEDURE — 74011250637 HC RX REV CODE- 250/637: Performed by: INTERNAL MEDICINE

## 2022-04-19 PROCEDURE — B245ZZ4 ULTRASONOGRAPHY OF LEFT HEART, TRANSESOPHAGEAL: ICD-10-PCS | Performed by: INTERNAL MEDICINE

## 2022-04-19 PROCEDURE — 74011250636 HC RX REV CODE- 250/636: Performed by: INTERNAL MEDICINE

## 2022-04-19 PROCEDURE — 77030040922 HC BLNKT HYPOTHRM STRY -A: Performed by: ANESTHESIOLOGY

## 2022-04-19 PROCEDURE — C1889 IMPLANT/INSERT DEVICE, NOC: HCPCS | Performed by: INTERNAL MEDICINE

## 2022-04-19 PROCEDURE — C1759 CATH, INTRA ECHOCARDIOGRAPHY: HCPCS | Performed by: INTERNAL MEDICINE

## 2022-04-19 PROCEDURE — C1894 INTRO/SHEATH, NON-LASER: HCPCS | Performed by: INTERNAL MEDICINE

## 2022-04-19 PROCEDURE — 77030019908 HC STETH ESOPH SIMS -A: Performed by: ANESTHESIOLOGY

## 2022-04-19 PROCEDURE — 02L73DK OCCLUSION OF LEFT ATRIAL APPENDAGE WITH INTRALUMINAL DEVICE, PERCUTANEOUS APPROACH: ICD-10-PCS | Performed by: INTERNAL MEDICINE

## 2022-04-19 PROCEDURE — C1760 CLOSURE DEV, VASC: HCPCS | Performed by: INTERNAL MEDICINE

## 2022-04-19 PROCEDURE — 93662 INTRACARDIAC ECG (ICE): CPT | Performed by: INTERNAL MEDICINE

## 2022-04-19 PROCEDURE — 74011250636 HC RX REV CODE- 250/636: Performed by: NURSE ANESTHETIST, CERTIFIED REGISTERED

## 2022-04-19 PROCEDURE — 33340 PERQ CLSR TCAT L ATR APNDGE: CPT | Performed by: INTERNAL MEDICINE

## 2022-04-19 PROCEDURE — 74011000250 HC RX REV CODE- 250: Performed by: INTERNAL MEDICINE

## 2022-04-19 PROCEDURE — C1893 INTRO/SHEATH, FIXED,NON-PEEL: HCPCS | Performed by: INTERNAL MEDICINE

## 2022-04-19 PROCEDURE — 77030037088 HC TUBE ENDOTRACH ORAL NSL COVD-A: Performed by: ANESTHESIOLOGY

## 2022-04-19 PROCEDURE — C1769 GUIDE WIRE: HCPCS | Performed by: INTERNAL MEDICINE

## 2022-04-19 PROCEDURE — 77030004558 HC CATH ANGI DX SUPR TORQ CARD -A: Performed by: INTERNAL MEDICINE

## 2022-04-19 PROCEDURE — 93325 DOPPLER ECHO COLOR FLOW MAPG: CPT

## 2022-04-19 PROCEDURE — 2709999900 HC NON-CHARGEABLE SUPPLY

## 2022-04-19 PROCEDURE — 65270000046 HC RM TELEMETRY

## 2022-04-19 PROCEDURE — B245ZZZ ULTRASONOGRAPHY OF LEFT HEART: ICD-10-PCS | Performed by: INTERNAL MEDICINE

## 2022-04-19 PROCEDURE — 76210000006 HC OR PH I REC 0.5 TO 1 HR: Performed by: INTERNAL MEDICINE

## 2022-04-19 PROCEDURE — 77030039425 HC BLD LARYNG TRULITE DISP TELE -A: Performed by: ANESTHESIOLOGY

## 2022-04-19 PROCEDURE — 74011000636 HC RX REV CODE- 636: Performed by: INTERNAL MEDICINE

## 2022-04-19 PROCEDURE — 2709999900 HC NON-CHARGEABLE SUPPLY: Performed by: INTERNAL MEDICINE

## 2022-04-19 PROCEDURE — 77030013687 HC GD NDL BARD -B: Performed by: INTERNAL MEDICINE

## 2022-04-19 PROCEDURE — 93355 ECHO TRANSESOPHAGEAL (TEE): CPT | Performed by: INTERNAL MEDICINE

## 2022-04-19 PROCEDURE — 77030038269 HC DRN EXT URIN PURWCK BARD -A

## 2022-04-19 PROCEDURE — 85347 COAGULATION TIME ACTIVATED: CPT

## 2022-04-19 PROCEDURE — 74011000250 HC RX REV CODE- 250: Performed by: NURSE ANESTHETIST, CERTIFIED REGISTERED

## 2022-04-19 PROCEDURE — 76060000033 HC ANESTHESIA 1 TO 1.5 HR: Performed by: INTERNAL MEDICINE

## 2022-04-19 PROCEDURE — 77030020506 HC NDL TRNSPTL NRG BAYL -F: Performed by: INTERNAL MEDICINE

## 2022-04-19 DEVICE — LEFT ATRIAL APPENDAGE CLOSURE DEVICE WITH DELIVERY SYSTEM
Type: IMPLANTABLE DEVICE | Site: HEART | Status: FUNCTIONAL
Brand: WATCHMAN FLX™

## 2022-04-19 RX ORDER — EPHEDRINE SULFATE/0.9% NACL/PF 50 MG/5 ML
SYRINGE (ML) INTRAVENOUS AS NEEDED
Status: DISCONTINUED | OUTPATIENT
Start: 2022-04-19 | End: 2022-04-19 | Stop reason: HOSPADM

## 2022-04-19 RX ORDER — LIDOCAINE HYDROCHLORIDE 10 MG/ML
0.1 INJECTION INFILTRATION; PERINEURAL AS NEEDED
Status: DISCONTINUED | OUTPATIENT
Start: 2022-04-19 | End: 2022-04-20 | Stop reason: HOSPADM

## 2022-04-19 RX ORDER — HYDROCHLOROTHIAZIDE 25 MG/1
12.5 TABLET ORAL DAILY
Status: DISCONTINUED | OUTPATIENT
Start: 2022-04-20 | End: 2022-04-20 | Stop reason: HOSPADM

## 2022-04-19 RX ORDER — HYDROCODONE BITARTRATE AND ACETAMINOPHEN 5; 325 MG/1; MG/1
1 TABLET ORAL
Status: DISCONTINUED | OUTPATIENT
Start: 2022-04-19 | End: 2022-04-20 | Stop reason: HOSPADM

## 2022-04-19 RX ORDER — SODIUM CHLORIDE, SODIUM LACTATE, POTASSIUM CHLORIDE, CALCIUM CHLORIDE 600; 310; 30; 20 MG/100ML; MG/100ML; MG/100ML; MG/100ML
1000 INJECTION, SOLUTION INTRAVENOUS CONTINUOUS
Status: DISCONTINUED | OUTPATIENT
Start: 2022-04-19 | End: 2022-04-19

## 2022-04-19 RX ORDER — SODIUM CHLORIDE 0.9 % (FLUSH) 0.9 %
5-40 SYRINGE (ML) INJECTION EVERY 8 HOURS
Status: DISCONTINUED | OUTPATIENT
Start: 2022-04-19 | End: 2022-04-20 | Stop reason: HOSPADM

## 2022-04-19 RX ORDER — SODIUM CHLORIDE 0.9 % (FLUSH) 0.9 %
5-40 SYRINGE (ML) INJECTION AS NEEDED
Status: DISCONTINUED | OUTPATIENT
Start: 2022-04-19 | End: 2022-04-20 | Stop reason: HOSPADM

## 2022-04-19 RX ORDER — LIDOCAINE HYDROCHLORIDE 20 MG/ML
INJECTION, SOLUTION EPIDURAL; INFILTRATION; INTRACAUDAL; PERINEURAL AS NEEDED
Status: DISCONTINUED | OUTPATIENT
Start: 2022-04-19 | End: 2022-04-19 | Stop reason: HOSPADM

## 2022-04-19 RX ORDER — HYDROMORPHONE HYDROCHLORIDE 2 MG/ML
0.5 INJECTION, SOLUTION INTRAMUSCULAR; INTRAVENOUS; SUBCUTANEOUS
Status: DISCONTINUED | OUTPATIENT
Start: 2022-04-19 | End: 2022-04-19 | Stop reason: HOSPADM

## 2022-04-19 RX ORDER — HEPARIN SODIUM 200 [USP'U]/100ML
INJECTION, SOLUTION INTRAVENOUS
Status: COMPLETED | OUTPATIENT
Start: 2022-04-19 | End: 2022-04-19

## 2022-04-19 RX ORDER — ONDANSETRON 2 MG/ML
4 INJECTION INTRAMUSCULAR; INTRAVENOUS
Status: DISCONTINUED | OUTPATIENT
Start: 2022-04-19 | End: 2022-04-19 | Stop reason: HOSPADM

## 2022-04-19 RX ORDER — OXYCODONE HYDROCHLORIDE 5 MG/1
5 TABLET ORAL
Status: DISCONTINUED | OUTPATIENT
Start: 2022-04-19 | End: 2022-04-19 | Stop reason: HOSPADM

## 2022-04-19 RX ORDER — ONDANSETRON 2 MG/ML
INJECTION INTRAMUSCULAR; INTRAVENOUS AS NEEDED
Status: DISCONTINUED | OUTPATIENT
Start: 2022-04-19 | End: 2022-04-19 | Stop reason: HOSPADM

## 2022-04-19 RX ORDER — LANOLIN ALCOHOL/MO/W.PET/CERES
400 CREAM (GRAM) TOPICAL DAILY
Status: DISCONTINUED | OUTPATIENT
Start: 2022-04-20 | End: 2022-04-20 | Stop reason: HOSPADM

## 2022-04-19 RX ORDER — LEVOTHYROXINE SODIUM 50 UG/1
50 TABLET ORAL
Status: DISCONTINUED | OUTPATIENT
Start: 2022-04-20 | End: 2022-04-20 | Stop reason: HOSPADM

## 2022-04-19 RX ORDER — LOSARTAN POTASSIUM 50 MG/1
100 TABLET ORAL DAILY
Status: DISCONTINUED | OUTPATIENT
Start: 2022-04-20 | End: 2022-04-20 | Stop reason: HOSPADM

## 2022-04-19 RX ORDER — ALBUTEROL SULFATE 0.83 MG/ML
2.5 SOLUTION RESPIRATORY (INHALATION) AS NEEDED
Status: DISCONTINUED | OUTPATIENT
Start: 2022-04-19 | End: 2022-04-19 | Stop reason: HOSPADM

## 2022-04-19 RX ORDER — PROPOFOL 10 MG/ML
INJECTION, EMULSION INTRAVENOUS AS NEEDED
Status: DISCONTINUED | OUTPATIENT
Start: 2022-04-19 | End: 2022-04-19 | Stop reason: HOSPADM

## 2022-04-19 RX ORDER — MIDAZOLAM HYDROCHLORIDE 1 MG/ML
2 INJECTION, SOLUTION INTRAMUSCULAR; INTRAVENOUS
Status: DISCONTINUED | OUTPATIENT
Start: 2022-04-19 | End: 2022-04-20 | Stop reason: HOSPADM

## 2022-04-19 RX ORDER — CEFAZOLIN SODIUM/WATER 2 G/20 ML
SYRINGE (ML) INTRAVENOUS AS NEEDED
Status: DISCONTINUED | OUTPATIENT
Start: 2022-04-19 | End: 2022-04-19 | Stop reason: HOSPADM

## 2022-04-19 RX ORDER — PROTAMINE SULFATE 10 MG/ML
INJECTION, SOLUTION INTRAVENOUS AS NEEDED
Status: DISCONTINUED | OUTPATIENT
Start: 2022-04-19 | End: 2022-04-19 | Stop reason: HOSPADM

## 2022-04-19 RX ORDER — DEXAMETHASONE SODIUM PHOSPHATE 4 MG/ML
INJECTION, SOLUTION INTRA-ARTICULAR; INTRALESIONAL; INTRAMUSCULAR; INTRAVENOUS; SOFT TISSUE AS NEEDED
Status: DISCONTINUED | OUTPATIENT
Start: 2022-04-19 | End: 2022-04-19 | Stop reason: HOSPADM

## 2022-04-19 RX ORDER — LORAZEPAM 1 MG/1
1 TABLET ORAL
Status: DISCONTINUED | OUTPATIENT
Start: 2022-04-19 | End: 2022-04-20 | Stop reason: HOSPADM

## 2022-04-19 RX ORDER — ROCURONIUM BROMIDE 10 MG/ML
INJECTION, SOLUTION INTRAVENOUS AS NEEDED
Status: DISCONTINUED | OUTPATIENT
Start: 2022-04-19 | End: 2022-04-19 | Stop reason: HOSPADM

## 2022-04-19 RX ORDER — FENTANYL CITRATE 50 UG/ML
INJECTION, SOLUTION INTRAMUSCULAR; INTRAVENOUS AS NEEDED
Status: DISCONTINUED | OUTPATIENT
Start: 2022-04-19 | End: 2022-04-19 | Stop reason: HOSPADM

## 2022-04-19 RX ORDER — METOPROLOL SUCCINATE 25 MG/1
25 TABLET, EXTENDED RELEASE ORAL DAILY
Status: DISCONTINUED | OUTPATIENT
Start: 2022-04-20 | End: 2022-04-20 | Stop reason: HOSPADM

## 2022-04-19 RX ORDER — ACETAMINOPHEN 500 MG
1000 TABLET ORAL ONCE
Status: DISCONTINUED | OUTPATIENT
Start: 2022-04-19 | End: 2022-04-20 | Stop reason: HOSPADM

## 2022-04-19 RX ORDER — AMIODARONE HYDROCHLORIDE 200 MG/1
200 TABLET ORAL DAILY
Status: DISCONTINUED | OUTPATIENT
Start: 2022-04-20 | End: 2022-04-20 | Stop reason: HOSPADM

## 2022-04-19 RX ORDER — HEPARIN SODIUM 1000 [USP'U]/ML
INJECTION, SOLUTION INTRAVENOUS; SUBCUTANEOUS AS NEEDED
Status: DISCONTINUED | OUTPATIENT
Start: 2022-04-19 | End: 2022-04-19 | Stop reason: HOSPADM

## 2022-04-19 RX ORDER — ACETAMINOPHEN 325 MG/1
650 TABLET ORAL
Status: DISCONTINUED | OUTPATIENT
Start: 2022-04-19 | End: 2022-04-20 | Stop reason: HOSPADM

## 2022-04-19 RX ORDER — SODIUM CHLORIDE, SODIUM LACTATE, POTASSIUM CHLORIDE, CALCIUM CHLORIDE 600; 310; 30; 20 MG/100ML; MG/100ML; MG/100ML; MG/100ML
75 INJECTION, SOLUTION INTRAVENOUS CONTINUOUS
Status: DISCONTINUED | OUTPATIENT
Start: 2022-04-19 | End: 2022-04-23 | Stop reason: HOSPADM

## 2022-04-19 RX ORDER — SODIUM CHLORIDE 9 MG/ML
1 INJECTION, SOLUTION INTRAVENOUS AS NEEDED
Status: DISCONTINUED | OUTPATIENT
Start: 2022-04-19 | End: 2022-04-19

## 2022-04-19 RX ORDER — SODIUM CHLORIDE 9 MG/ML
3 INJECTION, SOLUTION INTRAVENOUS ONCE
Status: COMPLETED | OUTPATIENT
Start: 2022-04-19 | End: 2022-04-19

## 2022-04-19 RX ADMIN — LIDOCAINE HYDROCHLORIDE 100 MG: 20 INJECTION, SOLUTION EPIDURAL; INFILTRATION; INTRACAUDAL; PERINEURAL at 14:48

## 2022-04-19 RX ADMIN — HYDROCODONE BITARTRATE AND ACETAMINOPHEN 1 TABLET: 5; 325 TABLET ORAL at 20:59

## 2022-04-19 RX ADMIN — PROTAMINE SULFATE 20 MG: 10 INJECTION, SOLUTION INTRAVENOUS at 15:44

## 2022-04-19 RX ADMIN — ROCURONIUM BROMIDE 30 MG: 50 INJECTION, SOLUTION INTRAVENOUS at 14:48

## 2022-04-19 RX ADMIN — PROTAMINE SULFATE 10 MG: 10 INJECTION, SOLUTION INTRAVENOUS at 15:38

## 2022-04-19 RX ADMIN — HEPARIN SODIUM 8000 UNITS: 1000 INJECTION, SOLUTION INTRAVENOUS; SUBCUTANEOUS at 15:15

## 2022-04-19 RX ADMIN — ONDANSETRON 4 MG: 2 INJECTION INTRAMUSCULAR; INTRAVENOUS at 15:04

## 2022-04-19 RX ADMIN — FENTANYL CITRATE 25 MCG: 50 INJECTION INTRAMUSCULAR; INTRAVENOUS at 15:06

## 2022-04-19 RX ADMIN — PROPOFOL 150 MG: 10 INJECTION, EMULSION INTRAVENOUS at 14:48

## 2022-04-19 RX ADMIN — SODIUM CHLORIDE 3 ML/KG/HR: 900 INJECTION, SOLUTION INTRAVENOUS at 11:13

## 2022-04-19 RX ADMIN — FENTANYL CITRATE 75 MCG: 50 INJECTION INTRAMUSCULAR; INTRAVENOUS at 14:48

## 2022-04-19 RX ADMIN — SODIUM CHLORIDE, PRESERVATIVE FREE 10 ML: 5 INJECTION INTRAVENOUS at 21:03

## 2022-04-19 RX ADMIN — PROTAMINE SULFATE 20 MG: 10 INJECTION, SOLUTION INTRAVENOUS at 15:43

## 2022-04-19 RX ADMIN — Medication 10 MG: at 15:12

## 2022-04-19 RX ADMIN — DEXAMETHASONE SODIUM PHOSPHATE 4 MG: 4 INJECTION, SOLUTION INTRAMUSCULAR; INTRAVENOUS at 15:04

## 2022-04-19 RX ADMIN — Medication 2 G: at 14:57

## 2022-04-19 RX ADMIN — HEPARIN SODIUM 3000 UNITS: 1000 INJECTION, SOLUTION INTRAVENOUS; SUBCUTANEOUS at 15:32

## 2022-04-19 RX ADMIN — SODIUM CHLORIDE, PRESERVATIVE FREE 10 ML: 5 INJECTION INTRAVENOUS at 18:04

## 2022-04-19 RX ADMIN — LORAZEPAM 1 MG: 1 TABLET ORAL at 20:59

## 2022-04-19 RX ADMIN — SODIUM CHLORIDE, SODIUM LACTATE, POTASSIUM CHLORIDE, AND CALCIUM CHLORIDE: 600; 310; 30; 20 INJECTION, SOLUTION INTRAVENOUS at 14:37

## 2022-04-19 RX ADMIN — SUGAMMADEX 200 MG: 100 INJECTION, SOLUTION INTRAVENOUS at 15:45

## 2022-04-19 NOTE — PROGRESS NOTES
04/19/22 1650   NIH Stroke Scale   Interval Baseline   Level of Conciousness (1a) 0   LOC Questions (1b) 0   LOC Commands (1c) 0   Best Gaze (2) 0   Visual (3) 0   Facial Palsy (4) 0   Motor Arm, Left (5a) 0   Motor Arm, Right (5b) 0   Motor Leg, Left (6a) 0   Motor Leg, Right (6b) 0   Limb Ataxia (7) 0   Sensory (8) 0   Best Language (9) 0   Dysarthria (10) 0   Extinction and Inattention (11) 0   Total 0     NIH baseline on admission to floor post Watchman procedure

## 2022-04-19 NOTE — ANESTHESIA PREPROCEDURE EVALUATION
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)       Cardiovascular    Hypertension: well controlled  Valvular problems/murmurs (s/p AVR 11/10/15): mitral insufficiency      Dysrhythmias : atrial flutter      Exercise tolerance: <4 METS  Comments: On Elaquis   GI/Hepatic/Renal         Renal disease (stage 3):  CRI       Endo/Other        Arthritis and cancer (breast)     Other Findings              Physical Exam    Airway  Mallampati: II  TM Distance: 4 - 6 cm  Neck ROM: normal range of motion   Mouth opening: Normal     Cardiovascular  Regular rate and rhythm,  S1 and S2 normal,  no murmur, click, rub, or gallop  Rhythm: regular  Rate: normal         Dental  No notable dental hx       Pulmonary  Breath sounds clear to auscultation               Abdominal  GI exam deferred       Other Findings            Anesthetic Plan    ASA: 3  Anesthesia type: general          Induction: Intravenous  Anesthetic plan and risks discussed with: Patient      TADEO

## 2022-04-19 NOTE — PROGRESS NOTES
Patient received to [address] [place] [address] room # 17  Ambulatory from [facility]. Patient scheduled for LAAO today with Dr Bakari Forte. Procedure reviewed & questions answered, voiced good understanding consent obtained & placed on chart. All medications and medical history reviewed. Will prep patient per orders. Patient & family updated on plan of care.

## 2022-04-19 NOTE — ROUTINE PROCESS
TRANSFER - IN REPORT:    Verbal report received from HERNANDO Levine on Shoshana Wilson being received from PACU for routine progression of care      Report consisted of patients Situation, Background, Assessment and Recommendations(SBAR). Information from the following report(s) SBAR, Kardex and Procedure Summary was reviewed with the receiving nurse. Opportunity for questions and clarification was provided. Assessment completed upon patients arrival to unit and care assumed.

## 2022-04-19 NOTE — Clinical Note
Sheath #2: Sheath: inserted. Sheath inserted/placed in the right femoral  vein.  Dilator only in and out - perclose

## 2022-04-19 NOTE — ROUTINE PROCESS
Bedside and verbal report given to [address] [place] [address]    Dual skin assessment completed with 2nd Rn. R groin site post procedure c/d/i. Skin fragile with scattered bruises. Otherwise skin intact with no breakdown noted.  Call light within reach

## 2022-04-19 NOTE — OP NOTES
PROCEDURE/OPERATIVE REPORT    Patient: Jennifer May MRN: [id number]  SSN: [id number]    YOB: 1940  Age: 78 y.o. Sex: female      DATE OF PROCEDURE: 4/19/2022     PROCEDURE: Left atrial appendage occlusion with Watchman device. Pre-procedural Diagnosis  1. Long standing persistent Atrial fibrillation  2. Intolerant to long term anticoagulation     Procedure Performed  1. Transesophageal echocardiogram  2. Intracardiac Echocardiogram  3. Transeptal puncture   3. Watchman implantation    Cardiac Electrophysiologist: Jenni Sahu MD  Interventional Cardiologist: Deanna Acevedo MD    Anesthesia: General     Estimated Blood Loss: Less than 10 mL     Specimens: * No specimens in log *    Procedure in Detail:  The patient was brought to the hybrid OR suite in the fasting state. The patient was intubated by anesthesiology and invasive arterial blood pressure monitoring obtained. A tranesophageal echocardiogram was performed directly prior to the procedure and was negative for a BAILEY thrombus (see full report in chart). As the secondary , I obtained venous access under ultrasound guidance x 2 using modified Seldinger technique with placement of a 16Fr short sidearm sheath and a 10Fr short side am sheath into the right femoral vein. Prior to placing the 16 Fr sheath, a Perclose device was deployed in a \"preclose\" fashion and will be used for hemostasis post procedure. I placed an SL-O 63cm long braided sheath through the 16 Fr sheath in the right femoral vein and guided over a wire to the RA. A [employer] intracardiac echo catheter was inserted into the 10Fr sheath and used for visualization and guidance for placing the Watchman device. Next, I inserted a trans-septal needle into the SLO and it was used to perform a trans-septal puncture with assistance from TERI, as well as fluoroscopy. The SLO sheath was advanced into the LA.  The ICE catheter was also advanced into the LA via the same transeptal puncture site. Total weight based heparin bolus was administered (1/2 prior to transeptal puncture and 1/2 just after transeptal access) and systemic blood pressure monitored invasively. The ACT target was 250-300. As the primary implanting physician, Dr. Ashley Yarbrough prepped the Watchman delivery sheath and delivered a 24 mm device per his procedure note with my assistance. I was present and assisted with this portion of the procedure. A contrast appendogram was performed revealing an adequate seal. After extensive evaluation including an excellent tug test, the device was deployed. Further measurements were taken post implant. The sheath was removed. At the completion of the Watchman implant procedure, all catheters were removed, and the Perclose device was fully deployed for adequate hemostasis and sheaths were pulled. A VASCADE MVP device was used to close the venotomy sites. The MVP tools were advanced into the 10 Fr sheaths, respectively; the sheaths were then removed. The collagen was then deployed and a 30 second dwell time was performed to allow for expansion of the collagen. The delivery tools were then removed with adequate hemostasis. The patient tolerated the procedure well with no acute complications recognized. Just prior to pulling shealths, the TERI was used to obtain ultrasound images and revealed no evidence of pericardial effusion. Complications: None    Summary:   1.  Successful Watchman implantation of a 20 FLX device      Paxton Kuo MD

## 2022-04-19 NOTE — Clinical Note
TRANSFER - OUT REPORT:     Verbal report given to: PACU RN. Report consisted of patient's Situation, Background, Assessment and   Recommendations(SBAR). Opportunity for questions and clarification was provided. Patient transported to: recovery.

## 2022-04-19 NOTE — H&P
[facility] Cardiology/Electrophyiology Consult                Date of  Admission: 4/19/2022 10:17 AM     CC/Reason for admission: Valente Blackwell is a 78 y.o. female admitted for PAF (paroxysmal atrial fibrillation) ([address] [I48.0]. 78 y.o. female with a past medical and cardiac history significant for s/p AVR, aflutter s/p ablation in 2015, TIA, THOMAS and presents for scheduled Watchman. She has had very easy bruising, has a hematoma around her hip after a mechanical injury, and frequent nose bleeds.      Cardiac PMH: (Old records have been reviewed and summarized below)    Patient Active Problem List   Diagnosis Code    HTN (hypertension) I10    Ventricular hypertrophy I51.7    Hyperlipidemia E78.5    Osteopenia M85.80    S/P aortic valve replacement Z95.2    Arthritis M19.90    Chronic pain G89.29    Anxiety F41.9    History of breast cancer Z85.3    CKD (chronic kidney disease) stage 3, GFR 30-59 ml/min ([facility]) N18.30    Atrial fibrillation and flutter ([facility]) I48.91, I48.92    Carotid stenosis w/infarct I63.239    Aortic aneurysm/Dilation of the aorta I71.9    Osteoarthritis M19.90    Hypothyroidism E03.9    Bilateral carotid artery disease ([facility]) I77.9    Chronic pain of right ankle M25.571, G89.29    NSVT (nonsustained ventricular tachycardia) ([facility]) I47.2       Past Medical History:   Diagnosis Date    Anxiety     Aortic aneurysm/Dilation of the aorta 1/27/2016    Aortic stenosis 10/15/2012    Arthritis     osteoarthritis    Atrial fibrillation and flutter ([address] 11/17/2015    11/17/15 Cardioversion (Dr Van Mello)    Atrial flutter ([address]     Basal cell carcinoma     face    Breast cancer ([address]     left mastectomy; chemo and radiation    Carotid stenosis w/infarct 1/27/2016    Chronic pain     osteoarthritis     CKD (chronic kidney disease) stage 3, GFR 30-59 ml/min ([address] 11/10/2015    History of breast cancer 11/10/2015    HTN (hypertension) 10/15/2012    Hyperlipidemia 10/15/2012    Hypertension     Hypocalcemia 11/11/2015    Nonrheumatic aortic valve stenosis 11/10/2015    11/10/15 (Dr Sree Delgado)     Osteoarthritis 1/27/2016    Osteopenia 10/15/2012    DEXA 5/27/14 with osteopenia. Previously treated 8 years with Fosamax.      S/P aortic valve replacement 11/10/2015    Shortness of breath 1/27/2016    Squamous cell carcinoma     right leg    Stroke [facility])     left eye     Transient ischemic attack 1/27/2016    Ventricular hypertrophy 10/15/2012    left       Past Surgical History:   Procedure Laterality Date    HX AORTIC VALVE REPLACEMENT  11/10/15    Dr Dominik Fischer HX APPENDECTOMY      HX CATARACT REMOVAL Bilateral     HX HYSTERECTOMY      HX KNEE ARTHROSCOPY      left knee replacement    HX KNEE REPLACEMENT Left     and revision     HX MASTECTOMY      left    HX OOPHORECTOMY      HX SKIN BIOPSY N/A 2017    basal cell x2 left side of face- [facility]     Allergies   Allergen Reactions    Adhesive Other (comments)     \"Tears  Skin\"  Paper tape ok     Cephalexin Swelling    Cephalosporins Unknown (comments)    Penicillin G Hives      Family History   Problem Relation Age of Onset    Hypertension Mother     Heart Disease Mother     Dementia Mother     Elevated Lipids Mother     Hypertension Father     Heart Disease Father         Current Facility-Administered Medications   Medication Dose Route Frequency    0.9% sodium chloride infusion  1 mL/kg/hr IntraVENous PRN     Facility-Administered Medications Ordered in Other Encounters   Medication Dose Route Frequency    lactated Ringers infusion  75 mL/hr IntraVENous CONTINUOUS       Review of Symptoms:  A comprehensive ROS was performed with the pertinent positives and negatives mentioned in the HPI, all other systems reviewed and are negative       Physical Exam  Vitals:    04/19/22 1050 04/19/22 1114   BP:  (!) 149/68   Pulse:  61   Resp:  18   Temp:  98.1 °F (36.7 °C)   SpO2:  100%   Weight: 164 lb (74.4 kg) 164 lb (74.4 kg)   Height: 5' 5\" (1.651 m) 5' 5\" (1.651 m)       Physical Exam:  Gen: well appearing, well developed, NAD  Eyes: Pupils equal, EOMI  CV: RRR, no M/R/G, normal JVD, normal distal pulses, no HARJINDER  Pulm: CTAB, no accessory muscle uses, no wheezes, crackles  GI: soft, NT, ND    Cardiographics    Telemetry:   ECG (Indpendently visualized and interpreted):  Echocardiogram:     Labs:   Recent Labs     04/18/22  1115   *   K 4.4   BUN 42*   CREA 1.60*   GLU 98   WBC 16.0*   HGB 12.6   HCT 37.0   *   INR 1.3        Assessment:      Active Problems:    Atrial fibrillation and flutter ([address] (11/17/2015)      Overview: 11/17/15 Cardioversion (Dr Marlon De La Cruz)           Plan:   1. CVA protection: Patient is an appropriate candidate for consideration of left atrial appendage occlusion. The patient's [id number] score is  7 which indicates a 9.6% annual risk of stroke. (age, HTN, F, THOMAS, TIA). Has-BLED score is 5 which indicates a 12.5% annual risk of a major bleeding event. (age, HTN, CKD, bleeding, ASA). I have discussed with the rationale for  left atrial appendage occlusion. We discussed the available data from randomized trials which demonstate left atrial appendage occlusion is as effective as long term anticoagulation at prevention of CVA or systemic embolism. We also discussed that left atrial appendage closure reduces risk of CVA or sytemic embolization by 67-83% compared to no anticoagulation.       The risk of left atrial appendage were also discussed. Risk of major complication is approximately 1.5% based on available data. Risk include but not limited:              - Pericardial tamponade (1.28%) which can be treated percutaneously in 63% of cases but can require sugical therapy in  31% of cases (3 out of 1000 patients). - Procedural related CVA in 2 out of 1000 patients.                 - Device embolization in less than 3 out of 1000 patients              - Death related to procedure in approximately 6 out of 10,000 patients.       Based on our discussion, it is felt benefits of left atrial appendage significantly outweigh risk. All questions answered to the best of my ability. Patient would like to proceed with Watchman. Deborah Mir MD, MS  Cardiology/Electrophysiology

## 2022-04-20 VITALS
HEART RATE: 65 BPM | RESPIRATION RATE: 16 BRPM | BODY MASS INDEX: 27.21 KG/M2 | TEMPERATURE: 98 F | HEIGHT: 65 IN | OXYGEN SATURATION: 96 % | WEIGHT: 163.3 LBS | SYSTOLIC BLOOD PRESSURE: 124 MMHG | DIASTOLIC BLOOD PRESSURE: 59 MMHG

## 2022-04-20 PROBLEM — Z95.818 PRESENCE OF WATCHMAN LEFT ATRIAL APPENDAGE CLOSURE DEVICE: Status: ACTIVE | Noted: 2022-04-20

## 2022-04-20 LAB
ANION GAP SERPL CALC-SCNC: 9 MMOL/L (ref 7–16)
BUN SERPL-MCNC: 32 MG/DL (ref 8–23)
CALCIUM SERPL-MCNC: 8.3 MG/DL (ref 8.3–10.4)
CHLORIDE SERPL-SCNC: 106 MMOL/L (ref 98–107)
CO2 SERPL-SCNC: 23 MMOL/L (ref 21–32)
CREAT SERPL-MCNC: 1.2 MG/DL (ref 0.6–1)
GLUCOSE SERPL-MCNC: 142 MG/DL (ref 65–100)
MAGNESIUM SERPL-MCNC: 2.3 MG/DL (ref 1.8–2.4)
POTASSIUM SERPL-SCNC: 4.5 MMOL/L (ref 3.5–5.1)
SODIUM SERPL-SCNC: 138 MMOL/L (ref 136–145)

## 2022-04-20 PROCEDURE — 74011250637 HC RX REV CODE- 250/637: Performed by: INTERNAL MEDICINE

## 2022-04-20 PROCEDURE — 36415 COLL VENOUS BLD VENIPUNCTURE: CPT

## 2022-04-20 PROCEDURE — 80048 BASIC METABOLIC PNL TOTAL CA: CPT

## 2022-04-20 PROCEDURE — 74011000250 HC RX REV CODE- 250: Performed by: INTERNAL MEDICINE

## 2022-04-20 PROCEDURE — 83735 ASSAY OF MAGNESIUM: CPT

## 2022-04-20 RX ADMIN — METOPROLOL SUCCINATE 25 MG: 25 TABLET, EXTENDED RELEASE ORAL at 08:52

## 2022-04-20 RX ADMIN — AMIODARONE HYDROCHLORIDE 200 MG: 200 TABLET ORAL at 08:52

## 2022-04-20 RX ADMIN — SODIUM CHLORIDE, PRESERVATIVE FREE 10 ML: 5 INJECTION INTRAVENOUS at 06:36

## 2022-04-20 RX ADMIN — Medication 400 MG: at 08:52

## 2022-04-20 RX ADMIN — LOSARTAN POTASSIUM 100 MG: 50 TABLET, FILM COATED ORAL at 08:52

## 2022-04-20 RX ADMIN — LORAZEPAM 1 MG: 1 TABLET ORAL at 01:27

## 2022-04-20 RX ADMIN — LEVOTHYROXINE SODIUM 50 MCG: 0.05 TABLET ORAL at 06:36

## 2022-04-20 RX ADMIN — APIXABAN 5 MG: 5 TABLET, FILM COATED ORAL at 08:52

## 2022-04-20 RX ADMIN — HYDROCHLOROTHIAZIDE 12.5 MG: 25 TABLET ORAL at 08:52

## 2022-04-20 NOTE — PROGRESS NOTES
Pt was seen at the office of [facility] Cardiology for consideration for watchman device. A TERI was performed directly prior to procedure that was negative for BAILEY thrombus. Pt underwent successful implantation of a watchman device. She tolerated the procedure well and is now discharging home in stable condition. No discharge needs were identified. Tx goals have been met. Care Management Interventions  PCP Verified by CM: Yes (Caleb)  Mode of Transport at Discharge:  Other (see comment) (Family)  Transition of Care Consult (CM Consult): Discharge Planning  Discharge Durable Medical Equipment: No  Physical Therapy Consult: No  Occupational Therapy Consult: No  Speech Therapy Consult: No  Support Systems: Child(yolanda),Other Family Member(s),[employer],Friend/Neighbor  Confirm Follow Up Transport: Family  The Plan for Transition of Care is Related to the Following Treatment Goals : Return home and back to her baseline  The Patient and/or Patient Representative was Provided with a Choice of Provider and Agrees with the Discharge Plan?: Yes  Name of the Patient Representative Who was Provided with a Choice of Provider and Agrees with the Discharge Plan: Patient  Freedom of Choice List was Provided with Basic Dialogue that Supports the Patient's Individualized Plan of Care/Goals, Treatment Preferences and Shares the Quality Data Associated with the Providers?: Yes   Resource Information Provided?: No  Discharge Location  Patient Expects to be Discharged to[de-identified] Home

## 2022-04-20 NOTE — ANESTHESIA POSTPROCEDURE EVALUATION
Procedure(s):  WATCHMAN BAILEY CLOSURE DEVICE. general    Anesthesia Post Evaluation      Multimodal analgesia: multimodal analgesia used between 6 hours prior to anesthesia start to PACU discharge  Patient location during evaluation: bedside  Patient participation: complete - patient participated  Level of consciousness: awake and responsive to light touch  Pain management: adequate  Airway patency: patent  Anesthetic complications: no  Cardiovascular status: acceptable, hemodynamically stable, blood pressure returned to baseline and stable  Respiratory status: acceptable, unassisted, spontaneous ventilation and nonlabored ventilation  Hydration status: acceptable  Post anesthesia nausea and vomiting:  controlled  Final Post Anesthesia Temperature Assessment:  Normothermia (36.0-37.5 degrees C)      INITIAL Post-op Vital signs:   Vitals Value Taken Time   /67 04/19/22 1631   Temp 36.3 °C (97.4 °F) 04/19/22 1625   Pulse 65 04/19/22 1632   Resp 16 04/19/22 1625   SpO2 100 % 04/19/22 1632   Vitals shown include unvalidated device data.

## 2022-04-20 NOTE — DISCHARGE SUMMARY
[facility] Cardiology Discharge Summary     Patient ID:  Tre Oquendo  [id number]  78 y.o.  1940    Admit date: 4/19/2022    Discharge date:  04/20/22    Admitting Physician: Chioma Benton MD     Discharge Physician: Henry Lancaster NP/Dr. Ferreira    Admission Diagnoses: PAF (paroxysmal atrial fibrillation) ([address] [I48.0]  Paroxysmal A-fib ([address] [I48.0]    Discharge Diagnoses:      Diagnosis    Paroxysmal A-fib ([address]        Presence of Watchman left atrial appendage closure device    PAF (paroxysmal atrial fibrillation) ([address]    Atrial fibrillation and flutter [facility])           Cardiology Procedures this admission:  TERI, Implantation of Watchman device, Echocardiogram    Consults: None    Hospital Course: Patient was seen at the office of [facility] Cardiology by Dr. Kristy Evans in follow up for consideration for watchman device. The patient presented for procedure. TERI was performed directly prior to procedure that was negative for BAILEY thrombus. The patient underwent successful implantation of a 20 mm Watchman device. The patient was monitored closely in the CV stepdown. An post procedure TERI showed no pericardial effusion. The morning of 04/20/22, patient was up feeling well without any complaints of chest pain or shortness of breath. Patient's labs were stable. Patient was seen and examined by Dr. Patric Wade and determined stable and ready for discharge. Patient was instructed on the importance of medication compliance. The patient will be discharged home on Eliquis [facility] and 81 mg ASA for at least 45 days until BAILEY seal is examined using TERI. Once BAILEY is noted to be sealed, Eliquis ([address] [place] [address]) will be discountinued, and Plavix 75 mg will be continued until 6 months post watchman implant. This protocol is due to hemorraghic complication. The patient will have repeat TERI performed in 45 days. The patient will follow up with [facility] cardiology.       DISPOSITION: The patient is being discharged home in stable condition on a low saturated fat, low cholesterol and low salt diet. The patient is instructed to advance activities as tolerated to the limit of fatigue or shortness of breath. The patient is instructed to avoid lifting anything heavier than 10 lbs for 1 weeks. The patient is instructed to avoid any straining, stooping or squatting for 2 weeks. The patient is instructed not to drive for 1 week. The patient is instructed to watch the groin site for bleeding/oozing; if seen, the patient is instructed to apply firm pressure with a clean cloth and call [address] Cardiology at [phone]. The patient is instructed to watch for signs of infection which include: increasing area of redness, fever/hot to touch or purulent drainage at the groin site. The patient is instructed not to soak in a bathtub for 7-10 days, but is cleared to shower. The patient is instructed to return to the ER immediately for any severe pain, color change, or temperature change in leg. The patient is informed not to stop any medications without discussing with our office and to contact our office if any dental work or possible surgeries are expected    Discharge Exam:   Visit Vitals  /62   Pulse 63   Temp 98.7 °F (37.1 °C)   Resp 17   Ht 5' 5\" (1.651 m)   Wt 74.1 kg (163 lb 4.8 oz)   SpO2 97%   BMI 27.17 kg/m²     Patient has been seen by Dr. Eddie Jansen: see his progress note for exam details.     Recent Results (from the past 24 hour(s))   POC ACTIVATED CLOTTING TIME    Collection Time: 04/19/22  3:28 PM   Result Value Ref Range    Activated Clotting Time (POC) 208 (H) 70 - 885 SECS   METABOLIC PANEL, BASIC    Collection Time: 04/20/22  6:07 AM   Result Value Ref Range    Sodium 138 136 - 145 mmol/L    Potassium 4.5 3.5 - 5.1 mmol/L    Chloride 106 98 - 107 mmol/L    CO2 23 21 - 32 mmol/L    Anion gap 9 7 - 16 mmol/L    Glucose 142 (H) 65 - 100 mg/dL    BUN 32 (H) 8 - 23 MG/DL    Creatinine 1.20 (H) 0.6 - 1.0 MG/DL GFR est AA 55 (L) >60 ml/min/1.73m2    GFR est non-AA 46 (L) >60 ml/min/1.73m2    Calcium 8.3 8.3 - 10.4 MG/DL   MAGNESIUM    Collection Time: 04/20/22  6:07 AM   Result Value Ref Range    Magnesium 2.3 1.8 - 2.4 mg/dL         Patient Instructions:     Current Discharge Medication List      CONTINUE these medications which have NOT CHANGED    Details   magnesium oxide (MAG-OX) 400 mg tablet Take 1 Tablet by mouth daily. Qty: 30 Tablet, Refills: 11      amiodarone (CORDARONE) 200 mg tablet Take 1 Tablet by mouth two (2) times a day. Qty: 60 Tablet, Refills: 11      apixaban (ELIQUIS) 5 mg tablet Take 1 Tab by mouth every twelve (12) hours. Indications: treatment to prevent blood clots in chronic atrial fibrillation  Qty: 60 Tab, Refills: 3      LORazepam (ATIVAN) 1 mg tablet Take 1 Tab by mouth every four (4) hours as needed for Anxiety. Max Daily Amount: 6 mg. Qty: 30 Tab, Refills: 5    Associated Diagnoses: Anxiety      levothyroxine (SYNTHROID) 50 mcg tablet Take 1 Tab by mouth Daily (before breakfast). Qty: 30 Tab, Refills: 5      losartan (COZAAR) 100 mg tablet Take 1 Tab by mouth daily. Qty: 30 Tab, Refills: 5      simvastatin (ZOCOR) 20 mg tablet Take 1 Tab by mouth nightly. Qty: 90 Tab, Refills: 3      metoprolol succinate (TOPROL-XL) 25 mg XL tablet Take 1 Tab by mouth daily. Qty: 90 Tab, Refills: 3      hydroCHLOROthiazide (HYDRODIURIL) 12.5 mg tablet Take 1 Tab by mouth daily. Qty: 30 Tab, Refills: 11      cyanocobalamin (VITAMIN B-12) 500 mcg tablet Take 500 mcg by mouth daily. MULTIVITS,TH W-FE,OTHER MIN (THERA-M PO) Take  by mouth.      polyethylene glycol (MIRALAX) 17 gram packet Take 17 g by mouth daily. As needed      acetaminophen (TYLENOL) 325 mg tablet Take 2 Tabs by mouth every four (4) hours as needed. Qty: 30 Tab, Refills: 0      CALCIUM CARBONATE/VITAMIN D3 (CALTRATE 600 + D PO) Take 1 Tab by mouth daily. Stop seven days prior to surgery per anesthesia protocol.       Aspirin, Buffered 81 mg tab Take 81 mg by mouth every morning. Take day of surgery per anesthesia protocol. Continue per Dr Fercho Gary orders dated 11-5-15      traMADoL (ULTRAM) 50 mg tablet Take 50 mg by mouth every six (6) hours as needed.                Signed:  Love Owen NP  4/20/2022  7:28 AM

## 2022-04-20 NOTE — PROGRESS NOTES
2022       Amrita Parada MD  [address] [place] [id number]  Via Fax: [phone]      Patient: Yoselin Mccabe   YOB: 1995   Date of Visit: 2022       Dear Dr. Parada:    I saw your patient, Yoselin Mccabe, for an evaluation. Below are my notes for this visit with her.    If you have questions, please do not hesitate to call me.      Sincerely,        Mamie Key MD        CC: No Recipients  Mamie Key MD  2022  2:15 PM  Sign when Signing Visit    22  RE: Yoselin Mccabe  : 1995  JILLIAN: 2022  Gest age: 17w5d       Dear Dr. Amrita Parada MD,    We had the pleasure of meeting Yoselin Mccabe for a Maternal-Fetal Medicine consultation. As you know, Yoselin Mccabe is a 26 year old  at 17w5d IUP. We spent the majority of her visit discussing pregnancy management regarding h/o bariatric surgery, obesity.  At the time of her consultation she had no complaints.  Patient reports: +fetal movement. No vaginal bleeding, vaginal discharge, loss of fluid, rupture of membranes, ha/vision changes/ruq pain, dysuria, uterine contractions, chest pain,shortness of breath. ROS is otherwise negative.         PAST OBSTETRICAL HISTORY:  , current preg w/o IVF  Past Gyn History: reg, no std's, no abnl paps   PAST MEDICAL HISTORY: obes - bmi 38, pt reports htn prior to bariatric surgery - since surgery this has resolved  PAST SURGICAL HISTORY:  Gastric bypass surgery ~1.5yrs  ALLERGIES: none  MEDICATIONS: pnv       SOCIAL HISTORY: neg for tob, alcohol, drugs  FAMILY HISTORY: dm, chtn    Visit Vitals  /70   Pulse 61   Ht 5' 4\" (1.626 m)   Wt 104.8 kg (231 lb)   LMP 2021   BMI 39.65 kg/m²      EXAM:  gen -nad, a&ox3, abd - soft, gravid, no g/r, nontender    Labs: see emr/pnr  22 - ferritin 111 wnl, ca 9.6 wnl, vit D 11.4 low hgba1c 4.6%, folic acid 7.9 low, vit B12 349 wnl, TSH 0.334 low, Free T4 1.16 wnl, O+,  Problem: Falls - Risk of  Goal: *Absence of Falls  Description: Document [place] Fall Risk and appropriate interventions in the flowsheet.   Outcome: Progressing Towards Goal  Note: Fall Risk Interventions:  Mobility Interventions: Patient to call before getting OOB         Medication Interventions: Teach patient to arise slowly,Patient to call before getting OOB    Elimination Interventions: Call light in reach,Patient to call for help with toileting needs    History of Falls Interventions: Evaluate medications/consider consulting pharmacy         Problem: Patient Education: Go to Patient Education Activity  Goal: Patient/Family Education  Outcome: Progressing Towards Goal abs neg, wbc 5.5%, h/h 13.9/39.7, plt 276,   2/15/22 - NIPT - screen neg  3/3/22 - chenodeoxycholic acid 2.4 wnl, cholic acid 7.9 elevated, deoxycholic acid 2.1 wnl, T. Bile acids 12 elevated, bun/cr 8/0.61, ast/alt 100/173 (both elevated), bili 0.5 wnl  OF NOTE: pt denies pruritus/sx's of IHCP    US: please see separate report  Findings:  - live IUP  - vertex  - AGA fetus - efw 81%  - fetal anatomy limited due to early gest age/mat habitus - no gross anomalies noted  - normal amniotic fluid   - posterior placenta  - maternal ovaries were visualized and appear normal  - TACL 3.3cm, closed    Impression: 26 year old  at 17w5d IUP with h/o bariatric surgery, obesity  - 2nd trim precautions provided to pt, preeclamptic precautions after 20wks, fetal kick counts from 28wks until delivery     - obes: This patient's BMI is 38.  I reviewed with the patient the risks associated with obesity and pregancy, including, but not limited to gestational diabetes, hypertensive complications of pregnancy, requiring  for delivery, problems with wound heaing/disruption, development of deep venous thrombosis/pulmonary embolus.  I reviewed the importance of diet and exercise with this patient.  I would recommend goal weight gain to be*** for this pregnancy.  I also reviewed new literature citing an increased risk of congenital anomalies associated with obesity and hindering in identification.      - h/o bariatric surgery: reviewed with patient that weight loss after bariatric surgery often leads to greater fertility and a decreased risk for the obstetric complications.   Explained that it is recommended to delay pregnancy for 12 to 24 months after bariatric surgery to avoid pregnancy during the period of rapid weight loss. Women with a gastric band should be monitored during pregnancy because the band may need to be adjusted.   In pregnancy there may be a delay in diagnosis of bariatric-related operative complications,  including anastomotic leaks, bowel obstructions, internal hernias, ventral hernias, band erosion, and band migration. Therefore, all gastrointestinal problems such as nausea, vomiting, and abdominal pain should be thoroughly evaluated with involvement of the bariatric surgeon because the underlying pathology may be related to the bariatric surgery, not the pregnancy itself.   Discussed the most common nutritional deficiencies after gastric bypass surgery to include protein, iron, vitamin B12, folate, vitamin D, and calcium.     - Covid vaccine: pt reports she completed vaccine series and also received booster    Recommendations:    - monitor for preeclampsia  - enc healthy nutrition, exercise as tolerated, goal wt gain 11-20lbs in perg  - check and replete nutritional deficiencies  - check FASTING bile acids - pls fax when avail   - repeat cmp - if lft's remain elevated would rec eval for Hep A/B/C and RUQ US  - anatomy US with TVUS-CL scheduled at 20wks  - screen for GDM at 24-28wks   If pt unable to tolerate glucola due to \"dumping syndrome\" would rec checking fs's qid x 2wks  - repeat fetal growth US with bpp to be scheduled at 30wks, serial thereafter  - weekly dvp/bpp to be scheduled from 36wks until delivery  - weekly nst from 36wks until delivery - P.OB to schedule    All questions answered for pt - pt expresses understanding and is in agreement with plan.    Thank you for allowing me to partake in this patient's care.   Please do not hesitate to contact me if I can be of further assistance. We spent 45 minutes performing this consult of which >50% of the time was spent in face to face counseling and review of records.    Sincerely,     Mamie Key MD

## 2022-04-20 NOTE — DISCHARGE INSTRUCTIONS
Watchman Discharge Instructions      Activity:     Follow your doctors recommendations   Return to normal activities gradually, pacing yourself as you feel better, resting when tired. · Activity should be limited for the next 48 hours. Climb stairs as little as possible and avoid any stooping, bending or strenuous activity for 48 hours. No heavy lifting (anything over 10 pounds) for five days. · Do not drive for 48 hoursHave a responsible person drive you home and stay with you for at least 24 hours after your heart catheterization/angiography. · Check the puncture site frequently for swelling or bleeding. If you see any bleeding, lie down and apply pressure over the area with a clean town or washcloth. Notify your doctor for any redness, swelling, drainage or oozing from the puncture site. Notify your doctor for any fever or chills. · You may resume your usual diet. Drink more fluids than usual.        Incision / Wound Care       Cleanse wounds with mild soap and water. Keep wound dry.  A small amount of bloody or clear drainage is normal.   Watch for redness, swelling, incision site hot to touch, foul or colored drainage from the incision site, these are all signs of infection and should be reported immediately to the physicians.  If there is site concern please notify your implanting physician.  You may remove the bandage from your Right and Groin in 24 hours. You may shower in 24 hours. No tub baths, hot tubs or swimming for one week. Do not place any lotions, creams, powders, ointments over the puncture site for one week. You may place a clean band-aid over the puncture site each day for 5 days. Change this daily. Continued        Medications:   DO NOT STOP TAKING YOUR ELIQUIS WITHOUT SPEAKING WITH YOUR CARDIOLOGIST FIRST!  Take your medications as ordered at the time of discharge.  Do NOT stop taking any medications without first discussing it with your doctor.    Notify all your doctors of current medication lists. Follow instructions on medication administration especially if blood thinning medications are prescribed. Your doctor will monitor your medications and advise you when or if you can stop taking them. Notify your doctor immediately if any of the following:   Sudden weight gain   Increasing shortness of breath   Pain, change in color, temperature or swelling in lower legs or feet.  Fevers greater than 101 degrees, redness, swelling, incision site hot to touch, foul or colored drainage from the incision site, these are all signs of infection and should be reported immediately to the physicians. Post Watchman Discharge Instructions Timeline     After a minimum of 45 days from date of procedure you will need to return to hospital to have an outpatient TERI performed to determine if the implant has closed the opening of the appendage. At this time you will see the [place] [id number]. Coordinator for a follow up. If the TERI reveals the appendage is not fully closed - you will require another TERI at a later date to reassess. Once the results from TERI have been reviewed the physicians will determine what medication changes need to be made. Your [facility] Coordinator can facilitate arranging these appointments.  6 months post implant you will need to see the [facility] Coordinator and visit the physician for a routine follow up and potentially EKG, and lab work. Your Coordinator can facilitate with setting up these appointments.  At 1 and 2 years post implant you will be contacted by your [place] [id number]. Coordinator for a brief interview. This allows us to complete required patient monitoring.        Prior to any dental work or surgery notify your dentist or surgeon about your Watchman implant and the medications you are on.     Maintain regular follow up visits with your cardiologist     Keep all bloodwork appointments. Thank you for entrusting us with your care! The patient is being discharged home in stable condition on a low saturated fat, low cholesterol and low salt diet. The patient is instructed to advance activities as tolerated to the limit of fatigue or shortness of breath. The patient is instructed to avoid lifting anything heavier than 10 lbs for 1 weeks. The patient is instructed to avoid any straining, stooping or squatting for 2 weeks. The patient is instructed not to drive for 1 week. The patient is instructed to watch the groin site for bleeding/oozing; if seen, the patient is instructed to apply firm pressure with a clean cloth and call [facility] Cardiology at [phone]. The patient is instructed to watch for signs of infection which include: increasing area of redness, fever/hot to touch or purulent drainage at the groin site. The patient is instructed not to soak in a bathtub for 7-10 days, but is cleared to shower. The patient is instructed to return to the ER immediately for any severe pain, color change, or temperature change in leg. Patient Education        Atrial Fibrillation: Care Instructions  Your Care Instructions     Atrial fibrillation is an irregular and often fast heartbeat. Treating this condition is important for several reasons. It can cause blood clots, which can travel from your heart to your brain and cause a stroke. If you have a fast heartbeat, you may feel lightheaded, dizzy, and weak. An irregular heartbeat can also increase your risk for heart failure. Atrial fibrillation is often the result of another heart condition, such as high blood pressure or coronary artery disease. Making changes to improve your heart condition will help you stay healthy and active. Follow-up care is a key part of your treatment and safety. Be sure to make and go to all appointments, and call your doctor if you are having problems.  It's also a good idea to know your test results and keep a list of the medicines you take. How can you care for yourself at home? Medicines    · Take your medicines exactly as prescribed. Call your doctor if you think you are having a problem with your medicine. You will get more details on the specific medicines your doctor prescribes.     · If your doctor has given you a blood thinner to prevent a stroke, be sure you get instructions about how to take your medicine safely. Blood thinners can cause serious bleeding problems.     · Do not take any vitamins, over-the-counter drugs, or herbal products without talking to your doctor first.   Lifestyle changes    · Do not smoke. Smoking can increase your chance of a stroke and heart attack. If you need help quitting, talk to your doctor about stop-smoking programs and medicines. These can increase your chances of quitting for good.     · Eat a heart-healthy diet.     · Stay at a healthy weight. Lose weight if you need to.     · Limit alcohol to 2 drinks a day for men and 1 drink a day for women. Too much alcohol can cause health problems.     · Avoid colds and flu. Get a pneumococcal vaccine shot. If you have had one before, ask your doctor whether you need another dose. Get a flu shot every year. If you must be around people with colds or flu, wash your hands often. Activity    · If your doctor recommends it, get more exercise. Walking is a good choice. Bit by bit, increase the amount you walk every day. Try for at least 30 minutes on most days of the week. You also may want to swim, bike, or do other activities. Your doctor may suggest that you join a cardiac rehabilitation program so that you can have help increasing your physical activity safely.     · Start light exercise if your doctor says it is okay. Even a small amount will help you get stronger, have more energy, and manage stress. Walking is an easy way to get exercise.  Start out by walking a little more than you did in the hospital. Gradually increase the amount you walk.     · When you exercise, watch for signs that your heart is working too hard. You are pushing too hard if you cannot talk while you are exercising. If you become short of breath or dizzy or have chest pain, sit down and rest immediately.     · Check your pulse regularly. Place two fingers on the artery at the palm side of your wrist, in line with your thumb. If your heartbeat seems uneven or fast, talk to your doctor. When should you call for help? Call 911 anytime you think you may need emergency care. For example, call if:    · You have symptoms of a heart attack. These may include:  ? Chest pain or pressure, or a strange feeling in the chest.  ? Sweating. ? Shortness of breath. ? Nausea or vomiting. ? Pain, pressure, or a strange feeling in the back, neck, jaw, or upper belly or in one or both shoulders or arms. ? Lightheadedness or sudden weakness. ? A fast or irregular heartbeat. After you call 911, the  may tell you to chew 1 adult-strength or 2 to 4 low-dose aspirin. Wait for an ambulance. Do not try to drive yourself.     · You have symptoms of a stroke. These may include:  ? Sudden numbness, tingling, weakness, or loss of movement in your face, arm, or leg, especially on only one side of your body. ? Sudden vision changes. ? Sudden trouble speaking. ? Sudden confusion or trouble understanding simple statements. ? Sudden problems with walking or balance. ? A sudden, severe headache that is different from past headaches.     · You passed out (lost consciousness). Call your doctor now or seek immediate medical care if:    · You have new or increased shortness of breath.     · You feel dizzy or lightheaded, or you feel like you may faint.     · Your heart rate becomes irregular.     · You can feel your heart flutter in your chest or skip heartbeats. Tell your doctor if these symptoms are new or worse.    Watch closely for changes in your health, and be sure to contact your doctor if you have any problems. Where can you learn more? Go to http://www.gray.com/  Enter U020 in the search box to learn more about \"Atrial Fibrillation: Care Instructions. \"  Current as of: January 10, 2022               Content Version: 13.2  © 9986-8734 [employer], [employer]. Care instructions adapted under license by [employer] (which disclaims liability or warranty for this information). If you have questions about a medical condition or this instruction, always ask your healthcare professional. [address] any warranty or liability for your use of this information.

## 2022-04-21 LAB
ABO + RH BLD: NORMAL
BLD PROD TYP BPU: NORMAL
BLOOD GROUP ANTIBODIES SERPL: NORMAL
BPU ID: NORMAL
CROSSMATCH RESULT,%XM: NORMAL
SPECIMEN EXP DATE BLD: NORMAL
STATUS OF UNIT,%ST: NORMAL
UNIT DIVISION, %UDIV: 0

## 2022-05-31 ENCOUNTER — TELEPHONE (OUTPATIENT)
Dept: CARDIAC CATH/INVASIVE PROCEDURES | Age: 82
End: 2022-05-31

## 2022-05-31 RX ORDER — METOPROLOL SUCCINATE 25 MG/1
25 TABLET, EXTENDED RELEASE ORAL DAILY
COMMUNITY

## 2022-05-31 RX ORDER — ASPIRIN 81 MG/1
81 TABLET ORAL DAILY
COMMUNITY

## 2022-05-31 RX ORDER — HYDROCHLOROTHIAZIDE 12.5 MG/1
12.5 TABLET ORAL DAILY
COMMUNITY

## 2022-05-31 RX ORDER — POLYETHYLENE GLYCOL 3350 17 G/17G
17 POWDER, FOR SOLUTION ORAL DAILY PRN
COMMUNITY

## 2022-05-31 RX ORDER — M-VIT,TX,IRON,MINS/CALC/FOLIC 27MG-0.4MG
1 TABLET ORAL DAILY
COMMUNITY

## 2022-05-31 RX ORDER — ACETAMINOPHEN 325 MG/1
325 TABLET ORAL EVERY 4 HOURS PRN
COMMUNITY

## 2022-05-31 RX ORDER — SIMVASTATIN 20 MG
20 TABLET ORAL NIGHTLY
COMMUNITY

## 2022-05-31 RX ORDER — MAGNESIUM OXIDE 400 MG/1
400 TABLET ORAL DAILY
COMMUNITY

## 2022-05-31 RX ORDER — TRAMADOL HYDROCHLORIDE 50 MG/1
50 TABLET ORAL EVERY 6 HOURS PRN
COMMUNITY

## 2022-05-31 RX ORDER — AMIODARONE HYDROCHLORIDE 200 MG/1
200 TABLET ORAL DAILY
COMMUNITY

## 2022-05-31 RX ORDER — LOSARTAN POTASSIUM 100 MG/1
100 TABLET ORAL DAILY
COMMUNITY
End: 2022-08-19

## 2022-05-31 RX ORDER — LEVOTHYROXINE SODIUM 0.05 MG/1
50 TABLET ORAL DAILY
Status: ON HOLD | COMMUNITY
End: 2022-08-19 | Stop reason: HOSPADM

## 2022-05-31 RX ORDER — CYANOCOBALAMIN (VITAMIN B-12) 500 MCG
500 TABLET ORAL DAILY
COMMUNITY
End: 2022-08-19

## 2022-05-31 RX ORDER — LORAZEPAM 1 MG/1
1 TABLET ORAL EVERY 4 HOURS PRN
COMMUNITY

## 2022-05-31 NOTE — PROGRESS NOTES
Called to pre-assess for PROSPER 45day post LAAO , Scheduled 6/1/22 arrival time 0930. No answer & message left.

## 2022-05-31 NOTE — TELEPHONE ENCOUNTER
Patient pre-assessment complete for PROSPER scheduled for 2022, arrival time 0930. Patient verified using . Patient instructed to bring all home medications in labeled bottles on the day of procedure. NPO status reinforced. She will take synthyroid, amiodarone, metoprolol, and aspirin with a small sip of water, the morning of his procedure. Patient instructed to HOLD all other medications and supplements. Patient verbalizes understanding of all instructions & denies any questions at this time. Patient has watchman contact info if she were to have any other questions.

## 2022-06-01 ENCOUNTER — HOSPITAL ENCOUNTER (OUTPATIENT)
Dept: CARDIAC CATH/INVASIVE PROCEDURES | Age: 82
Discharge: HOME | End: 2022-06-01
Attending: INTERNAL MEDICINE | Admitting: INTERNAL MEDICINE
Payer: MEDICARE

## 2022-06-01 VITALS
HEART RATE: 67 BPM | BODY MASS INDEX: 27.32 KG/M2 | SYSTOLIC BLOOD PRESSURE: 110 MMHG | HEIGHT: 65 IN | WEIGHT: 164 LBS | DIASTOLIC BLOOD PRESSURE: 54 MMHG | OXYGEN SATURATION: 98 % | TEMPERATURE: 98 F | RESPIRATION RATE: 16 BRPM

## 2022-06-01 DIAGNOSIS — I48.0 PAROXYSMAL A-FIB (HCC): ICD-10-CM

## 2022-06-01 LAB
ECHO BSA: 1.85 M2
ECHO TV REGURGITANT MAX VELOCITY: 1.82 M/S
ECHO TV REGURGITANT PEAK GRADIENT: 13 MMHG
ERYTHROCYTE [DISTWIDTH] IN BLOOD BY AUTOMATED COUNT: 17.5 % (ref 11.9–14.6)
HCT VFR BLD AUTO: 36.1 % (ref 35.8–46.3)
HGB BLD-MCNC: 12 G/DL (ref 11.7–15.4)
LV EF: 58 %
LVEF MODALITY: NORMAL
MCH RBC QN AUTO: 35.2 PG (ref 26.1–32.9)
MCHC RBC AUTO-ENTMCNC: 33.2 G/DL (ref 31.4–35)
MCV RBC AUTO: 105.9 FL (ref 79.6–97.8)
NRBC # BLD: 0 K/UL (ref 0–0.2)
PLATELET # BLD AUTO: 558 K/UL (ref 150–450)
PMV BLD AUTO: 10 FL (ref 9.4–12.3)
RBC # BLD AUTO: 3.41 M/UL (ref 4.05–5.2)
WBC # BLD AUTO: 12.1 K/UL (ref 4.3–11.1)

## 2022-06-01 PROCEDURE — 85027 COMPLETE CBC AUTOMATED: CPT

## 2022-06-01 PROCEDURE — 6370000000 HC RX 637 (ALT 250 FOR IP): Performed by: INTERNAL MEDICINE

## 2022-06-01 PROCEDURE — 6360000002 HC RX W HCPCS: Performed by: INTERNAL MEDICINE

## 2022-06-01 PROCEDURE — 93325 DOPPLER ECHO COLOR FLOW MAPG: CPT | Performed by: INTERNAL MEDICINE

## 2022-06-01 PROCEDURE — 93312 ECHO TRANSESOPHAGEAL: CPT | Performed by: INTERNAL MEDICINE

## 2022-06-01 PROCEDURE — 93312 ECHO TRANSESOPHAGEAL: CPT

## 2022-06-01 PROCEDURE — 76377 3D RENDER W/INTRP POSTPROCES: CPT

## 2022-06-01 PROCEDURE — 99152 MOD SED SAME PHYS/QHP 5/>YRS: CPT | Performed by: INTERNAL MEDICINE

## 2022-06-01 PROCEDURE — 99152 MOD SED SAME PHYS/QHP 5/>YRS: CPT

## 2022-06-01 PROCEDURE — 93320 DOPPLER ECHO COMPLETE: CPT | Performed by: INTERNAL MEDICINE

## 2022-06-01 RX ORDER — MIDAZOLAM HYDROCHLORIDE 2 MG/2ML
INJECTION, SOLUTION INTRAMUSCULAR; INTRAVENOUS
Status: COMPLETED | OUTPATIENT
Start: 2022-06-01 | End: 2022-06-01

## 2022-06-01 RX ORDER — MIDAZOLAM HYDROCHLORIDE 1 MG/ML
1 INJECTION INTRAMUSCULAR; INTRAVENOUS PRN
Status: ACTIVE | OUTPATIENT
Start: 2022-06-01 | End: 2022-06-01

## 2022-06-01 RX ORDER — MIDAZOLAM HYDROCHLORIDE 1 MG/ML
1 INJECTION INTRAMUSCULAR; INTRAVENOUS PRN
Status: DISCONTINUED | OUTPATIENT
Start: 2022-06-01 | End: 2022-06-01

## 2022-06-01 RX ORDER — LIDOCAINE HYDROCHLORIDE 20 MG/ML
15 SOLUTION OROPHARYNGEAL
Status: DISCONTINUED | OUTPATIENT
Start: 2022-06-01 | End: 2022-06-01 | Stop reason: HOSPADM

## 2022-06-01 RX ORDER — FENTANYL CITRATE 50 UG/ML
INJECTION, SOLUTION INTRAMUSCULAR; INTRAVENOUS
Status: COMPLETED | OUTPATIENT
Start: 2022-06-01 | End: 2022-06-01

## 2022-06-01 RX ORDER — LIDOCAINE HYDROCHLORIDE 20 MG/ML
SOLUTION OROPHARYNGEAL
Status: COMPLETED | OUTPATIENT
Start: 2022-06-01 | End: 2022-06-01

## 2022-06-01 RX ORDER — CLOPIDOGREL BISULFATE 75 MG/1
75 TABLET ORAL DAILY
Qty: 30 TABLET | Refills: 5 | Status: SHIPPED | OUTPATIENT
Start: 2022-06-01 | End: 2022-08-29 | Stop reason: SINTOL

## 2022-06-01 RX ADMIN — MIDAZOLAM HYDROCHLORIDE 2 MG: 1 INJECTION, SOLUTION INTRAMUSCULAR; INTRAVENOUS at 12:05

## 2022-06-01 RX ADMIN — Medication 10 ML: at 12:00

## 2022-06-01 RX ADMIN — MIDAZOLAM HYDROCHLORIDE 2 MG: 1 INJECTION, SOLUTION INTRAMUSCULAR; INTRAVENOUS at 12:07

## 2022-06-01 RX ADMIN — FENTANYL CITRATE 25 MCG: 50 INJECTION INTRAMUSCULAR; INTRAVENOUS at 12:05

## 2022-06-01 RX ADMIN — MIDAZOLAM HYDROCHLORIDE 1 MG: 1 INJECTION, SOLUTION INTRAMUSCULAR; INTRAVENOUS at 12:14

## 2022-06-01 RX ADMIN — MIDAZOLAM HYDROCHLORIDE 2 MG: 1 INJECTION, SOLUTION INTRAMUSCULAR; INTRAVENOUS at 12:10

## 2022-06-01 RX ADMIN — FENTANYL CITRATE 25 MCG: 50 INJECTION INTRAMUSCULAR; INTRAVENOUS at 12:07

## 2022-06-01 ASSESSMENT — ENCOUNTER SYMPTOMS
RESPIRATORY NEGATIVE: 1
GASTROINTESTINAL NEGATIVE: 1
COUGH: 0
SHORTNESS OF BREATH: 0

## 2022-06-01 NOTE — H&P
[address]      Patient:  Jimi Andrews  1940       SUBJECTIVE:  Jimi Andrews is a  78 y.o. female seen for the following:     No chief complaint on file. CC: atrial fibrillation     HPI:   Patient was seen at the office of [facility] by Dr. Saida Burton in follow up for consideration for watchman device. The patient presented for procedure. PROSPER was performed directly  prior to procedure that was negative for JORGE thrombus. The patient underwent successful implantation of a 20 mm Watchman device. The patient was monitored closely in the CV stepdown. An post procedure PROSPER showed no pericardial effusion. The morning of 04/20/22, patient was up feeling well without any complaints of chest pain or shortness of breath. Patient's labs were stable. Patient was seen and examined by Dr. Leela Powell and determined stable and ready for discharge. Patient is here for post watchman transesophageal echocardiogram, no chest pain, shortness of breath, difficulty swallowing food or liquids. Past medical history, past surgical history, family history, social history, and medications were all reviewed with the patient today and updated as necessary.          Patient Active Problem List   Diagnosis    Osteoarthritis    Chronic pain    History of breast cancer    Chronic pain of right ankle    NSVT [facility]onsustained ventricular tachycardia) ([facility])    Bilateral carotid artery disease (HCC)    Hyperlipidemia    Osteopenia    Hypothyroidism    Ventricular hypertrophy    Arthritis    CKD (chronic kidney disease) stage 3, GFR 30-59 ml/min ([facility])    Carotid stenosis, symptomatic, with infarction ([address]    Atrial fibrillation and flutter (HCC)    Anxiety    Aortic aneurysm (HCC)    S/P aortic valve replacement    HTN (hypertension)    Paroxysmal A-fib (HCC)    PAF (paroxysmal atrial fibrillation) ([address]    Presence of Watchman left atrial appendage closure device         Family History   Problem Relation Age of Onset    Heart Disease Father     Heart Disease Mother     Hypertension Mother     Elevated Lipids Mother     Hypertension Father     Dementia Mother        Social History     Tobacco Use    Smoking status: Never Smoker    Smokeless tobacco: Never Used   Substance Use Topics    Alcohol use: Yes     Alcohol/week: 7.0 standard drinks     Review of Systems   Constitutional: Negative. Cardiovascular: Negative. Negative for chest pain and dyspnea on exertion. Respiratory: Negative. Negative for cough and shortness of breath. Musculoskeletal: Positive for arthritis and joint pain. Gastrointestinal: Negative. All other systems reviewed and are negative. PHYSICAL EXAM:    BP (!) 110/54   Pulse 67   Temp 98 °F (36.7 °C)   Resp 16   Ht 5' 5\" (1.651 m)   Wt 164 lb (74.4 kg)   SpO2 98%   BMI 27.29 kg/m²     Physical Exam  Vitals reviewed. Constitutional:       General: She is not in acute distress. Appearance: She is not toxic-appearing. HENT:      Head: Normocephalic and atraumatic. Right Ear: External ear normal.      Left Ear: External ear normal.      Nose: Nose normal.   Eyes:      General: No scleral icterus. Right eye: No discharge. Left eye: No discharge. Cardiovascular:      Rate and Rhythm: Normal rate and regular rhythm. Heart sounds: Normal heart sounds. No murmur heard. No friction rub. No gallop. Pulmonary:      Effort: Pulmonary effort is normal. No respiratory distress. Breath sounds: Normal breath sounds. No stridor. No wheezing or rales. Abdominal:      General: Abdomen is flat. There is no distension. Palpations: Abdomen is soft. Tenderness: There is no abdominal tenderness. Musculoskeletal:         General: Normal range of motion. Right lower leg: No edema. Left lower leg: No edema. Skin:     General: Skin is warm and dry. Neurological:      Mental Status: She is alert and oriented to person, place, and time. Psychiatric:         Mood and Affect: Mood normal.         Thought Content:  Thought content normal.         Judgment: Judgment normal.         Recent Results (from the past 168 hour(s))   CBC    Collection Time: 06/01/22 10:23 AM   Result Value Ref Range    WBC 12.1 (H) 4.3 - 11.1 K/uL    RBC 3.41 (L) 4.05 - 5.2 M/uL    Hemoglobin 12.0 11.7 - 15.4 g/dL    Hematocrit 36.1 35.8 - 46.3 %    .9 (H) 79.6 - 97.8 FL    MCH 35.2 (H) 26.1 - 32.9 PG    MCHC 33.2 31.4 - 35.0 g/dL    RDW 17.5 (H) 11.9 - 14.6 %    Platelets 907 (H) 753 - 450 K/uL    MPV 10.0 9.4 - 12.3 FL    nRBC 0.00 0.0 - 0.2 K/uL   Transesophageal echocardiogram (PROSPER) with contrast and 3D PRN    Collection Time: 06/01/22 10:36 AM   Result Value Ref Range    TR Max Velocity 1.82 m/s    TR Peak Gradient 13 mmHg       ASSESSMENT/PLAN:    Atrial fibrillation  - patient is post Watchman (left atrial occluder device) implantation  - NPO  - consent on chart, risks benefits, alternatives have been discussed with the patient/patient's family   - questions answered  - appropriate to proceed to transesophageal echocardiogram     Josh Rasheed MD, DO  6/1/2022

## 2022-06-01 NOTE — PROGRESS NOTES
TRANSFER - IN REPORT:    Verbal report received from RN(name) on Marlyse Hodgkin  being received from CPRU(unit) for routine progression of patient care      Report consisted of patients Situation, Background, Assessment and   Recommendations(SBAR). Information from the following report(s) Nurse Handoff Report was reviewed with the receiving nurse. Opportunity for questions and clarification was provided.       Assessment completed upon patients arrival to unit and care assume

## 2022-06-01 NOTE — PROGRESS NOTES
Transesophageal Echo Note  - Indication: post Watchman, atrial fibrillation  - pt underwent successful PROSPER today in cath holding  - start 1203  - stop 1221  - sedation: 7 mg IV Midazolam, 50 mcg Fentanyl IV given by Marni Mata RN under my direct supervision. Direct monitoring of vital signs and respiratory status throughout the procedure. - An independent trained observer pushed medications at my direction. We monitored the patient's level of consciousness and vital signs/physiologic status throughout the procedure duration (see start and stop times above). - No complications, pt in stable condition  - PROSPER Brief Findings: Watchman device well seated without evidence of exterior (LA side) thrombus , no evidence of periprosthetic (Watchman) leak on color flow Doppler, mild MR, 3D imaging of LA and mitral valve performed  - OK for oral intake at 1421  - No driving or heavy machine operation today.    - Results and medication changes discussed with patient and family at bedside   - continue Aspirin 81 mg oral once daily  - Stop Eliquis  - Start Plavix 75 mg oral once daily

## 2022-06-01 NOTE — PROGRESS NOTES
Patient received to [address] [place] [address] room # 15  Ambulatory from [facility]. Patient scheduled for PROSPER today with Dr Lindsay Medina. Procedure reviewed & questions answered, voiced good understanding consent obtained & placed on chart. All medications and medical history reviewed. Will prep patient per orders. Patient & family updated on plan of care. The patient has a fraility score of 4-VULNERABLE, based on ambulation.

## 2022-06-01 NOTE — PROGRESS NOTES
TRANSFER - OUT REPORT:    Verbal report given to RN(name) on Conception Floss being transferred to CPRU(unit) for routine progression of patient care       Report consisted of patient's Situation, Background, Assessment and   Recommendations(SBAR). Information from the following report(s) Nurse Handoff Report was reviewed with the receiving nurse. Opportunity for questions and clarification was provided. Patient transported with:   Monitor     PROSPER w/ Dr. Agnieszka Santos  7 mg versed  50 mcg fentanyl  Will stop eliquis and start plavix.

## 2022-06-08 ENCOUNTER — OFFICE VISIT (OUTPATIENT)
Dept: CARDIOLOGY CLINIC | Age: 82
End: 2022-06-08
Payer: MEDICARE

## 2022-06-08 VITALS
HEART RATE: 66 BPM | SYSTOLIC BLOOD PRESSURE: 122 MMHG | WEIGHT: 161 LBS | DIASTOLIC BLOOD PRESSURE: 84 MMHG | BODY MASS INDEX: 26.79 KG/M2

## 2022-06-08 DIAGNOSIS — I48.0 PAROXYSMAL A-FIB (HCC): Primary | ICD-10-CM

## 2022-06-08 DIAGNOSIS — I10 PRIMARY HYPERTENSION: ICD-10-CM

## 2022-06-08 PROCEDURE — G8400 PT W/DXA NO RESULTS DOC: HCPCS | Performed by: INTERNAL MEDICINE

## 2022-06-08 PROCEDURE — 99214 OFFICE O/P EST MOD 30 MIN: CPT | Performed by: INTERNAL MEDICINE

## 2022-06-08 PROCEDURE — 1090F PRES/ABSN URINE INCON ASSESS: CPT | Performed by: INTERNAL MEDICINE

## 2022-06-08 PROCEDURE — 93000 ELECTROCARDIOGRAM COMPLETE: CPT | Performed by: INTERNAL MEDICINE

## 2022-06-08 PROCEDURE — 1036F TOBACCO NON-USER: CPT | Performed by: INTERNAL MEDICINE

## 2022-06-08 PROCEDURE — G8417 CALC BMI ABV UP PARAM F/U: HCPCS | Performed by: INTERNAL MEDICINE

## 2022-06-08 PROCEDURE — 1123F ACP DISCUSS/DSCN MKR DOCD: CPT | Performed by: INTERNAL MEDICINE

## 2022-06-08 PROCEDURE — G8427 DOCREV CUR MEDS BY ELIG CLIN: HCPCS | Performed by: INTERNAL MEDICINE

## 2022-06-08 NOTE — PROGRESS NOTES
[address] [place]  [address], [address], [address]  [address]  PHONE: [phone]  1940    SUBJECTIVE:   Marizol Patterson is a 78 y.o. female seen for a follow up visit regarding the following:     Chief Complaint   Patient presents with    Irregular Heart Beat       HPI:    Marizol Patterson is a very pleasant 78 y.o.  female with a past medical and cardiac history significant for s/p AVR, aflutter s/p ablation in 2015, TIA, CHARLIE and is now s/p Watchman. She is doing well, problems with OA, no cardiac complaints. Cardiac PMH: (Old records have been reviewed and summarized below)    s/p AVR with 23mm bovine AVR on 11/10/15 ([facility] with normal coron before the AVR)   TIA in 2014   s/p ablation for atrial flutter 12/18/15 after the AVR   carotid dz ( 11/15 with mild dz). Echo 8/2020 and 12/2021: normal EF, normal AVR    Past Medical History, Past Surgical History, Family history, Social History, and Medications were all reviewed with the patient today and updated as necessary. Current Outpatient Medications   Medication Sig Dispense Refill    clopidogrel (PLAVIX) 75 MG tablet Take 1 tablet by mouth daily 30 tablet 5    traMADol (ULTRAM) 50 MG tablet Take 50 mg by mouth every 6 hours as needed for Pain.  magnesium oxide (MAG-OX) 400 MG tablet Take 400 mg by mouth daily      amiodarone (CORDARONE) 200 MG tablet Take 200 mg by mouth daily      LORazepam (ATIVAN) 1 MG tablet Take 1 mg by mouth every 4 hours as needed for Anxiety.       levothyroxine (SYNTHROID) 50 MCG tablet Take 50 mcg by mouth Daily      losartan (COZAAR) 100 MG tablet Take 100 mg by mouth daily      simvastatin (ZOCOR) 20 MG tablet Take 20 mg by mouth nightly      metoprolol succinate (TOPROL XL) 25 MG extended release tablet Take 25 mg by mouth daily      hydroCHLOROthiazide (HYDRODIURIL) 12.5 MG tablet Take 12.5 mg by mouth daily      Cyanocobalamin (VITAMIN B 12) 500 MCG TABS Take 500 mcg by mouth daily  Multiple Vitamins-Minerals (THERAPEUTIC MULTIVITAMIN-MINERALS) tablet Take 1 tablet by mouth daily      polyethylene glycol (GLYCOLAX) 17 g packet Take 17 g by mouth daily as needed for Constipation      acetaminophen (TYLENOL) 325 mg tablet Take 325 mg by mouth every 4 hours as needed for Pain      aspirin 81 MG EC tablet Take 81 mg by mouth daily      calcium-vitamin D (OSCAL) 250-125 MG-UNIT per tablet Take 1 tablet by mouth daily       No current facility-administered medications for this visit. Allergies   Allergen Reactions    Penicillin G Hives    Loracarbef     Penicillins     Adhesive Tape Other (See Comments)     Tears skin, paper tape ok  Other reaction(s): Other- (not listed) - Allergy  \"Tears  Skin\"  Paper tape ok   \"Tears  Skin\"  Paper tape ok       Cephalexin Swelling    Cephalosporins      Other reaction(s): Unknown (comments)     [unfilled]  Past Medical History:   Diagnosis Date    Anxiety     Aortic aneurysm ([address] 1/27/2016    Aortic stenosis 10/15/2012    Arthritis     osteoarthritis    Atrial fibrillation and flutter ([address] 11/17/2015    11/17/15 Cardioversion (Dr Meño Lazaro)    Atrial flutter ([address]     Basal cell carcinoma     face    Breast cancer ([address]     left mastectomy; chemo and radiation    Carotid stenosis, symptomatic, with infarction ([address] 1/27/2016    Chronic pain     osteoarthritis     CKD (chronic kidney disease) stage 3, GFR 30-59 ml/min ([address] 11/10/2015    History of breast cancer 11/10/2015    HTN (hypertension) 10/15/2012    Hyperlipidemia 10/15/2012    Hypertension     Hypocalcemia 11/11/2015    Nonrheumatic aortic valve stenosis 11/10/2015    11/10/15 (Dr Salgado Heads)     Osteoarthritis 1/27/2016    Osteopenia 10/15/2012    DEXA 5/27/14 with osteopenia. Previously treated 8 years with Fosamax.      S/P aortic valve replacement 11/10/2015    Shortness of breath 1/27/2016    Squamous cell carcinoma     right leg    Stroke [facility])     left eye     Transient ischemic attack 1/27/2016    Ventricular hypertrophy 10/15/2012    left      Past Surgical History:   Procedure Laterality Date    AORTIC VALVE REPLACEMENT  11/10/15    Dr Melissa Sabillon APPENDECTOMY      CATARACT REMOVAL Bilateral     HYSTERECTOMY (CERVIX STATUS UNKNOWN)      KNEE ARTHROSCOPY      left knee replacement    MASTECTOMY      left    OVARY REMOVAL      SKIN BIOPSY N/A 2017    basal cell x2 left side of face- [facility]    TOTAL KNEE ARTHROPLASTY Left     and revision      Family History   Problem Relation Age of Onset    Heart Disease Father     Heart Disease Mother     Hypertension Mother     Elevated Lipids Mother     Hypertension Father     Dementia Mother      Social History     Tobacco Use    Smoking status: Never Smoker    Smokeless tobacco: Never Used   Substance Use Topics    Alcohol use: Yes     Alcohol/week: 7.0 standard drinks       PHYSICAL EXAM:    /84   Pulse 66   Wt 161 lb (73 kg)   BMI 26.79 kg/m²      Wt Readings from Last 5 Encounters:   06/08/22 161 lb (73 kg)   06/01/22 164 lb (74.4 kg)   03/01/22 169 lb 12.8 oz (77 kg)   02/28/22 169 lb 9.6 oz (76.9 kg)   12/17/21 172 lb (78 kg)       BP Readings from Last 5 Encounters:   06/08/22 122/84   06/01/22 (!) 110/54   03/01/22 (!) 148/88   02/28/22 132/88   12/17/21 (!) 118/90       General appearance: Alert, well appearing, and in no distress   CV: RRR, no M/R/G, no JVD, normal distal pulses, no lower extremity edema  Pulm: Clear to auscultation, no wheezes, no crackles, no accessory muscle use  GI: Soft, nontender, nondistended  Neuro: Alert and oriented    Medical problems and test results were reviewed with the patient today.      Lab Results   Component Value Date    K 4.5 04/20/2022     CREATININE   Date Value Ref Range Status   04/20/2022 1.20 (H) 0.6 - 1.0 MG/DL Final     Lab Results   Component Value Date    HGB 12.0 06/01/2022     Lab Results   Component Value Date    INR 1.3 04/18/2022       EKG: (EKG has been independently visualized by me with interpretation below)  Sinus  Rhythm, rate 66, normal axis  WITHIN NORMAL LIMITS    Rosi Lemos was seen today for irregular heart beat. Diagnoses and all orders for this visit:    Paroxysmal A-fib ([address]  -     EKG 12 lead        ASSESSMENT and PLAN  1. CVA protection: Patient is an appropriate candidate for consideration of left atrial appendage occlusion. The patient's [id number] score is   7 which indicates a 9.6% annual risk of stroke. (age, HTN, F, CHARLIE, TIA). Has-BLED score is 5 which indicates a 12.5% annual risk of a major  bleeding event. (age, HTN, CKD, TIA). She is s/p Watchman, stable 45 day PROSPER, now on plavix, cont per protocol    Patient has been instructed and agrees to call our office with any issues or other concerns related to their cardiac condition(s) and/or complaint(s). No follow-up provider specified. Mikhail Brown MD, MS  Cardiology/Electrophysiology  6/8/2022  11:38 AM

## 2022-08-12 ENCOUNTER — TELEPHONE (OUTPATIENT)
Dept: CARDIOLOGY CLINIC | Age: 82
End: 2022-08-12

## 2022-08-12 DIAGNOSIS — I51.7 VENTRICULAR HYPERTROPHY: ICD-10-CM

## 2022-08-12 DIAGNOSIS — I48.0 PAROXYSMAL A-FIB (HCC): Primary | ICD-10-CM

## 2022-08-12 DIAGNOSIS — N18.31 CHRONIC KIDNEY DISEASE, STAGE 3A (HCC): ICD-10-CM

## 2022-08-12 DIAGNOSIS — Z95.2 PRESENCE OF PROSTHETIC HEART VALVE: ICD-10-CM

## 2022-08-12 DIAGNOSIS — I47.29 NSVT (NONSUSTAINED VENTRICULAR TACHYCARDIA): ICD-10-CM

## 2022-08-12 DIAGNOSIS — N18.30 CKD (CHRONIC KIDNEY DISEASE) STAGE 3, GFR 30-59 ML/MIN (HCC): ICD-10-CM

## 2022-08-12 NOTE — TELEPHONE ENCOUNTER
Tiny flat red areas with increased dark purple bruising on arms since 6/1/22 PROSPER. Bruising at 6/1/22 PROSPER IV sites spread and has not completely resolved. Zilphia Moat on left hip, causing large dark purple bruise on 8/5/22. Concerned that veins might have been damaged during PORSPER, causing red spots and bruising. New slight tremor in both hands. Very weak and tired since PROSPER. Feels very nervous and anxious about weakness, tiredness, and bruising. P-75. Has not been checking BP. No chest pain, palpitations, SOB, or edema. Taking Plavix 75 mg qd, ASA 81 mg qd, amiodarone 200 mg qd,  losartan 100 mg qd, Toprol XL 25 mg qd, HCTZ 12.5 mg qd, and Zocor 20 mg qd. Patient is scheduled to see Dr. Christian Samuels 8/29/22. Patient asks for earlier appointment with Dr. Christian Samuels and asks for Dr. Carolyne Griffith recommendations. Scheduled next available appointment with Dr. Christian Samuels on  8/18/22 at 12:45 pm. Advised patient that I will notify Dr. Christian Samuels of above and call with his response. Patient verbalized understanding.

## 2022-08-15 DIAGNOSIS — I48.0 PAROXYSMAL A-FIB (HCC): ICD-10-CM

## 2022-08-15 DIAGNOSIS — N18.30 CKD (CHRONIC KIDNEY DISEASE) STAGE 3, GFR 30-59 ML/MIN (HCC): ICD-10-CM

## 2022-08-15 DIAGNOSIS — N18.31 CHRONIC KIDNEY DISEASE, STAGE 3A (HCC): ICD-10-CM

## 2022-08-15 DIAGNOSIS — I51.7 VENTRICULAR HYPERTROPHY: ICD-10-CM

## 2022-08-15 DIAGNOSIS — Z95.2 PRESENCE OF PROSTHETIC HEART VALVE: ICD-10-CM

## 2022-08-15 DIAGNOSIS — I47.29 NSVT (NONSUSTAINED VENTRICULAR TACHYCARDIA): ICD-10-CM

## 2022-08-15 LAB
BASOPHILS # BLD: 0.3 K/UL (ref 0–0.2)
BASOPHILS NFR BLD: 2 % (ref 0–2)
DIFFERENTIAL METHOD BLD: ABNORMAL
EOSINOPHIL # BLD: 0.3 K/UL (ref 0–0.8)
EOSINOPHIL NFR BLD: 2 % (ref 0.5–7.8)
ERYTHROCYTE [DISTWIDTH] IN BLOOD BY AUTOMATED COUNT: 16.6 % (ref 11.9–14.6)
HCT VFR BLD AUTO: 37.8 % (ref 35.8–46.3)
HGB BLD-MCNC: 12.9 G/DL (ref 11.7–15.4)
IMM GRANULOCYTES # BLD AUTO: 0.9 K/UL (ref 0–0.5)
IMM GRANULOCYTES NFR BLD AUTO: 6 % (ref 0–5)
INR PPP: 0.9
LYMPHOCYTES # BLD: 1.7 K/UL (ref 0.5–4.6)
LYMPHOCYTES NFR BLD: 11 % (ref 13–44)
MCH RBC QN AUTO: 35.5 PG (ref 26.1–32.9)
MCHC RBC AUTO-ENTMCNC: 34.1 G/DL (ref 31.4–35)
MCV RBC AUTO: 104.1 FL (ref 79.6–97.8)
MONOCYTES # BLD: 1.5 K/UL (ref 0.1–1.3)
MONOCYTES NFR BLD: 10 % (ref 4–12)
NEUTS SEG # BLD: 10.7 K/UL (ref 1.7–8.2)
NEUTS SEG NFR BLD: 69 % (ref 43–78)
NRBC # BLD: 0 K/UL (ref 0–0.2)
PLATELET # BLD AUTO: 704 K/UL (ref 150–450)
PLATELET COMMENT: ADEQUATE
PMV BLD AUTO: 10.2 FL (ref 9.4–12.3)
PROTHROMBIN TIME: 12.9 SEC (ref 12.6–14.5)
RBC # BLD AUTO: 3.63 M/UL (ref 4.05–5.2)
RBC MORPH BLD: ABNORMAL
RBC MORPH BLD: ABNORMAL
WBC # BLD AUTO: 15.4 K/UL (ref 4.3–11.1)
WBC MORPH BLD: ABNORMAL

## 2022-08-16 ENCOUNTER — HOSPITAL ENCOUNTER (EMERGENCY)
Dept: GENERAL RADIOLOGY | Age: 82
Discharge: HOME | DRG: 872 | End: 2022-08-19
Payer: MEDICARE

## 2022-08-16 ENCOUNTER — HOSPITAL ENCOUNTER (EMERGENCY)
Age: 82
Discharge: HOME | DRG: 872 | End: 2022-08-16
Attending: EMERGENCY MEDICINE
Payer: MEDICARE

## 2022-08-16 VITALS
HEART RATE: 88 BPM | OXYGEN SATURATION: 99 % | RESPIRATION RATE: 18 BRPM | WEIGHT: 160 LBS | TEMPERATURE: 98.6 F | SYSTOLIC BLOOD PRESSURE: 152 MMHG | BODY MASS INDEX: 26.66 KG/M2 | HEIGHT: 65 IN | DIASTOLIC BLOOD PRESSURE: 74 MMHG

## 2022-08-16 DIAGNOSIS — E03.9 HYPOTHYROIDISM, UNSPECIFIED TYPE: ICD-10-CM

## 2022-08-16 DIAGNOSIS — N30.00 ACUTE CYSTITIS WITHOUT HEMATURIA: ICD-10-CM

## 2022-08-16 DIAGNOSIS — R53.1 GENERALIZED WEAKNESS: Primary | ICD-10-CM

## 2022-08-16 DIAGNOSIS — A41.9 SEPTICEMIA (HCC): ICD-10-CM

## 2022-08-16 PROBLEM — N39.0 UTI (URINARY TRACT INFECTION): Status: ACTIVE | Noted: 2022-08-16

## 2022-08-16 LAB
ALBUMIN SERPL-MCNC: 3.5 G/DL (ref 3.2–4.6)
ALBUMIN/GLOB SERPL: 1.1 {RATIO} (ref 1.2–3.5)
ALP SERPL-CCNC: 63 U/L (ref 50–136)
ALT SERPL-CCNC: 36 U/L (ref 12–65)
ANION GAP SERPL CALC-SCNC: 7 MMOL/L (ref 7–16)
APPEARANCE UR: CLEAR
AST SERPL-CCNC: 32 U/L (ref 15–37)
BACTERIA URNS QL MICRO: ABNORMAL /HPF
BASOPHILS # BLD: 0.2 K/UL (ref 0–0.2)
BASOPHILS NFR BLD: 1 % (ref 0–2)
BILIRUB SERPL-MCNC: 0.8 MG/DL (ref 0.2–1.1)
BILIRUB UR QL: NEGATIVE
BUN SERPL-MCNC: 26 MG/DL (ref 8–23)
CALCIUM SERPL-MCNC: 8.5 MG/DL (ref 8.3–10.4)
CASTS URNS QL MICRO: 0 /LPF
CHLORIDE SERPL-SCNC: 103 MMOL/L (ref 98–107)
CO2 SERPL-SCNC: 24 MMOL/L (ref 21–32)
COLOR UR: ABNORMAL
CREAT SERPL-MCNC: 1.5 MG/DL (ref 0.6–1)
CRYSTALS URNS QL MICRO: 0 /LPF
DIFFERENTIAL METHOD BLD: ABNORMAL
EOSINOPHIL # BLD: 0.3 K/UL (ref 0–0.8)
EOSINOPHIL NFR BLD: 1 % (ref 0.5–7.8)
EPI CELLS #/AREA URNS HPF: ABNORMAL /HPF
ERYTHROCYTE [DISTWIDTH] IN BLOOD BY AUTOMATED COUNT: 16.4 % (ref 11.9–14.6)
GLOBULIN SER CALC-MCNC: 3.3 G/DL (ref 2.3–3.5)
GLUCOSE SERPL-MCNC: 142 MG/DL (ref 65–100)
GLUCOSE UR STRIP.AUTO-MCNC: NEGATIVE MG/DL
HCT VFR BLD AUTO: 37.7 % (ref 35.8–46.3)
HGB BLD-MCNC: 12.8 G/DL (ref 11.7–15.4)
HGB UR QL STRIP: NEGATIVE
IMM GRANULOCYTES # BLD AUTO: 0.6 K/UL (ref 0–0.5)
IMM GRANULOCYTES NFR BLD AUTO: 3 % (ref 0–5)
KETONES UR QL STRIP.AUTO: NEGATIVE MG/DL
LACTATE SERPL-SCNC: 2.7 MMOL/L (ref 0.4–2)
LEUKOCYTE ESTERASE UR QL STRIP.AUTO: ABNORMAL
LYMPHOCYTES # BLD: 1.1 K/UL (ref 0.5–4.6)
LYMPHOCYTES NFR BLD: 6 % (ref 13–44)
MCH RBC QN AUTO: 35.9 PG (ref 26.1–32.9)
MCHC RBC AUTO-ENTMCNC: 34 G/DL (ref 31.4–35)
MCV RBC AUTO: 105.6 FL (ref 79.6–97.8)
MONOCYTES # BLD: 1.3 K/UL (ref 0.1–1.3)
MONOCYTES NFR BLD: 7 % (ref 4–12)
MUCOUS THREADS URNS QL MICRO: 0 /LPF
NEUTS SEG # BLD: 14.6 K/UL (ref 1.7–8.2)
NEUTS SEG NFR BLD: 81 % (ref 43–78)
NITRITE UR QL STRIP.AUTO: NEGATIVE
NRBC # BLD: 0 K/UL (ref 0–0.2)
NT PRO BNP: 1458 PG/ML
OTHER OBSERVATIONS: ABNORMAL
PH UR STRIP: 7 [PH] (ref 5–9)
PLATELET # BLD AUTO: 657 K/UL (ref 150–450)
PMV BLD AUTO: 9.7 FL (ref 9.4–12.3)
POTASSIUM SERPL-SCNC: 3.7 MMOL/L (ref 3.5–5.1)
PROCALCITONIN SERPL-MCNC: 0.14 NG/ML (ref 0–0.49)
PROT SERPL-MCNC: 6.8 G/DL (ref 6.3–8.2)
PROT UR STRIP-MCNC: NEGATIVE MG/DL
RBC # BLD AUTO: 3.57 M/UL (ref 4.05–5.2)
RBC #/AREA URNS HPF: 0 /HPF
SARS-COV-2 RDRP RESP QL NAA+PROBE: NOT DETECTED
SODIUM SERPL-SCNC: 134 MMOL/L (ref 136–145)
SOURCE: NORMAL
SP GR UR REFRACTOMETRY: 1.01 (ref 1–1.02)
TROPONIN I SERPL HS-MCNC: 8.6 PG/ML (ref 0–14)
TSH, 3RD GENERATION: 9.68 UIU/ML (ref 0.36–3.74)
URINE CULTURE IF INDICATED: ABNORMAL
UROBILINOGEN UR QL STRIP.AUTO: 0.2 EU/DL (ref 0.2–1)
WBC # BLD AUTO: 18 K/UL (ref 4.3–11.1)
WBC URNS QL MICRO: ABNORMAL /HPF

## 2022-08-16 PROCEDURE — 71045 X-RAY EXAM CHEST 1 VIEW: CPT

## 2022-08-16 PROCEDURE — 2580000003 HC RX 258: Performed by: EMERGENCY MEDICINE

## 2022-08-16 PROCEDURE — 87040 BLOOD CULTURE FOR BACTERIA: CPT

## 2022-08-16 PROCEDURE — 85025 COMPLETE CBC W/AUTO DIFF WBC: CPT

## 2022-08-16 PROCEDURE — 83880 ASSAY OF NATRIURETIC PEPTIDE: CPT

## 2022-08-16 PROCEDURE — 84484 ASSAY OF TROPONIN QUANT: CPT

## 2022-08-16 PROCEDURE — 80053 COMPREHEN METABOLIC PANEL: CPT

## 2022-08-16 PROCEDURE — 84145 PROCALCITONIN (PCT): CPT

## 2022-08-16 PROCEDURE — 36415 COLL VENOUS BLD VENIPUNCTURE: CPT

## 2022-08-16 PROCEDURE — 81001 URINALYSIS AUTO W/SCOPE: CPT

## 2022-08-16 PROCEDURE — 84443 ASSAY THYROID STIM HORMONE: CPT

## 2022-08-16 PROCEDURE — 83605 ASSAY OF LACTIC ACID: CPT

## 2022-08-16 PROCEDURE — 87635 SARS-COV-2 COVID-19 AMP PRB: CPT

## 2022-08-16 PROCEDURE — 93005 ELECTROCARDIOGRAM TRACING: CPT | Performed by: STUDENT IN AN ORGANIZED HEALTH CARE EDUCATION/TRAINING PROGRAM

## 2022-08-16 RX ORDER — 0.9 % SODIUM CHLORIDE 0.9 %
1000 INTRAVENOUS SOLUTION INTRAVENOUS
Status: COMPLETED | OUTPATIENT
Start: 2022-08-16 | End: 2022-08-16

## 2022-08-16 RX ORDER — LEVOTHYROXINE SODIUM 0.07 MG/1
75 TABLET ORAL DAILY
Status: DISCONTINUED | OUTPATIENT
Start: 2022-08-17 | End: 2022-08-17

## 2022-08-16 RX ORDER — CIPROFLOXACIN 2 MG/ML
400 INJECTION, SOLUTION INTRAVENOUS ONCE
Status: DISCONTINUED | OUTPATIENT
Start: 2022-08-16 | End: 2022-08-17 | Stop reason: HOSPADM

## 2022-08-16 RX ADMIN — SODIUM CHLORIDE 1000 ML: 9 INJECTION, SOLUTION INTRAVENOUS at 20:26

## 2022-08-16 ASSESSMENT — ENCOUNTER SYMPTOMS
NAUSEA: 0
ABDOMINAL PAIN: 0
BACK PAIN: 0
VISUAL CHANGE: 0
DIARRHEA: 0
SORE THROAT: 0
SHORTNESS OF BREATH: 0
CONSTIPATION: 0
VOMITING: 0
COUGH: 0
COLOR CHANGE: 0
RHINORRHEA: 0

## 2022-08-16 ASSESSMENT — PAIN DESCRIPTION - LOCATION: LOCATION: GENERALIZED

## 2022-08-16 ASSESSMENT — PAIN SCALES - GENERAL: PAINLEVEL_OUTOF10: 3

## 2022-08-16 ASSESSMENT — PAIN - FUNCTIONAL ASSESSMENT: PAIN_FUNCTIONAL_ASSESSMENT: 0-10

## 2022-08-16 ASSESSMENT — PAIN DESCRIPTION - PAIN TYPE: TYPE: CHRONIC PAIN

## 2022-08-16 NOTE — ED PROVIDER NOTES
[employer] Emergency Department Provider Note                   PCP: Chang Munroe MD               Age: 78 y.o. Sex: female       ICD-10-CM    1. Generalized weakness  R53.1       2. Acute cystitis without hematuria  N30.00       3. Septicemia ([facility] [address]  A41.9       4. Hypothyroidism, unspecified type  E03.9           DISPOSITION Decision To Admit 08/16/2022 09:39:20 PM       MDM  Number of Diagnoses or Management Options  Diagnosis management comments: Patient with progressive fatigue and weakness. Has a UTI with sepsis. Will admit for antibiotics and further treatment. Also has some hypothyroidism. Amount and/or Complexity of Data Reviewed  Clinical lab tests: ordered and reviewed  Tests in the radiology section of CPT®: ordered and reviewed  Tests in the medicine section of CPT®: ordered and reviewed    Patient Progress  Patient progress: stable       Orders Placed This Encounter   Procedures    COVID-19, Rapid    XR CHEST PORTABLE    CBC with Auto Differential    Comprehensive Metabolic Panel    Troponin    Brain Natriuretic Peptide    Urinalysis with Reflex to Culture    TSH    Lactic Acid    Procalcitonin    Lactic Acid    Cardiac Monitor - ED Only    Continuous Pulse Oximetry    EKG 12 Lead    Saline lock IV        Cha Olea is a 78 y.o. female who presents to the Emergency Department with chief complaint of    Chief Complaint   Patient presents with    Fatigue      Patient here for increased fatigue and weakness over the past 6 weeks. Denies any other symptoms. States she was recently changed to Plavix by her cardiologist.  Has some bruising to her arms. Unsure if this is playing a role. With symptoms getting worse came in for evaluation. The history is provided by the patient. No  was used.    Fatigue  Severity:  Moderate  Duration:  6 weeks  Timing:  Constant  Progression:  Worsening  Chronicity:  New  Relieved by:  Nothing  Worsened by: Nothing  Associated symptoms: no abdominal pain, no chest pain, no cough, no diarrhea, no difficulty walking, no dizziness, no drooling, no dysuria, no numbness in extremities, no falls, no fever, no foul-smelling urine, no headaches, no loss of consciousness, no myalgias, no nausea, no shortness of breath, no vision change and no vomiting      All other systems reviewed and are negative. Review of Systems   Constitutional:  Positive for fatigue. Negative for chills and fever. HENT:  Negative for drooling, rhinorrhea and sore throat. Respiratory:  Negative for cough and shortness of breath. Cardiovascular:  Negative for chest pain and palpitations. Gastrointestinal:  Negative for abdominal pain, constipation, diarrhea, nausea and vomiting. Genitourinary:  Negative for dysuria and hematuria. Musculoskeletal:  Negative for back pain, falls, myalgias and neck pain. Skin:  Negative for color change and rash. Neurological:  Positive for weakness. Negative for dizziness, loss of consciousness, numbness and headaches. All other systems reviewed and are negative. Past Medical History:   Diagnosis Date    Anxiety     Aortic aneurysm ([address] 1/27/2016    Aortic stenosis 10/15/2012    Arthritis     osteoarthritis    Atrial fibrillation and flutter ([address] 11/17/2015    11/17/15 Cardioversion (Dr Cordelia Espinoza)    Atrial flutter [facility])     Basal cell carcinoma     face    Breast cancer ([address]     left mastectomy; chemo and radiation    Carotid stenosis, symptomatic, with infarction ([facility] [address] 1/27/2016    Chronic pain     osteoarthritis     CKD (chronic kidney disease) stage 3, GFR 30-59 ml/min ([address] 11/10/2015    History of breast cancer 11/10/2015    HTN (hypertension) 10/15/2012    Hyperlipidemia 10/15/2012    Hypertension     Hypocalcemia 11/11/2015    Nonrheumatic aortic valve stenosis 11/10/2015    11/10/15 (Dr Bethany Fernandes)     Osteoarthritis 1/27/2016    Osteopenia 10/15/2012    DEXA 5/27/14 with osteopenia. Previously treated 8 years with Fosamax. S/P aortic valve replacement 11/10/2015    Shortness of breath 1/27/2016    Squamous cell carcinoma     right leg    Stroke [facility])     left eye     Transient ischemic attack 1/27/2016    Ventricular hypertrophy 10/15/2012    left         Past Surgical History:   Procedure Laterality Date    AORTIC VALVE REPLACEMENT  11/10/15    Dr Kyleigh Kendrick Bilateral     HYSTERECTOMY (CERVIX STATUS UNKNOWN)      KNEE ARTHROSCOPY      left knee replacement    MASTECTOMY      left    OVARY REMOVAL      SKIN BIOPSY N/A 2017    basal cell x2 left side of face- [facility]    TOTAL KNEE ARTHROPLASTY Left     and revision         Family History   Problem Relation Age of Onset    Heart Disease Father     Heart Disease Mother     Hypertension Mother     Elevated Lipids Mother     Hypertension Father     Dementia Mother         Social History     Socioeconomic History    Marital status:    Tobacco Use    Smoking status: Never    Smokeless tobacco: Never   Substance and Sexual Activity    Alcohol use:  Yes     Alcohol/week: 7.0 standard drinks    Drug use: Yes     Types: Prescription   Social History Narrative    Lives alone        Allergies: Penicillin g, Loracarbef, Penicillins, Adhesive tape, Cephalexin, and Cephalosporins    Previous Medications    ACETAMINOPHEN (TYLENOL) 325 MG TABLET    Take 325 mg by mouth every 4 hours as needed for Pain    AMIODARONE (CORDARONE) 200 MG TABLET    Take 200 mg by mouth daily    ASPIRIN 81 MG EC TABLET    Take 81 mg by mouth daily    CALCIUM-VITAMIN D (OSCAL) 250-125 MG-UNIT PER TABLET    Take 1 tablet by mouth daily    CLOPIDOGREL (PLAVIX) 75 MG TABLET    Take 1 tablet by mouth daily    CYANOCOBALAMIN (VITAMIN B 12) 500 MCG TABS    Take 500 mcg by mouth daily    HYDROCHLOROTHIAZIDE (HYDRODIURIL) 12.5 MG TABLET    Take 12.5 mg by mouth daily    LEVOTHYROXINE (SYNTHROID) 50 MCG TABLET    Take 50 mcg by mouth Daily    LORAZEPAM (ATIVAN) 1 MG TABLET    Take 1 mg by mouth every 4 hours as needed for Anxiety. LOSARTAN (COZAAR) 100 MG TABLET    Take 100 mg by mouth daily    MAGNESIUM OXIDE (MAG-OX) 400 MG TABLET    Take 400 mg by mouth daily    METOPROLOL SUCCINATE (TOPROL XL) 25 MG EXTENDED RELEASE TABLET    Take 25 mg by mouth daily    MULTIPLE VITAMINS-MINERALS (THERAPEUTIC MULTIVITAMIN-MINERALS) TABLET    Take 1 tablet by mouth daily    POLYETHYLENE GLYCOL (GLYCOLAX) 17 G PACKET    Take 17 g by mouth daily as needed for Constipation    SIMVASTATIN (ZOCOR) 20 MG TABLET    Take 20 mg by mouth nightly    TRAMADOL (ULTRAM) 50 MG TABLET    Take 50 mg by mouth every 6 hours as needed for Pain. Vitals signs and nursing note reviewed. No data found. Physical Exam  Vitals and nursing note reviewed. Constitutional:       Appearance: Normal appearance. HENT:      Head: Normocephalic and atraumatic. Cardiovascular:      Rate and Rhythm: Normal rate and regular rhythm. Pulmonary:      Effort: Pulmonary effort is normal. No respiratory distress. Breath sounds: Normal breath sounds. No wheezing. Abdominal:      General: Bowel sounds are normal.      Palpations: Abdomen is soft. Tenderness: There is no abdominal tenderness. Musculoskeletal:         General: No swelling. Normal range of motion. Cervical back: Normal range of motion. No tenderness. Skin:     General: Skin is warm and dry. Neurological:      General: No focal deficit present. Mental Status: She is alert and oriented to person, place, and time. Cranial Nerves: No cranial nerve deficit.         Procedures    ED EKG Interpretation  EKG was interpreted in the absence of a cardiologist.    Rate: Rate: Normal  EKG Interpretation: EKG Interpretation: sinus rhythm  ST Segments: Nonspecific ST segments - NO STEMI    Labs Reviewed   CBC WITH AUTO DIFFERENTIAL - Abnormal; Notable for the following components:       Result Value    WBC 18.0 (*)     RBC 3.57 (*)     .6 (*)     MCH 35.9 (*)     RDW 16.4 (*)     Platelets 841 (*)     Seg Neutrophils 81 (*)     Lymphocytes 6 (*)     Segs Absolute 14.6 (*)     Absolute Immature Granulocyte 0.6 (*)     All other components within normal limits   COMPREHENSIVE METABOLIC PANEL - Abnormal; Notable for the following components:    Sodium 134 (*)     Glucose 142 (*)     BUN 26 (*)     Creatinine 1.50 (*)     GFR  43 (*)     GFR Non-African American 35 (*)     Albumin/Globulin Ratio 1.1 (*)     All other components within normal limits   URINALYSIS WITH REFLEX TO CULTURE - Abnormal; Notable for the following components:    Leukocyte Esterase, Urine TRACE (*)     BACTERIA, URINE 4+ (*)     All other components within normal limits   TSH - Abnormal; Notable for the following components:    TSH, 3RD GENERATION 9.680 (*)     All other components within normal limits   LACTIC ACID - Abnormal; Notable for the following components:    Lactic Acid, Plasma 2.7 (*)     All other components within normal limits   COVID-19, RAPID   TROPONIN   PROCALCITONIN   BRAIN NATRIURETIC PEPTIDE   LACTIC ACID        XR CHEST PORTABLE   Final Result   The lungs are clear. The heart is normal in size. No pneumothorax. No pleural effusions. Prior median sternotomy for valve replacement. Voice dictation software was used during the making of this note. This software is not perfect and grammatical and other typographical errors may be present. This note has not been completely proofread for errors.      Vincent Milan III, MD  08/16/22 [id number]

## 2022-08-16 NOTE — ED TRIAGE NOTES
Pt ambulatory with cane assistance to triage for reports of generalized weakness & fatigue. Pt states she has recently had 2 TEEs & a watchman placed, which at the time of the second PROSPER, pt states she was changed from eliquis to plavix. Pt she recently fell last Friday onto her L hip which she also has had torn ligaments on. Pt denies hitting head or losing consciousness & seen at [facility] after previous fall episodes. Pt denies any true chest pain or shortness of breath but does report tremors. Pt a&ox4.

## 2022-08-17 ENCOUNTER — HOSPITAL ENCOUNTER (INPATIENT)
Age: 82
LOS: 2 days | Discharge: HOME | DRG: 872 | End: 2022-08-19
Attending: FAMILY MEDICINE
Payer: MEDICARE

## 2022-08-17 PROBLEM — Z95.818 PRESENCE OF WATCHMAN LEFT ATRIAL APPENDAGE CLOSURE DEVICE: Status: ACTIVE | Noted: 2022-04-20

## 2022-08-17 PROBLEM — E87.1 HYPONATREMIA: Status: ACTIVE | Noted: 2022-08-17

## 2022-08-17 PROBLEM — I48.0 PAROXYSMAL A-FIB (HCC): Status: ACTIVE | Noted: 2022-04-19

## 2022-08-17 PROBLEM — N17.9 AKI (ACUTE KIDNEY INJURY) (HCC): Status: ACTIVE | Noted: 2022-08-17

## 2022-08-17 PROBLEM — A41.9 SEPSIS (HCC): Status: ACTIVE | Noted: 2022-08-17

## 2022-08-17 LAB
ANION GAP SERPL CALC-SCNC: 7 MMOL/L (ref 7–16)
BASOPHILS # BLD: 0.1 K/UL (ref 0–0.2)
BASOPHILS NFR BLD: 1 % (ref 0–2)
BUN SERPL-MCNC: 18 MG/DL (ref 8–23)
CALCIUM SERPL-MCNC: 7.9 MG/DL (ref 8.3–10.4)
CHLORIDE SERPL-SCNC: 104 MMOL/L (ref 98–107)
CO2 SERPL-SCNC: 25 MMOL/L (ref 21–32)
CREAT SERPL-MCNC: 1.01 MG/DL (ref 0.6–1)
DIFFERENTIAL METHOD BLD: ABNORMAL
EKG ATRIAL RATE: 80 BPM
EKG DIAGNOSIS: NORMAL
EKG P AXIS: 11 DEGREES
EKG P-R INTERVAL: 182 MS
EKG Q-T INTERVAL: 456 MS
EKG QRS DURATION: 154 MS
EKG QTC CALCULATION (BAZETT): 525 MS
EKG R AXIS: 256 DEGREES
EKG T AXIS: 74 DEGREES
EKG VENTRICULAR RATE: 80 BPM
EOSINOPHIL # BLD: 0.4 K/UL (ref 0–0.8)
EOSINOPHIL NFR BLD: 2 % (ref 0.5–7.8)
ERYTHROCYTE [DISTWIDTH] IN BLOOD BY AUTOMATED COUNT: 16.2 % (ref 11.9–14.6)
GLUCOSE SERPL-MCNC: 100 MG/DL (ref 65–100)
HCT VFR BLD AUTO: 31.3 % (ref 35.8–46.3)
HGB BLD-MCNC: 11 G/DL (ref 11.7–15.4)
IMM GRANULOCYTES # BLD AUTO: 0.5 K/UL (ref 0–0.5)
IMM GRANULOCYTES NFR BLD AUTO: 3 % (ref 0–5)
LACTATE SERPL-SCNC: 1.9 MMOL/L (ref 0.4–2)
LYMPHOCYTES # BLD: 1.5 K/UL (ref 0.5–4.6)
LYMPHOCYTES NFR BLD: 10 % (ref 13–44)
MAGNESIUM SERPL-MCNC: 1.9 MG/DL (ref 1.8–2.4)
MCH RBC QN AUTO: 35.7 PG (ref 26.1–32.9)
MCHC RBC AUTO-ENTMCNC: 35.1 G/DL (ref 31.4–35)
MCV RBC AUTO: 101.6 FL (ref 79.6–97.8)
MONOCYTES # BLD: 1.1 K/UL (ref 0.1–1.3)
MONOCYTES NFR BLD: 8 % (ref 4–12)
NEUTS SEG # BLD: 11.1 K/UL (ref 1.7–8.2)
NEUTS SEG NFR BLD: 76 % (ref 43–78)
NRBC # BLD: 0 K/UL (ref 0–0.2)
PLATELET # BLD AUTO: 526 K/UL (ref 150–450)
PMV BLD AUTO: 9.2 FL (ref 9.4–12.3)
POTASSIUM SERPL-SCNC: 3 MMOL/L (ref 3.5–5.1)
RBC # BLD AUTO: 3.08 M/UL (ref 4.05–5.2)
SODIUM SERPL-SCNC: 136 MMOL/L (ref 136–145)
WBC # BLD AUTO: 14.6 K/UL (ref 4.3–11.1)

## 2022-08-17 PROCEDURE — 85025 COMPLETE CBC W/AUTO DIFF WBC: CPT

## 2022-08-17 PROCEDURE — 2580000003 HC RX 258: Performed by: FAMILY MEDICINE

## 2022-08-17 PROCEDURE — 6370000000 HC RX 637 (ALT 250 FOR IP): Performed by: INTERNAL MEDICINE

## 2022-08-17 PROCEDURE — 97165 OT EVAL LOW COMPLEX 30 MIN: CPT

## 2022-08-17 PROCEDURE — 80048 BASIC METABOLIC PNL TOTAL CA: CPT

## 2022-08-17 PROCEDURE — 6360000002 HC RX W HCPCS: Performed by: FAMILY MEDICINE

## 2022-08-17 PROCEDURE — 83735 ASSAY OF MAGNESIUM: CPT

## 2022-08-17 PROCEDURE — 97161 PT EVAL LOW COMPLEX 20 MIN: CPT

## 2022-08-17 PROCEDURE — 36415 COLL VENOUS BLD VENIPUNCTURE: CPT

## 2022-08-17 PROCEDURE — 6370000000 HC RX 637 (ALT 250 FOR IP): Performed by: FAMILY MEDICINE

## 2022-08-17 PROCEDURE — 97535 SELF CARE MNGMENT TRAINING: CPT

## 2022-08-17 PROCEDURE — 97530 THERAPEUTIC ACTIVITIES: CPT

## 2022-08-17 PROCEDURE — 6360000002 HC RX W HCPCS: Performed by: INTERNAL MEDICINE

## 2022-08-17 PROCEDURE — 1100000000 HC RM PRIVATE

## 2022-08-17 RX ORDER — ONDANSETRON 2 MG/ML
4 INJECTION INTRAMUSCULAR; INTRAVENOUS EVERY 6 HOURS PRN
Status: DISCONTINUED | OUTPATIENT
Start: 2022-08-17 | End: 2022-08-19 | Stop reason: HOSPADM

## 2022-08-17 RX ORDER — SODIUM CHLORIDE 0.9 % (FLUSH) 0.9 %
5-40 SYRINGE (ML) INJECTION EVERY 12 HOURS SCHEDULED
Status: DISCONTINUED | OUTPATIENT
Start: 2022-08-17 | End: 2022-08-19 | Stop reason: HOSPADM

## 2022-08-17 RX ORDER — ACETAMINOPHEN 650 MG/1
650 SUPPOSITORY RECTAL EVERY 6 HOURS PRN
Status: DISCONTINUED | OUTPATIENT
Start: 2022-08-17 | End: 2022-08-19 | Stop reason: HOSPADM

## 2022-08-17 RX ORDER — SODIUM CHLORIDE 9 MG/ML
INJECTION, SOLUTION INTRAVENOUS PRN
Status: DISCONTINUED | OUTPATIENT
Start: 2022-08-17 | End: 2022-08-19 | Stop reason: HOSPADM

## 2022-08-17 RX ORDER — SODIUM CHLORIDE 0.9 % (FLUSH) 0.9 %
5-40 SYRINGE (ML) INJECTION PRN
Status: DISCONTINUED | OUTPATIENT
Start: 2022-08-17 | End: 2022-08-19 | Stop reason: HOSPADM

## 2022-08-17 RX ORDER — TRAMADOL HYDROCHLORIDE 50 MG/1
50 TABLET ORAL EVERY 6 HOURS PRN
Status: DISCONTINUED | OUTPATIENT
Start: 2022-08-17 | End: 2022-08-19 | Stop reason: HOSPADM

## 2022-08-17 RX ORDER — LANOLIN ALCOHOL/MO/W.PET/CERES
400 CREAM (GRAM) TOPICAL DAILY
Status: DISCONTINUED | OUTPATIENT
Start: 2022-08-17 | End: 2022-08-19 | Stop reason: HOSPADM

## 2022-08-17 RX ORDER — CLOPIDOGREL BISULFATE 75 MG/1
75 TABLET ORAL DAILY
Status: DISCONTINUED | OUTPATIENT
Start: 2022-08-17 | End: 2022-08-19 | Stop reason: HOSPADM

## 2022-08-17 RX ORDER — NITROFURANTOIN MACROCRYSTALS 50 MG/1
50 CAPSULE ORAL EVERY 6 HOURS SCHEDULED
Status: DISCONTINUED | OUTPATIENT
Start: 2022-08-17 | End: 2022-08-19 | Stop reason: HOSPADM

## 2022-08-17 RX ORDER — LOSARTAN POTASSIUM 50 MG/1
100 TABLET ORAL DAILY
Status: DISCONTINUED | OUTPATIENT
Start: 2022-08-17 | End: 2022-08-19 | Stop reason: HOSPADM

## 2022-08-17 RX ORDER — LORAZEPAM 1 MG/1
1 TABLET ORAL EVERY 4 HOURS PRN
Status: DISCONTINUED | OUTPATIENT
Start: 2022-08-17 | End: 2022-08-19 | Stop reason: HOSPADM

## 2022-08-17 RX ORDER — CALCIUM CARBONATE-CHOLECALCIFEROL TAB 250 MG-125 UNIT 250-125 MG-UNIT
1 TAB ORAL DAILY
Status: DISCONTINUED | OUTPATIENT
Start: 2022-08-17 | End: 2022-08-19 | Stop reason: HOSPADM

## 2022-08-17 RX ORDER — ENOXAPARIN SODIUM 100 MG/ML
30 INJECTION SUBCUTANEOUS DAILY
Status: DISCONTINUED | OUTPATIENT
Start: 2022-08-17 | End: 2022-08-17

## 2022-08-17 RX ORDER — AMIODARONE HYDROCHLORIDE 200 MG/1
200 TABLET ORAL DAILY
Status: DISCONTINUED | OUTPATIENT
Start: 2022-08-17 | End: 2022-08-19 | Stop reason: HOSPADM

## 2022-08-17 RX ORDER — POTASSIUM CHLORIDE 20 MEQ/1
40 TABLET, EXTENDED RELEASE ORAL ONCE
Status: COMPLETED | OUTPATIENT
Start: 2022-08-17 | End: 2022-08-17

## 2022-08-17 RX ORDER — ONDANSETRON 4 MG/1
4 TABLET, ORALLY DISINTEGRATING ORAL EVERY 8 HOURS PRN
Status: DISCONTINUED | OUTPATIENT
Start: 2022-08-17 | End: 2022-08-19 | Stop reason: HOSPADM

## 2022-08-17 RX ORDER — ATORVASTATIN CALCIUM 10 MG/1
10 TABLET, FILM COATED ORAL DAILY
Status: DISCONTINUED | OUTPATIENT
Start: 2022-08-17 | End: 2022-08-19 | Stop reason: HOSPADM

## 2022-08-17 RX ORDER — ASPIRIN 81 MG/1
81 TABLET ORAL DAILY
Status: DISCONTINUED | OUTPATIENT
Start: 2022-08-17 | End: 2022-08-19 | Stop reason: HOSPADM

## 2022-08-17 RX ORDER — POLYETHYLENE GLYCOL 3350 17 G/17G
17 POWDER, FOR SOLUTION ORAL DAILY PRN
Status: DISCONTINUED | OUTPATIENT
Start: 2022-08-17 | End: 2022-08-19 | Stop reason: HOSPADM

## 2022-08-17 RX ORDER — ENOXAPARIN SODIUM 100 MG/ML
40 INJECTION SUBCUTANEOUS EVERY 24 HOURS
Status: DISCONTINUED | OUTPATIENT
Start: 2022-08-17 | End: 2022-08-19 | Stop reason: HOSPADM

## 2022-08-17 RX ORDER — LANOLIN ALCOHOL/MO/W.PET/CERES
500 CREAM (GRAM) TOPICAL DAILY
Status: DISCONTINUED | OUTPATIENT
Start: 2022-08-17 | End: 2022-08-18

## 2022-08-17 RX ORDER — SODIUM CHLORIDE 9 MG/ML
INJECTION, SOLUTION INTRAVENOUS CONTINUOUS
Status: ACTIVE | OUTPATIENT
Start: 2022-08-17 | End: 2022-08-17

## 2022-08-17 RX ORDER — LEVOTHYROXINE SODIUM 88 UG/1
88 TABLET ORAL DAILY
Status: DISCONTINUED | OUTPATIENT
Start: 2022-08-18 | End: 2022-08-19 | Stop reason: HOSPADM

## 2022-08-17 RX ORDER — METOPROLOL SUCCINATE 25 MG/1
25 TABLET, EXTENDED RELEASE ORAL DAILY
Status: DISCONTINUED | OUTPATIENT
Start: 2022-08-17 | End: 2022-08-19 | Stop reason: HOSPADM

## 2022-08-17 RX ORDER — DOCUSATE SODIUM 100 MG/1
100 CAPSULE, LIQUID FILLED ORAL 2 TIMES DAILY
Status: DISCONTINUED | OUTPATIENT
Start: 2022-08-17 | End: 2022-08-19 | Stop reason: HOSPADM

## 2022-08-17 RX ORDER — HYDROCHLOROTHIAZIDE 25 MG/1
12.5 TABLET ORAL DAILY
Status: DISCONTINUED | OUTPATIENT
Start: 2022-08-17 | End: 2022-08-19 | Stop reason: HOSPADM

## 2022-08-17 RX ORDER — LANOLIN ALCOHOL/MO/W.PET/CERES
3 CREAM (GRAM) TOPICAL NIGHTLY PRN
Status: DISCONTINUED | OUTPATIENT
Start: 2022-08-17 | End: 2022-08-19 | Stop reason: HOSPADM

## 2022-08-17 RX ORDER — ACETAMINOPHEN 325 MG/1
650 TABLET ORAL EVERY 6 HOURS PRN
Status: DISCONTINUED | OUTPATIENT
Start: 2022-08-17 | End: 2022-08-19 | Stop reason: HOSPADM

## 2022-08-17 RX ADMIN — NITROFURANTOIN MACROCRYSTALS 50 MG: 50 CAPSULE ORAL at 22:56

## 2022-08-17 RX ADMIN — ASPIRIN 81 MG: 81 TABLET, COATED ORAL at 11:51

## 2022-08-17 RX ADMIN — AMIODARONE HYDROCHLORIDE 200 MG: 200 TABLET ORAL at 11:48

## 2022-08-17 RX ADMIN — LORAZEPAM 1 MG: 1 TABLET ORAL at 22:56

## 2022-08-17 RX ADMIN — SODIUM CHLORIDE: 9 INJECTION, SOLUTION INTRAVENOUS at 02:52

## 2022-08-17 RX ADMIN — SODIUM CHLORIDE, PRESERVATIVE FREE 10 ML: 5 INJECTION INTRAVENOUS at 23:01

## 2022-08-17 RX ADMIN — POTASSIUM CHLORIDE 40 MEQ: 1500 TABLET, EXTENDED RELEASE ORAL at 11:50

## 2022-08-17 RX ADMIN — ENOXAPARIN SODIUM 40 MG: 100 INJECTION SUBCUTANEOUS at 11:44

## 2022-08-17 RX ADMIN — ENOXAPARIN SODIUM 30 MG: 100 INJECTION SUBCUTANEOUS at 11:44

## 2022-08-17 RX ADMIN — Medication 3 MG: at 22:56

## 2022-08-17 RX ADMIN — TRAMADOL HYDROCHLORIDE 50 MG: 50 TABLET, COATED ORAL at 03:48

## 2022-08-17 RX ADMIN — LEVOTHYROXINE SODIUM 75 MCG: 0.07 TABLET ORAL at 05:50

## 2022-08-17 RX ADMIN — CLOPIDOGREL BISULFATE 75 MG: 75 TABLET ORAL at 11:51

## 2022-08-17 RX ADMIN — DOCUSATE SODIUM 100 MG: 100 CAPSULE ORAL at 19:25

## 2022-08-17 RX ADMIN — Medication 400 MG: at 11:44

## 2022-08-17 RX ADMIN — CYANOCOBALAMIN TAB 1000 MCG 500 MCG: 1000 TAB at 11:51

## 2022-08-17 RX ADMIN — ATORVASTATIN CALCIUM 10 MG: 10 TABLET, FILM COATED ORAL at 11:52

## 2022-08-17 RX ADMIN — CALCIUM CARBONATE-CHOLECALCIFEROL TAB 250 MG-125 UNIT 1 TABLET: 250-125 TAB at 11:48

## 2022-08-17 RX ADMIN — LORAZEPAM 1 MG: 1 TABLET ORAL at 03:48

## 2022-08-17 RX ADMIN — METOPROLOL SUCCINATE 25 MG: 25 TABLET, EXTENDED RELEASE ORAL at 11:48

## 2022-08-17 RX ADMIN — NITROFURANTOIN MACROCRYSTALS 50 MG: 50 CAPSULE ORAL at 19:25

## 2022-08-17 RX ADMIN — TRAMADOL HYDROCHLORIDE 50 MG: 50 TABLET, COATED ORAL at 22:56

## 2022-08-17 ASSESSMENT — PAIN SCALES - GENERAL
PAINLEVEL_OUTOF10: 0
PAINLEVEL_OUTOF10: 4
PAINLEVEL_OUTOF10: 7

## 2022-08-17 ASSESSMENT — PAIN DESCRIPTION - LOCATION
LOCATION: BACK
LOCATION: GENERALIZED

## 2022-08-17 ASSESSMENT — PAIN - FUNCTIONAL ASSESSMENT: PAIN_FUNCTIONAL_ASSESSMENT: ACTIVITIES ARE NOT PREVENTED

## 2022-08-17 ASSESSMENT — PAIN DESCRIPTION - DESCRIPTORS
DESCRIPTORS: ACHING
DESCRIPTORS: ACHING

## 2022-08-17 NOTE — PROGRESS NOTES
PHYSICAL THERAPY Initial Assessment and PM  (Link to Caseload Tracking: PT Visit Days : 1  Acknowledge Orders  Time In/Out  PT Charge Capture  Rehab Caseload Tracker    Sharan Apple is a 78 y.o. female   PRIMARY DIAGNOSIS: Sepsis ([address]  UTI (urinary tract infection) [N39.0]       Reason for Referral: Generalized Muscle Weakness (M62.81)  Difficulty in walking, Not elsewhere classified (R26.2)  Inpatient: Payor: MEDICARE / Plan: MEDICARE PART A AND B / Product Type: *No Product type* /     ASSESSMENT:     REHAB RECOMMENDATIONS:   Recommendation to date pending progress:  Setting:  TBD-HH vs none    Equipment:    None     ASSESSMENT:  Ms. Dubois Rolling admitted with above diagnoses. Patient demonstrates generalized weakness affecting her balance, mobility, tolerance for activity, and overall independence. She is independent/modified independent at baseline with occasional use of cane. She did have a fall last week (missed a step) and bruised her L hip. Patient would benefit from therapy during her admission to address above deficits. Recommend patient return home at d/c. Will determine need for further therapy at home closer to time of d/c. Patient participated well today. Easily agreeable and anxious to walk. She reported feeling a bit more unsteady than typical and chose to use a cane  but did not use it consistently. She was CGA at most for gait. Patient able to perform hygiene at sink in standing with only SBA. Expect patient will progress well and she is eager to work with therapy.        [address] [facility] [address] [id number] AM-PAC 6 Clicks Basic Mobility Inpatient Short Form  AM-PAC Mobility Inpatient   How much difficulty turning over in bed?: A Little  How much difficulty sitting down on / standing up from a chair with arms?: A Little  How much difficulty moving from lying on back to sitting on side of bed?: A Little  How much help from another person moving to and from a bed to a chair?: A Little  How much help from another person needed to walk in hospital room?: A Little  How much help from another person for climbing 3-5 steps with a railing?: A Little  AM-PAC Inpatient Mobility Raw Score : 18  AM-PAC Inpatient T-Scale Score : 43.63  Mobility Inpatient CMS 0-100% Score: 46.58  Mobility Inpatient CMS G-Code Modifier : CK    SUBJECTIVE:   Ms. Xochitl Valle states, \"I'd like to walk\".      Social/Functional Lives With: Alone  Type of Home: House  Home Layout: Two level, Able to Live on Main level with bedroom/bathroom (goes downstairs 1x/week for laundry)  Entrance Stairs - Number of Steps: 2  Bathroom Shower/Tub: Walk-in shower  Bathroom Toilet: Handicap height  Bathroom Equipment: Shower chair  Home Equipment: Aldair Lingo, rolling  Has the patient had two or more falls in the past year or any fall with injury in the past year?: No (endorses 1 recent fall)  Receives Help From: Family  ADL Assistance: Independent  Homemaking Assistance: Independent  Ambulation Assistance: Independent  Transfer Assistance: Independent  Active : Yes  Mode of Transportation: Car  Occupation: Retired    OBJECTIVE:     PAIN: VITALS / O2: PRECAUTION / Chantel Bryant / Redia Contreras:   Pre Treatment:   Pain Assessment: None - Denies Pain      Post Treatment: 0 Vitals        Oxygen      IV    RESTRICTIONS/PRECAUTIONS:  Restrictions/Precautions: Fall Risk                 GROSS EVALUATION: Intact Impaired (Comments):   AROM []  decreased   PROM []    Strength []  decreased   Balance [] Sitting - Static: Good  Sitting - Dynamic: Good  Standing - Static: Fair, +  Standing - Dynamic: Fair   Posture [] Forward Head  Rounded Shoulders   Sensation []     Coordination [x]      Tone []     Edema []    Activity Tolerance []  decreased    []      COGNITION/  PERCEPTION: Intact Impaired (Comments):   Orientation [x]     Vision [x]     Hearing [x]     Cognition  [x]       MOBILITY: I Mod I S SBA CGA Min Mod Max Total  NT x2 Comments:   Bed Mobility    Rolling [] [] [] [] [] [] [] [] [] [] []    Supine to Sit [] [] [] [] [] [] [] [] [] [] []    Scooting [] [] [] [] [] [] [] [] [] [] []    Sit to Supine [] [] [] [] [] [] [] [] [] [] []    Transfers    Sit to Stand [] [] [] [x] [x] [] [] [] [] [] []    Bed to Chair [] [] [] [] [x] [] [] [] [] [] []    Stand to Sit [] [] [] [x] [] [] [] [] [] [] []     [] [] [] [] [] [] [] [] [] [] []    I=Independent, Mod I=Modified Independent, S=Supervision, SBA=Standby Assistance, CGA=Contact Guard Assistance,   Min=Minimal Assistance, Mod=Moderate Assistance, Max=Maximal Assistance, Total=Total Assistance, NT=Not Tested    GAIT: I Mod I S SBA CGA Min Mod Max Total  NT x2 Comments:   Level of Assistance [] [] [] [] [x] [] [] [] [] [] []    Distance 150 (x 1; 125' x 1) feet    DME Gait Belt and SPC    Gait Quality Decreased zach , Decreased step length, and Trunk sway increased    Weightbearing Status      Stairs      I=Independent, Mod I=Modified Independent, S=Supervision, SBA=Standby Assistance, CGA=Contact Guard Assistance,   Min=Minimal Assistance, Mod=Moderate Assistance, Max=Maximal Assistance, Total=Total Assistance, NT=Not Tested    PLAN:   ACUTE PHYSICAL THERAPY GOALS:   (Developed with and agreed upon by patient and/or caregiver.)   Patient will demonstrate independence with bed mobility within 5 treatment days. Patient will transfer between the bed and chair and/or toilet with supervision within 5 treatment days. Patient will ambulate 300' with the least restrictive device needed with supervision within 5 treatment days. Patient will ambulate up and down 2 steps with railing as available with supervision within 5 treatment days. Patient will ambulate up and down a flight of steps with a railing and supervision within 5 treatment days.     FREQUENCY AND DURATION: Daily for duration of hospital stay or until stated goals are met, whichever comes first.    THERAPY PROGNOSIS: Good    PROBLEM LIST:   (Skilled intervention is medically necessary to address:)  Decreased ADL/Functional Activities  Decreased Activity Tolerance  Decreased AROM/PROM  Decreased Balance  Decreased Gait Ability  Decreased Strength  Decreased Transfer Abilities INTERVENTIONS PLANNED:   (Benefits and precautions of physical therapy have been discussed with the patient.)  Therapeutic Activity  Therapeutic Exercise/HEP  Neuromuscular Re-education  Gait Training  Manual Therapy  Modalities  Education       TREATMENT:   EVALUATION: LOW COMPLEXITY: (Untimed Charge)    TREATMENT:   Therapeutic Activity (15 Minutes): Therapeutic activity included Transfer Training, Ambulation on level ground, Sitting balance , and Standing balance to improve functional Activity tolerance, Balance, Coordination, Mobility, and Strength.     TREATMENT GRID:  N/A    AFTER TREATMENT PRECAUTIONS: Bed/Chair Locked, Call light within reach, Chair, Needs within reach, and Visitors at bedside    INTERDISCIPLINARY COLLABORATION:  RN/ PCT and OT/ [place]    EDUCATION: Education Given To: Patient  Education Provided: Role of Therapy;Plan of Care  Education Outcome: Verbalized understanding    TIME IN/OUT:  Time In: 1430  Time Out: 601 [address]  Minutes: Lizy Knight 61, PT

## 2022-08-17 NOTE — PROGRESS NOTES
Intolerant to PCN/ cephalosporins, cipro contraindicated with amiodarone, ok to trial Macrobid for UTI  Phoebe Cortez MD

## 2022-08-17 NOTE — PROGRESS NOTES
Hospitalist Progress Note   Admit Date:  2022 12:00 AM   Name:  Dai Matute   Age:  78 y.o. Sex:  female  :  1940   MRN:  [id number]   Room:  [facility]    Presenting Complaint: No chief complaint on file. Reason(s) for Admission: UTI (urinary tract infection) [N39.0]     Hospital Course & Interval History:     Ms. Celi Bob is a 77 yo female with PMH of HTN, AFIB s/p watchman, CKD, hypothyroidism, AAA, anxiety admitted with weakness, UTI, hyponatremia, HUNG. She is on course of rocephin for UTI. Culture pending. Met sepsis criteria due to lactic acid 2.7/ leukocytosis. She has been hydrated for her HUNG that is improving. Hyponatremia resolved. Found with elevated TSH and synthroid adjusted to 75 mcg. FULL CODE. Discharge plans pending. Lives alone and does ok. Subjective/24hr Events (22): Not sure how she feels yet, was sweating and feeling poorly, shaking, had recent fall and hit her left hip with hematoma that is somewhat  painful , doig PT, no fever, has a lot of malaise, no chest pain, no dyspnea, eating ok, has constipation, has some anxiety and insomnia       Assessment & Plan:       Sepsis due to UTI:  Principal Problem:    UTI (urinary tract infection)  Plan:   D2 rocephin   Followup culture         Active Problems:    Paroxysmal A-fib ([address]  Plan:   S/p watchman  On amiodarone and asa  On chronic plavix through          HUNG (acute kidney injury) ([address]  Plan:    On CKD   CKD (chronic kidney disease) stage 3, GFR 30-59 ml/min ([facility])  Plan:  Improved with IVF           Hyponatremia  Plan:   Resolved-            Hypothyroidism  Plan:   Increase synthroid 88 mcg daily  Will need 8 week followup lab check with PCP             Aortic aneurysm ([facility])  Plan:   Noted             HTN (hypertension)  Plan:   HCTZ / Kadi Schanz on hold due to HUNG  Continued metoprolol           Hypokalemia:  Replace and repeat lab      Discharge Planning: Pending     Diet:  ADULT DIET; Regular  DVT PPx: lovenox   Code status: Full Code    Hospital Problems:  Principal Problem:    UTI (urinary tract infection)  Active Problems:    Paroxysmal A-fib (HCC)    HUNG (acute kidney injury) (HCC)    Hyponatremia    Hypothyroidism    CKD (chronic kidney disease) stage 3, GFR 30-59 ml/min (HCC)    Aortic aneurysm (HCC)    HTN (hypertension)    Presence of Watchman left atrial appendage closure device  Resolved Problems:    * No resolved hospital problems. *      Objective:   Patient Vitals for the past 24 hrs:   Temp Pulse Resp BP SpO2   08/17/22 0800 98.6 °F (37 °C) 65 17 133/64 97 %   08/17/22 0348 -- -- 18 -- --   08/17/22 0315 98.6 °F (37 °C) 70 17 (!) 141/67 --   08/17/22 0015 98.4 °F (36.9 °C) 84 16 (!) 142/72 --       Oxygen Therapy  SpO2: 97 %  O2 Device: None (Room air)    Estimated body mass index is 26.63 kg/m² as calculated from the following:    Height as of this encounter: 5' 5\" (1.651 m). Weight as of this encounter: 160 lb (72.6 kg). No intake or output data in the 24 hours ending 08/17/22 1006      Physical Exam:     Blood pressure 133/64, pulse 65, temperature 98.6 °F (37 °C), temperature source Oral, resp. rate 17, height 5' 5\" (1.651 m), weight 160 lb (72.6 kg), SpO2 97 %. General:    Well nourished. Alert, no distress, pleasant   Head:  Normocephalic, atraumatic  Eyes:  Sclerae appear normal.  Pupils equally round. ENT:  Nares appear normal, no drainage. Moist oral mucosa  Neck:  No restricted ROM. Trachea midline   CV:   RRR. III/VI HERIBERTO LUSB, no edema   Lungs:   CTAB. No wheezing, rhonchi, or rales. Symmetric expansion. Anterior   Abdomen: Bowel sounds present. Soft, nontender, nondistended. Extremities: No cyanosis or clubbing. No edema  Skin:     No rashes and normal coloration. Warm and dry. Neuro:   grossly intact. Psych:  Normal mood and affect.       I have personally reviewed labs and tests showing:  Recent Labs:  Recent Results (from the past 48 hour(s))   Protime-INR    Collection Time: 08/15/22 10:34 AM   Result Value Ref Range    Protime 12.9 12.6 - 14.5 sec    INR 0.9     CBC with Auto Differential    Collection Time: 08/15/22 10:34 AM   Result Value Ref Range    WBC 15.4 (H) 4.3 - 11.1 K/uL    RBC 3.63 (L) 4.05 - 5.2 M/uL    Hemoglobin 12.9 11.7 - 15.4 g/dL    Hematocrit 37.8 35.8 - 46.3 %    .1 (H) 79.6 - 97.8 FL    MCH 35.5 (H) 26.1 - 32.9 PG    MCHC 34.1 31.4 - 35.0 g/dL    RDW 16.6 (H) 11.9 - 14.6 %    Platelets 208 (H) 319 - 450 K/uL    MPV 10.2 9.4 - 12.3 FL    nRBC 0.00 0.0 - 0.2 K/uL    Seg Neutrophils 69 43 - 78 %    Lymphocytes 11 (L) 13 - 44 %    Monocytes 10 4.0 - 12.0 %    Eosinophils % 2 0.5 - 7.8 %    Basophils 2 0.0 - 2.0 %    Immature Granulocytes 6 (H) 0.0 - 5.0 %    Segs Absolute 10.7 (H) 1.7 - 8.2 K/UL    Absolute Lymph # 1.7 0.5 - 4.6 K/UL    Absolute Mono # 1.5 (H) 0.1 - 1.3 K/UL    Absolute Eos # 0.3 0.0 - 0.8 K/UL    Basophils Absolute 0.3 (H) 0.0 - 0.2 K/UL    Absolute Immature Granulocyte 0.9 (H) 0.0 - 0.5 K/UL    RBC Comment SLIGHT  MACROCYTOSIS        RBC Comment SLIGHT  ANISOCYTOSIS + POIKILOCYTOSIS        WBC Comment Result Confirmed By Smear      Platelet Comment ADEQUATE      Differential Type AUTOMATED     EKG 12 Lead    Collection Time: 08/16/22  3:44 PM   Result Value Ref Range    Ventricular Rate 80 BPM    Atrial Rate 80 BPM    P-R Interval 182 ms    QRS Duration 154 ms    Q-T Interval 456 ms    QTc Calculation (Bazett) 525 ms    P Axis 11 degrees    R Axis 256 degrees    T Axis 74 degrees    Diagnosis Normal sinus rhythm    CBC with Auto Differential    Collection Time: 08/16/22  4:00 PM   Result Value Ref Range    WBC 18.0 (H) 4.3 - 11.1 K/uL    RBC 3.57 (L) 4.05 - 5.2 M/uL    Hemoglobin 12.8 11.7 - 15.4 g/dL    Hematocrit 37.7 35.8 - 46.3 %    .6 (H) 79.6 - 97.8 FL    MCH 35.9 (H) 26.1 - 32.9 PG    MCHC 34.0 31.4 - 35.0 g/dL    RDW 16.4 (H) 11.9 - 14.6 %    Platelets 654 (H) 150 - 450 K/uL    MPV 9.7 9.4 - 12.3 FL    nRBC 0.00 0.0 - 0.2 K/uL    Differential Type AUTOMATED      Seg Neutrophils 81 (H) 43 - 78 %    Lymphocytes 6 (L) 13 - 44 %    Monocytes 7 4.0 - 12.0 %    Eosinophils % 1 0.5 - 7.8 %    Basophils 1 0.0 - 2.0 %    Immature Granulocytes 3 0.0 - 5.0 %    Segs Absolute 14.6 (H) 1.7 - 8.2 K/UL    Absolute Lymph # 1.1 0.5 - 4.6 K/UL    Absolute Mono # 1.3 0.1 - 1.3 K/UL    Absolute Eos # 0.3 0.0 - 0.8 K/UL    Basophils Absolute 0.2 0.0 - 0.2 K/UL    Absolute Immature Granulocyte 0.6 (H) 0.0 - 0.5 K/UL   Comprehensive Metabolic Panel    Collection Time: 08/16/22  4:00 PM   Result Value Ref Range    Sodium 134 (L) 136 - 145 mmol/L    Potassium 3.7 3.5 - 5.1 mmol/L    Chloride 103 98 - 107 mmol/L    CO2 24 21 - 32 mmol/L    Anion Gap 7 7 - 16 mmol/L    Glucose 142 (H) 65 - 100 mg/dL    BUN 26 (H) 8 - 23 MG/DL    Creatinine 1.50 (H) 0.6 - 1.0 MG/DL    GFR  43 (L) >60 ml/min/1.73m2    GFR Non- 35 (L) >60 ml/min/1.73m2    Calcium 8.5 8.3 - 10.4 MG/DL    Total Bilirubin 0.8 0.2 - 1.1 MG/DL    ALT 36 12 - 65 U/L    AST 32 15 - 37 U/L    Alk Phosphatase 63 50 - 136 U/L    Total Protein 6.8 6.3 - 8.2 g/dL    Albumin 3.5 3.2 - 4.6 g/dL    Globulin 3.3 2.3 - 3.5 g/dL    Albumin/Globulin Ratio 1.1 (L) 1.2 - 3.5     Troponin    Collection Time: 08/16/22  4:00 PM   Result Value Ref Range    Troponin, High Sensitivity 8.6 0 - 14 pg/mL   COVID-19, Rapid    Collection Time: 08/16/22  4:00 PM    Specimen: Nasopharyngeal   Result Value Ref Range    Source NASAL      SARS-CoV-2, Rapid Not detected NOTD     TSH    Collection Time: 08/16/22  4:00 PM   Result Value Ref Range    TSH, 3RD GENERATION 9.680 (H) 0.358 - 3.740 uIU/mL   Procalcitonin    Collection Time: 08/16/22  4:00 PM   Result Value Ref Range    Procalcitonin 0.14 0.00 - 0.49 ng/mL   Urinalysis with Reflex to Culture    Collection Time: 08/16/22  8:42 PM    Specimen: Urine   Result Value Ref Range    Color, UA YELLOW/STRAW      Appearance CLEAR      Specific [place], UA 1.011 1.001 - 1.023      pH, Urine 7.0 5.0 - 9.0      Protein, UA Negative NEG mg/dL    Glucose, UA Negative mg/dL    Ketones, Urine Negative NEG mg/dL    Bilirubin Urine Negative NEG      Blood, Urine Negative NEG      Urobilinogen, Urine 0.2 0.2 - 1.0 EU/dL    Nitrite, Urine Negative NEG      Leukocyte Esterase, Urine TRACE (A) NEG      WBC, UA 3-5 0 /hpf    RBC, UA 0 0 /hpf    BACTERIA, URINE 4+ (H) 0 /hpf    Urine Culture if Indicated CULTURE NOT INDICATED BY UA RESULT      Epithelial Cells UA 0-3 0 /hpf    Casts 0 0 /lpf    Crystals 0 0 /LPF    Mucus, UA 0 0 /lpf    OTHER OBSERVATIONS RESULTS VERIFIED MANUALLY     Lactic Acid    Collection Time: 08/16/22  8:42 PM   Result Value Ref Range    Lactic Acid, Plasma 2.7 (H) 0.4 - 2.0 MMOL/L   Brain Natriuretic Peptide    Collection Time: 08/16/22 11:03 PM   Result Value Ref Range    NT Pro-BNP 1,458 (H) <450 PG/ML   Lactic Acid    Collection Time: 08/16/22 11:03 PM   Result Value Ref Range    Lactic Acid, Plasma 1.9 0.4 - 2.0 MMOL/L   Culture, Blood 1    Collection Time: 08/16/22 11:03 PM    Specimen: Blood   Result Value Ref Range    Special Requests RIGHT  Antecubital        Culture NO GROWTH AFTER 8 HOURS     Basic Metabolic Panel w/ Reflex to MG    Collection Time: 08/17/22  6:09 AM   Result Value Ref Range    Sodium 136 136 - 145 mmol/L    Potassium 3.0 (L) 3.5 - 5.1 mmol/L    Chloride 104 98 - 107 mmol/L    CO2 25 21 - 32 mmol/L    Anion Gap 7 7 - 16 mmol/L    Glucose 100 65 - 100 mg/dL    BUN 18 8 - 23 MG/DL    Creatinine 1.01 (H) 0.6 - 1.0 MG/DL    GFR African American >60 >60 ml/min/1.73m2    GFR Non- 56 (L) >60 ml/min/1.73m2    Calcium 7.9 (L) 8.3 - 10.4 MG/DL   CBC with Auto Differential    Collection Time: 08/17/22  6:09 AM   Result Value Ref Range    WBC 14.6 (H) 4.3 - 11.1 K/uL    RBC 3.08 (L) 4.05 - 5.2 M/uL    Hemoglobin 11.0 (L) 11.7 - 15.4 g/dL    Hematocrit 31.3 (L) 35.8 - 46.3 %    .6 (H) 79.6 - 97.8 FL    MCH 35.7 (H) 26.1 - 32.9 PG    MCHC 35.1 (H) 31.4 - 35.0 g/dL    RDW 16.2 (H) 11.9 - 14.6 %    Platelets 082 (H) 978 - 450 K/uL    MPV 9.2 (L) 9.4 - 12.3 FL    nRBC 0.00 0.0 - 0.2 K/uL    Differential Type AUTOMATED      Seg Neutrophils 76 43 - 78 %    Lymphocytes 10 (L) 13 - 44 %    Monocytes 8 4.0 - 12.0 %    Eosinophils % 2 0.5 - 7.8 %    Basophils 1 0.0 - 2.0 %    Immature Granulocytes 3 0.0 - 5.0 %    Segs Absolute 11.1 (H) 1.7 - 8.2 K/UL    Absolute Lymph # 1.5 0.5 - 4.6 K/UL    Absolute Mono # 1.1 0.1 - 1.3 K/UL    Absolute Eos # 0.4 0.0 - 0.8 K/UL    Basophils Absolute 0.1 0.0 - 0.2 K/UL    Absolute Immature Granulocyte 0.5 0.0 - 0.5 K/UL   Magnesium    Collection Time: 08/17/22  6:09 AM   Result Value Ref Range    Magnesium 1.9 1.8 - 2.4 mg/dL       I have personally reviewed imaging studies showing:   Other Studies:  No orders to display       Current Meds:  Current Facility-Administered Medications   Medication Dose Route Frequency    [Held by provider] losartan (COZAAR) tablet 100 mg  100 mg Oral Daily    [Held by provider] hydroCHLOROthiazide (HYDRODIURIL) tablet 12.5 mg  12.5 mg Oral Daily    traMADol (ULTRAM) tablet 50 mg  50 mg Oral Q6H PRN    magnesium oxide (MAG-OX) tablet 400 mg  400 mg Oral Daily    amiodarone (CORDARONE) tablet 200 mg  200 mg Oral Daily    LORazepam (ATIVAN) tablet 1 mg  1 mg Oral Q4H PRN    metoprolol succinate (TOPROL XL) extended release tablet 25 mg  25 mg Oral Daily    vitamin B-12 (CYANOCOBALAMIN) tablet 500 mcg  500 mcg Oral Daily    aspirin EC tablet 81 mg  81 mg Oral Daily    calcium carb-cholecalciferol 250-125 MG-UNIT per tab 1 tablet  1 tablet Oral Daily    clopidogrel (PLAVIX) tablet 75 mg  75 mg Oral Daily    atorvastatin (LIPITOR) tablet 10 mg  10 mg Oral Daily    sodium chloride flush 0.9 % injection 5-40 mL  5-40 mL IntraVENous 2 times per day    sodium chloride flush 0.9 % injection 5-40 mL  5-40 mL IntraVENous PRN    0.9 % sodium chloride infusion   IntraVENous PRN    enoxaparin Sodium (LOVENOX) injection 30 mg  30 mg SubCUTAneous Daily    ondansetron (ZOFRAN-ODT) disintegrating tablet 4 mg  4 mg Oral Q8H PRN    Or    ondansetron (ZOFRAN) injection 4 mg  4 mg IntraVENous Q6H PRN    polyethylene glycol (GLYCOLAX) packet 17 g  17 g Oral Daily PRN    acetaminophen (TYLENOL) tablet 650 mg  650 mg Oral Q6H PRN    Or    acetaminophen (TYLENOL) suppository 650 mg  650 mg Rectal Q6H PRN    0.9 % sodium chloride infusion   IntraVENous Continuous    [Held by provider] cefTRIAXone (ROCEPHIN) 1,000 mg in sodium chloride 0.9 % 50 mL IVPB mini-bag  1,000 mg IntraVENous Q24H    potassium chloride (KLOR-CON M) extended release tablet 40 mEq  40 mEq Oral Once    levothyroxine (SYNTHROID) tablet 75 mcg  75 mcg Oral Daily       Signed:  Nico Elizalde MD    Part of this note may have been written by using a voice dictation software. The note has been proof read but may still contain some grammatical/other typographical errors.

## 2022-08-17 NOTE — PROGRESS NOTES
OCCUPATIONAL THERAPY Initial Assessment and Daily Note       OT Visit Days: 1  Acknowledge Orders  Time  OT Charge Capture  Rehab Caseload Tracker      Davina Santana is a 78 y.o. female   PRIMARY DIAGNOSIS: Sepsis ([address]  UTI (urinary tract infection) [N39.0]       Reason for Referral: Generalized Muscle Weakness (M62.81)  Difficulty in walking, Not elsewhere classified (R26.2)  Other abnormalities of gait and mobility (R26.89)  History of falling (Z91.81)  Inpatient: Payor: MEDICARE / Plan: MEDICARE PART A AND B / Product Type: *No Product type* /     ASSESSMENT:     REHAB RECOMMENDATIONS:   Recommendation to date pending progress:  Setting:  Home Health Therapy    Equipment:    To Be Determined--pt has SPC, RW, SC and raised toilet seat at home     ASSESSMENT:  Ms. Echo Singh is an 77 y/o female presents with UTI, generalized weakness and sepsis. At baseline pt lives alone, is independent with all ADLs, endorses 1 recent fall and uses SPC PRN. Today pt presents with decreased activity tolerance, balance and mobility impacting ADLs. Pt overall CGA SPC for functional transfers and mobility of household distances. Pt admits to feeling more unsteady than usual. Pt SBA for grooming ADLs standing at sink and required short seated rest break before another bout of ambulation. Pt SOB with activity and is limited by arthritic joints. Pt is currently functioning below baseline and would benefit from skilled OT services to address OT goals and plan of care. Rec HHOT at d/c.       [facility] [address] [id number] AM-PAC 6 Clicks Daily Activity Inpatient Short Form:    AM-PAC Daily Activity Inpatient   How much help for putting on and taking off regular lower body clothing?: A Little  How much help for Bathing?: A Little  How much help for Toileting?: A Little  How much help for putting on and taking off regular upper body clothing?: None  How much help for taking care of personal grooming?: A Little  How much help for eating meals?: None  AM-PAC Inpatient Daily Activity Raw Score: 20  AM-PAC Inpatient ADL T-Scale Score : 42.03  ADL Inpatient CMS 0-100% Score: 38.32  ADL Inpatient CMS G-Code Modifier : CJ           SUBJECTIVE:     Ms. Lyle Blanco states, \"Do you hear my joints creaking? \"     Social/Functional Lives With: Alone  Type of Home: House  Home Layout: Two level, Able to Live on Main level with bedroom/bathroom (goes downstairs 1x/week for laundry)  Entrance Stairs - Number of Steps: 2  Bathroom Shower/Tub: Walk-in shower  Bathroom Toilet: Handicap height  Bathroom Equipment: Shower chair  Home Equipment: [place], rolling  Has the patient had two or more falls in the past year or any fall with injury in the past year?: No (endorses 1 recent fall)  Receives Help From: Family  ADL Assistance: [address] [place] [address] Avenue: Independent  Ambulation Assistance: Independent  Transfer Assistance: Independent  Active : Yes  Mode of Transportation: Car  Occupation: Retired    OBJECTIVE:     Lambert Bryan / Vamsi Robles / Vasiliy Lauth: IV    RESTRICTIONS/PRECAUTIONS:  Restrictions/Precautions: Fall Risk    PAIN: Graceann Bunk / O2:   Pre Treatment:   Pain Assessment: None - Denies Pain    Pt reported joint pain after ambulation, but baseline level of pain for pt    Post Treatment: no c/o pain, resting comfortably in bedside chair   Vitals          Oxygen            GROSS EVALUATION: INTACT IMPAIRED   (See Comments)   UE AROM [x] []   UE PROM [x] []   Strength [x]       Posture / Balance [] Posture: Good  Sitting - Static: Good  Sitting - Dynamic: Good  Standing - Static: Fair, +  Standing - Dynamic: Fair   Sensation [x]     Coordination [x]       Tone [x]       Edema [x]    Activity Tolerance [] Patient limited by fatigue, Patient limited by pain, Patient Tolerated treatment well     Hand Dominance R [] L []      COGNITION/  PERCEPTION: INTACT IMPAIRED   (See Comments)   Orientation [x]     Vision [x]     Hearing [x]     Cognition  [x]     Perception [x] MOBILITY: I Mod I S SBA CGA Min Mod Max Total  NT x2 Comments: pt received sitting in chair   Bed Mobility    Rolling [] [] [] [] [] [] [] [] [] [x] []    Supine to Sit [] [] [] [] [] [] [] [] [] [x] []    Scooting [] [] [] [] [] [] [] [] [] [x] []    Sit to Supine [] [] [] [] [] [] [] [] [] [x] []    Transfers    Sit to Stand [] [] [] [] [x] [] [] [] [] [] [] SPC   Bed to Chair [] [] [] [] [x] [] [] [] [] [] [] SPC    Stand to Sit [] [] [] [] [x] [] [] [] [] [] [] SPC   Tub/Shower [] [] [] [] [] [] [] [] [] [x] []     Toilet [] [] [] [] [] [] [] [] [] [x] []      [] [] [] [] [] [] [] [] [] [x] []    I=Independent, Mod I=Modified Independent, S=Supervision/Setup, SBA=Standby Assistance, CGA=Contact Guard Assistance, Min=Minimal Assistance, Mod=Moderate Assistance, Max=Maximal Assistance, Total=Total Assistance, NT=Not Tested    ACTIVITIES OF DAILY LIVING: I Mod I S SBA CGA Min Mod Max Total NT Comments   BASIC ADLs:              Upper Body Bathing  [] [] [] [] [] [] [] [] [] [x]    Lower Body Bathing [] [] [] [] [] [] [] [] [] [x]    Toileting [] [] [] [] [] [] [] [] [] [x]    Upper Body Dressing [] [] [] [] [] [] [] [] [] [x]    Lower Body Dressing [] [] [] [] [] [] [] [] [] [x]    Feeding [] [] [] [] [] [] [] [] [] [x]    Grooming [] [] [] [x] [] [] [] [] [] [] Brushing teeth and combing hair standing at sink    Personal Device Care [] [] [] [] [] [] [] [] [] [x]    Functional Mobility [] [] [] [] [x] [] [] [] [] [] SPC   I=Independent, Mod I=Modified Independent, S=Supervision/Setup, SBA=Standby Assistance, CGA=Contact Guard Assistance, Min=Minimal Assistance, Mod=Moderate Assistance, Max=Maximal Assistance, Total=Total Assistance, NT=Not Tested    PLAN:     FREQUENCY/DURATION   OT Plan of Care: 3 times/week for duration of hospital stay or until stated goals are met, whichever comes first.    ACUTE OCCUPATIONAL THERAPY GOALS:   (Developed with and agreed upon by patient and/or caregiver.)  1.  Patient will complete lower body bathing and dressing with SUPERVISION and adaptive equipment as needed. 2. Patient will complete toileting with SUPERVISION. 3. Patient will complete grooming ADL with SUPERVISION. 4. Patient will tolerate 25 minutes of OT treatment with 1-2 rest breaks to increase activity tolerance for ADLs. 5. Patient will complete functional transfers with SUPERVISION and adaptive equipment as needed. 6. Patient will tolerate 10 minutes BUE exercises to increase strength for safe, functional transfers. Timeframe: 7 visits       PROBLEM LIST:   (Skilled intervention is medically necessary to address:)  Decreased ADL/Functional Activities  Decreased Activity Tolerance  Decreased Balance  Decreased Gait Ability  Decreased Strength  Decreased Transfer Abilities   INTERVENTIONS PLANNED:  (Benefits and precautions of occupational therapy have been discussed with the patient.)  Self Care Training  Therapeutic Activity  Therapeutic Exercise/HEP  Neuromuscular Re-education  Manual Therapy  Education         TREATMENT:     EVALUATION: LOW COMPLEXITY: (Untimed Charge)    TREATMENT:   Self Care (12 minutes): Patient participated in grooming ADLs in standing with minimal verbal cueing to increase independence, decrease assistance required, and increase safety awareness. Patient also participated in functional mobility, functional transfer, and adaptive equipment training to increase independence, decrease assistance required, increase activity tolerance, and increase safety awareness.      TREATMENT GRID:  N/A    AFTER TREATMENT PRECAUTIONS: Call light within reach, Chair, Needs within reach, RN notified, and Visitors at bedside    INTERDISCIPLINARY COLLABORATION:  RN/ PCT, PT/ PTA, and OT/ [place]    EDUCATION:  Education Given To: Patient  Education Provided: Role of Therapy;Plan of Care  Education Method: Verbal  Barriers to Learning: None  Education Outcome: Verbalized understanding    TOTAL TREATMENT DURATION AND TIME:  Time In: 1430  Time Out: 601 [address]  Minutes: Shayla Aguilar 24, OT

## 2022-08-17 NOTE — CARE COORDINATION
Medical record reviewed. Pt is an 77 y/o female admitted with a UTI and sepsis. CM met with patient to introduce self and explain role in planning. Prior to admission, pt was living independently in her home alone. She has a son who resides in the area and is supportive. Pt plans to discharge home when medically stable. No discharge planning needs noted at this time. CM will continue to follow to assist with needs if/when they arise. Patient confirmed PCP and stated that she last saw her physician a few weeks ago. She uses the [facility] on [address] in [place]. 08/17/22 1442   Service Assessment   Patient Orientation Alert and Oriented;Person;Place;Situation   Cognition Alert   History Provided By Patient   Primary [address]   PCP Verified by CM Yes   Last Visit to PCP Within last 3 months   Prior Functional Level Independent in ADLs/IADLs   Current Functional Level Independent in ADLs/IADLs   Can patient return to prior living arrangement Yes   Ability to make needs known: Good   Family able to assist with home care needs: Yes   Social/Functional History   Lives With Alone   Type of Home House   ADL Assistance Independent   Homemaking Assistance Independent   Ambulation Assistance Independent   Transfer Assistance Independent   Active  Yes   Mode of Transportation Car   Occupation Retired   Discharge Planning   Type of [facility] Prior To Admission [address] Needed N/A   DME Ordered? No   Potential Assistance Purchasing Medications No   Patient expects to be discharged to: [facility] Discharge   Services At/After Discharge None   [facility] Information Provided?  No   Mode of Transport at Discharge Self   Confirm Follow Up Transport Self   Condition of Participation: Discharge Planning   The Plan for Transition of Care is related to the following treatment goals: return to prior level of functioning

## 2022-08-17 NOTE — H&P
Hospitalist History and Physical   Admit Date:  2022 12:00 AM   Name:  Saida Kirk   Age:  78 y.o. Sex:  female  :  1940   MRN:  [id number]     Presenting Complaint: malaise  Reason(s) for Admission: UTI (urinary tract infection) [N39.0]     History of Present Illness:   Saida Kirk is a 78 y.o. female with medical history of HTN, afib s/p Watchman, CKD, hypothyroidism, aortic aneurysm, anxiety who presented to ED with general malaise, weakness, easy fatigability. Patient reports this has been going on for over a month. Also reports that she feels tremulous at times, and her anxiety is worse. Reports seeing her PCP for this issue as well. Upon ER workup, patient found to have evidence of UTI. WBC is 18.0. Additionally, patient is also mildly hyponatremic with Na of 134. Cr is also slightly elevated at 1.5. Family at bedside noted that while urinating in the restroom in the ER, her urinary stream was a \"trickle. \"  Asked about prior Urology evaluation, and patient reports that she has not seen a Urologist for years. Advised that after treatment of UTI, patient may wish to pursue outpatient Urology evaluation. Lactic acid returned at 2.7, but RN did inform me that as patient was a very difficult stick, she did have to \"milk\" the patient's arm to get a sample. Repeat lactic acid is pending. Hospitalist to admit. Due to [facility] of [facility], patient will transfer to [facility] for further care. Also of note, patient's TSH is significantly elevated at 9.68. When asked about any recent dose changes to her home Synthroid, patient thought it was changed ~6 months ago, but daughter-in-law at bedside conferred with pt's son, and they think it was more like 1 month ago. Upon review into [facility], it appears that patient had a TSH check on 22 which resulted at 17.3.   At that visit, physician documented that patient was prescribed Synthroid 75mcg (which is higher than the 50mcg which we have on file here), but unclear when that dosage change occurred. Review of Systems:  10 systems reviewed and negative except as noted in HPI. Assessment & Plan:   UTI  - UA indicative of infection  - Ordered rocephin  - Patient does have listed allergies to multiple antibiotics, RN to confirm severity and reaction as chart says \"low\"  - Urine and blood cultures obtained  - Initial lactic acid is elevated, but per obtaining RN, it is likely falsely so, obtaining repeat via phlebotomy    HUNG  - Mild  - Cr up to 1.5  - Holding potentially nephrotoxic home meds at this time  - Does have h/o CKD  - IVFs  - Recheck in AM    Hyponatremia  - Mild  - Na is 134  - Should improve with IVFs  - Will recheck in AM    Hypothyroidism  - TSH is 9.8 today, but appears to have been 17.3 on 8/2 at outside office visit  - Unclear when Synthroid dosage was increased, but TSH is significantly lower from ~2 weeks ago  - This is likely the main source of her malaise, tremors, and worsening anxiety  - Have continue Synthroid 75mcg as noted in OP PCP note, will defer to attending whether to increase Synthroid dosage again    Afib  - S/P Watchman  - Home meds    CKD  - Stage 3  - Currently with mild HUNG  - IVFs, holding potentially nephrotoxic meds    HTN  - Home meds  - Holding ACE-I and ARB for now due to HUNG    Aortic Aneurysm  - Per chart review  - Of note    Disposition: inpatient    Past medical history reviewed.     Past Medical History:   Diagnosis Date    Anxiety     Aortic aneurysm ([facility] [address] 1/27/2016    Aortic stenosis 10/15/2012    Arthritis     osteoarthritis    Atrial fibrillation and flutter ([address] 11/17/2015    11/17/15 Cardioversion (Dr Michelle Robb)    Atrial flutter [facility])     Basal cell carcinoma     face    Breast cancer ([address]     left mastectomy; chemo and radiation    Carotid stenosis, symptomatic, with infarction ([address] 1/27/2016    Chronic pain     osteoarthritis     CKD (chronic kidney disease) stage History   Administered Date(s) Administered    COVID-19, PFIZER PURPLE top, DILUTE for use, (age 15 y+), 30mcg/0.3mL 02/05/2021, 10/03/2021    Influenza Virus Vaccine 10/20/2011, 10/15/2012, 09/15/2013, 10/01/2014    Influenza, High Dose (Fluzone 65 yrs and older) 10/01/2016, 08/29/2017, 10/01/2018, 09/25/2019    Influenza, Triv, inactivated, subunit, adjuvanted, IM (Fluad 65 yrs and older) 09/22/2018    PPD Test 11/10/2015    Pneumococcal Conjugate 13-valent ([id number]) 11/15/2016    Pneumococcal Polysaccharide ([id number]) 08/22/2012    Pneumococcal Vaccine 08/23/2012, 09/01/2013    Tdap (Boostrix, Adacel) 07/02/2019    Zoster Live (Zostavax) 11/16/2014     PTA Medications:  Current Outpatient Medications   Medication Instructions    acetaminophen (TYLENOL) 325 mg, Oral, EVERY 4 HOURS PRN    amiodarone (CORDARONE) 200 mg, Oral, DAILY    aspirin 81 mg, Oral, DAILY    calcium-vitamin D (OSCAL) 250-125 MG-UNIT per tablet 1 tablet, Oral, DAILY    clopidogrel (PLAVIX) 75 mg, Oral, DAILY    hydroCHLOROthiazide (HYDRODIURIL) 12.5 mg, Oral, DAILY    levothyroxine (SYNTHROID) 50 mcg, Oral, DAILY    LORazepam (ATIVAN) 1 mg, Oral, EVERY 4 HOURS PRN    losartan (COZAAR) 100 mg, Oral, DAILY    magnesium oxide (MAG-OX) 400 mg, Oral, DAILY    metoprolol succinate (TOPROL XL) 25 mg, Oral, DAILY    Multiple Vitamins-Minerals (THERAPEUTIC MULTIVITAMIN-MINERALS) tablet 1 tablet, Oral, DAILY    polyethylene glycol (GLYCOLAX) 17 g, Oral, DAILY PRN    simvastatin (ZOCOR) 20 mg, Oral, NIGHTLY    traMADol (ULTRAM) 50 mg, Oral, EVERY 6 HOURS PRN    Vitamin B 12 500 mcg, Oral, DAILY       Objective:   Patient Vitals for the past 24 hrs:   Temp Pulse Resp BP   08/17/22 0015 98.4 °F (36.9 °C) 84 16 (!) 142/72     Estimated body mass index is 26.63 kg/m² as calculated from the following:    Height as of this encounter: 5' 5\" (1.651 m). Weight as of this encounter: 160 lb (72.6 kg).   No intake or output data in the 24 hours ending 08/17/22 6006      Physical Exam:  General:    Well nourished. No overt distress, but appears to not feel well  Head:  Normocephalic, atraumatic  Eyes:  Sclerae appear normal.  Pupils equally round. HENT:  Nares appear normal, no drainage. Moist mucous membranes  Neck:  No restricted ROM. Trachea midline  CV:   RRR. S1/S2 auscultated  Lungs:   CTAB. No wheezing, rhonchi, or rales. Appears even, unlabored  Abdomen: Bowel sounds present. Soft, nontender, nondistended. Extremities: Warm and dry. No cyanosis or clubbing. No edema. Skin:     No rashes. Normal turgor. Normal coloration  Neuro:  Cranial nerves II-XII grossly intact. Sensation intact  Psych:  Normal mood and affect.   Alert and oriented x3    Data Ordered and Personally Reviewed:    Last 24hr Labs:  Recent Results (from the past 24 hour(s))   EKG 12 Lead    Collection Time: 08/16/22  3:44 PM   Result Value Ref Range    Ventricular Rate 80 BPM    Atrial Rate 80 BPM    P-R Interval 182 ms    QRS Duration 154 ms    Q-T Interval 456 ms    QTc Calculation (Bazett) 525 ms    P Axis 11 degrees    R Axis 256 degrees    T Axis 74 degrees    Diagnosis Normal sinus rhythm    CBC with Auto Differential    Collection Time: 08/16/22  4:00 PM   Result Value Ref Range    WBC 18.0 (H) 4.3 - 11.1 K/uL    RBC 3.57 (L) 4.05 - 5.2 M/uL    Hemoglobin 12.8 11.7 - 15.4 g/dL    Hematocrit 37.7 35.8 - 46.3 %    .6 (H) 79.6 - 97.8 FL    MCH 35.9 (H) 26.1 - 32.9 PG    MCHC 34.0 31.4 - 35.0 g/dL    RDW 16.4 (H) 11.9 - 14.6 %    Platelets 620 (H) 470 - 450 K/uL    MPV 9.7 9.4 - 12.3 FL    nRBC 0.00 0.0 - 0.2 K/uL    Differential Type AUTOMATED      Seg Neutrophils 81 (H) 43 - 78 %    Lymphocytes 6 (L) 13 - 44 %    Monocytes 7 4.0 - 12.0 %    Eosinophils % 1 0.5 - 7.8 %    Basophils 1 0.0 - 2.0 %    Immature Granulocytes 3 0.0 - 5.0 %    Segs Absolute 14.6 (H) 1.7 - 8.2 K/UL    Absolute Lymph # 1.1 0.5 - 4.6 K/UL    Absolute Mono # 1.3 0.1 - 1.3 K/UL    Absolute Eos # 0.3 0.0 - 0.8 K/UL    Basophils Absolute 0.2 0.0 - 0.2 K/UL    Absolute Immature Granulocyte 0.6 (H) 0.0 - 0.5 K/UL   Comprehensive Metabolic Panel    Collection Time: 08/16/22  4:00 PM   Result Value Ref Range    Sodium 134 (L) 136 - 145 mmol/L    Potassium 3.7 3.5 - 5.1 mmol/L    Chloride 103 98 - 107 mmol/L    CO2 24 21 - 32 mmol/L    Anion Gap 7 7 - 16 mmol/L    Glucose 142 (H) 65 - 100 mg/dL    BUN 26 (H) 8 - 23 MG/DL    Creatinine 1.50 (H) 0.6 - 1.0 MG/DL    GFR  43 (L) >60 ml/min/1.73m2    GFR Non- 35 (L) >60 ml/min/1.73m2    Calcium 8.5 8.3 - 10.4 MG/DL    Total Bilirubin 0.8 0.2 - 1.1 MG/DL    ALT 36 12 - 65 U/L    AST 32 15 - 37 U/L    Alk Phosphatase 63 50 - 136 U/L    Total Protein 6.8 6.3 - 8.2 g/dL    Albumin 3.5 3.2 - 4.6 g/dL    Globulin 3.3 2.3 - 3.5 g/dL    Albumin/Globulin Ratio 1.1 (L) 1.2 - 3.5     Troponin    Collection Time: 08/16/22  4:00 PM   Result Value Ref Range    Troponin, High Sensitivity 8.6 0 - 14 pg/mL   COVID-19, Rapid    Collection Time: 08/16/22  4:00 PM    Specimen: Nasopharyngeal   Result Value Ref Range    Source NASAL      SARS-CoV-2, Rapid Not detected NOTD     TSH    Collection Time: 08/16/22  4:00 PM   Result Value Ref Range    TSH, 3RD GENERATION 9.680 (H) 0.358 - 3.740 uIU/mL   Procalcitonin    Collection Time: 08/16/22  4:00 PM   Result Value Ref Range    Procalcitonin 0.14 0.00 - 0.49 ng/mL   Urinalysis with Reflex to Culture    Collection Time: 08/16/22  8:42 PM    Specimen: Urine   Result Value Ref Range    Color, UA YELLOW/STRAW      Appearance CLEAR      Specific Julian, UA 1.011 1.001 - 1.023      pH, Urine 7.0 5.0 - 9.0      Protein, UA Negative NEG mg/dL    Glucose, UA Negative mg/dL    Ketones, Urine Negative NEG mg/dL    Bilirubin Urine Negative NEG      Blood, Urine Negative NEG      Urobilinogen, Urine 0.2 0.2 - 1.0 EU/dL    Nitrite, Urine Negative NEG      Leukocyte Esterase, Urine TRACE (A) NEG      WBC, UA 3-5 0 /hpf RBC, UA 0 0 /hpf    BACTERIA, URINE 4+ (H) 0 /hpf    Urine Culture if Indicated CULTURE NOT INDICATED BY UA RESULT      Epithelial Cells UA 0-3 0 /hpf    Casts 0 0 /lpf    Crystals 0 0 /LPF    Mucus, UA 0 0 /lpf    OTHER OBSERVATIONS RESULTS VERIFIED MANUALLY     Lactic Acid    Collection Time: 08/16/22  8:42 PM   Result Value Ref Range    Lactic Acid, Plasma 2.7 (H) 0.4 - 2.0 MMOL/L   Brain Natriuretic Peptide    Collection Time: 08/16/22 11:03 PM   Result Value Ref Range    NT Pro-BNP 1,458 (H) <450 PG/ML   Lactic Acid    Collection Time: 08/16/22 11:03 PM   Result Value Ref Range    Lactic Acid, Plasma 1.9 0.4 - 2.0 MMOL/L           Signed:  Eliana Redding MD

## 2022-08-18 ENCOUNTER — HOME HEALTH ADMISSION (OUTPATIENT)
Dept: HOME HEALTH SERVICES | Facility: HOME HEALTH | Age: 82
End: 2022-08-18
Payer: MEDICARE

## 2022-08-18 LAB
ANION GAP SERPL CALC-SCNC: 5 MMOL/L (ref 7–16)
BASOPHILS # BLD: 0.2 K/UL (ref 0–0.2)
BASOPHILS NFR BLD: 1 % (ref 0–2)
BUN SERPL-MCNC: 13 MG/DL (ref 8–23)
CALCIUM SERPL-MCNC: 8.2 MG/DL (ref 8.3–10.4)
CHLORIDE SERPL-SCNC: 105 MMOL/L (ref 98–107)
CO2 SERPL-SCNC: 26 MMOL/L (ref 21–32)
CREAT SERPL-MCNC: 1.06 MG/DL (ref 0.6–1)
DIFFERENTIAL METHOD BLD: ABNORMAL
EOSINOPHIL # BLD: 0.5 K/UL (ref 0–0.8)
EOSINOPHIL NFR BLD: 4 % (ref 0.5–7.8)
ERYTHROCYTE [DISTWIDTH] IN BLOOD BY AUTOMATED COUNT: 16.4 % (ref 11.9–14.6)
FOLATE SERPL-MCNC: 41.5 NG/ML (ref 3.1–17.5)
GLUCOSE SERPL-MCNC: 96 MG/DL (ref 65–100)
HCT VFR BLD AUTO: 30.6 % (ref 35.8–46.3)
HCT VFR BLD AUTO: 32.9 % (ref 35.8–46.3)
HGB BLD-MCNC: 10.4 G/DL (ref 11.7–15.4)
HGB BLD-MCNC: 11.3 G/DL (ref 11.7–15.4)
IMM GRANULOCYTES # BLD AUTO: 0.4 K/UL (ref 0–0.5)
IMM GRANULOCYTES NFR BLD AUTO: 3 % (ref 0–5)
LYMPHOCYTES # BLD: 1.7 K/UL (ref 0.5–4.6)
LYMPHOCYTES NFR BLD: 12 % (ref 13–44)
MCH RBC QN AUTO: 34.8 PG (ref 26.1–32.9)
MCHC RBC AUTO-ENTMCNC: 34 G/DL (ref 31.4–35)
MCV RBC AUTO: 102.3 FL (ref 79.6–97.8)
MONOCYTES # BLD: 1.2 K/UL (ref 0.1–1.3)
MONOCYTES NFR BLD: 9 % (ref 4–12)
NEUTS SEG # BLD: 10 K/UL (ref 1.7–8.2)
NEUTS SEG NFR BLD: 72 % (ref 43–78)
NRBC # BLD: 0 K/UL (ref 0–0.2)
PLATELET # BLD AUTO: 511 K/UL (ref 150–450)
PMV BLD AUTO: 9.4 FL (ref 9.4–12.3)
POTASSIUM SERPL-SCNC: 3.6 MMOL/L (ref 3.5–5.1)
RBC # BLD AUTO: 2.99 M/UL (ref 4.05–5.2)
SODIUM SERPL-SCNC: 136 MMOL/L (ref 136–145)
VIT B12 SERPL-MCNC: 1813 PG/ML (ref 193–986)
WBC # BLD AUTO: 13.9 K/UL (ref 4.3–11.1)

## 2022-08-18 PROCEDURE — 82746 ASSAY OF FOLIC ACID SERUM: CPT

## 2022-08-18 PROCEDURE — 6360000002 HC RX W HCPCS: Performed by: INTERNAL MEDICINE

## 2022-08-18 PROCEDURE — 2580000003 HC RX 258: Performed by: FAMILY MEDICINE

## 2022-08-18 PROCEDURE — 6370000000 HC RX 637 (ALT 250 FOR IP): Performed by: INTERNAL MEDICINE

## 2022-08-18 PROCEDURE — 1100000000 HC RM PRIVATE

## 2022-08-18 PROCEDURE — 6370000000 HC RX 637 (ALT 250 FOR IP): Performed by: FAMILY MEDICINE

## 2022-08-18 PROCEDURE — 97530 THERAPEUTIC ACTIVITIES: CPT

## 2022-08-18 PROCEDURE — 82607 VITAMIN B-12: CPT

## 2022-08-18 PROCEDURE — 85018 HEMOGLOBIN: CPT

## 2022-08-18 PROCEDURE — 36415 COLL VENOUS BLD VENIPUNCTURE: CPT

## 2022-08-18 PROCEDURE — 80048 BASIC METABOLIC PNL TOTAL CA: CPT

## 2022-08-18 PROCEDURE — 85025 COMPLETE CBC W/AUTO DIFF WBC: CPT

## 2022-08-18 RX ADMIN — DOCUSATE SODIUM 100 MG: 100 CAPSULE ORAL at 08:32

## 2022-08-18 RX ADMIN — ASPIRIN 81 MG: 81 TABLET, COATED ORAL at 08:32

## 2022-08-18 RX ADMIN — SODIUM CHLORIDE, PRESERVATIVE FREE 10 ML: 5 INJECTION INTRAVENOUS at 22:26

## 2022-08-18 RX ADMIN — NITROFURANTOIN MACROCRYSTALS 50 MG: 50 CAPSULE ORAL at 05:24

## 2022-08-18 RX ADMIN — NITROFURANTOIN MACROCRYSTALS 50 MG: 50 CAPSULE ORAL at 11:29

## 2022-08-18 RX ADMIN — TRAMADOL HYDROCHLORIDE 50 MG: 50 TABLET, COATED ORAL at 22:23

## 2022-08-18 RX ADMIN — NITROFURANTOIN MACROCRYSTALS 50 MG: 50 CAPSULE ORAL at 23:33

## 2022-08-18 RX ADMIN — Medication 400 MG: at 08:33

## 2022-08-18 RX ADMIN — AMIODARONE HYDROCHLORIDE 200 MG: 200 TABLET ORAL at 08:33

## 2022-08-18 RX ADMIN — POLYETHYLENE GLYCOL 3350 17 G: 17 POWDER, FOR SOLUTION ORAL at 18:29

## 2022-08-18 RX ADMIN — LORAZEPAM 1 MG: 1 TABLET ORAL at 22:25

## 2022-08-18 RX ADMIN — NITROFURANTOIN MACROCRYSTALS 50 MG: 50 CAPSULE ORAL at 18:16

## 2022-08-18 RX ADMIN — CYANOCOBALAMIN TAB 1000 MCG 500 MCG: 1000 TAB at 08:32

## 2022-08-18 RX ADMIN — LEVOTHYROXINE SODIUM 88 MCG: 0.09 TABLET ORAL at 05:24

## 2022-08-18 RX ADMIN — CLOPIDOGREL BISULFATE 75 MG: 75 TABLET ORAL at 08:32

## 2022-08-18 RX ADMIN — DOCUSATE SODIUM 100 MG: 100 CAPSULE ORAL at 20:30

## 2022-08-18 RX ADMIN — BISACODYL 5 MG: 5 TABLET, COATED ORAL at 18:29

## 2022-08-18 RX ADMIN — ATORVASTATIN CALCIUM 10 MG: 10 TABLET, FILM COATED ORAL at 08:32

## 2022-08-18 RX ADMIN — METOPROLOL SUCCINATE 25 MG: 25 TABLET, EXTENDED RELEASE ORAL at 08:32

## 2022-08-18 RX ADMIN — SODIUM CHLORIDE, PRESERVATIVE FREE 10 ML: 5 INJECTION INTRAVENOUS at 08:31

## 2022-08-18 RX ADMIN — ENOXAPARIN SODIUM 40 MG: 100 INJECTION SUBCUTANEOUS at 11:30

## 2022-08-18 RX ADMIN — CALCIUM CARBONATE-CHOLECALCIFEROL TAB 250 MG-125 UNIT 1 TABLET: 250-125 TAB at 08:32

## 2022-08-18 RX ADMIN — Medication 3 MG: at 22:25

## 2022-08-18 ASSESSMENT — PAIN - FUNCTIONAL ASSESSMENT: PAIN_FUNCTIONAL_ASSESSMENT: PREVENTS OR INTERFERES SOME ACTIVE ACTIVITIES AND ADLS

## 2022-08-18 ASSESSMENT — PAIN DESCRIPTION - DESCRIPTORS: DESCRIPTORS: ACHING

## 2022-08-18 ASSESSMENT — PAIN SCALES - GENERAL: PAINLEVEL_OUTOF10: 6

## 2022-08-18 ASSESSMENT — PAIN DESCRIPTION - LOCATION: LOCATION: GENERALIZED;HIP

## 2022-08-18 ASSESSMENT — PAIN DESCRIPTION - PAIN TYPE: TYPE: CHRONIC PAIN

## 2022-08-18 NOTE — PLAN OF CARE
Problem: Discharge Planning  Goal: Discharge to home or other facility with appropriate resources  Outcome: Progressing    HHS to be set up. Pt wishes to have PT for gait instability/weakness  Problem: Safety - Adult  Goal: Free from fall injury  Outcome: Progressing. Falls at home noted. Flowsheets (Taken 8/18/2022 1030)  Free From Fall Injury:   Instruct family/caregiver on patient safety   Based on caregiver fall risk screen, instruct family/caregiver to ask for assistance with transferring infant if caregiver noted to have fall risk factors   Pt krissy diet, macrodantin.

## 2022-08-18 NOTE — CARE COORDINATION
Patient care plan reviewed in interdisciplinary rounds with the following disciplines: MD, nursing, case management, and therapy. Pt will likely be ready for discharge later today or tomorrow. Pt would benefit from home health physical and occupational therapy. CM met with patient to discuss discharge plans. Pt stated that she does not feel ready for discharge today; still feels a little bit \"woozy\" and has not had a BM. Working on plan for d/c tomorrow. Pt in agreement with home health therapy services and expressed no preference in providers. Orders entered and referral initiated to [facility]. Pt has needed DME in place at home. No additional discharge planning needs.

## 2022-08-18 NOTE — PROGRESS NOTES
PHYSICAL THERAPY Daily Note and AM  (Link to Caseload Tracking: PT Visit Days : 2  Acknowledge Orders  Time In/Out  PT Charge Capture  Rehab Caseload Tracker    Dai Matute is a 78 y.o. female   PRIMARY DIAGNOSIS: Sepsis ([address]  UTI (urinary tract infection) [N39.0]       Reason for Referral: Generalized Muscle Weakness (M62.81)  Difficulty in walking, Not elsewhere classified (R26.2)  Inpatient: Payor: MEDICARE / Plan: MEDICARE PART A AND B / Product Type: *No Product type* /     ASSESSMENT:     REHAB RECOMMENDATIONS:   Recommendation to date pending progress:  Setting:  Home Health Therapy    Equipment:    None     ASSESSMENT:  Ms. Celi Bob admitted with above diagnoses. Patient demonstrates generalized weakness affecting her balance, mobility, tolerance for activity, and overall independence. She is independent/modified independent at baseline with occasional use of cane. She did have a fall last week (missed a step) and bruised her L hip. Patient would benefit from therapy during her admission to address above deficits. Patient requesting to return home at D/C. Recommend HHPT. Patient was supine upon contact. Agreeable to PT. SBA for bed mobility. SBA to CGA for transfers. Ambulated 200' with cane, gait belt and CGA to min assist. She had difficulty with sequencing of cane and needed physical and verbal cueing. Unsteady; LOB X 2-needed min assist to correct. Appeared to be more stable using cane on L versus R. Patient sat up in recliner and performed LE exercises as below then ambulated an additional 50' in room with no AD and HHA as needed. Try RW if unsteadiness continues. 8/19- pt likely going home today, she states she thinks she will do much better once she gets home. Worked on bed mobility, sit to stand, pt went in and out of restroom then walked in valera with her cane. Pt doing well, did not over do it to save her energy for discharge later today. HHPT for follow up.       [address] [facility] [address] [address] AM-PAC 6 Clicks Basic Mobility Inpatient Short Form  AM-PAC Mobility Inpatient   How much difficulty turning over in bed?: A Little  How much difficulty sitting down on / standing up from a chair with arms?: A Little  How much difficulty moving from lying on back to sitting on side of bed?: A Little  How much help from another person moving to and from a bed to a chair?: A Little  How much help from another person needed to walk in hospital room?: A Little  How much help from another person for climbing 3-5 steps with a railing?: A Little  [facility] Inpatient Mobility Raw Score : 18  AM-PAC Inpatient T-Scale Score : 43.63  Mobility Inpatient CMS 0-100% Score: 46.58  Mobility Inpatient CMS G-Code Modifier : CK    SUBJECTIVE:   Ms. Stacy Calle states, \"I'm ready to go home\".      Social/Functional Lives With: Alone  Type of Home: House  Home Layout: Two level, Able to Live on Main level with bedroom/bathroom (goes downstairs 1x/week for laundry)  Entrance Stairs - Number of Steps: 2  Bathroom Shower/Tub: Walk-in shower  Bathroom Toilet: Handicap height  Bathroom Equipment: Shower chair  Home Equipment: Rosea Mon, rolling  Has the patient had two or more falls in the past year or any fall with injury in the past year?: No (endorses 1 recent fall)  Receives Help From: Family  ADL Assistance: Independent  Homemaking Assistance: Independent  Ambulation Assistance: Independent  Transfer Assistance: Independent  Active : Yes  Mode of Transportation: Car  Occupation: Retired    OBJECTIVE:     PAIN: VITALS / O2: PRECAUTION / Howie Richards / Isabel Allan:   Pre Treatment:   Pain Assessment: None - Denies Pain      Post Treatment: 0 Vitals        Oxygen      None    RESTRICTIONS/PRECAUTIONS:  Restrictions/Precautions: Fall Risk                 GROSS EVALUATION: Intact Impaired (Comments):   AROM []  decreased   PROM []    Strength []  decreased   Balance []     Posture [] Forward Head  Rounded Shoulders   Sensation []     Coordination [x] Tone []     Edema []    Activity Tolerance []  decreased    []      COGNITION/  PERCEPTION: Intact Impaired (Comments):   Orientation [x]     Vision [x]     Hearing [x]     Cognition  [x]       MOBILITY: I Mod I S SBA CGA Min Mod Max Total  NT x2 Comments:   Bed Mobility    Rolling [] [] [] [] [] [] [] [] [] [x] []    Supine to Sit [] [] [x] [] [] [] [] [] [] [] []    Scooting [] [] [x] [] [] [] [] [] [] [] []    Sit to Supine [] [] [] [] [] [] [] [] [] [x] []    Transfers    Sit to Stand [] [] [] [x] [] [] [] [] [] [] []    Bed to Chair [] [] [] [] [] [] [] [] [] [] []    Stand to Sit [] [] [] [x] [] [] [] [] [] [] []     [] [] [] [] [] [] [] [] [] [] []    I=Independent, Mod I=Modified Independent, S=Supervision, SBA=Standby Assistance, CGA=Contact Guard Assistance,   Min=Minimal Assistance, Mod=Moderate Assistance, Max=Maximal Assistance, Total=Total Assistance, NT=Not Tested    GAIT: I Mod I S SBA CGA Min Mod Max Total  NT x2 Comments:   Level of Assistance [] [] [] [] [x] [x] [] [] [] [] []    Distance 200 feet    DME SPC    Gait Quality Decreased zach , Decreased step length, and Trunk sway increased    Weightbearing Status      Stairs      I=Independent, Mod I=Modified Independent, S=Supervision, SBA=Standby Assistance, CGA=Contact Guard Assistance,   Min=Minimal Assistance, Mod=Moderate Assistance, Max=Maximal Assistance, Total=Total Assistance, NT=Not Tested    PLAN:   ACUTE PHYSICAL THERAPY GOALS:   (Developed with and agreed upon by patient and/or caregiver.)   Patient will demonstrate independence with bed mobility within 5 treatment days. Patient will transfer between the bed and chair and/or toilet with supervision within 5 treatment days. Patient will ambulate 300' with the least restrictive device needed with supervision within 5 treatment days. Patient will ambulate up and down 2 steps with railing as available with supervision within 5 treatment days.   Patient will ambulate up and down a flight of steps with a railing and supervision within 5 treatment days. FREQUENCY AND DURATION: Daily for duration of hospital stay or until stated goals are met, whichever comes first.    THERAPY PROGNOSIS: Good    PROBLEM LIST:   (Skilled intervention is medically necessary to address:)  Decreased ADL/Functional Activities  Decreased Activity Tolerance  Decreased AROM/PROM  Decreased Balance  Decreased Gait Ability  Decreased Strength  Decreased Transfer Abilities INTERVENTIONS PLANNED:   (Benefits and precautions of physical therapy have been discussed with the patient.)  Therapeutic Activity  Therapeutic Exercise/HEP  Neuromuscular Re-education  Gait Training  Manual Therapy  Modalities  Education       TREATMENT:       TREATMENT:   Therapeutic Activity (15 Minutes): Therapeutic activity included Supine to Sit, Scooting, Transfer Training, Ambulation on level ground, Sitting balance , and Standing balance to improve functional Activity tolerance, Balance, Coordination, Mobility, and Strength.     TREATMENT GRID:   Date:  8/18/22 Date:   Date:     Activity/Exercise Parameters Parameters Parameters   Ankle pumps 15     Long arc quads 15     Seated marching 15                                 AFTER TREATMENT PRECAUTIONS: Bed/Chair Locked, Call light within reach, Chair, Needs within reach, and RN notified    INTERDISCIPLINARY COLLABORATION:  RN/ PCT    EDUCATION:      TIME IN/OUT:  Time In: 1145  Time Out: 1200  Minutes: [address], PT

## 2022-08-18 NOTE — PROGRESS NOTES
Hospitalist Progress Note   Admit Date:  2022 12:00 AM   Name:  Sharmila Philip   Age:  78 y.o. Sex:  female  :  1940   MRN:  [id number]   Room:  [facility]    Presenting Complaint: No chief complaint on file. Reason(s) for Admission: UTI (urinary tract infection) [N39.0]     Hospital Course & Interval History:     Ms. Purvis Fothergill is a 79 yo female with PMH of HTN, AFIB s/p watchman,  on asa/ plavix, CKD, hypothyroidism, AAA, anxiety admitted with weakness, UTI, hyponatremia, HUNG. She is on course of macrobid for UTI. Culture pending. Met sepsis criteria due to lactic acid 2.7/ leukocytosis. She has been hydrated for her HUNG that is improving. Hyponatremia resolved. Found with elevated TSH and synthroid adjusted to 88 mcg. Followup TSH needed for 8 weeks with PCP. She has lower grade anemia, chronic macrocytosis and chronic leukocytosis. She is agreeable to hematology referral.         FULL CODE. Discharge plans pending. Lives alone and does ok. Subjective/24hr Events (22): Feels some better, tried melatonin, ate some, slept ok, felt wobbly with PT, no bleeding,has belching, no BM       Assessment & Plan:       Sepsis due to UTI:  Principal Problem:    UTI (urinary tract infection)  Plan:   D2 Macrobid   Followup culture         Active Problems:    Paroxysmal A-fib ([address]  Plan:   S/p watchman  On amiodarone and asa  On chronic plavix through          HUNG (acute kidney injury) ([facility]  Plan:    On CKD   CKD (chronic kidney disease) stage 3, GFR 30-59 ml/min ([facility])  Plan:  Improved with IVF  that are now stopped           Hyponatremia  Plan:   Resolved          Hypothyroidism  Plan:   Increased synthroid 88 mcg daily  Will need 8 week followup lab check with PCP             Aortic aneurysm ([facility])  Plan:   Noted             HTN (hypertension)  Plan:   HCTZ / Raymundo Angelina on hold due to HUNG  Continued metoprolol           Hypokalemia:  Replace and repeat lab    Macrocytic anemia and leukocytosis:  Trend HGB/ WBC  Referral to hematology outpatient       Discharge Planning:      Pending     Diet:  ADULT DIET; Regular  DVT PPx: lovenox   Code status: Full Code    Hospital Problems:  Principal Problem:    Sepsis ([address]  Active Problems:    Paroxysmal A-fib (HCC)    UTI (urinary tract infection)    HUNG (acute kidney injury) (HCC)    Hyponatremia    Hypothyroidism    CKD (chronic kidney disease) stage 3, GFR 30-59 ml/min (HCC)    Aortic aneurysm (HCC)    HTN (hypertension)    Presence of Watchman left atrial appendage closure device  Resolved Problems:    * No resolved hospital problems. *      Objective:   Patient Vitals for the past 24 hrs:   Temp Pulse Resp BP SpO2   08/18/22 0739 97.7 °F (36.5 °C) 66 16 127/64 100 %   08/18/22 0054 97.9 °F (36.6 °C) 59 16 123/67 97 %   08/17/22 1930 98.2 °F (36.8 °C) 73 18 131/74 99 %   08/17/22 1655 98.8 °F (37.1 °C) 71 -- (!) 112/55 98 %   08/17/22 1207 97.3 °F (36.3 °C) 74 18 134/60 100 %   08/17/22 1144 98.6 °F (37 °C) 64 18 (!) 150/78 96 %       Oxygen Therapy  SpO2: 100 %  O2 Device: None (Room air)    Estimated body mass index is 26.63 kg/m² as calculated from the following:    Height as of this encounter: 5' 5\" (1.651 m). Weight as of this encounter: 160 lb (72.6 kg). No intake or output data in the 24 hours ending 08/18/22 1128      Physical Exam:     Blood pressure 127/64, pulse 66, temperature 97.7 °F (36.5 °C), temperature source Oral, resp. rate 16, height 5' 5\" (1.651 m), weight 160 lb (72.6 kg), SpO2 100 %. General:    Well nourished. Alert,  Head:  Normocephalic, atraumatic  Eyes:  Sclerae appear normal.  Pupils equally round. ENT:  Nares appear normal, no drainage. Moist oral mucosa  Neck:  No restricted ROM. Trachea midline   CV:   RRR. III/VI HERIBERTO LUSB, no edema   Lungs:   CTAB. No wheezing, rhonchi, or rales. Symmetric expansion. Anterior   Abdomen: Bowel sounds present.   Soft, nontender, nondistended. Extremities: No cyanosis or clubbing. No edema  Skin:     No rashes and normal coloration. Warm and dry. Neuro:   grossly intact. Psych:  Normal mood and affect.       I have personally reviewed labs and tests showing:  Recent Labs:  Recent Results (from the past 48 hour(s))   EKG 12 Lead    Collection Time: 08/16/22  3:44 PM   Result Value Ref Range    Ventricular Rate 80 BPM    Atrial Rate 80 BPM    P-R Interval 182 ms    QRS Duration 154 ms    Q-T Interval 456 ms    QTc Calculation (Bazett) 525 ms    P Axis 11 degrees    R Axis 256 degrees    T Axis 74 degrees    Diagnosis Normal sinus rhythm    CBC with Auto Differential    Collection Time: 08/16/22  4:00 PM   Result Value Ref Range    WBC 18.0 (H) 4.3 - 11.1 K/uL    RBC 3.57 (L) 4.05 - 5.2 M/uL    Hemoglobin 12.8 11.7 - 15.4 g/dL    Hematocrit 37.7 35.8 - 46.3 %    .6 (H) 79.6 - 97.8 FL    MCH 35.9 (H) 26.1 - 32.9 PG    MCHC 34.0 31.4 - 35.0 g/dL    RDW 16.4 (H) 11.9 - 14.6 %    Platelets 689 (H) 940 - 450 K/uL    MPV 9.7 9.4 - 12.3 FL    nRBC 0.00 0.0 - 0.2 K/uL    Differential Type AUTOMATED      Seg Neutrophils 81 (H) 43 - 78 %    Lymphocytes 6 (L) 13 - 44 %    Monocytes 7 4.0 - 12.0 %    Eosinophils % 1 0.5 - 7.8 %    Basophils 1 0.0 - 2.0 %    Immature Granulocytes 3 0.0 - 5.0 %    Segs Absolute 14.6 (H) 1.7 - 8.2 K/UL    Absolute Lymph # 1.1 0.5 - 4.6 K/UL    Absolute Mono # 1.3 0.1 - 1.3 K/UL    Absolute Eos # 0.3 0.0 - 0.8 K/UL    Basophils Absolute 0.2 0.0 - 0.2 K/UL    Absolute Immature Granulocyte 0.6 (H) 0.0 - 0.5 K/UL   Comprehensive Metabolic Panel    Collection Time: 08/16/22  4:00 PM   Result Value Ref Range    Sodium 134 (L) 136 - 145 mmol/L    Potassium 3.7 3.5 - 5.1 mmol/L    Chloride 103 98 - 107 mmol/L    CO2 24 21 - 32 mmol/L    Anion Gap 7 7 - 16 mmol/L    Glucose 142 (H) 65 - 100 mg/dL    BUN 26 (H) 8 - 23 MG/DL    Creatinine 1.50 (H) 0.6 - 1.0 MG/DL    GFR  43 (L) >60 ml/min/1.73m2    GFR Non-African American 35 (L) >60 ml/min/1.73m2    Calcium 8.5 8.3 - 10.4 MG/DL    Total Bilirubin 0.8 0.2 - 1.1 MG/DL    ALT 36 12 - 65 U/L    AST 32 15 - 37 U/L    Alk Phosphatase 63 50 - 136 U/L    Total Protein 6.8 6.3 - 8.2 g/dL    Albumin 3.5 3.2 - 4.6 g/dL    Globulin 3.3 2.3 - 3.5 g/dL    Albumin/Globulin Ratio 1.1 (L) 1.2 - 3.5     Troponin    Collection Time: 08/16/22  4:00 PM   Result Value Ref Range    Troponin, High Sensitivity 8.6 0 - 14 pg/mL   COVID-19, Rapid    Collection Time: 08/16/22  4:00 PM    Specimen: Nasopharyngeal   Result Value Ref Range    Source NASAL      SARS-CoV-2, Rapid Not detected NOTD     TSH    Collection Time: 08/16/22  4:00 PM   Result Value Ref Range    TSH, 3RD GENERATION 9.680 (H) 0.358 - 3.740 uIU/mL   Procalcitonin    Collection Time: 08/16/22  4:00 PM   Result Value Ref Range    Procalcitonin 0.14 0.00 - 0.49 ng/mL   Urinalysis with Reflex to Culture    Collection Time: 08/16/22  8:42 PM    Specimen: Urine   Result Value Ref Range    Color, UA YELLOW/STRAW      Appearance CLEAR      Specific [place], UA 1.011 1.001 - 1.023      pH, Urine 7.0 5.0 - 9.0      Protein, UA Negative NEG mg/dL    Glucose, UA Negative mg/dL    Ketones, Urine Negative NEG mg/dL    Bilirubin Urine Negative NEG      Blood, Urine Negative NEG      Urobilinogen, Urine 0.2 0.2 - 1.0 EU/dL    Nitrite, Urine Negative NEG      Leukocyte Esterase, Urine TRACE (A) NEG      WBC, UA 3-5 0 /hpf    RBC, UA 0 0 /hpf    BACTERIA, URINE 4+ (H) 0 /hpf    Urine Culture if Indicated CULTURE NOT INDICATED BY UA RESULT      Epithelial Cells UA 0-3 0 /hpf    Casts 0 0 /lpf    Crystals 0 0 /LPF    Mucus, UA 0 0 /lpf    OTHER OBSERVATIONS RESULTS VERIFIED MANUALLY     Lactic Acid    Collection Time: 08/16/22  8:42 PM   Result Value Ref Range    Lactic Acid, Plasma 2.7 (H) 0.4 - 2.0 MMOL/L   Brain Natriuretic Peptide    Collection Time: 08/16/22 11:03 PM   Result Value Ref Range    NT Pro-BNP 1,458 (H) <450 PG/ML   Lactic Acid    Collection Time: 08/16/22 11:03 PM   Result Value Ref Range    Lactic Acid, Plasma 1.9 0.4 - 2.0 MMOL/L   Culture, Blood 1    Collection Time: 08/16/22 11:03 PM    Specimen: Blood   Result Value Ref Range    Special Requests RIGHT  Antecubital        Culture NO GROWTH 2 DAYS     Culture, Blood 1    Collection Time: 08/16/22 11:22 PM    Specimen: Blood   Result Value Ref Range    Special Requests RIGHT  Antecubital        Culture NO GROWTH 1 DAY     Basic Metabolic Panel w/ Reflex to MG    Collection Time: 08/17/22  6:09 AM   Result Value Ref Range    Sodium 136 136 - 145 mmol/L    Potassium 3.0 (L) 3.5 - 5.1 mmol/L    Chloride 104 98 - 107 mmol/L    CO2 25 21 - 32 mmol/L    Anion Gap 7 7 - 16 mmol/L    Glucose 100 65 - 100 mg/dL    BUN 18 8 - 23 MG/DL    Creatinine 1.01 (H) 0.6 - 1.0 MG/DL    GFR African American >60 >60 ml/min/1.73m2    GFR Non- 56 (L) >60 ml/min/1.73m2    Calcium 7.9 (L) 8.3 - 10.4 MG/DL   CBC with Auto Differential    Collection Time: 08/17/22  6:09 AM   Result Value Ref Range    WBC 14.6 (H) 4.3 - 11.1 K/uL    RBC 3.08 (L) 4.05 - 5.2 M/uL    Hemoglobin 11.0 (L) 11.7 - 15.4 g/dL    Hematocrit 31.3 (L) 35.8 - 46.3 %    .6 (H) 79.6 - 97.8 FL    MCH 35.7 (H) 26.1 - 32.9 PG    MCHC 35.1 (H) 31.4 - 35.0 g/dL    RDW 16.2 (H) 11.9 - 14.6 %    Platelets 175 (H) 841 - 450 K/uL    MPV 9.2 (L) 9.4 - 12.3 FL    nRBC 0.00 0.0 - 0.2 K/uL    Differential Type AUTOMATED      Seg Neutrophils 76 43 - 78 %    Lymphocytes 10 (L) 13 - 44 %    Monocytes 8 4.0 - 12.0 %    Eosinophils % 2 0.5 - 7.8 %    Basophils 1 0.0 - 2.0 %    Immature Granulocytes 3 0.0 - 5.0 %    Segs Absolute 11.1 (H) 1.7 - 8.2 K/UL    Absolute Lymph # 1.5 0.5 - 4.6 K/UL    Absolute Mono # 1.1 0.1 - 1.3 K/UL    Absolute Eos # 0.4 0.0 - 0.8 K/UL    Basophils Absolute 0.1 0.0 - 0.2 K/UL    Absolute Immature Granulocyte 0.5 0.0 - 0.5 K/UL   Magnesium    Collection Time: 08/17/22  6:09 AM   Result Value Ref Range Magnesium 1.9 1.8 - 2.4 mg/dL   Basic Metabolic Panel w/ Reflex to MG    Collection Time: 08/18/22  5:21 AM   Result Value Ref Range    Sodium 136 136 - 145 mmol/L    Potassium 3.6 3.5 - 5.1 mmol/L    Chloride 105 98 - 107 mmol/L    CO2 26 21 - 32 mmol/L    Anion Gap 5 (L) 7 - 16 mmol/L    Glucose 96 65 - 100 mg/dL    BUN 13 8 - 23 MG/DL    Creatinine 1.06 (H) 0.6 - 1.0 MG/DL    GFR African American >60 >60 ml/min/1.73m2    GFR Non- 52 (L) >60 ml/min/1.73m2    Calcium 8.2 (L) 8.3 - 10.4 MG/DL   CBC with Auto Differential    Collection Time: 08/18/22  5:21 AM   Result Value Ref Range    WBC 13.9 (H) 4.3 - 11.1 K/uL    RBC 2.99 (L) 4.05 - 5.2 M/uL    Hemoglobin 10.4 (L) 11.7 - 15.4 g/dL    Hematocrit 30.6 (L) 35.8 - 46.3 %    .3 (H) 79.6 - 97.8 FL    MCH 34.8 (H) 26.1 - 32.9 PG    MCHC 34.0 31.4 - 35.0 g/dL    RDW 16.4 (H) 11.9 - 14.6 %    Platelets 896 (H) 670 - 450 K/uL    MPV 9.4 9.4 - 12.3 FL    nRBC 0.00 0.0 - 0.2 K/uL    Differential Type AUTOMATED      Seg Neutrophils 72 43 - 78 %    Lymphocytes 12 (L) 13 - 44 %    Monocytes 9 4.0 - 12.0 %    Eosinophils % 4 0.5 - 7.8 %    Basophils 1 0.0 - 2.0 %    Immature Granulocytes 3 0.0 - 5.0 %    Segs Absolute 10.0 (H) 1.7 - 8.2 K/UL    Absolute Lymph # 1.7 0.5 - 4.6 K/UL    Absolute Mono # 1.2 0.1 - 1.3 K/UL    Absolute Eos # 0.5 0.0 - 0.8 K/UL    Basophils Absolute 0.2 0.0 - 0.2 K/UL    Absolute Immature Granulocyte 0.4 0.0 - 0.5 K/UL       I have personally reviewed imaging studies showing:   Other Studies:  No orders to display       Current Meds:  Current Facility-Administered Medications   Medication Dose Route Frequency    [Held by provider] losartan (COZAAR) tablet 100 mg  100 mg Oral Daily    [Held by provider] hydroCHLOROthiazide (HYDRODIURIL) tablet 12.5 mg  12.5 mg Oral Daily    traMADol (ULTRAM) tablet 50 mg  50 mg Oral Q6H PRN    magnesium oxide (MAG-OX) tablet 400 mg  400 mg Oral Daily    amiodarone (CORDARONE) tablet 200 mg  200 mg Oral Daily    LORazepam (ATIVAN) tablet 1 mg  1 mg Oral Q4H PRN    metoprolol succinate (TOPROL XL) extended release tablet 25 mg  25 mg Oral Daily    vitamin B-12 (CYANOCOBALAMIN) tablet 500 mcg  500 mcg Oral Daily    aspirin EC tablet 81 mg  81 mg Oral Daily    calcium carb-cholecalciferol 250-125 MG-UNIT per tab 1 tablet  1 tablet Oral Daily    clopidogrel (PLAVIX) tablet 75 mg  75 mg Oral Daily    atorvastatin (LIPITOR) tablet 10 mg  10 mg Oral Daily    sodium chloride flush 0.9 % injection 5-40 mL  5-40 mL IntraVENous 2 times per day    sodium chloride flush 0.9 % injection 5-40 mL  5-40 mL IntraVENous PRN    0.9 % sodium chloride infusion   IntraVENous PRN    ondansetron (ZOFRAN-ODT) disintegrating tablet 4 mg  4 mg Oral Q8H PRN    Or    ondansetron (ZOFRAN) injection 4 mg  4 mg IntraVENous Q6H PRN    polyethylene glycol (GLYCOLAX) packet 17 g  17 g Oral Daily PRN    acetaminophen (TYLENOL) tablet 650 mg  650 mg Oral Q6H PRN    Or    acetaminophen (TYLENOL) suppository 650 mg  650 mg Rectal Q6H PRN    melatonin tablet 3 mg  3 mg Oral Nightly PRN    enoxaparin (LOVENOX) injection 40 mg  40 mg SubCUTAneous Q24H    levothyroxine (SYNTHROID) tablet 88 mcg  88 mcg Oral Daily    docusate sodium (COLACE) capsule 100 mg  100 mg Oral BID    nitrofurantoin (MACRODANTIN) capsule 50 mg  50 mg Oral 4 times per day       Signed:  Juan Arevalo MD    Part of this note may have been written by using a voice dictation software. The note has been proof read but may still contain some grammatical/other typographical errors.

## 2022-08-19 VITALS
RESPIRATION RATE: 16 BRPM | OXYGEN SATURATION: 100 % | HEART RATE: 69 BPM | WEIGHT: 160 LBS | SYSTOLIC BLOOD PRESSURE: 114 MMHG | DIASTOLIC BLOOD PRESSURE: 59 MMHG | BODY MASS INDEX: 26.66 KG/M2 | HEIGHT: 65 IN | TEMPERATURE: 98.2 F

## 2022-08-19 PROBLEM — A41.9 SEPSIS (HCC): Status: RESOLVED | Noted: 2022-08-17 | Resolved: 2022-08-19

## 2022-08-19 PROBLEM — E87.1 HYPONATREMIA: Status: RESOLVED | Noted: 2022-08-17 | Resolved: 2022-08-19

## 2022-08-19 PROBLEM — N17.9 AKI (ACUTE KIDNEY INJURY) (HCC): Status: RESOLVED | Noted: 2022-08-17 | Resolved: 2022-08-19

## 2022-08-19 LAB
ANION GAP SERPL CALC-SCNC: 4 MMOL/L (ref 7–16)
BASOPHILS # BLD: 0.2 K/UL (ref 0–0.2)
BASOPHILS NFR BLD: 1 % (ref 0–2)
BUN SERPL-MCNC: 12 MG/DL (ref 8–23)
CALCIUM SERPL-MCNC: 8.3 MG/DL (ref 8.3–10.4)
CHLORIDE SERPL-SCNC: 102 MMOL/L (ref 98–107)
CO2 SERPL-SCNC: 28 MMOL/L (ref 21–32)
CREAT SERPL-MCNC: 1 MG/DL (ref 0.6–1)
DIFFERENTIAL METHOD BLD: ABNORMAL
EOSINOPHIL # BLD: 0.6 K/UL (ref 0–0.8)
EOSINOPHIL NFR BLD: 4 % (ref 0.5–7.8)
ERYTHROCYTE [DISTWIDTH] IN BLOOD BY AUTOMATED COUNT: 16.5 % (ref 11.9–14.6)
FERRITIN SERPL-MCNC: 213 NG/ML (ref 8–388)
GLUCOSE SERPL-MCNC: 95 MG/DL (ref 65–100)
HCT VFR BLD AUTO: 32.1 % (ref 35.8–46.3)
HEMOCCULT STL QL: NEGATIVE
HGB BLD-MCNC: 10.9 G/DL (ref 11.7–15.4)
HGB RETIC QN AUTO: 40 PG (ref 29–35)
IMM GRANULOCYTES # BLD AUTO: 0.3 K/UL (ref 0–0.5)
IMM GRANULOCYTES NFR BLD AUTO: 2 % (ref 0–5)
IMM RETICS NFR: 23.5 % (ref 3–15.9)
IRON SATN MFR SERPL: 24 %
IRON SERPL-MCNC: 63 UG/DL (ref 35–150)
LYMPHOCYTES # BLD: 1.8 K/UL (ref 0.5–4.6)
LYMPHOCYTES NFR BLD: 13 % (ref 13–44)
MCH RBC QN AUTO: 35.2 PG (ref 26.1–32.9)
MCHC RBC AUTO-ENTMCNC: 34 G/DL (ref 31.4–35)
MCV RBC AUTO: 103.5 FL (ref 79.6–97.8)
MONOCYTES # BLD: 1.2 K/UL (ref 0.1–1.3)
MONOCYTES NFR BLD: 9 % (ref 4–12)
NEUTS SEG # BLD: 10.2 K/UL (ref 1.7–8.2)
NEUTS SEG NFR BLD: 71 % (ref 43–78)
NRBC # BLD: 0 K/UL (ref 0–0.2)
PLATELET # BLD AUTO: 536 K/UL (ref 150–450)
PMV BLD AUTO: 9.5 FL (ref 9.4–12.3)
POTASSIUM SERPL-SCNC: 3.6 MMOL/L (ref 3.5–5.1)
RBC # BLD AUTO: 3.1 M/UL (ref 4.05–5.2)
RETICS # AUTO: 0.07 M/UL (ref 0.03–0.1)
RETICS/RBC NFR AUTO: 2.4 % (ref 0.3–2)
SODIUM SERPL-SCNC: 134 MMOL/L (ref 136–145)
TIBC SERPL-MCNC: 268 UG/DL (ref 250–450)
WBC # BLD AUTO: 14.2 K/UL (ref 4.3–11.1)

## 2022-08-19 PROCEDURE — 82272 OCCULT BLD FECES 1-3 TESTS: CPT

## 2022-08-19 PROCEDURE — 83540 ASSAY OF IRON: CPT

## 2022-08-19 PROCEDURE — 6370000000 HC RX 637 (ALT 250 FOR IP): Performed by: INTERNAL MEDICINE

## 2022-08-19 PROCEDURE — 82728 ASSAY OF FERRITIN: CPT

## 2022-08-19 PROCEDURE — 6370000000 HC RX 637 (ALT 250 FOR IP): Performed by: FAMILY MEDICINE

## 2022-08-19 PROCEDURE — 85025 COMPLETE CBC W/AUTO DIFF WBC: CPT

## 2022-08-19 PROCEDURE — 85046 RETICYTE/HGB CONCENTRATE: CPT

## 2022-08-19 PROCEDURE — 36415 COLL VENOUS BLD VENIPUNCTURE: CPT

## 2022-08-19 PROCEDURE — 6360000002 HC RX W HCPCS: Performed by: INTERNAL MEDICINE

## 2022-08-19 PROCEDURE — 97530 THERAPEUTIC ACTIVITIES: CPT

## 2022-08-19 PROCEDURE — 80048 BASIC METABOLIC PNL TOTAL CA: CPT

## 2022-08-19 PROCEDURE — 2580000003 HC RX 258: Performed by: FAMILY MEDICINE

## 2022-08-19 RX ORDER — PSEUDOEPHEDRINE HCL 30 MG
100 TABLET ORAL 2 TIMES DAILY
Qty: 100 CAPSULE | Refills: 0 | Status: SHIPPED | OUTPATIENT
Start: 2022-08-19

## 2022-08-19 RX ORDER — NITROFURANTOIN MACROCRYSTALS 50 MG/1
50 CAPSULE ORAL 4 TIMES DAILY
Qty: 5 CAPSULE | Refills: 0 | Status: SHIPPED | OUTPATIENT
Start: 2022-08-19 | End: 2022-08-21

## 2022-08-19 RX ORDER — LEVOTHYROXINE SODIUM 88 UG/1
88 TABLET ORAL DAILY
Qty: 30 TABLET | Refills: 0 | Status: SHIPPED | OUTPATIENT
Start: 2022-08-20

## 2022-08-19 RX ORDER — LANOLIN ALCOHOL/MO/W.PET/CERES
3 CREAM (GRAM) TOPICAL NIGHTLY PRN
Qty: 30 TABLET | Refills: 0 | Status: SHIPPED | OUTPATIENT
Start: 2022-08-19

## 2022-08-19 RX ORDER — BISACODYL 10 MG
10 SUPPOSITORY, RECTAL RECTAL DAILY PRN
Status: DISCONTINUED | OUTPATIENT
Start: 2022-08-19 | End: 2022-08-19 | Stop reason: HOSPADM

## 2022-08-19 RX ADMIN — SODIUM CHLORIDE, PRESERVATIVE FREE 10 ML: 5 INJECTION INTRAVENOUS at 09:17

## 2022-08-19 RX ADMIN — AMIODARONE HYDROCHLORIDE 200 MG: 200 TABLET ORAL at 09:18

## 2022-08-19 RX ADMIN — LEVOTHYROXINE SODIUM 88 MCG: 0.09 TABLET ORAL at 05:30

## 2022-08-19 RX ADMIN — METOPROLOL SUCCINATE 25 MG: 25 TABLET, EXTENDED RELEASE ORAL at 09:18

## 2022-08-19 RX ADMIN — ATORVASTATIN CALCIUM 10 MG: 10 TABLET, FILM COATED ORAL at 09:18

## 2022-08-19 RX ADMIN — CALCIUM CARBONATE-CHOLECALCIFEROL TAB 250 MG-125 UNIT 1 TABLET: 250-125 TAB at 09:18

## 2022-08-19 RX ADMIN — DOCUSATE SODIUM 100 MG: 100 CAPSULE ORAL at 09:18

## 2022-08-19 RX ADMIN — ASPIRIN 81 MG: 81 TABLET, COATED ORAL at 09:18

## 2022-08-19 RX ADMIN — BISACODYL 10 MG: 10 SUPPOSITORY RECTAL at 09:25

## 2022-08-19 RX ADMIN — ENOXAPARIN SODIUM 40 MG: 100 INJECTION SUBCUTANEOUS at 11:59

## 2022-08-19 RX ADMIN — Medication 400 MG: at 09:18

## 2022-08-19 RX ADMIN — NITROFURANTOIN MACROCRYSTALS 50 MG: 50 CAPSULE ORAL at 05:30

## 2022-08-19 RX ADMIN — NITROFURANTOIN MACROCRYSTALS 50 MG: 50 CAPSULE ORAL at 11:59

## 2022-08-19 RX ADMIN — CLOPIDOGREL BISULFATE 75 MG: 75 TABLET ORAL at 09:18

## 2022-08-19 NOTE — DISCHARGE SUMMARY
Hospitalist Discharge Summary   Admit Date:  2022 12:00 AM   DC Note date: 2022  Name:  Vika Wilson   Age:  78 y.o. Sex:  female  :  1940   MRN:  [id number]   Room:  [facility]  PCP:  Imelda Mark MD    Presenting Complaint: No chief complaint on file. Initial Admission Diagnosis: UTI (urinary tract infection) [N39.0]     Problem List for this Hospitalization (present on admission):    Principal Problem (Resolved):    Sepsis ([address]  Active Problems:    Paroxysmal A-fib ([address]    UTI (urinary tract infection)    Hypothyroidism    CKD (chronic kidney disease) stage 3, GFR 30-59 ml/min (HCC)    Aortic aneurysm (HCC)    HTN (hypertension)    Presence of Watchman left atrial appendage closure device  Resolved Problems:    HUNG (acute kidney injury) ([address]    Hyponatremia      Hospital Course:    Ms. Marley Patterson is a 79 yo female with PMH of HTN, AFIB s/p watchman,  on asa/ plavix, CKD, hypothyroidism, AAA, anxiety admitted with weakness, UTI, hyponatremia, HUNG. She is on course of macrobid for UTI. Culture pending but does not seem to be added on. Met sepsis criteria due to lactic acid 2.7/ leukocytosis. She has been afebrile. She has prior PCN/cephalosporin intolerances and is on amiodarone that contradicts quinolones. I will defer repeat UA to PCP for followup. She has been hydrated for her HUNG that is improving. Hyponatremia resolved. Cozaar and HCTZ will be held until seen by PCP. She is found with elevated TSH and synthroid was adjusted to 88 mcg. Followup TSH needed for 8 weeks with PCP. She has lower grade anemia, chronic macrocytosis and chronic leukocytosis. She is agreeable to hematology referral. she has normal iron panel, occult stool pending, B12 elevated and normal folate. HGB 10.9 to be followed by PCP/ hematology. She has a generalized feeling of being unwell. She can not pinpoint her complaints. Has lost some weight and has metalic taste. COVID negative. No chest pain, cough, dyspnea. Has some suprapubic pains. She denies significant anxiety but does mention stress with recent medical issue and being concerned about falls at home due to  a recent fall with left hip hematoma. She is encouraged to seek PCP guidance regarding mood symptoms. She will have home health and PT/OT. She had constipation that resolved with regimen. I have discussed plan with her providers RN Brenton Waters and with her son Kristie Garcia. FULL CODE. Discharge plans are to home with family support. Lives alone and does ok. Will have home health and PT. Disposition: home  Diet: ADULT DIET; Regular  Code Status: Full Code    Follow Ups:    PCP  Hematology         Time spent in patient discharge and coordination 35  minutes. Follow up labs/diagnostics (ultimately defer to outpatient provider):        CBC  Blood pressure        Plan was discussed with patient and son Kristie Garcia by phone. All questions answered. Patient was stable at time of discharge. Instructions given to call a physician or return if any concerns.     Current Discharge Medication List        START taking these medications    Details   docusate sodium (COLACE, DULCOLAX) 100 MG CAPS Take 100 mg by mouth 2 times daily  Qty: 100 capsule, Refills: 0      melatonin 3 MG TABS tablet Take 1 tablet by mouth nightly as needed (insomnia)  Qty: 30 tablet, Refills: 0      nitrofurantoin (MACRODANTIN) 50 MG capsule Take 1 capsule by mouth 4 times daily for 5 doses  Qty: 5 capsule, Refills: 0           CONTINUE these medications which have CHANGED    Details   levothyroxine (SYNTHROID) 88 MCG tablet Take 1 tablet by mouth Daily  Qty: 30 tablet, Refills: 0           CONTINUE these medications which have NOT CHANGED    Details   clopidogrel (PLAVIX) 75 MG tablet Take 1 tablet by mouth daily  Qty: 30 tablet, Refills: 5      traMADol (ULTRAM) 50 MG tablet Take 50 mg by mouth every 6 hours as needed for Pain.      magnesium oxide (MAG-OX) 400 MG tablet Take 400 mg by mouth daily      amiodarone (CORDARONE) 200 MG tablet Take 200 mg by mouth daily      LORazepam (ATIVAN) 1 MG tablet Take 1 mg by mouth every 4 hours as needed for Anxiety. simvastatin (ZOCOR) 20 MG tablet Take 20 mg by mouth nightly      metoprolol succinate (TOPROL XL) 25 MG extended release tablet Take 25 mg by mouth daily      hydroCHLOROthiazide (HYDRODIURIL) 12.5 MG tablet Take 12.5 mg by mouth daily      Multiple Vitamins-Minerals (THERAPEUTIC MULTIVITAMIN-MINERALS) tablet Take 1 tablet by mouth daily      polyethylene glycol (GLYCOLAX) 17 g packet Take 17 g by mouth daily as needed for Constipation      acetaminophen (TYLENOL) 325 mg tablet Take 325 mg by mouth every 4 hours as needed for Pain      aspirin 81 MG EC tablet Take 81 mg by mouth daily      calcium-vitamin D (OSCAL) 250-125 MG-UNIT per tablet Take 1 tablet by mouth daily           STOP taking these medications       losartan (COZAAR) 100 MG tablet Comments:   Reason for Stopping:         Cyanocobalamin (VITAMIN B 12) 500 MCG TABS Comments:   Reason for Stopping:               Procedures done this admission:  * No surgery found *    Consults this [facility]:  [address] [facility] [address] [facility] VISIT    Echocardiogram results:  06/01/22    TRANSESOPHAGEAL ECHOCARDIOGRAM (CONTRAST/3D PRN) 06/01/2022  1:54 PM (Final)    Interpretation Summary  Formatting of this result is different from the original.      Left Ventricle: The EF by visual approximation is 55 - 60%. Left ventricle size is normal. Mildly increased wall thickness. Unable to assess wall motion. Right Ventricle: Right ventricle size is normal. Normal systolic function. Left Atrium: Watchman device is well-seated without evidence of periprosthetic leak on color flow Doppler, no evidence of exterior (left atrial side) thrombus on the watchman device. Aortic Valve: Bioprosthetic valve. No regurgitation.  No stenosis. Mitral Valve: Mild regurgitation. Technical qualifiers: Color flow Doppler was performed and pulse wave and/or continuous wave Doppler was performed. Signed by: Thom Wilburn DO on 6/1/2022  1:54 PM      Diagnostic Imaging/Tests:   XR CHEST PORTABLE    Result Date: 8/16/2022  The lungs are clear. The heart is normal in size. No pneumothorax. No pleural effusions. Prior median sternotomy for valve replacement.         Recent Labs     08/16/22  2322 08/16/22  2303   CULTURE NO GROWTH 2 DAYS NO GROWTH 3 DAYS       Labs: Results:       BMP, Mg, Phos Recent Labs     08/17/22  0609 08/18/22  0521 08/19/22  0555    136 134*   K 3.0* 3.6 3.6    105 102   CO2 25 26 28   ANIONGAP 7 5* 4*   BUN 18 13 12   CREATININE 1.01* 1.06* 1.00   LABGLOM 56* 52* 56*   GFRAA >60 >60 >60   CALCIUM 7.9* 8.2* 8.3   GLUCOSE 100 96 95   MG 1.9  --   --       CBC Recent Labs     08/17/22  0609 08/18/22  0521 08/18/22  1246 08/19/22  0555   WBC 14.6* 13.9*  --  14.2*   RBC 3.08* 2.99*  --  3.10*   HGB 11.0* 10.4* 11.3* 10.9*   HCT 31.3* 30.6* 32.9* 32.1*   .6* 102.3*  --  103.5*   MCH 35.7* 34.8*  --  35.2*   MCHC 35.1* 34.0  --  34.0   RDW 16.2* 16.4*  --  16.5*   * 511*  --  536*   MPV 9.2* 9.4  --  9.5   NRBC 0.00 0.00  --  0.00   SEGS 76 72  --  71   LYMPHOPCT 10* 12*  --  13   EOSRELPCT 2 4  --  4   MONOPCT 8 9  --  9   BASOPCT 1 1  --  1   IMMGRAN 3 3  --  2   SEGSABS 11.1* 10.0*  --  10.2*   LYMPHSABS 1.5 1.7  --  1.8   EOSABS 0.4 0.5  --  0.6   MONOSABS 1.1 1.2  --  1.2   BASOSABS 0.1 0.2  --  0.2   ABSIMMGRAN 0.5 0.4  --  0.3      LFT Recent Labs     08/16/22  1600   BILITOT 0.8   ALKPHOS 63   AST 32   ALT 36   PROT 6.8   LABALBU 3.5   GLOB 3.3      Cardiac  Lab Results   Component Value Date/Time    NTPROBNP 1,458 08/16/2022 11:03 PM    TROPHS 8.6 08/16/2022 04:00 PM      Coags Lab Results   Component Value Date/Time    PROTIME 12.9 08/15/2022 10:34 AM    PROTIME 16.3 04/18/2022 11:15 Collection Time: 08/19/22  5:55 AM   Result Value Ref Range    WBC 14.2 (H) 4.3 - 11.1 K/uL    RBC 3.10 (L) 4.05 - 5.2 M/uL    Hemoglobin 10.9 (L) 11.7 - 15.4 g/dL    Hematocrit 32.1 (L) 35.8 - 46.3 %    .5 (H) 79.6 - 97.8 FL    MCH 35.2 (H) 26.1 - 32.9 PG    MCHC 34.0 31.4 - 35.0 g/dL    RDW 16.5 (H) 11.9 - 14.6 %    Platelets 742 (H) 084 - 450 K/uL    MPV 9.5 9.4 - 12.3 FL    nRBC 0.00 0.0 - 0.2 K/uL    Differential Type AUTOMATED      Seg Neutrophils 71 43 - 78 %    Lymphocytes 13 13 - 44 %    Monocytes 9 4.0 - 12.0 %    Eosinophils % 4 0.5 - 7.8 %    Basophils 1 0.0 - 2.0 %    Immature Granulocytes 2 0.0 - 5.0 %    Segs Absolute 10.2 (H) 1.7 - 8.2 K/UL    Absolute Lymph # 1.8 0.5 - 4.6 K/UL    Absolute Mono # 1.2 0.1 - 1.3 K/UL    Absolute Eos # 0.6 0.0 - 0.8 K/UL    Basophils Absolute 0.2 0.0 - 0.2 K/UL    Absolute Immature Granulocyte 0.3 0.0 - 0.5 K/UL   Ferritin    Collection Time: 08/19/22  5:55 AM   Result Value Ref Range    Ferritin 213 8 - 388 NG/ML   Transferrin Saturation    Collection Time: 08/19/22  5:55 AM   Result Value Ref Range    Iron 63 35 - 150 ug/dL    TIBC 268 250 - 450 ug/dL    TRANSFERRIN SATURATION 24 %   Reticulocytes    Collection Time: 08/19/22  5:55 AM   Result Value Ref Range    Reticulocyte Count,Automated 2.4 (H) 0.3 - 2.0 %    Absolute Retic # 0.0733 0.026 - 0.095 M/ul    Immature Retic Fraction 23.5 (H) 3.0 - 15.9 %    Retic Hemoglobin conc. 40 (H) 29 - 35 pg       Allergies   Allergen Reactions    Penicillin G Hives    Loracarbef     Penicillins     Adhesive Tape Other (See Comments)     Tears skin, paper tape ok  Other reaction(s):  Other- (not listed) - Allergy  \"Tears  Skin\"  Paper tape ok   \"Tears  Skin\"  Paper tape ok       Cephalexin Swelling    Cephalosporins      Other reaction(s): Unknown (comments)     Immunization History   Administered Date(s) Administered    COVID-19, PFIZER PURPLE top, DILUTE for use, (age 15 y+), 30mcg/0.3mL 02/05/2021, 10/03/2021 Influenza Virus Vaccine 10/20/2011, 10/15/2012, 09/15/2013, 10/01/2014    Influenza, High Dose (Fluzone 65 yrs and older) 10/01/2016, 08/29/2017, 10/01/2018, 09/25/2019    Influenza, Triv, inactivated, subunit, adjuvanted, IM (Fluad 65 yrs and older) 09/22/2018    PPD Test 11/10/2015    Pneumococcal Conjugate 13-valent ([id number]) 11/15/2016    Pneumococcal Polysaccharide ([id number]) 08/22/2012    Pneumococcal Vaccine 08/23/2012, 09/01/2013    Tdap (Boostrix, Adacel) 07/02/2019    Zoster Live (Zostavax) 11/16/2014       Recent Vital Data:  Patient Vitals for the past 24 hrs:   Temp Pulse Resp BP SpO2   08/19/22 0713 97.7 °F (36.5 °C) 63 17 (!) 145/80 99 %   08/19/22 0526 97.3 °F (36.3 °C) 58 17 138/74 100 %   08/19/22 0057 97.3 °F (36.3 °C) 63 16 118/60 100 %   08/18/22 2253 -- -- 16 -- --   08/18/22 2223 -- -- 16 -- --   08/18/22 1931 97.9 °F (36.6 °C) 64 17 132/73 100 %   08/18/22 1622 98.1 °F (36.7 °C) 69 -- 127/73 100 %   08/18/22 1203 98.1 °F (36.7 °C) 69 -- 136/74 100 %       Oxygen Therapy  SpO2: 99 %  O2 Device: None (Room air)    Estimated body mass index is 26.63 kg/m² as calculated from the following:    Height as of this encounter: 5' 5\" (1.651 m). Weight as of this encounter: 160 lb (72.6 kg). Intake/Output Summary (Last 24 hours) at 8/19/2022 1038  Last data filed at 8/18/2022 1728  Gross per 24 hour   Intake --   Output 650 ml   Net -650 ml         Physical Exam:    General:    Well nourished. No overt distress, alert, pleasant   Head:  Normocephalic, atraumatic  Eyes:  Sclerae appear normal.  Pupils equally round. HENT:  Nares appear normal, no drainage. Moist mucous membranes  Neck:  No restricted ROM. Trachea midline  CV:   RRR. No m/r/g. No JVD, no edema   Lungs:   CTAB. No wheezing, rhonchi, or rales. Respirations even, unlabored   Abdomen:   Soft, tender, Suprapubic area, nondistended. Present BS  Extremities: Warm and dry. No cyanosis or clubbing. No edema.     Skin:     No rashes. Normal coloration  Neuro:  grossly intact. Psych:  Normal mood and affect. Signed:  Srinath Bryant MD    Part of this note may have been written by using a voice dictation software. The note has been proof read but may still contain some grammatical/other typographical errors.

## 2022-08-19 NOTE — PLAN OF CARE
Problem: Skin/Tissue Integrity - Adult  Goal: Skin integrity remains intact  Outcome: Progressing     Problem: Neurosensory - Adult  Goal: Achieves maximal functionality and self care  Outcome: Progressing     Problem: Chronic Conditions and Co-morbidities  Goal: Patient's chronic conditions and co-morbidity symptoms are monitored and maintained or improved  Outcome: Progressing  Flowsheets (Taken 8/18/2022 2030)  Care Plan - Patient's Chronic Conditions and Co-Morbidity Symptoms are Monitored and Maintained or Improved: Monitor and assess patient's chronic conditions and comorbid symptoms for stability, deterioration, or improvement     Problem: Safety - Adult  Goal: Free from fall injury  Outcome: Progressing     Problem: Discharge Planning  Goal: Discharge to home or other facility with appropriate resources  Outcome: Progressing  Flowsheets (Taken 8/18/2022 2030)  Discharge to home or other facility with appropriate resources: Identify barriers to discharge with patient and caregiver

## 2022-08-19 NOTE — PLAN OF CARE
Problem: Discharge Planning  Goal: Discharge to home or other facility with appropriate resources  8/19/2022 1125 by Kelby Walker RN  Outcome: Progressing  8/19/2022 0511 by Rebecca Carranza RN  Outcome: Progressing  Flowsheets (Taken 8/18/2022 2030)  Discharge to home or other facility with appropriate resources: Identify barriers to discharge with patient and caregiver     Problem: Safety - Adult  Goal: Free from fall injury  8/19/2022 1125 by Kelby Walker RN  Outcome: Progressing  8/19/2022 0511 by Rebecca Carranza RN  Outcome: Progressing     Problem: Chronic Conditions and Co-morbidities  Goal: Patient's chronic conditions and co-morbidity symptoms are monitored and maintained or improved  8/19/2022 1125 by Kelby Walker RN  Outcome: Progressing  8/19/2022 0511 by Rebecca Carranza RN  Outcome: Progressing  Flowsheets (Taken 8/18/2022 2030)  Care Plan - Patient's Chronic Conditions and Co-Morbidity Symptoms are Monitored and Maintained or Improved: Monitor and assess patient's chronic conditions and comorbid symptoms for stability, deterioration, or improvement     Problem: Neurosensory - Adult  Goal: Achieves maximal functionality and self care  8/19/2022 1125 by Kelby Walker RN  Outcome: Progressing  8/19/2022 0511 by Rebecca Carranza RN  Outcome: Progressing     Problem: Skin/Tissue Integrity - Adult  Goal: Skin integrity remains intact  8/19/2022 1125 by Kelby Walker RN  Outcome: Progressing  8/19/2022 0511 by Rebecca Carranza RN  Outcome: Progressing     Problem: Musculoskeletal - Adult  Goal: Return mobility to safest level of function  8/19/2022 1125 by Kelby Walker RN  Outcome: Progressing  8/19/2022 0511 by Rebecca Carranza RN  Outcome: Progressing  Goal: Maintain proper alignment of affected body part  8/19/2022 1125 by Kelby Walker RN  Outcome: Progressing  8/19/2022 0511 by Rebecca Carranza RN  Outcome: Progressing  Goal: Return ADL status to a safe level of function  8/19/2022 1125 by Marielle Peres RN  Outcome: Progressing  8/19/2022 0511 by Carolina Haro RN  Outcome: Progressing     Problem: Pain  Goal: Verbalizes/displays adequate comfort level or baseline comfort level  Outcome: Progressing

## 2022-08-19 NOTE — PROGRESS NOTES
PIV removed. Patient tolerated well. Catheter intact, no complications. Patient received after visit summary and had no further questions.

## 2022-08-19 NOTE — CARE COORDINATION
Patient anticipated to be medically ready for discharge today. Called [facility] and notified of D/C. Plan is as follows:     ASSESSMENT NOTE    Attending Physician: Selena Andre MD  Admit Problem: UTI (urinary tract infection) [N39.0]  Date/Time of Admission: 8/17/2022 12:00 AM  Problem List:  Patient Active Problem List   Diagnosis    Osteoarthritis    Chronic pain    History of breast cancer    Chronic pain of right ankle    NSVT (nonsustained ventricular tachycardia) ([facility])    Bilateral carotid artery disease ([facility])    Hyperlipidemia    Osteopenia    Hypothyroidism    Ventricular hypertrophy    Arthritis    CKD (chronic kidney disease) stage 3, GFR 30-59 ml/min ([facility])    Carotid stenosis, symptomatic, with infarction ([facility])    Atrial fibrillation and flutter ([facility])    Anxiety    Aortic aneurysm ([facility])    S/P aortic valve replacement    HTN (hypertension)    Paroxysmal A-fib ([facility])    PAF (paroxysmal atrial fibrillation) ([facility]    Presence of Watchman left atrial appendage closure device    UTI (urinary tract infection)       Service Assessment  Patient Orientation Alert and Oriented, Person, Place, Situation   Cognition Alert   History Provided By Patient   Primary Caregiver Self   Accompanied By/Relationship     [address] is:     PCP Verified by CM Yes   Last Visit to PCP Within last 3 months   Prior Functional Level Independent in ADLs/IADLs   Current Functional Level Independent in ADLs/IADLs   Can patient return to prior living arrangement Yes   Ability to make needs known: Good   Family able to assist with home care needs: Yes   Would you like for me to discuss the discharge plan with any other family members/significant others, and if so, who?      Financial Resources     Community Resources     CM/SW Referral       Social/Functional History  Lives With Alone   Type of Home House   Home Layout Two level, Able to Live on Main level with bedroom/bathroom (goes downstairs 1x/week for laundry)   Home Access     Entrance Stairs - Number of Steps 2   Entrance Stairs - Rails     Bathroom Shower/Tub Walk-in shower   Bathroom Toilet Handicap height   Bathroom Equipment Shower chair   [facility], Anjum Calderon, [address] Help From [address]     Grooming     Feeding     Toileting     Luiss[employer]   Meal Prep     Laundry     [address] Work     Driving     Shopping          Other (Comment)     Homemaking Responsibilities     Meal Prep Responsibility     [address] Paying/Finance Responsibility     [address] Management     Other (Comment)     Ambuation Assistance Independent   Transfer Assisstance Independent   Active  Yes   Patient's  Info     Mode of Transportation Car   Education     Occupation Retired   Type of Occupation       Discharge Planning   Type of [address]   Current Services Prior To Admission [address]   DME     DME     DME Ordered? No   Potential Assistance Purchasing Medications No   Meds-to-Beds: Does the patient want to have any new prescriptions delivered to bedside prior to discharge? Type of Home Care Services None   Patient expects to be discharged to: House   Follow Up Appointment: Best Day/Time     One/Two Story Residence: One story   # of Interior Steps     Height of Each Step (in)     [employer] Available     History of Falls? Yes     Services At/After Discharge  Transition of Care Consult (CM Consult):      Internal Home Health     Internal Hospice     Reason Outside Agency [address]     Partner SNF     Reason Why Partner SNF Not Chosen     Internal Comfort Care     Reason Outside [address] Discharge None   [employer] Information Provided? No   Mode of [facility] at Discharge [facility] [facility] Time of [facility]     Confirm Follow Up Transport Self     Condition of Participation: Discharge Planning  The plan for Transition of Care is related to the following treatment goals: return to prior level of functioning   The Patient and/or Patient Representative was provided with a Choice of Provider? Name of the Patient Representative who was provided with the Choice of Provider and agrees with the Discharge Plan? The Patient and/or Patient Representative Agree with the Discharge Plan? Freedom of Choice list was provided with basic dialogue that supports the individualized plan of care/goals, treatment preferences, and shares the quality data associated with the providers?        Documentation for Discharge Appeal  Discharge Appealed by     Date notified by [facility] of appeal request:     Time notified by [facility] of appeal request:     Detailed Notice of Discharge given to:     Date Notice of Discharge given:     Time Notice of Discharge given:     Date records sent to [facility]     Time records sent to Shawna Barbour     Date Notified of Outcome     Time Notified of Outcome     Outcome of appeal           DAVE Gifford 08/19/22 10:52 AM    Sarah Spencer LMSW    [address] [facility]

## 2022-08-20 ENCOUNTER — HOME CARE VISIT (OUTPATIENT)
Dept: SCHEDULING | Facility: HOME HEALTH | Age: 82
End: 2022-08-20
Payer: MEDICARE

## 2022-08-20 PROCEDURE — 1090000001 HH PPS REVENUE CREDIT

## 2022-08-20 PROCEDURE — 0221000100 HH NO PAY CLAIM PROCEDURE

## 2022-08-20 PROCEDURE — 400013 HH SOC

## 2022-08-20 PROCEDURE — 1090000002 HH PPS REVENUE DEBIT

## 2022-08-20 PROCEDURE — G0151 HHCP-SERV OF PT,EA 15 MIN: HCPCS

## 2022-08-21 VITALS
SYSTOLIC BLOOD PRESSURE: 142 MMHG | DIASTOLIC BLOOD PRESSURE: 86 MMHG | RESPIRATION RATE: 16 BRPM | TEMPERATURE: 97.1 F | HEART RATE: 60 BPM | OXYGEN SATURATION: 95 %

## 2022-08-21 LAB
BACTERIA SPEC CULT: NORMAL
SERVICE CMNT-IMP: NORMAL

## 2022-08-21 PROCEDURE — 1090000002 HH PPS REVENUE DEBIT

## 2022-08-21 PROCEDURE — 1090000001 HH PPS REVENUE CREDIT

## 2022-08-21 ASSESSMENT — ENCOUNTER SYMPTOMS
PAIN LOCATION - PAIN QUALITY: ACHE
DYSPNEA ACTIVITY LEVEL: AFTER AMBULATING 10 - 20 FT

## 2022-08-22 ENCOUNTER — TELEPHONE (OUTPATIENT)
Dept: HOME HEALTH SERVICES | Facility: HOME HEALTH | Age: 82
End: 2022-08-22

## 2022-08-22 LAB
BACTERIA SPEC CULT: NORMAL
SERVICE CMNT-IMP: NORMAL

## 2022-08-22 PROCEDURE — 1090000001 HH PPS REVENUE CREDIT

## 2022-08-22 PROCEDURE — 1090000002 HH PPS REVENUE DEBIT

## 2022-08-22 NOTE — TELEPHONE ENCOUNTER
[address] [place] [address] Oscar Tan Pharmacist consult. Mrs. Eugenia Moyer was discharged from [facility] after having sepsis due to UTI. I have called to review her discharge medications with her and gotten her voice mail. I left my name and cell phone number. I asked her to call me with any medication questions or issues.

## 2022-08-23 PROCEDURE — 1090000002 HH PPS REVENUE DEBIT

## 2022-08-23 PROCEDURE — 1090000001 HH PPS REVENUE CREDIT

## 2022-08-24 PROCEDURE — 1090000001 HH PPS REVENUE CREDIT

## 2022-08-24 PROCEDURE — 1090000002 HH PPS REVENUE DEBIT

## 2022-08-25 PROCEDURE — 1090000002 HH PPS REVENUE DEBIT

## 2022-08-25 PROCEDURE — 1090000001 HH PPS REVENUE CREDIT

## 2022-08-26 ENCOUNTER — HOME CARE VISIT (OUTPATIENT)
Dept: SCHEDULING | Facility: HOME HEALTH | Age: 82
End: 2022-08-26
Payer: MEDICARE

## 2022-08-26 PROCEDURE — 1090000002 HH PPS REVENUE DEBIT

## 2022-08-26 PROCEDURE — 1090000001 HH PPS REVENUE CREDIT

## 2022-08-27 PROCEDURE — 1090000001 HH PPS REVENUE CREDIT

## 2022-08-27 PROCEDURE — 1090000002 HH PPS REVENUE DEBIT

## 2022-08-28 PROCEDURE — 1090000001 HH PPS REVENUE CREDIT

## 2022-08-28 PROCEDURE — 1090000002 HH PPS REVENUE DEBIT

## 2022-08-29 ENCOUNTER — OFFICE VISIT (OUTPATIENT)
Dept: CARDIOLOGY CLINIC | Age: 82
End: 2022-08-29
Payer: MEDICARE

## 2022-08-29 VITALS
HEART RATE: 60 BPM | SYSTOLIC BLOOD PRESSURE: 134 MMHG | DIASTOLIC BLOOD PRESSURE: 84 MMHG | HEIGHT: 65 IN | WEIGHT: 156.2 LBS | BODY MASS INDEX: 26.02 KG/M2

## 2022-08-29 DIAGNOSIS — I10 PRIMARY HYPERTENSION: ICD-10-CM

## 2022-08-29 DIAGNOSIS — I47.29 NSVT (NONSUSTAINED VENTRICULAR TACHYCARDIA): Primary | ICD-10-CM

## 2022-08-29 DIAGNOSIS — Z95.818 PRESENCE OF WATCHMAN LEFT ATRIAL APPENDAGE CLOSURE DEVICE: ICD-10-CM

## 2022-08-29 DIAGNOSIS — I48.0 PAROXYSMAL A-FIB (HCC): ICD-10-CM

## 2022-08-29 PROCEDURE — 1090000002 HH PPS REVENUE DEBIT

## 2022-08-29 PROCEDURE — 1111F DSCHRG MED/CURRENT MED MERGE: CPT | Performed by: INTERNAL MEDICINE

## 2022-08-29 PROCEDURE — 99214 OFFICE O/P EST MOD 30 MIN: CPT | Performed by: INTERNAL MEDICINE

## 2022-08-29 PROCEDURE — 1036F TOBACCO NON-USER: CPT | Performed by: INTERNAL MEDICINE

## 2022-08-29 PROCEDURE — 1090F PRES/ABSN URINE INCON ASSESS: CPT | Performed by: INTERNAL MEDICINE

## 2022-08-29 PROCEDURE — G8427 DOCREV CUR MEDS BY ELIG CLIN: HCPCS | Performed by: INTERNAL MEDICINE

## 2022-08-29 PROCEDURE — G8400 PT W/DXA NO RESULTS DOC: HCPCS | Performed by: INTERNAL MEDICINE

## 2022-08-29 PROCEDURE — G8417 CALC BMI ABV UP PARAM F/U: HCPCS | Performed by: INTERNAL MEDICINE

## 2022-08-29 PROCEDURE — 1123F ACP DISCUSS/DSCN MKR DOCD: CPT | Performed by: INTERNAL MEDICINE

## 2022-08-29 PROCEDURE — 1090000001 HH PPS REVENUE CREDIT

## 2022-08-29 NOTE — PROGRESS NOTES
[address], [address]  PHONE: [phone]     22    NAME:  Shantel Allen  : 1940  MRN: [id number]       SUBJECTIVE:   Shantel Allen is a 78 y.o. female seen for a follow up visit regarding the following:     Chief Complaint   Patient presents with    Atrial Fibrillation     6 mo f/u        HPI: Here for s/p AVR with 23mm bovine AVR on 11/10/15 ([facility] with normal coron before the AVR)   TIA in    s/p ablation for atrial flutter 12/18/15 after the AVR   carotid dz (US 11/15 with mild dz). Echo 2020 and 2021: normal EF, normal AVR  2022: watchman device     Falling more, recent admission for UTI, HUNG, weakness. Feeling better now. Getting PT now. ARB stopped. No new angina, CP, SHAVER, SOB. Edema chronic and not changed. Feeling well. Patient denies recent history of orthopnea, PND, excessive dizziness and/or syncope. Son is Pharm D. Past Medical History, Past Surgical History, Family history, Social History, and Medications were all reviewed with the patient today and updated as necessary. Current Outpatient Medications   Medication Sig Dispense Refill    famotidine (PEPCID) 20 MG tablet Take 20 mg by mouth as needed (acid reflux). once daily as needed      loratadine (CLARITIN) 10 MG tablet Take 10 mg by mouth daily. docusate sodium (COLACE, DULCOLAX) 100 MG CAPS Take 100 mg by mouth 2 times daily 100 capsule 0    melatonin 3 MG TABS tablet Take 1 tablet by mouth nightly as needed (insomnia) 30 tablet 0    levothyroxine (SYNTHROID) 88 MCG tablet Take 1 tablet by mouth Daily 30 tablet 0    traMADol (ULTRAM) 50 MG tablet Take 50 mg by mouth every 6 hours as needed for Pain.      magnesium oxide (MAG-OX) 400 MG tablet Take 400 mg by mouth daily      amiodarone (CORDARONE) 200 MG tablet Take 200 mg by mouth daily      LORazepam (ATIVAN) 1 MG tablet Take 1 mg by mouth every 4 hours as needed for Anxiety.       simvastatin (ZOCOR) 20 MG tablet Take 20 mg by mouth nightly      metoprolol succinate (TOPROL XL) 25 MG extended release tablet Take 25 mg by mouth daily      hydroCHLOROthiazide (HYDRODIURIL) 12.5 MG tablet Take 12.5 mg by mouth daily      Multiple Vitamins-Minerals (THERAPEUTIC MULTIVITAMIN-MINERALS) tablet Take 1 tablet by mouth daily      polyethylene glycol (GLYCOLAX) 17 g packet Take 17 g by mouth daily as needed for Constipation      acetaminophen (TYLENOL) 325 mg tablet Take 325 mg by mouth every 4 hours as needed for Pain (breakthrough pain)      aspirin 81 MG EC tablet Take 81 mg by mouth daily      calcium-vitamin D (OSCAL) 250-125 MG-UNIT per tablet Take 1 tablet by mouth daily       No current facility-administered medications for this visit. Allergies   Allergen Reactions    Penicillin G Hives    Loracarbef     Penicillins     Adhesive Tape Other (See Comments)     Tears skin, paper tape ok  Other reaction(s): Other- (not listed) - Allergy  \"Tears  Skin\"  Paper tape ok   \"Tears  Skin\"  Paper tape ok       Cephalexin Swelling    Cephalosporins      Other reaction(s): Unknown (comments)     Patient Active Problem List    Diagnosis Date Noted    Paroxysmal A-fib ([facility] 04/19/2022     Priority: High    UTI (urinary tract infection) 08/16/2022     Priority: Medium    Presence of Watchman left atrial appendage closure device 04/20/2022    PAF (paroxysmal atrial fibrillation) ([facility] 04/19/2022    NSVT (nonsustained ventricular tachycardia) ([facility] 11/29/2021    Chronic pain      osteoarthritis        Arthritis     Anxiety     Chronic pain of right ankle 08/29/2017    Bilateral carotid artery disease ([facility] 07/28/2017    Hypothyroidism 11/15/2016    Osteoarthritis 01/27/2016     Severe ankle pain.         Carotid stenosis, symptomatic, with infarction ([address] 01/27/2016     US 11/15: <50% ICAs        Aortic aneurysm ([facility] 01/27/2016    Atrial fibrillation and flutter ([facility] 11/17/2015     11/17/15 Cardioversion (Dr Nola Mejias Marcus)        History of breast cancer 11/10/2015     Treated by Dr. Melissa Levy. Prior left mastectomy. CKD (chronic kidney disease) stage 3, GFR 30-59 ml/min ([facility]) 11/10/2015    S/P aortic valve replacement 11/10/2015     AVR 11/10/15 (23mm bovine valve), EF normal, pre-op LHC showed normal   coronaries         Hyperlipidemia 10/15/2012    Osteopenia 10/15/2012     DEXA 5/27/14 with osteopenia. Previously treated 8 years with Fosamax. Ventricular hypertrophy 10/15/2012     left        HTN (hypertension) 10/15/2012      Past Surgical History:   Procedure Laterality Date    AORTIC VALVE REPLACEMENT  11/10/15    Dr Grimm Child Bilateral     HYSTERECTOMY (CERVIX STATUS UNKNOWN)      KNEE ARTHROSCOPY      left knee replacement    MASTECTOMY      left    OVARY REMOVAL      SKIN BIOPSY N/A 2017    basal cell x2 left side of face- [facility]    TOTAL KNEE ARTHROPLASTY Left     and revision      Family History   Problem Relation Age of Onset    Heart Disease Father     Heart Disease Mother     Hypertension Mother     Elevated Lipids Mother     Hypertension Father     Dementia Mother      Social History     Tobacco Use    Smoking status: Never    Smokeless tobacco: Never   Substance Use Topics    Alcohol use: Yes     Alcohol/week: 7.0 standard drinks         ROS:    No obvious pertinent positives on review of systems except for what was outlined in the HPI today.       PHYSICAL EXAM:     /84   Pulse 60   Ht 5' 5\" (1.651 m)   Wt 156 lb 3.2 oz (70.9 kg)   BMI 25.99 kg/m²    General/Constitutional:   Alert and oriented x 3, no acute distress  HEENT:   normocephalic, atraumatic, moist mucous membranes  Neck:   No JVD or carotid bruits bilaterally  Cardiovascular:   regular rate and rhythm, no murmur/rub/gallop appreciated  Pulmonary:   clear to auscultation bilaterally, no respiratory distress  Abdomen:   soft, non-tender, non-distended  Ext:   No sig LE edema bilaterally  Skin:  warm and dry, no obvious rashes seen  Neuro:   no obvious sensory or motor deficits  Psychiatric:   normal mood and affect      Lab Results   Component Value Date/Time     08/19/2022 05:55 AM    K 3.6 08/19/2022 05:55 AM     08/19/2022 05:55 AM    CO2 28 08/19/2022 05:55 AM    BUN 12 08/19/2022 05:55 AM    CREATININE 1.00 08/19/2022 05:55 AM    GLUCOSE 95 08/19/2022 05:55 AM    CALCIUM 8.3 08/19/2022 05:55 AM        Lab Results   Component Value Date    WBC 14.2 (H) 08/19/2022    HGB 10.9 (L) 08/19/2022    HCT 32.1 (L) 08/19/2022    .5 (H) 08/19/2022     (H) 08/19/2022       Lab Results   Component Value Date    TSH 2.590 11/30/2021       No results found for: LABA1C  No results found for: EAG    Lab Results   Component Value Date    CHOL 158 01/07/2020     Lab Results   Component Value Date    TRIG 190 (H) 01/07/2020     Lab Results   Component Value Date    HDL 48 01/07/2020     Lab Results   Component Value Date    LDLCALC 72 01/07/2020     Lab Results   Component Value Date    LABVLDL 38 01/07/2020     No results found for: CHOLHDLRATIO        I have Independently reviewed prior care notes, any ER records available, cardiac testing, labs and results with the patient and before seeing the patient today. Also independently reviewed outside records when available. ASSESSMENT:    Kenyetta Best was seen today for atrial fibrillation. Diagnoses and all orders for this visit:    NSVT (nonsustained ventricular tachycardia) ([address]    Paroxysmal A-fib ([address]    Primary hypertension    Presence of Watchman left atrial appendage closure device        PLAN:    1. NSVT:     Reduced amio to 200 daily, remain on Mg. Remain on BB.         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  PAf/flutter:  s/p watchman, falling a lot more. Can stop plavix, remain on ASA 81.       3. AVR:   On ASA. PROSPER was ok. 4. Carotid Dz: we will follow in the future. 5.  HTN:  off the ARB. Back on HCTZ. Follow. Monitor in PT. Patient has been instructed and agrees to call our office with any issues or other concerns related to their cardiac condition(s) and/or complaint(s). Return in about 3 months (around 11/29/2022).        ARTEMIO RAMSEY, DO  8/29/2022

## 2022-08-30 ENCOUNTER — HOME CARE VISIT (OUTPATIENT)
Dept: SCHEDULING | Facility: HOME HEALTH | Age: 82
End: 2022-08-30
Payer: MEDICARE

## 2022-08-30 VITALS
RESPIRATION RATE: 18 BRPM | HEART RATE: 72 BPM | OXYGEN SATURATION: 98 % | SYSTOLIC BLOOD PRESSURE: 114 MMHG | TEMPERATURE: 97.8 F | DIASTOLIC BLOOD PRESSURE: 72 MMHG

## 2022-08-30 PROCEDURE — 1090000002 HH PPS REVENUE DEBIT

## 2022-08-30 PROCEDURE — 1090000001 HH PPS REVENUE CREDIT

## 2022-08-30 PROCEDURE — G0151 HHCP-SERV OF PT,EA 15 MIN: HCPCS

## 2022-08-30 ASSESSMENT — ENCOUNTER SYMPTOMS: PAIN LOCATION - PAIN QUALITY: ACHE

## 2022-08-31 ENCOUNTER — TELEPHONE (OUTPATIENT)
Dept: HOME HEALTH SERVICES | Facility: HOME HEALTH | Age: 82
End: 2022-08-31

## 2022-08-31 PROCEDURE — 1090000002 HH PPS REVENUE DEBIT

## 2022-08-31 PROCEDURE — 1090000001 HH PPS REVENUE CREDIT

## 2022-08-31 NOTE — TELEPHONE ENCOUNTER
I spoke with . Eugenia Moyer and explained my part of the [facility] team.  We discussed her medications. She had a visit with Dr. Soumya Desai on Monday. He stopped her Plavix saying she had been on 4 months and that was good enough. He did not make any other changes. She said her UTI was probably cleared up. She had no symptoms like burning before going to the hospital.  She did complete her antibiotic. No medication questions at this time. She said OK to call back any time.

## 2022-09-01 ENCOUNTER — HOME CARE VISIT (OUTPATIENT)
Dept: SCHEDULING | Facility: HOME HEALTH | Age: 82
End: 2022-09-01
Payer: MEDICARE

## 2022-09-01 ENCOUNTER — TELEPHONE (OUTPATIENT)
Dept: ONCOLOGY | Age: 82
End: 2022-09-01

## 2022-09-01 VITALS
OXYGEN SATURATION: 98 % | TEMPERATURE: 98.2 F | SYSTOLIC BLOOD PRESSURE: 110 MMHG | HEART RATE: 78 BPM | DIASTOLIC BLOOD PRESSURE: 64 MMHG | RESPIRATION RATE: 17 BRPM

## 2022-09-01 PROCEDURE — G0157 HHC PT ASSISTANT EA 15: HCPCS

## 2022-09-01 PROCEDURE — 1090000001 HH PPS REVENUE CREDIT

## 2022-09-01 PROCEDURE — 1090000002 HH PPS REVENUE DEBIT

## 2022-09-01 ASSESSMENT — ENCOUNTER SYMPTOMS: PAIN LOCATION - PAIN QUALITY: ACHING

## 2022-09-02 PROCEDURE — 1090000001 HH PPS REVENUE CREDIT

## 2022-09-02 PROCEDURE — 1090000002 HH PPS REVENUE DEBIT

## 2022-09-03 PROCEDURE — 1090000002 HH PPS REVENUE DEBIT

## 2022-09-03 PROCEDURE — 1090000001 HH PPS REVENUE CREDIT

## 2022-09-04 PROCEDURE — 1090000002 HH PPS REVENUE DEBIT

## 2022-09-04 PROCEDURE — 1090000001 HH PPS REVENUE CREDIT

## 2022-09-05 PROCEDURE — 1090000001 HH PPS REVENUE CREDIT

## 2022-09-05 PROCEDURE — 1090000002 HH PPS REVENUE DEBIT

## 2022-09-06 ENCOUNTER — TELEPHONE (OUTPATIENT)
Dept: HOME HEALTH SERVICES | Facility: HOME HEALTH | Age: 82
End: 2022-09-06

## 2022-09-06 ENCOUNTER — HOME CARE VISIT (OUTPATIENT)
Dept: SCHEDULING | Facility: HOME HEALTH | Age: 82
End: 2022-09-06
Payer: MEDICARE

## 2022-09-06 VITALS
HEART RATE: 72 BPM | RESPIRATION RATE: 18 BRPM | OXYGEN SATURATION: 96 % | TEMPERATURE: 97.4 F | SYSTOLIC BLOOD PRESSURE: 122 MMHG | DIASTOLIC BLOOD PRESSURE: 80 MMHG

## 2022-09-06 PROCEDURE — 1090000002 HH PPS REVENUE DEBIT

## 2022-09-06 PROCEDURE — 1090000001 HH PPS REVENUE CREDIT

## 2022-09-06 PROCEDURE — G0151 HHCP-SERV OF PT,EA 15 MIN: HCPCS

## 2022-09-06 ASSESSMENT — ENCOUNTER SYMPTOMS: PAIN LOCATION - PAIN QUALITY: ACHING

## 2022-09-06 NOTE — TELEPHONE ENCOUNTER
Voice mail answered my call. I left my name and cell phone number. I asked Mrs. Jocelyn Del Castillo to call me with any medication questions or issues.

## 2022-09-07 ENCOUNTER — HOME CARE VISIT (OUTPATIENT)
Dept: SCHEDULING | Facility: HOME HEALTH | Age: 82
End: 2022-09-07
Payer: MEDICARE

## 2022-09-07 VITALS
DIASTOLIC BLOOD PRESSURE: 78 MMHG | SYSTOLIC BLOOD PRESSURE: 124 MMHG | OXYGEN SATURATION: 98 % | RESPIRATION RATE: 18 BRPM | TEMPERATURE: 97.8 F | HEART RATE: 78 BPM

## 2022-09-07 PROCEDURE — G0151 HHCP-SERV OF PT,EA 15 MIN: HCPCS

## 2022-09-07 PROCEDURE — 1090000001 HH PPS REVENUE CREDIT

## 2022-09-07 PROCEDURE — 1090000002 HH PPS REVENUE DEBIT

## 2022-09-07 ASSESSMENT — ENCOUNTER SYMPTOMS: PAIN LOCATION - PAIN QUALITY: ACHE

## 2022-09-18 PROBLEM — N39.0 UTI (URINARY TRACT INFECTION): Status: RESOLVED | Noted: 2022-08-16 | Resolved: 2022-09-18

## 2022-09-21 ENCOUNTER — HOSPITAL ENCOUNTER (OUTPATIENT)
Dept: PHYSICAL THERAPY | Age: 82
Setting detail: RECURRING SERIES
Discharge: HOME | End: 2022-09-24
Payer: MEDICARE

## 2022-09-21 PROCEDURE — 97163 PT EVAL HIGH COMPLEX 45 MIN: CPT

## 2022-09-21 ASSESSMENT — PAIN SCALES - GENERAL: PAINLEVEL_OUTOF10: 7

## 2022-09-21 ASSESSMENT — PAIN DESCRIPTION - ORIENTATION: ORIENTATION: RIGHT;LEFT

## 2022-09-21 ASSESSMENT — PAIN DESCRIPTION - LOCATION: LOCATION: ANKLE

## 2022-09-21 NOTE — THERAPY EVALUATION
Mike Antunez  : 1940  Primary: Medicare Part A And B  Secondary: [address] @ [address] [place] [address]  Phone: [phone]  Fax: [phone] Plan Frequency: 2x per week  Plan of Care/Certification Expiration Date: 22    PT Visit Info: Total # of Visits Approved: 99  Total # of Visits to Date: 1  Progress Note Counter: 1  Progress Note Due Date: 10/20/22    Visit Count:  1    OUTPATIENT PHYSICAL THERAPY:OP NOTE TYPE: Initial Assessment 2022               Episode  Appt Desk         Treatment Diagnosis:  Difficulty in walking, not elsewhere classified (R26.2); Unsteadiness on feet (R26.81); Pain in left ankle and joints of left foot (M25.572); Pain in right ankle and joints of right foot (M25.571); Other instability, left ankle (M25.372); Other instability, right ankle (M25.371); Other specified arthritis, right knee (F24.992); pes cavus right foot, pes cavus left foot; Repeated falls (R29.6); Muscle weakness (generalized) (M62.81)    Medical/Referring Diagnosis:  Other abnormalities of gait and mobility [R26.89]  Referring Physician:  Nicholas Barrett MD MD Orders:  PT Eval and Treat   Return MD Appt:  unknown  Date of Onset:  Onset Date: 22   Allergies:  Penicillin g, Loracarbef, Penicillins, Adhesive tape, Cephalexin, and Cephalosporins  Restrictions/Precautions:    Restrictions/Precautions: Fall Risk      Medications Last Reviewed:  2022     SUBJECTIVE   History of Injury/Illness (Reason for Referral):  78 year right handed white female lives alone brought here by her neighbor Jayson Maya. Dtr in law helps her every Tuesday. Lots of falls. Just finished HHPT. Most recent fall last Saturday:  \"I don't know what I did. I was going to put plate on table and looked out the window and I went down. The right knee buckled.  Like in the morning and then the fall in the pm.\"  No injury except may have twistid right nakle. Burising and some discomfort in her right toes. .    Using cane 6 months. Another fall x4 weeks ago:  Down the valera I decided to go in the bathroom may have misjudged the door. Don't know how did it happed. Valve replacement x 6 years; 16 years from breast CA radiation and chemo. 8- 10 years TIA; L TKA x 2. I can only walk a few steps at home alone. Use the walker and cane at home. Ahsan Lowe mother's chair here today. Using an old rollator - can get to the door of the bathroom with the rollator but can't get it in. I ususally sit the cane at the doorway. Does not really see how deficit she is. Have the old walker that fits in the bathroom and could put that in there. Smart watch. Ankle problems for years. Swollen, deformed, high arch. 15 years Dr. Jesus Doty kept suggesting ankle replacelment. Never did. [employer] shoes for comfort. Watchman put in heart to come off blood thinners. Takes voltaren. Ankles feel sore. Walking hurts. Twisted ankle. 7 - 8/10 sore: 4. Knee valgus. Patient Stated Goal(s):  \"I want to be fixed up enough that I can get out and not be a burden. \"  Initial: Right, Left Ankle 7/10 Post Session: Right, Left Ankle  /10  Past Medical History/Comorbidities:   Ms. Xenia Arcos  has a past medical history of Anxiety, Aortic aneurysm ([address] Aortic stenosis, Arthritis, Atrial fibrillation and flutter ([address] Atrial flutter ([address] Basal cell carcinoma, Breast cancer ([address] Carotid stenosis, symptomatic, with infarction ([address] Chronic pain, CKD (chronic kidney disease) stage 3, GFR 30-59 ml/min ([address] History of breast cancer, HTN (hypertension), Hyperlipidemia, Hypertension, Hypocalcemia, Nonrheumatic aortic valve stenosis, Osteoarthritis, Osteopenia, S/P aortic valve replacement, Shortness of breath, Squamous cell carcinoma, Stroke ([address] Transient ischemic attack, and Ventricular hypertrophy.   Ms. Xeina Arcos  has a past surgical history that includes Appendectomy; skin biopsy (N/A, 2017); Aortic valve replacement (11/10/15); Knee arthroscopy; Hysterectomy; Total knee arthroplasty (Left); Mastectomy; Ovary removal; and Cataract removal (Bilateral). Social History/Living Environment: grab bar in shower    Lives With: Alone  Type of Home: House  Home Layout: Two level; Able to Live on Main level with bedroom/bathroom (goes downstairs 1x/week for laundry)  Entrance Stairs - Number of Steps: 2  Bathroom Shower/Tub: Walk-in shower  Bathroom Toilet: Handicap height  Bathroom Equipment: Shower chair  Home Equipment: [employer]; Walker, rolling     Prior Level of Function/Work/Activity:   Occupation: Retired  Receives Help From: Family  ADL Assistance: Independent  Homemaking Assistance: Independent  Ambulation Assistance: Independent  Transfer Assistance: Independent  Active : Yes  Mode of Transportation: Car      Learning:   Does the patient/guardian have any barriers to learning?: No barriers  Will there be a co-learner?: No  What is the preferred language of the patient/guardian?: English  Is an  required?: No  How does the patient/guardian prefer to learn new concepts?: Listening; Reading; Demonstration   Fall Risk Scale: Donnelly Total Score: 65  Donnelly Fall Risk: High (45 and higher)           OBJECTIVE   Pt is very unsteady with her gait. Uses cane with steppage on the left and decent heel strike on the right. Her right knee has creaking vs crackling severe creptitus to the point the the joint sometimes gets locked up. Left ankle through range is 3 into relative inv. High arch. Bulging lateral malleolus. Relative inversion. Inversion after placement 3+ with multiple cracks and full snaps. Very little eversion 3- with crack. Left worse than right. Right: Almost no eversion barely able to hold. DF about 3. Inv 3. Creaking right knee.     Other leg musculature about 4/5  ASSESSMENT   Initial Assessment:  78year old right female with rapid decline per her report and multiple falls. Pt has severe bone on bone arthritis with severe creaking vs cracling crepitus in the right knee to the point of locking up which is likely causing her falls. In addition her ankle appear to be severely arthritic as well with the same kind of crepitus and popping with weakness, swelling, high arch and decreased range of motion. The left foot basically is a foot drop with deformity. Pt will benefit from physical therapy for strengthening to tolerance, gait and balance refinement but will need orthotic assessment as soon as possible for bracing and support of her ankles. Pt needs to use a walker at all times at this time. Pt may also benefit from hyaluronic injection to the right knee to hopefully decrease her joint from locking up. Problem List: (Impacting functional limitations): Body Structures, Functions, Activity Limitations Requiring Skilled Therapeutic Intervention: Decreased functional mobility ; Decreased ADL status; Decreased ROM; Decreased body mechanics; Decreased strength; Decreased sensation; Decreased balance; Decreased coordination; Increased pain   Theragpy Prognosis:   Therapy Prognosis: Good   Assessment Complexity:   High Complexity  PLAN   Effective Dates: 9/21/2022 TO Plan of Care/Certification Expiration Date: 12/20/22   Frequency/Duration: Plan Frequency: 2x per week   Interventions Planned (Treatment may consist of any combination of the following):        GOALS: (Goals have been discussed and agreed upon with patient.)  Long Term Functional Goals: Time Frame: 16 visits  Pt will be independent with range of motion, strength and balance home exercise program.  Pt will decrease TUG score by 10 seconds indicating more normalized gait pattern and decrease risk for falls. Pt will increase Cantu Balance Scale to 34/56 indicating increased balance and decreased risk for falls.   Pt will improve the FAAM by 10 points indicating improved ankle pain and strength to increase gait balance and decreased risk for falls. Outcome Measure: Tool Used: Cantu Balance Scale  Score:  Initial: 24/56 Most Recent: X/56 (Date: -- )   Interpretation of Score: Each section is scored on a 0-4 scale, 0 representing the patients inability to perform the task and 4 representing independence. The scores of each section are added together for a total score of 56. The higher the patients score, the more independent the patient is. Any score below 45 indicates increased risk for falls. Tool Used: FOOT AND ANKLE ABILITY MEASURE  Score:  Initial: 22 Most Recent: X (Date: -- )   Interpretation of Score: For the \"Activities of Daily Living\", there are 21 questions each scored on a 5 point scale with 0 representing \"Unable to do\" and 4 representing \"No difficulty\". The lower the score, the greater the functional disability. 84/84 represents no disability. Minimal detectable change is 5.7 points. With the addition of the 8 questions in the \"Sports Subscale,\" there are 29 questions, each scored on a 5 point scale with 0 representing \"Unable to do\" and 4 representing \"No difficulty\". The lower the score, the greater the functional disability. 116/116 represents no disability. Minimal detectable change is 12.3 points. Tool Used: Timed Up and Go (TUG)  Score:  Initial: 41.61 seconds Most Recent: X seconds (Date: -- )   Interpretation of Score: The test measures, in seconds, the time taken by an individual to stand up from a standard arm chair (seat height 46 cm [18 in], arm height 65 cm [25.6 in]), walk a distance of 3 meters (118 in, approx 10 ft), turn, walk back to the chair and sit down. If the individual takes longer than 14 seconds to complete TUG, this indicates risk for falls.     MEDICAL NECESSITY:   Patient is expected to demonstrate progress in strength, range of motion, balance, coordination, and functional technique to improve safety during all activities. REASON FOR SERVICES/OTHER COMMENTS:  Patient continues to require skilled intervention due to severe arthritis, ankle pain, weakness and balance deficit. Total Duration:  Time In: 0930  Time Out: 1015    Regarding Juana Flannery's therapy, I certify that the treatment plan above will be carried out by a therapist or under their direction.   Thank you for this referral,  May Daniels, PT     Referring Physician Signature: Zunilda Spicer MD _______________________________ Date _____________        Post Session Pain  Charge Capture  PT Visit Info MD Guidelines  MyChart

## 2022-09-22 NOTE — PROGRESS NOTES
Outpatient Rehab Services  Referral Form/Physician Order  Central Scheduling Fax: [phone]  Speak to a :  Call [phone]   Please review and co-sign (electronically) OR sign and return to the below indicated clinic's fax number if you agree with this request.  Thank you! NAME:  Ian Valadez SSN:  [id number] :  1940   ADDRESS:  [place] [id number] Daytime Phone:  [phone] (FCGI)[phone] (mobile)    Diagnosis & Date of Onset:  Other abnormalities of gait and mobility [R26.89]Difficulty in walking, not elsewhere classified (R26.2); Unsteadiness on feet (R26.81); Pain in left ankle and joints of left foot (M25.572); Pain in right ankle and joints of right foot (M25.571); Other instability, left ankle (M25.372); Other instability, right ankle (M25.371); Other specified arthritis, right knee (A32.897); pes cavus right foot, pes cavus left foot; Repeated falls (R29.6); Muscle weakness (generalized) (M62.81) Special Precautions:   Frequency:  [] to be determined by therapist after evaluation   OR   ____ X per week X ____ weeks    Services    [] Evaluate & Treat  [x] Special Orders:  Orthotic consult for ankle bracing.      [] Physical Therapy  [] Occupational Therapy   [] Speech Therapy  [] MBS w/ Speech Therapy    Specialized Programs    [] Aquatic Therapy [] Lymphedema [] Oncology Rehab   [] Balance Rehab [] Parkinson's Program [] Osteoporosis   [] Fibromyalgia [] Motion Analysis [] Spine Rehab   [] Industrial Rehab [] Hand & Upper Extremity [] Sports Injury Rehab    [] Urinary Incontinence     Locations    @ [address], [address]  [facility], [address] [facility]  Ph: [phone]  Fax: [phone] @ [address] [place] [address], [address] [facility],[address]  [facility], [address] [facility]  Ph: [phone]  Fax: [phone] @ [place] Castaneda, [address]  [facility], [address] [place] [address]  Ph: [phone]  Fax: [phone]        @ [address] [facility] [address]  Ph: [phone]  Fax: [phone] @ [address] [facility], [address], [address]   Ph: [phone]  Fax: [phone] @ [facility] [place] [address] [address]  [place], [address]  Ph: [phone]  Fax: [phone]        @ [address] [place] [address]  [address], [address] [facility]  Ph: [phone]  Fax: [phone] @ [address]  Ph: [phone]  Fax: [phone] @ [facility]  [address] [facility], [place] [address], [address]  Ph: [phone]         @ [address] [place] [address]  Ph: [phone]  Fax: [phone]      This section is not needed if signing electronically   I certify that I have examined the above patient and outpatient rehab services are medically necessary.    ___________________________________________           Signature of Physician/Provider    ___________________________________________            Practice Name   ______________________   Date    ______________________   Referral Contact

## 2022-09-29 NOTE — PROGRESS NOTES
[facility]: Office Visit New Patient H & P    Reason for visit:  Macrocytosis  Thrombocytosis  Neutrophilic leukocytosis  Fatigue    History of Present Illness:  Ms. Stan Burnette is a pleasant 78years old female patient with multiple medical problems including anxiety, atrial fibrillation status post watchman, status post aortic valve replacement, breast cancer (ER/LA positive, HER2/lloyd positive, diagnosed in 2005), cerebrovascular disease, TIA, among others who has been referred for evaluation and management of macrocytic anemia, thrombocytosis and neutrophilic leukocytosis. She presented to ED in August 2022 with complaints of malaise, generalized weakness and progressively worsening fatigue. On 8/15/2022, labs showed WBC count of 15.4, MCV of 104, and platelet count of 653. Hemoglobin 12.9. She was admitted with a diagnosis of urinary tract infection. Based on her labs, Synthroid dose was increased. Lab work at discharge has been summarized below. On evaluation today, patient reports persistent fatigue. She suffers from chronic joint pains related to arthritis but denies any new sites of bone pain. She reports moderate dyspnea on exertion. Denies unexplained fevers, drenching night sweats, swollen glands in the body, skin rash, pruritus, abdominal pain/distention. She previously experienced significant bleeding including hematoma of hip, oral and nosebleed while on Eliquis and Plavix which have just been discontinued. She reports about 10 pound weight loss over the past few weeks. Reports some decline in appetite. She has had lower extremity edema for several months-denies recent worsening. She is accompanied by her daughter during this visit. Review of Systems:  14 point ROS was negative except as mentioned above. ECOG PERFORMANCE STATUS - 2 - Ambulatory and capable of all selfcare but unable to carry out any work activities.  Up and about more than 50% of waking hours. Pain - /10. Mild to moderate pain, requiring medication - see MAR     Fatigue - No flowsheet data found. Distress - No flowsheet data found. Reviewed and updated this visit by provider:  Tobacco  Allergies  Meds  Problems  Med Hx  Surg Hx  Fam Hx          Current Outpatient Medications   Medication Sig Dispense Refill    diclofenac (CATAFLAM) 50 MG tablet TAKE 1 TABLET (50 MG) BY MOUTH 2 (TWO) TIMES A DAY AS NEEDED (ARTHRITIS PAIN)      sertraline (ZOLOFT) 25 MG tablet TAKE 1 TABLET (25 MG) BY MOUTH DAILY      loratadine (CLARITIN) 10 MG tablet Take 10 mg by mouth daily. docusate sodium (COLACE, DULCOLAX) 100 MG CAPS Take 100 mg by mouth 2 times daily 100 capsule 0    melatonin 3 MG TABS tablet Take 1 tablet by mouth nightly as needed (insomnia) 30 tablet 0    levothyroxine (SYNTHROID) 88 MCG tablet Take 1 tablet by mouth Daily 30 tablet 0    traMADol (ULTRAM) 50 MG tablet Take 50 mg by mouth every 6 hours as needed for Pain.      magnesium oxide (MAG-OX) 400 MG tablet Take 400 mg by mouth daily      amiodarone (CORDARONE) 200 MG tablet Take 200 mg by mouth daily      LORazepam (ATIVAN) 1 MG tablet Take 1 mg by mouth every 4 hours as needed for Anxiety.       simvastatin (ZOCOR) 20 MG tablet Take 20 mg by mouth nightly      metoprolol succinate (TOPROL XL) 25 MG extended release tablet Take 25 mg by mouth daily      hydroCHLOROthiazide (HYDRODIURIL) 12.5 MG tablet Take 12.5 mg by mouth daily      Multiple Vitamins-Minerals (THERAPEUTIC MULTIVITAMIN-MINERALS) tablet Take 1 tablet by mouth daily      polyethylene glycol (GLYCOLAX) 17 g packet Take 17 g by mouth daily as needed for Constipation      acetaminophen (TYLENOL) 325 mg tablet Take 325 mg by mouth every 4 hours as needed for Pain (breakthrough pain)      aspirin 81 MG EC tablet Take 81 mg by mouth daily      calcium-vitamin D (OSCAL) 250-125 MG-UNIT per tablet Take 1 tablet by mouth daily      famotidine (PEPCID) 20 MG [id number] 1813 (H) 08/18/2022     Lab Results   Component Value Date    FOLATE 41.5 (H) 08/18/2022           Imaging:  Sanjuana Wolfe MD - 05/05/2022   Formatting of this note might be different from the original.     CT HEAD WITHOUT CONTRAST     INDICATION: 78year-old in the ER after minor head trauma, status post fall in bathroom, on blood thinners     COMPARISON: Prior head CT 3/30/2022     TECHNIQUE: Contiguous axial images of the head were obtained without intravenous contrast administration. 2-D images were generated and reviewed. FINDINGS:       .  Skull and scalp: No evidence of acute osseous abnormality. Mastoids are aerated. .  Paranasal sinuses: Imaged paranasal sinuses are aerated. .  Imaged orbits: No radiopaque foreign bodies or evidence of acute abnormality. .  Brain and CSF spaces: No acute large vascular territory ischemic changes, acute hemorrhage, hydrocephalus, or mass effect. No traumatic intracranial hemorrhage, extra-axial hematoma or evidence of acute displaced skull fracture. Again noted is a 2.7 cm heavily calcified extra-axial mass arising along the lateral margin of the left posterior fossa most compatible with a heavily calcified meningioma. No significant mass effect given the background of volume loss. Minimal periventricular white matter low-attenuation likely reflects gliosis from chronic small vessel ischemic change. IMPRESSION:   No CT evidence of acute intracranial abnormality. Large 2.7 cm calcified meningioma in the left posterior fossa, similar to prior.      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  5/10 nodes positive, status post left mastectomy; received 4 cycles of AC followed by 4 cycles of Taxol, radiation, 1 year of Herceptin and 7 years of Femara.  -Hypothyroidism  -Atrial fibrillation status post watchman, Plavix as well as Eliquis discontinued due to extensive hematoma of left hip x2, oral bleeding, nosebleeding  -Hypertension  -Probably calcified 2.7 cm meningioma near left posterior fossa incidentally seen on CT scan done in March and May 2022. No mass-effect.  -Depression/anxiety  -Stage III CKD        PLAN:  I had an extensive discussion with the patient and her daughter during which we reviewed the differential diagnosis for leukocytosis, erythrocytosis and thrombocytosis. While some of these changes could be explained by acute infection (patient was recently admitted for treatment of UTI) and dehydration leading to hemoconcentration, based on her age and prior exposure to alkylating agents, disorders affecting hematopoietic stem cells such as myelodysplasia, MPN and leukemia are high among the diagnostic possibilities. We shall proceed with work-up as follows. CBC with diff  Peripheral smear review  Peripheral blood flow cytometry  Retic, LDH, haptoglobin  CMP  Copper/ceruloplasmin  FISH for BCR-ABL  -MPN panel  Erythropoietin level  Schedule for IR bone marrow biopsy  Plan CT head in June 2023 to follow-up on meningioma. Continue with cardiology follow-up for management of cardiovascular issues. All questions were answered to the best of my abilities. Return office visit with labs prior as scheduled. Wolf Ram MD  [facility]  [address] [place] [address]  Office : [phone]  Fax : [phone]    Elements of this note have been dictated using speech recognition software. As a result, errors of speech recognition may have occurred.

## 2022-09-29 NOTE — PROGRESS NOTES
New Patient Abstract    Reason for Referral: Elevated MCV; Anemia    Referring Provider:  Diann Davidson MD    Primary Care Provider: Diann Davidson MD    Family History of Cancer/Hematologic Disorders: none noted    Presenting Symptoms: general malaise, weakness, easy fatigability    Narrative with recent with Results/Procedures/Biopsies and Dates completed:   Ms. Grover Cartwright is an 20-year-old  female who reports to have never used tobacco products. She reports use of alcohol and states use of prescription drugs only. Her medical history reports as anxiety, aneurysm, aortic stenosis, A-Fib, basal cell carcinoma, breast cancer, carotid stenosis, chronic pain, CKD, HTN, HLD, OA, osteopenia, SOB, squamous cell carcinoma of leg, stroke, TIA and ventricular hypertrophy. Her surgical history reports as valve replacement, appendectomy, cataract removal, hysterectomy, knee, mastectomy, ovary removal, and skin biopsy. In August 2022, she presented to ED with complaints of general malaise, weakness, and easy fatigability. Her 8/15/22 CBC reported an elevated WBC of 15.4 MCV of 104.1, MCH of 35.5, RDW of 16.6 and platelet count of 326Z with a decreased RBC of 3.63 with a normal hemoglobin of 12.9. Her 8/16/22 chemistry panel reported a BUN of 26 and creatinine of 1.50. She ultimately was admitted for UTI. Her TSH reported elevated and her Synthroid was increased. Her discharge CBC reported a elevated WBC of 14, MCV of 103, MCH of 35, RDW of 16 and platelet count of 953 with a decreased RBC of 3.10, hemoglobin of 10.9, and hematocrit of 32.1. Her 8/19/22 retic fraction reported elevated at 23.5. Her 8/19/22 creatinine reported normal at 1.0. On 8/22/22 she presented to her PCP for hospital follow up. A referral was placed to hematology for her elevated MCV with anemia.     Labs        8/16/22      Notes from Referring Provider: n/a    Other Pertinent Information: n/a    Presented at Tumor Board: n/a Statement Selected

## 2022-09-30 ENCOUNTER — HOSPITAL ENCOUNTER (OUTPATIENT)
Dept: LAB | Age: 82
Discharge: HOME | End: 2022-10-03
Payer: MEDICARE

## 2022-09-30 ENCOUNTER — OFFICE VISIT (OUTPATIENT)
Dept: ONCOLOGY | Age: 82
End: 2022-09-30
Payer: MEDICARE

## 2022-09-30 VITALS
SYSTOLIC BLOOD PRESSURE: 137 MMHG | WEIGHT: 159 LBS | BODY MASS INDEX: 26.49 KG/M2 | RESPIRATION RATE: 16 BRPM | HEART RATE: 64 BPM | HEIGHT: 65 IN | TEMPERATURE: 98 F | OXYGEN SATURATION: 99 % | DIASTOLIC BLOOD PRESSURE: 66 MMHG

## 2022-09-30 DIAGNOSIS — D53.9 MACROCYTIC ANEMIA: ICD-10-CM

## 2022-09-30 DIAGNOSIS — D72.9 NEUTROPHILIC LEUKOCYTOSIS: Primary | ICD-10-CM

## 2022-09-30 DIAGNOSIS — D72.9 NEUTROPHILIC LEUKOCYTOSIS: ICD-10-CM

## 2022-09-30 LAB
ALBUMIN SERPL-MCNC: 3.6 G/DL (ref 3.2–4.6)
ALBUMIN/GLOB SERPL: 1.2 {RATIO} (ref 1.2–3.5)
ALP SERPL-CCNC: 71 U/L (ref 50–136)
ALT SERPL-CCNC: 27 U/L (ref 12–65)
ANION GAP SERPL CALC-SCNC: 8 MMOL/L (ref 4–13)
AST SERPL-CCNC: 20 U/L (ref 15–37)
BASOPHILS # BLD: 0.2 K/UL (ref 0–0.2)
BASOPHILS NFR BLD: 2 % (ref 0–2)
BILIRUB SERPL-MCNC: 0.8 MG/DL (ref 0.2–1.1)
BUN SERPL-MCNC: 33 MG/DL (ref 8–23)
CALCIUM SERPL-MCNC: 8.7 MG/DL (ref 8.3–10.4)
CHLORIDE SERPL-SCNC: 102 MMOL/L (ref 101–110)
CO2 SERPL-SCNC: 26 MMOL/L (ref 21–32)
CREAT SERPL-MCNC: 1.5 MG/DL (ref 0.6–1)
DIFFERENTIAL METHOD BLD: ABNORMAL
EOSINOPHIL # BLD: 0.2 K/UL (ref 0–0.8)
EOSINOPHIL NFR BLD: 2 % (ref 0.5–7.8)
ERYTHROCYTE [DISTWIDTH] IN BLOOD BY AUTOMATED COUNT: 17.2 % (ref 11.9–14.6)
GLOBULIN SER CALC-MCNC: 3 G/DL (ref 2.3–3.5)
GLUCOSE SERPL-MCNC: 140 MG/DL (ref 65–100)
HAPTOGLOB SERPL-MCNC: 88 MG/DL (ref 30–200)
HCT VFR BLD AUTO: 34.7 % (ref 35.8–46.3)
HGB BLD-MCNC: 11.6 G/DL (ref 11.7–15.4)
HGB RETIC QN AUTO: 40 PG (ref 29–35)
IMM GRANULOCYTES # BLD AUTO: 0.5 K/UL (ref 0–0.5)
IMM GRANULOCYTES NFR BLD AUTO: 4 % (ref 0–5)
IMM RETICS NFR: 17.5 % (ref 3–15.9)
LDH SERPL L TO P-CCNC: 276 U/L (ref 110–210)
LYMPHOCYTES # BLD: 1.2 K/UL (ref 0.5–4.6)
LYMPHOCYTES NFR BLD: 9 % (ref 13–44)
Lab: NORMAL
MCH RBC QN AUTO: 35.2 PG (ref 26.1–32.9)
MCHC RBC AUTO-ENTMCNC: 33.4 G/DL (ref 31.4–35)
MCV RBC AUTO: 105.2 FL (ref 79.6–97.8)
MONOCYTES # BLD: 1.1 K/UL (ref 0.1–1.3)
MONOCYTES NFR BLD: 8 % (ref 4–12)
NEUTS SEG # BLD: 10.2 K/UL (ref 1.7–8.2)
NEUTS SEG NFR BLD: 76 % (ref 43–78)
NRBC # BLD: 0 K/UL (ref 0–0.2)
PLATELET # BLD AUTO: 677 K/UL (ref 150–450)
PMV BLD AUTO: 10 FL (ref 9.4–12.3)
POTASSIUM SERPL-SCNC: 3.8 MMOL/L (ref 3.5–5.1)
PROT SERPL-MCNC: 6.6 G/DL (ref 6.3–8.2)
RBC # BLD AUTO: 3.3 M/UL (ref 4.05–5.2)
REFERENCE LAB: NORMAL
REFERENCE LAB: NORMAL
RETICS # AUTO: 0.07 M/UL (ref 0.03–0.1)
RETICS/RBC NFR AUTO: 2 % (ref 0.3–2)
SODIUM SERPL-SCNC: 136 MMOL/L (ref 136–145)
WBC # BLD AUTO: 13.5 K/UL (ref 4.3–11.1)

## 2022-09-30 PROCEDURE — 85046 RETICYTE/HGB CONCENTRATE: CPT

## 2022-09-30 PROCEDURE — 99204 OFFICE O/P NEW MOD 45 MIN: CPT | Performed by: INTERNAL MEDICINE

## 2022-09-30 PROCEDURE — 1123F ACP DISCUSS/DSCN MKR DOCD: CPT | Performed by: INTERNAL MEDICINE

## 2022-09-30 PROCEDURE — 82525 ASSAY OF COPPER: CPT

## 2022-09-30 PROCEDURE — 85025 COMPLETE CBC W/AUTO DIFF WBC: CPT

## 2022-09-30 PROCEDURE — 82668 ASSAY OF ERYTHROPOIETIN: CPT

## 2022-09-30 PROCEDURE — 83010 ASSAY OF HAPTOGLOBIN QUANT: CPT

## 2022-09-30 PROCEDURE — 36415 COLL VENOUS BLD VENIPUNCTURE: CPT

## 2022-09-30 PROCEDURE — 80053 COMPREHEN METABOLIC PANEL: CPT

## 2022-09-30 PROCEDURE — 83615 LACTATE (LD) (LDH) ENZYME: CPT

## 2022-09-30 PROCEDURE — 82390 ASSAY OF CERULOPLASMIN: CPT

## 2022-09-30 RX ORDER — SERTRALINE HYDROCHLORIDE 25 MG/1
TABLET, FILM COATED ORAL
COMMUNITY
Start: 2022-09-20

## 2022-09-30 RX ORDER — DICLOFENAC POTASSIUM 50 MG/1
TABLET, FILM COATED ORAL
COMMUNITY
Start: 2022-08-31

## 2022-09-30 ASSESSMENT — PATIENT HEALTH QUESTIONNAIRE - PHQ9
SUM OF ALL RESPONSES TO PHQ9 QUESTIONS 1 & 2: 1
SUM OF ALL RESPONSES TO PHQ QUESTIONS 1-9: 1
1. LITTLE INTEREST OR PLEASURE IN DOING THINGS: 0
SUM OF ALL RESPONSES TO PHQ9 QUESTIONS 1 & 2: 1
SUM OF ALL RESPONSES TO PHQ QUESTIONS 1-9: 1
SUM OF ALL RESPONSES TO PHQ QUESTIONS 1-9: 1
2. FEELING DOWN, DEPRESSED OR HOPELESS: 1
1. LITTLE INTEREST OR PLEASURE IN DOING THINGS: 0
SUM OF ALL RESPONSES TO PHQ QUESTIONS 1-9: 1
2. FEELING DOWN, DEPRESSED OR HOPELESS: 1

## 2022-09-30 NOTE — PATIENT INSTRUCTIONS
Patient Instructions from Today's Visit    Reason for Visit:  New Patient - Anemia     Diagnosis Information:  [email or web address] [email or web address]-[email or web address]-answers-patient-[email or web address]-[email or web address]-[email or web address]    Plan:  Follow up with your primary care physician regarding your medications - will need to discuss taking the Zoloft, Ativan, and Diclofenac   We discussed the possibility of MDS (Myelodysplastic syndrome)  Bone marrow biopsy recommended and discussed at length - referral sent to interventional radiology for scheduling. We we will need to get some additional lab work today to further investigate this. Please call us if you have any questions or concerns before your next visit. Follow Up: Follow up in 3-4 weeks with Dr. Les Mckeon, with labs prior. Recent Lab Results:  No visits with results within 3 Day(s) from this visit. Latest known visit with results is:   Admission on 08/17/2022, Discharged on 08/19/2022   Component Date Value Ref Range Status    Sodium 08/17/2022 136  136 - 145 mmol/L Final    Potassium 08/17/2022 3.0 (A)  3.5 - 5.1 mmol/L Final    Chloride 08/17/2022 104  98 - 107 mmol/L Final    CO2 08/17/2022 25  21 - 32 mmol/L Final    Anion Gap 08/17/2022 7  7 - 16 mmol/L Final    Glucose 08/17/2022 100  65 - 100 mg/dL Final    BUN 08/17/2022 18  8 - 23 MG/DL Final    Creatinine 08/17/2022 1.01 (A)  0.6 - 1.0 MG/DL Final    GFR  08/17/2022 >60  >60 ml/min/1.73m2 Final    GFR Non- 08/17/2022 56 (A)  >60 ml/min/1.73m2 Final    Comment:      Estimated GFR is calculated using the Modification of Diet in Renal Disease (MDRD) Study equation, reported for both  Americans (GFRAA) and non- Americans (GFRNA), and normalized to 1.73m2 body surface area. The physician must decide which value applies to the patient. The MDRD study equation should only be used in individuals age 25 or older.  It has not been validated for the following: pregnant women, patients with serious comorbid conditions,or on certain medications, or persons with extremes of body size, muscle mass, or nutritional status.       Calcium 08/17/2022 7.9 (A)  8.3 - 10.4 MG/DL Final    WBC 08/17/2022 14.6 (A)  4.3 - 11.1 K/uL Final    RBC 08/17/2022 3.08 (A)  4.05 - 5.2 M/uL Final    Hemoglobin 08/17/2022 11.0 (A)  11.7 - 15.4 g/dL Final    Hematocrit 08/17/2022 31.3 (A)  35.8 - 46.3 % Final    MCV 08/17/2022 101.6 (A)  79.6 - 97.8 FL Final    MCH 08/17/2022 35.7 (A)  26.1 - 32.9 PG Final    MCHC 08/17/2022 35.1 (A)  31.4 - 35.0 g/dL Final    RDW 08/17/2022 16.2 (A)  11.9 - 14.6 % Final    Platelets 42/98/8554 526 (A)  150 - 450 K/uL Final    MPV 08/17/2022 9.2 (A)  9.4 - 12.3 FL Final    nRBC 08/17/2022 0.00  0.0 - 0.2 K/uL Final    **Note: Absolute NRBC parameter is now reported with Hemogram**    Differential Type 08/17/2022 AUTOMATED    Final    Seg Neutrophils 08/17/2022 76  43 - 78 % Final    Lymphocytes 08/17/2022 10 (A)  13 - 44 % Final    Monocytes 08/17/2022 8  4.0 - 12.0 % Final    Eosinophils % 08/17/2022 2  0.5 - 7.8 % Final    Basophils 08/17/2022 1  0.0 - 2.0 % Final    Immature Granulocytes 08/17/2022 3  0.0 - 5.0 % Final    Segs Absolute 08/17/2022 11.1 (A)  1.7 - 8.2 K/UL Final    Absolute Lymph # 08/17/2022 1.5  0.5 - 4.6 K/UL Final    Absolute Mono # 08/17/2022 1.1  0.1 - 1.3 K/UL Final    Absolute Eos # 08/17/2022 0.4  0.0 - 0.8 K/UL Final    Basophils Absolute 08/17/2022 0.1  0.0 - 0.2 K/UL Final    Absolute Immature Granulocyte 08/17/2022 0.5  0.0 - 0.5 K/UL Final    Magnesium 08/17/2022 1.9  1.8 - 2.4 mg/dL Final    Sodium 08/18/2022 136  136 - 145 mmol/L Final    Potassium 08/18/2022 3.6  3.5 - 5.1 mmol/L Final    Chloride 08/18/2022 105  98 - 107 mmol/L Final    CO2 08/18/2022 26  21 - 32 mmol/L Final    Anion Gap 08/18/2022 5 (A)  7 - 16 mmol/L Final    Glucose 08/18/2022 96  65 - 100 mg/dL Final    BUN 08/18/2022 13  8 - 23 MG/DL Final    Creatinine 08/18/2022 1.06 (A) 0.6 - 1.0 MG/DL Final    GFR  08/18/2022 >60  >60 ml/min/1.73m2 Final    GFR Non- 08/18/2022 52 (A)  >60 ml/min/1.73m2 Final    Comment:      Estimated GFR is calculated using the Modification of Diet in Renal Disease (MDRD) Study equation, reported for both  Americans (GFRAA) and non- Americans (GFRNA), and normalized to 1.73m2 body surface area. The physician must decide which value applies to the patient. The MDRD study equation should only be used in individuals age 25 or older. It has not been validated for the following: pregnant women, patients with serious comorbid conditions,or on certain medications, or persons with extremes of body size, muscle mass, or nutritional status.       Calcium 08/18/2022 8.2 (A)  8.3 - 10.4 MG/DL Final    WBC 08/18/2022 13.9 (A)  4.3 - 11.1 K/uL Final    RBC 08/18/2022 2.99 (A)  4.05 - 5.2 M/uL Final    Hemoglobin 08/18/2022 10.4 (A)  11.7 - 15.4 g/dL Final    Hematocrit 08/18/2022 30.6 (A)  35.8 - 46.3 % Final    MCV 08/18/2022 102.3 (A)  79.6 - 97.8 FL Final    MCH 08/18/2022 34.8 (A)  26.1 - 32.9 PG Final    MCHC 08/18/2022 34.0  31.4 - 35.0 g/dL Final    RDW 08/18/2022 16.4 (A)  11.9 - 14.6 % Final    Platelets 50/04/8009 511 (A)  150 - 450 K/uL Final    MPV 08/18/2022 9.4  9.4 - 12.3 FL Final    nRBC 08/18/2022 0.00  0.0 - 0.2 K/uL Final    **Note: Absolute NRBC parameter is now reported with Hemogram**    Differential Type 08/18/2022 AUTOMATED    Final    Seg Neutrophils 08/18/2022 72  43 - 78 % Final    Lymphocytes 08/18/2022 12 (A)  13 - 44 % Final    Monocytes 08/18/2022 9  4.0 - 12.0 % Final    Eosinophils % 08/18/2022 4  0.5 - 7.8 % Final    Basophils 08/18/2022 1  0.0 - 2.0 % Final    Immature Granulocytes 08/18/2022 3  0.0 - 5.0 % Final    Segs Absolute 08/18/2022 10.0 (A)  1.7 - 8.2 K/UL Final    Absolute Lymph # 08/18/2022 1.7  0.5 - 4.6 K/UL Final    Absolute Mono # 08/18/2022 1.2  0.1 - 1.3 K/UL Final    Absolute Eos 14.6 % Final    Platelets 09/49/8443 536 (A)  150 - 450 K/uL Final    MPV 08/19/2022 9.5  9.4 - 12.3 FL Final    nRBC 08/19/2022 0.00  0.0 - 0.2 K/uL Final    **Note: Absolute NRBC parameter is now reported with Hemogram**    Differential Type 08/19/2022 AUTOMATED    Final    Seg Neutrophils 08/19/2022 71  43 - 78 % Final    Lymphocytes 08/19/2022 13  13 - 44 % Final    Monocytes 08/19/2022 9  4.0 - 12.0 % Final    Eosinophils % 08/19/2022 4  0.5 - 7.8 % Final    Basophils 08/19/2022 1  0.0 - 2.0 % Final    Immature Granulocytes 08/19/2022 2  0.0 - 5.0 % Final    Segs Absolute 08/19/2022 10.2 (A)  1.7 - 8.2 K/UL Final    Absolute Lymph # 08/19/2022 1.8  0.5 - 4.6 K/UL Final    Absolute Mono # 08/19/2022 1.2  0.1 - 1.3 K/UL Final    Absolute Eos # 08/19/2022 0.6  0.0 - 0.8 K/UL Final    Basophils Absolute 08/19/2022 0.2  0.0 - 0.2 K/UL Final    Absolute Immature Granulocyte 08/19/2022 0.3  0.0 - 0.5 K/UL Final    Ferritin 08/19/2022 213  8 - 388 NG/ML Final    Iron 08/19/2022 63  35 - 150 ug/dL Final    Comment: Known Interfering Substances section:  \"Iron values may be falsely elevated in  serum samples from patients with  anticoagulants (e.g., hemodialysis patients). \"  Limitations of Procedure section:  \"Turbidity resulting from precipitation of  fibrinogen in the serum of patients treated  with anticoagulants (e.g. hemodialysis  patients) may cause spuriously elevated  iron results. \"      TIBC 08/19/2022 268  250 - 450 ug/dL Final    TRANSFERRIN SATURATION 08/19/2022 24  % Final    POC Occult Blood, Fecal 08/19/2022 Negative  NEG   Final    Reticulocyte Count,Automated 08/19/2022 2.4 (A)  0.3 - 2.0 % Final    Absolute Retic # 08/19/2022 0.0733  0.026 - 0.095 M/ul Final    Immature Retic Fraction 08/19/2022 23.5 (A)  3.0 - 15.9 % Final    Retic Hemoglobin conc. 08/19/2022 40 (A)  29 - 35 pg Final         Treatment Summary has been discussed and given to patient: N/A        -------------------------------------------------------------------------------------------------------------------  Please call our office at [phone] if you have any  of the following symptoms:   Fever of 100.5 or greater  Chills  Shortness of breath  Swelling or pain in one leg    After office hours an answering service is available and will contact a provider for emergencies or if you are experiencing any of the above symptoms. Patient does express an interest in My Chart. My Chart log in information explained on the after visit summary printout at the [facility] desk.     Segundo West RN

## 2022-10-02 LAB
CERULOPLASMIN SERPL-MCNC: 25.1 MG/DL (ref 19–39)
COPPER SERPL-MCNC: 113 UG/DL (ref 80–158)
EPO SERPL-ACNC: 6.9 MIU/ML (ref 2.6–18.5)

## 2022-10-03 ENCOUNTER — HOSPITAL ENCOUNTER (OUTPATIENT)
Dept: PHYSICAL THERAPY | Age: 82
Setting detail: RECURRING SERIES
Discharge: HOME | End: 2022-10-06
Payer: MEDICARE

## 2022-10-03 LAB
FLOW CYTOMETRY RESULTS: NORMAL
PATH REV BLD -IMP: NORMAL
SPECIMEN SOURCE: NORMAL
TEST ORDERED: NORMAL

## 2022-10-03 PROCEDURE — 97760 ORTHOTIC MGMT&TRAING 1ST ENC: CPT

## 2022-10-03 PROCEDURE — 97110 THERAPEUTIC EXERCISES: CPT

## 2022-10-03 NOTE — PROGRESS NOTES
Alberto Jacobo  : 1940  Primary: Medicare Part A And B  Secondary: [address] @ [address] [place] [address]  Phone: [phone]  Fax: [phone] Plan Frequency: 2x per week    Plan of Care/Certification Expiration Date: 22      PT Visit Info: Total # of Visits Approved: 99  Total # of Visits to Date: 2  Progress Note Counter: 2  Progress Note Due Date: 10/20/22     Visit Count:  2   OUTPATIENT PHYSICAL THERAPY:OP NOTE TYPE: Treatment Note 10/3/2022       Episode  }Appt Desk             Treatment Diagnosis:  Difficulty in walking, not elsewhere classified (R26.2); Unsteadiness on feet (R26.81); Pain in left ankle and joints of left foot (M25.572); Pain in right ankle and joints of right foot (M25.571); Other instability, left ankle (M25.372); Other instability, right ankle (M25.371); Other specified arthritis, right knee (V75.078); pes cavus right foot, pes cavus left foot; Repeated falls (R29.6); Muscle weakness (generalized) (M62.81)  Medical/Referring Diagnosis:  Other abnormalities of gait and mobility [R26.89]  Referring Physician:  Alex Alcantar MD MD Orders:  PT Eval and Treat   Date of Onset:  Onset Date: 22     Allergies:   Penicillin g, Loracarbef, Penicillins, Adhesive tape, Cephalexin, and Cephalosporins  Restrictions/Precautions:  Restrictions/Precautions: Fall Risk       Interventions Planned (Treatment may consist of any combination of the following):    Current Treatment Recommendations: ROM; Strengthening; Balance training; Functional mobility training; Gait training; Neuromuscular re-education; Manual Therapy - Soft Tissue Mobilization; Pain management; Home exercise program; Safety education & training; Equipment evaluation, education, & procurement;  Therapeutic activities (orthotic consult)     Subjective Comments:    New rollator use that in the house except the bathroom and I have the statonary wheeled walker in there. But I keep the rollaotr at home becaseeliana it seems hard to fold up and put in car so will get in the house and then put the other one in the BR. I am wonderirng if I need to hire someone to get me out of the house. Nata Goncalves get out of the house  Initial:}     /10Post Session:       0/10  Medications Last Reviewed:  10/3/2022  Updated Objective Findings:   blood work with some abnormal readings - Bone marrow biopsy tomorrow. Treatment   THERAPEUTIC EXERCISE: (15 minutes):    Exercises per grid below to improve mobility, strength, balance, and coordination. Required minimal visual, verbal, and manual cues to promote proper body alignment and promote proper body posture. ORTHOTIC MANAGEMENT/TRAINING: (25 minutes): This right ankle orthotic is being utilized in order to increase patient's functional independence. Patient/caregiver educated to proper application and appropriate use of this orthotic and the patient benefits from this orthotic by achieving increased independence with tasks, needing less assistance with tasks, achieving increased joint stability, and better gait . Walking with and without AFO with stationary wheeled rolling walker. Mild steppage gait on the right which increases with fatigue. With Emmanuelle Toe off carbon fibre brace pt has a better gait pttern and is able to achieve HS. Date:  10/1/2022 Date:   Activity/Exercise Parameters Parameters   Semi sit to stand with chIR IN FRONT X5 with elevated mat. Grinding and creaking in right knee and ankle. No pain. bridge X15 cues to slow down and increase power                                         EDUCATION:  Instructed in above exercises and home program.  HEP:  Written HEP given to patient. Treatment/Session Summary:    Treatment Assessment:  better gait with Toe off carbon fibre AFO on the right. Pt verbalized comfort with this brace.   Communication/Consultation:   waiting for orders to be signed for AFO. Pt to see office today for blood work and will inquire. Equipment provided today:  None  Recommendations/Intent for next treatment session: Next visit will focus on strengthening and POC follow through.     Total Treatment Billable Duration:  45 minutes  Time In: 8515  Time Out: TaraKeren Alfredo 116, PT       Charge Capture  }Post Session Pain  PT Visit Info  [address] Portal  MD Guidelines  Scanned Media  Benefits  MyChart    Future Appointments   Date Time Provider Salty Herzog   10/4/2022  1:00 PM SFD CT UNIT 2 [facility]   10/5/2022 10:15 AM William Ferrara PT [facility]   10/10/2022 12:15 PM William Ferrara PT [facility]   10/12/2022 10:15 AM William Ferrara PT [facility]   10/24/2022 10:10 AM [facility]   10/24/2022 10:30 AM Ibrahima Beckford MD [facility]   11/29/2022 11:45 AM DO MARTHA AhujaDG [facility]

## 2022-10-04 ENCOUNTER — HOSPITAL ENCOUNTER (OUTPATIENT)
Dept: CT IMAGING | Age: 82
Discharge: HOME | End: 2022-10-07
Payer: MEDICARE

## 2022-10-04 VITALS
OXYGEN SATURATION: 95 % | HEART RATE: 61 BPM | SYSTOLIC BLOOD PRESSURE: 141 MMHG | DIASTOLIC BLOOD PRESSURE: 68 MMHG | TEMPERATURE: 97.6 F | RESPIRATION RATE: 16 BRPM

## 2022-10-04 DIAGNOSIS — D72.9 NEUTROPHILIC LEUKOCYTOSIS: ICD-10-CM

## 2022-10-04 DIAGNOSIS — D53.9 MACROCYTIC ANEMIA: ICD-10-CM

## 2022-10-04 LAB
BASOPHILS # BLD: 0.2 K/UL (ref 0–0.2)
BASOPHILS NFR BLD: 1 % (ref 0–2)
BONE MARROW PREP & WRIGHT STAIN: NORMAL
DIFFERENTIAL METHOD BLD: ABNORMAL
EOSINOPHIL # BLD: 0.2 K/UL (ref 0–0.8)
EOSINOPHIL NFR BLD: 1 % (ref 0.5–7.8)
ERYTHROCYTE [DISTWIDTH] IN BLOOD BY AUTOMATED COUNT: 16.9 % (ref 11.9–14.6)
HCT VFR BLD AUTO: 35 % (ref 35.8–46.3)
HGB BLD-MCNC: 11.7 G/DL (ref 11.7–15.4)
IMM GRANULOCYTES # BLD AUTO: 0.4 K/UL (ref 0–0.5)
IMM GRANULOCYTES NFR BLD AUTO: 2 % (ref 0–5)
LYMPHOCYTES # BLD: 1.2 K/UL (ref 0.5–4.6)
LYMPHOCYTES NFR BLD: 7 % (ref 13–44)
MCH RBC QN AUTO: 35.3 PG (ref 26.1–32.9)
MCHC RBC AUTO-ENTMCNC: 33.4 G/DL (ref 31.4–35)
MCV RBC AUTO: 105.7 FL (ref 79.6–97.8)
MONOCYTES # BLD: 1.2 K/UL (ref 0.1–1.3)
MONOCYTES NFR BLD: 7 % (ref 4–12)
NEUTS SEG # BLD: 13.7 K/UL (ref 1.7–8.2)
NEUTS SEG NFR BLD: 81 % (ref 43–78)
NRBC # BLD: 0 K/UL (ref 0–0.2)
PLATELET # BLD AUTO: 650 K/UL (ref 150–450)
PMV BLD AUTO: 10.3 FL (ref 9.4–12.3)
RBC # BLD AUTO: 3.31 M/UL (ref 4.05–5.2)
WBC # BLD AUTO: 16.8 K/UL (ref 4.3–11.1)

## 2022-10-04 PROCEDURE — 88237 TISSUE CULTURE BONE MARROW: CPT

## 2022-10-04 PROCEDURE — 88311 DECALCIFY TISSUE: CPT

## 2022-10-04 PROCEDURE — 88313 SPECIAL STAINS GROUP 2: CPT

## 2022-10-04 PROCEDURE — 88184 FLOWCYTOMETRY/ TC 1 MARKER: CPT

## 2022-10-04 PROCEDURE — 6360000002 HC RX W HCPCS: Performed by: RADIOLOGY

## 2022-10-04 PROCEDURE — 77012 CT SCAN FOR NEEDLE BIOPSY: CPT

## 2022-10-04 PROCEDURE — 2500000003 HC RX 250 WO HCPCS: Performed by: PHYSICIAN ASSISTANT

## 2022-10-04 PROCEDURE — 85025 COMPLETE CBC W/AUTO DIFF WBC: CPT

## 2022-10-04 PROCEDURE — 88305 TISSUE EXAM BY PATHOLOGIST: CPT

## 2022-10-04 PROCEDURE — 88374 M/PHMTRC ALYS ISHQUANT/SEMIQ: CPT

## 2022-10-04 PROCEDURE — 88185 FLOWCYTOMETRY/TC ADD-ON: CPT

## 2022-10-04 PROCEDURE — 88264 CHROMOSOME ANALYSIS 20-25: CPT

## 2022-10-04 RX ORDER — MIDAZOLAM HYDROCHLORIDE 2 MG/2ML
INJECTION, SOLUTION INTRAMUSCULAR; INTRAVENOUS
Status: COMPLETED | OUTPATIENT
Start: 2022-10-04 | End: 2022-10-04

## 2022-10-04 RX ORDER — LIDOCAINE HYDROCHLORIDE 20 MG/ML
INJECTION, SOLUTION INFILTRATION; PERINEURAL
Status: COMPLETED | OUTPATIENT
Start: 2022-10-04 | End: 2022-10-04

## 2022-10-04 RX ORDER — FENTANYL CITRATE 50 UG/ML
INJECTION, SOLUTION INTRAMUSCULAR; INTRAVENOUS
Status: COMPLETED | OUTPATIENT
Start: 2022-10-04 | End: 2022-10-04

## 2022-10-04 RX ADMIN — MIDAZOLAM HYDROCHLORIDE 0.5 MG: 1 INJECTION, SOLUTION INTRAMUSCULAR; INTRAVENOUS at 14:03

## 2022-10-04 RX ADMIN — FENTANYL CITRATE 25 MCG: 50 INJECTION, SOLUTION INTRAMUSCULAR; INTRAVENOUS at 13:55

## 2022-10-04 RX ADMIN — LIDOCAINE HYDROCHLORIDE 200 MG: 20 INJECTION, SOLUTION INFILTRATION; PERINEURAL at 14:04

## 2022-10-04 RX ADMIN — MIDAZOLAM HYDROCHLORIDE 0.5 MG: 1 INJECTION, SOLUTION INTRAMUSCULAR; INTRAVENOUS at 13:55

## 2022-10-04 RX ADMIN — FENTANYL CITRATE 25 MCG: 50 INJECTION, SOLUTION INTRAMUSCULAR; INTRAVENOUS at 14:03

## 2022-10-04 RX ADMIN — MIDAZOLAM HYDROCHLORIDE 1 MG: 1 INJECTION, SOLUTION INTRAMUSCULAR; INTRAVENOUS at 13:45

## 2022-10-04 RX ADMIN — FENTANYL CITRATE 50 MCG: 50 INJECTION, SOLUTION INTRAMUSCULAR; INTRAVENOUS at 13:45

## 2022-10-04 NOTE — PRE SEDATION
Sedation Pre-Procedure Note    Patient Name: Edna Chaparro   YOB: 1940  Room/Bed: Room/bed info not found  Medical Record Number: [id number]  Date: 10/4/2022   Time: 1:27 PM       Indication:  macrocytic anemia    Consent: I have discussed with the patient and/or the patient representative the indication, alternatives, and the possible risks and/or complications of the planned procedure and the anesthesia methods. The patient and/or patient representative appear to understand and agree to proceed. Vital Signs:   Vitals:    10/04/22 1232   BP: (!) 175/76   Pulse: 72   Resp: 16   Temp: 97.6 °F (36.4 °C)   SpO2: 95%       Past Medical History:   has a past medical history of Anxiety, Aortic aneurysm ([address] Aortic stenosis, Arthritis, Atrial fibrillation and flutter ([address] Atrial flutter ([address] Basal cell carcinoma, Breast cancer ([address] Carotid stenosis, symptomatic, with infarction ([address] Chronic pain, CKD (chronic kidney disease) stage 3, GFR 30-59 ml/min ([facility]), History of breast cancer, HTN (hypertension), Hyperlipidemia, Hypertension, Hypocalcemia, Nonrheumatic aortic valve stenosis, Osteoarthritis, Osteopenia, S/P aortic valve replacement, Shortness of breath, Squamous cell carcinoma, Stroke ([address] Transient ischemic attack, and Ventricular hypertrophy. Past Surgical History:   has a past surgical history that includes Appendectomy; skin biopsy (N/A, 2017); Aortic valve replacement (11/10/15); Knee arthroscopy; Hysterectomy; Total knee arthroplasty (Left); Mastectomy; Ovary removal; and Cataract removal (Bilateral). Medications:   Scheduled Meds:   Continuous Infusions:   PRN Meds:   Home Meds:   Prior to Admission medications    Medication Sig Start Date End Date Taking?  Authorizing Provider   diclofenac (CATAFLAM) 50 MG tablet TAKE 1 TABLET (50 MG) BY MOUTH 2 (TWO) TIMES A DAY AS NEEDED (ARTHRITIS PAIN) 8/31/22   Historical Provider, MD   sertraline (ZOLOFT) 25 MG tablet TAKE 1 TABLET (25 MG) BY MOUTH DAILY 9/20/22   Historical Provider, MD   famotidine (PEPCID) 20 MG tablet Take 20 mg by mouth as needed (acid reflux). once daily as needed  Patient not taking: Reported on 9/30/2022    Historical Provider, MD   loratadine (CLARITIN) 10 MG tablet Take 10 mg by mouth daily. Historical Provider, MD   docusate sodium (COLACE, DULCOLAX) 100 MG CAPS Take 100 mg by mouth 2 times daily 8/19/22   Jeffery Lin MD   melatonin 3 MG TABS tablet Take 1 tablet by mouth nightly as needed (insomnia) 8/19/22   Jeffery Lin MD   levothyroxine (SYNTHROID) 88 MCG tablet Take 1 tablet by mouth Daily 8/20/22   Jeffery Lin MD   traMADol (ULTRAM) 50 MG tablet Take 50 mg by mouth every 6 hours as needed for Pain. Historical Provider, MD   magnesium oxide (MAG-OX) 400 MG tablet Take 400 mg by mouth daily    Historical Provider, MD   amiodarone (CORDARONE) 200 MG tablet Take 200 mg by mouth daily    Historical Provider, MD   LORazepam (ATIVAN) 1 MG tablet Take 1 mg by mouth every 4 hours as needed for Anxiety.     Historical Provider, MD   simvastatin (ZOCOR) 20 MG tablet Take 20 mg by mouth nightly    Historical Provider, MD   metoprolol succinate (TOPROL XL) 25 MG extended release tablet Take 25 mg by mouth daily    Historical Provider, MD   hydroCHLOROthiazide (HYDRODIURIL) 12.5 MG tablet Take 12.5 mg by mouth daily    Historical Provider, MD   Multiple Vitamins-Minerals (THERAPEUTIC MULTIVITAMIN-MINERALS) tablet Take 1 tablet by mouth daily    Historical Provider, MD   polyethylene glycol (GLYCOLAX) 17 g packet Take 17 g by mouth daily as needed for Constipation    Historical Provider, MD   acetaminophen (TYLENOL) 325 mg tablet Take 325 mg by mouth every 4 hours as needed for Pain (breakthrough pain)    Historical Provider, MD   aspirin 81 MG EC tablet Take 81 mg by mouth daily    Historical Provider, MD   calcium-vitamin D (OSCAL) 250-125 MG-UNIT per tablet Take 1 tablet by mouth daily Historical Provider, MD   losartan (COZAAR) 100 MG tablet Take 100 mg by mouth daily  8/19/22  Historical Provider, MD   Cyanocobalamin (VITAMIN B 12) 500 MCG TABS Take 500 mcg by mouth daily  8/19/22  Historical Provider, MD         Pre-Sedation Documentation and Exam:   I have personally completed a history, physical exam & review of systems for this patient (see notes).     Mallampati Airway Assessment:  normal, dentition not prohibitive, normal neck range of motion, Mallampati Class II - (soft palate, fauces & uvula are visible)    Prior History of Anesthesia Complications:   none    ASA Classification:  Class 3 - A patient with severe systemic disease that limits activity but is not incapacitating    Sedation/ Anesthesia Plan:   intravenous sedation    Medications Planned:   midazolam (Versed) intravenously and fentanyl intravenously    Patient is an appropriate candidate for plan of sedation: yes    Electronically signed by Harish Mejia PA-C on 10/4/2022 at 1:27 PM

## 2022-10-04 NOTE — H&P
Department of Interventional Radiology  [phone]    History and Physical    Patient:  Inell Hashimoto MRN:  [id number]  SSN:  [id number]    YOB: 1940  Age:  78 y.o. Sex:  female      Primary Care Provider:  Emily Contreras MD  Referring Physician:  Srinivas Espinal MD    Subjective:     Chief Complaint: biopsy    History of the Present Illness: The patient is a 78 y.o. female with multiple medical problems including AVR, Atrial fib, Watchman device who presents for bone marrow biopsy to evaluate macrocytic anemia, thrombocytosis and neutrophilic leukocytosis. NPO. Past Medical History:   Diagnosis Date    Anxiety     Aortic aneurysm ([address] 1/27/2016    Aortic stenosis 10/15/2012    Arthritis     osteoarthritis    Atrial fibrillation and flutter ([address] 11/17/2015    11/17/15 Cardioversion (Dr Shankar Mccord)    Atrial flutter [facility])     Basal cell carcinoma     face    Breast cancer ([address]     left mastectomy; chemo and radiation    Carotid stenosis, symptomatic, with infarction ([address] 1/27/2016    Chronic pain     osteoarthritis     CKD (chronic kidney disease) stage 3, GFR 30-59 ml/min ([address] 11/10/2015    History of breast cancer 11/10/2015    HTN (hypertension) 10/15/2012    Hyperlipidemia 10/15/2012    Hypertension     Hypocalcemia 11/11/2015    Nonrheumatic aortic valve stenosis 11/10/2015    11/10/15 (Dr Janelle Aragon)     Osteoarthritis 1/27/2016    Osteopenia 10/15/2012    DEXA 5/27/14 with osteopenia. Previously treated 8 years with Fosamax.      S/P aortic valve replacement 11/10/2015    Shortness of breath 1/27/2016    Squamous cell carcinoma     right leg    Stroke [facility])     left eye     Transient ischemic attack 1/27/2016    Ventricular hypertrophy 10/15/2012    left      Past Surgical History:   Procedure Laterality Date    AORTIC VALVE REPLACEMENT  11/10/15    Dr Ayo Almeida Bilateral     HYSTERECTOMY (CERVIX STATUS UNKNOWN)      KNEE ARTHROSCOPY left knee replacement    MASTECTOMY      left    OVARY REMOVAL      SKIN BIOPSY N/A 2017    basal cell x2 left side of face- [facility]    TOTAL KNEE ARTHROPLASTY Left     and revision         Review of Systems:    Pertinent items are noted in HPI. Prior to Admission medications    Medication Sig Start Date End Date Taking? Authorizing Provider   diclofenac (CATAFLAM) 50 MG tablet TAKE 1 TABLET (50 MG) BY MOUTH 2 (TWO) TIMES A DAY AS NEEDED (ARTHRITIS PAIN) 8/31/22   Historical Provider, MD   sertraline (ZOLOFT) 25 MG tablet TAKE 1 TABLET (25 MG) BY MOUTH DAILY 9/20/22   Historical Provider, MD   famotidine (PEPCID) 20 MG tablet Take 20 mg by mouth as needed (acid reflux). once daily as needed  Patient not taking: Reported on 9/30/2022    Historical Provider, MD   loratadine (CLARITIN) 10 MG tablet Take 10 mg by mouth daily. Historical Provider, MD   docusate sodium (COLACE, DULCOLAX) 100 MG CAPS Take 100 mg by mouth 2 times daily 8/19/22   Citlali Solomon MD   melatonin 3 MG TABS tablet Take 1 tablet by mouth nightly as needed (insomnia) 8/19/22   Citlali Solomon MD   levothyroxine (SYNTHROID) 88 MCG tablet Take 1 tablet by mouth Daily 8/20/22   Citlali Solomon MD   traMADol (ULTRAM) 50 MG tablet Take 50 mg by mouth every 6 hours as needed for Pain. Historical Provider, MD   magnesium oxide (MAG-OX) 400 MG tablet Take 400 mg by mouth daily    Historical Provider, MD   amiodarone (CORDARONE) 200 MG tablet Take 200 mg by mouth daily    Historical Provider, MD   LORazepam (ATIVAN) 1 MG tablet Take 1 mg by mouth every 4 hours as needed for Anxiety.     Historical Provider, MD   simvastatin (ZOCOR) 20 MG tablet Take 20 mg by mouth nightly    Historical Provider, MD   metoprolol succinate (TOPROL XL) 25 MG extended release tablet Take 25 mg by mouth daily    Historical Provider, MD   hydroCHLOROthiazide (HYDRODIURIL) 12.5 MG tablet Take 12.5 mg by mouth daily    Historical Provider, MD Multiple Vitamins-Minerals (THERAPEUTIC MULTIVITAMIN-MINERALS) tablet Take 1 tablet by mouth daily    Historical Provider, MD   polyethylene glycol (GLYCOLAX) 17 g packet Take 17 g by mouth daily as needed for Constipation    Historical Provider, MD   acetaminophen (TYLENOL) 325 mg tablet Take 325 mg by mouth every 4 hours as needed for Pain (breakthrough pain)    Historical Provider, MD   aspirin 81 MG EC tablet Take 81 mg by mouth daily    Historical Provider, MD   calcium-vitamin D (OSCAL) 250-125 MG-UNIT per tablet Take 1 tablet by mouth daily    Historical Provider, MD   losartan (COZAAR) 100 MG tablet Take 100 mg by mouth daily  8/19/22  Historical Provider, MD   Cyanocobalamin (VITAMIN B 12) 500 MCG TABS Take 500 mcg by mouth daily  8/19/22  Historical Provider, MD        Allergies   Allergen Reactions    Penicillin G Hives    Loracarbef     Penicillins     Adhesive Tape Other (See Comments)     Tears skin, paper tape ok  Other reaction(s): Other- (not listed) - Allergy  \"Tears  Skin\"  Paper tape ok   \"Tears  Skin\"  Paper tape ok       Cephalexin Swelling    Cephalosporins      Other reaction(s): Unknown (comments)       Family History   Problem Relation Age of Onset    Heart Disease Father     Heart Disease Mother     Hypertension Mother     Elevated Lipids Mother     Hypertension Father     Dementia Mother      Social History     Tobacco Use    Smoking status: Never    Smokeless tobacco: Never   Substance Use Topics    Alcohol use: Yes     Alcohol/week: 7.0 standard drinks        Not in a hospital admission.     Objective:       Physical Examination:    Vitals:    10/04/22 1232   BP: (!) 175/76   Pulse: 72   Resp: 16   Temp: 97.6 °F (36.4 °C)   TempSrc: Oral   SpO2: 95%       Pain Assessment       denies                                              HEART: regular rate and rhythm  LUNG: clear to auscultation bilaterally  ABDOMEN: normal findings: soft, non-tender  EXTREMITIES: warm, no edema    Laboratory: Lab Results   Component Value Date/Time     09/30/2022 12:03 PM     08/19/2022 05:55 AM    K 3.8 09/30/2022 12:03 PM    K 3.6 08/19/2022 05:55 AM     09/30/2022 12:03 PM     08/19/2022 05:55 AM    CO2 26 09/30/2022 12:03 PM    CO2 28 08/19/2022 05:55 AM    BUN 33 09/30/2022 12:03 PM    BUN 12 08/19/2022 05:55 AM    GFRAA 43 09/30/2022 12:03 PM    GFRAA >60 08/19/2022 05:55 AM    MG 1.9 08/17/2022 06:09 AM    MG 2.3 04/20/2022 06:07 AM    GLOB 3.0 09/30/2022 12:03 PM    GLOB 3.3 08/16/2022 04:00 PM    ALT 27 09/30/2022 12:03 PM    ALT 36 08/16/2022 04:00 PM     Lab Results   Component Value Date/Time    WBC 13.5 09/30/2022 12:03 PM    WBC 14.2 08/19/2022 05:55 AM    HGB 11.6 09/30/2022 12:03 PM    HGB 10.9 08/19/2022 05:55 AM    HCT 34.7 09/30/2022 12:03 PM    HCT 32.1 08/19/2022 05:55 AM     09/30/2022 12:03 PM     08/19/2022 05:55 AM     Lab Results   Component Value Date/Time    INR 0.9 08/15/2022 10:34 AM    INR 1.3 04/18/2022 11:15 AM       Assessment:     macrocytic anemia, thrombocytosis and neutrophilic leukocytosis    [unfilled]    Plan:     Planned Procedure:  bone marrow biopsy    Risks, benefits, and alternatives reviewed with patient and she agrees to proceed with the procedure.       Signed By: Elma Rivera PA-C     October 4, 2022

## 2022-10-04 NOTE — OP NOTE
Department of Interventional Radiology  [phone]        Interventional Radiology Brief Procedure Note    Patient: Ciara Zamora MRN: [id number]  SSN: [id number]    YOB: 1940  Age: 78 y.o. Sex: female      Date of Procedure: 10/4/2022    Pre-Procedure Diagnosis: Neutrophilic leukocytosis, macrocytic anemia    Post-Procedure Diagnosis: SAME    Procedure(s): Image Guided Biopsy    Brief Description of Procedure: CT guided bone marrow biopsy and aspiration performed under moderate sedation of the right posterior iliac crest    Performed By: Hao Jeffrey PA-C     Assistants: Tim Pierre PA-C    Anesthesia: 24 minutes of  moderate sedation per Dr. Mando Myers. Continuous cardiorespiratorymonitoring was provided by trained independent observer present. Lidocaine used to anesthetize the biopsy site    Findings: No post biopsy hemorrhage. IMPRESSION:  Uncomplicated CT guided bone marrow biopsy. Plan:  Bedrest observation for a minimum of 30 minutes. Estimated Blood Loss: Less than 10ml    Specimens:  Aspirate and core sent to lab    Implants:  None    Findings: Routine bone marrow biopsy.   No hemorrhage noted postprocedure    Complications: None    Recommendations: Bed rest minimum of 30 minutes    Follow Up: As needed    Signed By: OJ Holman     October 4, 2022

## 2022-10-04 NOTE — DISCHARGE INSTRUCTIONS
If you have any questions about your procedure, please call the Interventional Radiology department at [phone]. After business hours (5pm) and weekends, call the answering service at [phone] and ask for the Radiologist on call to be paged. Si tiene Preguntas acerca del procedimiento, por favor llame al departamento de Radiología Intervencional al [phone]. Después de horas de oficina (5 pm) y los. ir fines de semana, llamar al [place] al [phone] y pregunte por el Radiologo de [place] [facility]. Interventional Radiology General Nurse Discharge    After general anesthesia or intravenous sedation, for 24 hours or while taking prescription Narcotics:  Limit your activities  Do not drive and operate hazardous machinery  Do not make important personal or business decisions  Do  not drink alcoholic beverages  If you have not urinated within 8 hours after discharge, please contact your surgeon on call. * Please give a list of your current medications to your Primary Care Provider. * Please update this list whenever your medications are discontinued, doses are     changed, or new medications (including over-the-counter products) are added. * Please carry medication information at all times in case of emergency situations. These are general instructions for a healthy lifestyle:    No smoking/ No tobacco products/ Avoid exposure to second hand smoke  Surgeon General's Warning:  Quitting smoking now greatly reduces serious risk to your health.     Obesity, smoking, and sedentary lifestyle greatly increases your risk for illness  A healthy diet, regular physical exercise & weight monitoring are important for maintaining a healthy lifestyle    You may be retaining fluid if you have a history of heart failure or if you experience any of the following symptoms:  Weight gain of 3 pounds or more overnight or 5 pounds in a week, increased swelling in our hands or feet or shortness of breath while lying flat in bed. Please call your doctor as soon as you notice any of these symptoms; do not wait until your next office visit. Recognize signs and symptoms of STROKE:  F-face looks uneven    A-arms unable to move or move unevenly    S-speech slurred or non-existent    T-time-call 911 as soon as signs and symptoms begin-DO NOT go       Back to bed or wait to see if you get better-TIME IS BRAIN.

## 2022-10-04 NOTE — OR NURSING
TRANSFER - OUT REPORT:           Verbal report given to Courtneycarley Villagranble on Aron Crocker  being transferred to IR room 6 for routine post-op              Report consisted of patients Situation, Background, Assessment and      Recommendations(SBAR). Information from the following report(s) SBAR, Procedure Summary and MAR was reviewed with the receiving nurse. Opportunity for questions and clarification was provided. Conscious Sedation:    100 Mcg of Fentanyl administered   2 Mg of Versed administered           Pt tolerated procedure well.          VITALS:  BP (!) 162/79   Pulse 65   Temp 97.6 °F (36.4 °C) (Oral)   Resp 16   SpO2 100%

## 2022-10-05 ENCOUNTER — APPOINTMENT (OUTPATIENT)
Dept: PHYSICAL THERAPY | Age: 82
End: 2022-10-05
Payer: MEDICARE

## 2022-10-10 ENCOUNTER — HOSPITAL ENCOUNTER (OUTPATIENT)
Dept: PHYSICAL THERAPY | Age: 82
Setting detail: RECURRING SERIES
Discharge: HOME | End: 2022-10-13
Payer: MEDICARE

## 2022-10-10 PROCEDURE — 97760 ORTHOTIC MGMT&TRAING 1ST ENC: CPT

## 2022-10-10 PROCEDURE — 97530 THERAPEUTIC ACTIVITIES: CPT

## 2022-10-10 PROCEDURE — 97110 THERAPEUTIC EXERCISES: CPT

## 2022-10-10 ASSESSMENT — PAIN SCALES - GENERAL: PAINLEVEL_OUTOF10: 0

## 2022-10-10 NOTE — PROGRESS NOTES
Santiago Lyle  : 1940  Primary: Medicare Part A And B  Secondary: [address] @ [address] [place] [address]  Phone: [phone]  Fax: [phone] Plan Frequency: 2x per week    Plan of Care/Certification Expiration Date: 22      PT Visit Info: Total # of Visits Approved: 99  Total # of Visits to Date: 3  Progress Note Counter: 3  Progress Note Due Date: 10/20/22     Visit Count:  3   OUTPATIENT PHYSICAL THERAPY:OP NOTE TYPE: Treatment Note 10/10/2022       Episode  }Appt Desk             Treatment Diagnosis:  Difficulty in walking, not elsewhere classified (R26.2); Unsteadiness on feet (R26.81); Pain in left ankle and joints of left foot (M25.572); Pain in right ankle and joints of right foot (M25.571); Other instability, left ankle (M25.372); Other instability, right ankle (M25.371); Other specified arthritis, right knee (D27.594); pes cavus right foot, pes cavus left foot; Repeated falls (R29.6); Muscle weakness (generalized) (M62.81)  Medical/Referring Diagnosis:  Other abnormalities of gait and mobility [R26.89]  Referring Physician:  Kenia Kimbel MD MD Orders:  PT Eval and Treat   Date of Onset:  Onset Date: 22     Allergies:   Penicillin g, Loracarbef, Penicillins, Adhesive tape, Cephalexin, and Cephalosporins  Restrictions/Precautions:  Restrictions/Precautions: Fall Risk     Interventions Planned (Treatment may consist of any combination of the following):    Current Treatment Recommendations: ROM; Strengthening; Balance training; Functional mobility training; Gait training; Neuromuscular re-education; Manual Therapy - Soft Tissue Mobilization; Pain management; Home exercise program; Safety education & training; Equipment evaluation, education, & procurement;  Therapeutic activities (orthotic consult)     Subjective Comments:    I got so down this weekend and I have never been confined to my house and not being able to drive. I know I could do it but I am worried about getting to my car and get my walking in the car. Did the sit to stand but could not figure out what chair to use and did the bridges 2 x per day. Did ok with the bone marrow thing. It was a little painful. Initial:}    0/10Post Session:       0/10  Medications Last Reviewed:  10/10/2022  Updated Objective Findings:   Sees Dr. Saroj Adan tomorrow. Treatment   THERAPEUTIC EXERCISE: (15 minutes):    Exercises per grid below to improve mobility, strength, balance, and coordination. Required minimal visual, verbal, and manual cues to promote proper body alignment and promote proper body posture. THERAPEUTIC ACTIVITY: ( 25 minutes): Therapeutic activities per grid below to improve mobility, strength, balance, and coordination. Required minimal visual, verbal, and manual cues to promote static and dynamic balance in standing and promote coordination of bilateral, upper extremity(s), lower extremity(s). Pt expressing concerns about getting to and from her car and getting her walker in and out of the car. Does not want to rely on people to get her everywhere. 2 steps rail in the right going I have soneone going to put a reil up and I am waiting to get a price. ORTHOTIC MANAGEMENT/TRAINING: (10 minutes): This right ankle orthotic is being utilized in order to increase patient's functional independence. Patient/caregiver educated to proper application and appropriate use of this orthotic and the patient benefits from this orthotic by achieving increased independence with tasks, needing less assistance with tasks, achieving increased joint stability, and better gait . Pt comes in with a brace she has ordered that offers lateral support with rigid lateral stays. Pt was able to don this on her right foot independently with a little bit of difficulty. She states it does not hurt. Pt ambulated with the brace on.   No major changes noted in her gait. This may ultimately be for comfort if she chooses. Date:  10/1/2022 Date:  10/10/2022   Activity/Exercise Parameters Parameters   Semi sit to stand with chIR IN FRONT X5 with elevated mat. Grinding and creaking in right knee and ankle. No pain. X5 with ant lean and good stretch felt in calves. bridge X15 cues to slow down and increase power    Simulated car/walker into car transfer  Set up for passenger behind . Open car door, fold walker, slide into car, walk to  seat and back out entire process. EDUCATION:  Instructed in above exercises and home program.  HEP:  Written HEP given to patient. Treatment/Session Summary:    Treatment Assessment:  good ability with folding the walker and turn on its side to palce in car. Good moving to the \"front of the car\". Need to practice with her real car. Communication/Consultation:   waiting for orders to be signed for AFO. Messages given to pt to give to Dr. Souleymane Domingo. Pt to see office today for blood work and will inquire. Equipment provided today:  None  Recommendations/Intent for next treatment session: Next visit will focus on strengthening and POC follow through.     Total Treatment Billable Duration:  5 minutes  Time In: 4578  Time Out: [address] [place] [address], PT       Charge Capture  }Post Session Pain  PT Visit Info  [address] Portal  MD Guidelines  Scanned Media  Benefits  MyChart    Future Appointments   Date Time Provider Salty Herozg   10/12/2022 10:15 AM ABHISHEK [facility]illo [facility]   10/24/2022 10:10 AM [facility]   10/24/2022 10:30 AM Ibrahima Beckford MD [facility]   11/29/2022 11:45 AM DO YULIA Ahuja [facility]

## 2022-10-12 ENCOUNTER — HOSPITAL ENCOUNTER (OUTPATIENT)
Dept: PHYSICAL THERAPY | Age: 82
Setting detail: RECURRING SERIES
Discharge: HOME | End: 2022-10-15
Payer: MEDICARE

## 2022-10-12 PROCEDURE — 97530 THERAPEUTIC ACTIVITIES: CPT

## 2022-10-12 PROCEDURE — 97110 THERAPEUTIC EXERCISES: CPT

## 2022-10-12 NOTE — PROGRESS NOTES
Ailyn Shultzr  : 1940  Primary: Medicare Part A And B  Secondary: [address] @ [address] [place] [address]  Phone: [phone]  Fax: [phone] Plan Frequency: 2x per week    Plan of Care/Certification Expiration Date: 22      PT Visit Info: Total # of Visits Approved: 99  Total # of Visits to Date: 4  Progress Note Counter: 4  Progress Note Due Date: 10/20/22     Visit Count:  4   OUTPATIENT PHYSICAL THERAPY:OP NOTE TYPE: Treatment Note 10/12/2022       Episode  }Appt Desk             Treatment Diagnosis:  Difficulty in walking, not elsewhere classified (R26.2); Unsteadiness on feet (R26.81); Pain in left ankle and joints of left foot (M25.572); Pain in right ankle and joints of right foot (M25.571); Other instability, left ankle (M25.372); Other instability, right ankle (M25.371); Other specified arthritis, right knee (Z37.843); pes cavus right foot, pes cavus left foot; Repeated falls (R29.6); Muscle weakness (generalized) (M62.81)  Medical/Referring Diagnosis:  Other abnormalities of gait and mobility [R26.89]  Referring Physician:  Ximena Segovia MD MD Orders:  PT Eval and Treat   Date of Onset:  Onset Date: 22     Allergies:   Penicillin g, Loracarbef, Penicillins, Adhesive tape, Cephalexin, and Cephalosporins  Restrictions/Precautions:  Restrictions/Precautions: Fall Risk     Interventions Planned (Treatment may consist of any combination of the following):    Current Treatment Recommendations: ROM; Strengthening; Balance training; Functional mobility training; Gait training; Neuromuscular re-education; Manual Therapy - Soft Tissue Mobilization; Pain management; Home exercise program; Safety education & training; Equipment evaluation, education, & procurement; Therapeutic activities (orthotic consult)     Subjective Comments:    Pt reports having a rough nights sleep.  Mentioned increase in pain due to being restless from medication. Initial:}     (6-7)/10Post Session:       6/10  Medications Last Reviewed:  10/12/2022  Updated Objective Findings:   Sees Dr. Misty Merino tomorrow. Treatment   THERAPEUTIC EXERCISE: (30 minutes):    Exercises per grid below to improve mobility, strength, balance, and coordination. Required minimal visual, verbal, and manual cues to promote proper body alignment and promote proper body posture. THERAPEUTIC ACTIVITY: ( 20 minutes): Therapeutic activities per grid below to improve mobility, strength, balance, and coordination. Required minimal visual, verbal, and manual cues to promote static and dynamic balance in standing and promote coordination of bilateral, upper extremity(s), lower extremity(s). Pt expressing concerns about getting to and from her car and getting her walker in and out of the car. Does not want to rely on people to get her everywhere. 2 steps rail in the right going I have soneone going to put a reil up and I am waiting to get a price. ORTHOTIC MANAGEMENT/TRAINING: (0 minutes): This right ankle orthotic is being utilized in order to increase patient's functional independence. Patient/caregiver educated to proper application and appropriate use of this orthotic and the patient benefits from this orthotic by achieving increased independence with tasks, needing less assistance with tasks, achieving increased joint stability, and better gait . Pt comes in with a brace she has ordered that offers lateral support with rigid lateral stays. Pt was able to don this on her right foot independently with a little bit of difficulty. She states it does not hurt. Pt ambulated with the brace on. No major changes noted in her gait. This may ultimately be for comfort if she chooses. Date:  10/1/2022 Date:  10/10/2022 Date:  10-12-22   Activity/Exercise Parameters Parameters Parameters   Semi sit to stand with chIR IN FRONT X5 with elevated mat. Grinding and creaking in right knee and ankle. No pain. X5 with ant lean and good stretch felt in calves. X5 with ant lean 10 sec holds stretching calves and hamstrings. Bridge X15 cues to slow down and increase power  X15      Simulated car/walker into car transfer  Set up for passenger behind . Open car door, fold walker, slide into car, walk to  seat and back out entire process. Heel Slides   2 x 10 ea    SLR   2 x 10 ea    Clamshell   2 x 10 ea    Ankle Pumps   2 x 10 ea    Sit-Stand    10x with cues to descend slowly and controlled   5x with green wedge pad underneath left foot to increase weight bearing on right LE   Timed Static Standing Balance   45 secs 1x  25 secs 1x  1 min 20sec 1x           EDUCATION:  Instructed in above exercises and home program.  HEP:  Written HEP with images given to patient. Treatment/Session Summary:    Treatment Assessment:  Pt responded well to therapeutic exercises and balance activities. Pt would continue to benefit from more balance activities with emphasis on posture to increase static balance. Plan to do more therapeutic activities to increase endurance. Communication/Consultation:   None  Equipment provided today:  None  Recommendations/Intent for next treatment session: Next visit will focus on strengthening and standing balance activities.      Total Treatment Billable Duration:  50 minutes  Time In: 3787  Time Out: [address] [facility] [place]       Charge Capture  }Post Session Pain  PT Visit Info  [address] [facility] [address] Portal  MD Guidelines  Scanned Media  Benefits  MyChart    Future Appointments   Date Time Provider Salty Herzog   10/17/2022 10:15 AM Karissa Torres [facility]   10/21/2022 11:00 AM Dorisann Baumgarten, PT [facility]   10/24/2022 10:10 AM [facility] [facility]   10/24/2022 10:30 AM Alejandro Neff MD [facility]   10/25/2022 11:00 AM Dorisann Baumgarten, PT [facility] 10/28/2022 11:00 AM Paradise Trinidad PT [facility]   11/29/2022 11:45 AM DO YULIA Keenan [facility]

## 2022-10-14 LAB
FLOW CYTOMETRY RESULTS: NORMAL
SPECIMEN SOURCE: NORMAL
TEST ORDERED: NORMAL

## 2022-10-17 ENCOUNTER — HOSPITAL ENCOUNTER (OUTPATIENT)
Dept: PHYSICAL THERAPY | Age: 82
Setting detail: RECURRING SERIES
Discharge: HOME | End: 2022-10-20
Payer: MEDICARE

## 2022-10-17 PROCEDURE — 97530 THERAPEUTIC ACTIVITIES: CPT

## 2022-10-17 PROCEDURE — 97110 THERAPEUTIC EXERCISES: CPT

## 2022-10-17 ASSESSMENT — PAIN SCALES - GENERAL: PAINLEVEL_OUTOF10: 5

## 2022-10-17 NOTE — PROGRESS NOTES
Margaret Dubois  : 1940  Primary: Medicare Part A And B  Secondary: [address] @ [address] [place] [address]  Phone: [phone]  Fax: [phone] Plan Frequency: 2x per week    Plan of Care/Certification Expiration Date: 22      PT Visit Info: Total # of Visits Approved: 99  Total # of Visits to Date: 5  Progress Note Counter: 5  Progress Note Due Date: 10/20/22     Visit Count:  5   OUTPATIENT PHYSICAL THERAPY:OP NOTE TYPE: Treatment Note 10/17/2022       Episode  }Appt Desk             Treatment Diagnosis:  Difficulty in walking, not elsewhere classified (R26.2); Unsteadiness on feet (R26.81); Pain in left ankle and joints of left foot (M25.572); Pain in right ankle and joints of right foot (M25.571); Other instability, left ankle (M25.372); Other instability, right ankle (M25.371); Other specified arthritis, right knee (L89.173); pes cavus right foot, pes cavus left foot; Repeated falls (R29.6); Muscle weakness (generalized) (M62.81)  Medical/Referring Diagnosis:  Other abnormalities of gait and mobility [R26.89]  Referring Physician:  Laren Meckel, MD MD Orders:  PT Eval and Treat   Date of Onset:  Onset Date: 22     Allergies:   Penicillin g, Loracarbef, Penicillins, Adhesive tape, Cephalexin, and Cephalosporins  Restrictions/Precautions:  Restrictions/Precautions: Fall Risk     Interventions Planned (Treatment may consist of any combination of the following):    Current Treatment Recommendations: ROM; Strengthening; Balance training; Functional mobility training; Gait training; Neuromuscular re-education; Manual Therapy - Soft Tissue Mobilization; Pain management; Home exercise program; Safety education & training; Equipment evaluation, education, & procurement; Therapeutic activities (orthotic consult)     Subjective Comments:    Pt reports feeling well. Reports doing well with HEP with no issues. General pain from long weekend. Initial:}    5/10Post Session:       4/10  Medications Last Reviewed:  10/17/2022  Updated Objective Findings:   Sees Dr. Filippo Berg tomorrow. Treatment   THERAPEUTIC EXERCISE: (29 minutes):    Exercises per grid below to improve mobility, strength, balance, and coordination. Required minimal visual, verbal, and manual cues to promote proper body alignment and promote proper body posture. THERAPEUTIC ACTIVITY: ( 24 minutes): Therapeutic activities per grid below to improve mobility, strength, balance, and coordination. Required minimal visual, verbal, and manual cues to promote static and dynamic balance in standing and promote coordination of bilateral, upper extremity(s), lower extremity(s). Pt expressing concerns about getting to and from her car and getting her walker in and out of the car. Does not want to rely on people to get her everywhere. 2 steps rail in the right going I have soneone going to put a reil up and I am waiting to get a price. ORTHOTIC MANAGEMENT/TRAINING: (0 minutes): This right ankle orthotic is being utilized in order to increase patient's functional independence. Patient/caregiver educated to proper application and appropriate use of this orthotic and the patient benefits from this orthotic by achieving increased independence with tasks, needing less assistance with tasks, achieving increased joint stability, and better gait . Pt comes in with a brace she has ordered that offers lateral support with rigid lateral stays. Pt was able to don this on her right foot independently with a little bit of difficulty. She states it does not hurt. Pt ambulated with the brace on. No major changes noted in her gait. This may ultimately be for comfort if she chooses.            Date:  10/1/2022 Date:  10/10/2022 Date:  10-12-22 Date:  10-17-22   Activity/Exercise Parameters Parameters Parameters    Semi sit to stand with chair IN FRONT X5 with elevated mat. Grinding and creaking in right knee and ankle. No pain. X5 with ant lean and good stretch felt in calves. X5 with ant lean 10 sec holds stretching calves and hamstrings. Bridge X15 cues to slow down and increase power  X15    2 x 10    Simulated car/walker into car transfer  Set up for passenger behind . Open car door, fold walker, slide into car, walk to  seat and back out entire process. Heel Slides   2 x 10 ea  2 x 10 ea    SLR   2 x 10 ea  2 x 15 ea   Clamshell   2 x 10 ea  2 x 10 ea    Ankle Pumps   2 x 10 ea  2 x 10 ea    Sit-Stand    10x with cues to descend slowly and controlled   5x with green wedge pad underneath left foot to increase weight bearing on right LE    Timed Static Standing Balance   45 secs 1x  25 secs 1x  1 min 20sec 1x 1 x 30 sec  2 x 1 min  In // bar   Standing Marches    In // bar  10x ea   Ambulation     Around gym 150 ft. SBA with emphasis on increasing step length      EDUCATION:  Instructed in above exercises and home program.  HEP:  Written HEP with images given to patient. Treatment/Session Summary:    Treatment Assessment:  Pt responded well to increased reps during therapeutic exercises. Introduced pt to // bar activities to increase static balance and ambulatory activities and pt did well. Plan to incorporate standing activities to increase endurance as well as dynamic balance. Communication/Consultation:   None  Equipment provided today:  None  Recommendations/Intent for next treatment session: Next visit will focus on strengthening and static/dynamic standing balance activities in // bar.      Total Treatment Billable Duration:  53 minutes  Time In: 5318  Time Out: [address] [place], [place]       Charge Capture  }Post Session Pain  PT Visit [address] [facility] Portal  MD Guidelines  Scanned Media  Benefits  MyChart    Future Appointments   Date Time Provider Salty Herzog   10/21/2022 11:00 AM Amilcar Hurt, [facility]   10/24/2022 10:10 AM [facility]   10/24/2022 10:30 AM Joy Shipley MD [facility]   10/25/2022 11:00 AM Amilcar Hurt, [facility] [place]   10/28/2022 11:00 AM Amilcar Hurt [facility] [place]   11/29/2022 11:45 AM Jasen Keating DO [facility]

## 2022-10-20 DIAGNOSIS — D72.9 NEUTROPHILIC LEUKOCYTOSIS: Primary | ICD-10-CM

## 2022-10-20 DIAGNOSIS — D53.9 MACROCYTIC ANEMIA: ICD-10-CM

## 2022-10-21 ENCOUNTER — HOSPITAL ENCOUNTER (OUTPATIENT)
Dept: PHYSICAL THERAPY | Age: 82
Setting detail: RECURRING SERIES
Discharge: HOME | End: 2022-10-24
Payer: MEDICARE

## 2022-10-21 PROCEDURE — 97110 THERAPEUTIC EXERCISES: CPT

## 2022-10-21 ASSESSMENT — PAIN SCALES - GENERAL: PAINLEVEL_OUTOF10: 5

## 2022-10-21 NOTE — PROGRESS NOTES
Davina Santana  : 1940  Primary: Medicare Part A And B  Secondary: [address] @ [address] [place] [address]  Phone: [phone]  Fax: [phone] Plan Frequency: 2x per week    Plan of Care/Certification Expiration Date: 22      PT Visit Info: Total # of Visits Approved: 99  Total # of Visits to Date: 6  Progress Note Counter: 6  Progress Note Due Date: 10/20/22     Visit Count:  6   OUTPATIENT PHYSICAL THERAPY:OP NOTE TYPE: Treatment Note 10/21/2022       Episode  }Appt Desk             Treatment Diagnosis:  Difficulty in walking, not elsewhere classified (R26.2); Unsteadiness on feet (R26.81); Pain in left ankle and joints of left foot (M25.572); Pain in right ankle and joints of right foot (M25.571); Other instability, left ankle (M25.372); Other instability, right ankle (M25.371); Other specified arthritis, right knee (W76.628); pes cavus right foot, pes cavus left foot; Repeated falls (R29.6); Muscle weakness (generalized) (M62.81)  Medical/Referring Diagnosis:  Other abnormalities of gait and mobility [R26.89]  Referring Physician:  Montana Mendoza MD MD Orders:  PT Eval and Treat   Date of Onset:  Onset Date: 22     Allergies:   Penicillin g, Loracarbef, Penicillins, Adhesive tape, Cephalexin, and Cephalosporins  Restrictions/Precautions:  Restrictions/Precautions: Fall Risk     Interventions Planned (Treatment may consist of any combination of the following):    Current Treatment Recommendations: ROM; Strengthening; Balance training; Functional mobility training; Gait training; Neuromuscular re-education; Manual Therapy - Soft Tissue Mobilization; Pain management; Home exercise program; Safety education & training; Equipment evaluation, education, & procurement;  Therapeutic activities (orthotic consult)     Subjective Comments:    Got the other rail put up this last week so that has really helped as far as getting on and off the porch. My ankles are a little sore. so she said she sent the referral but they have not called me back so not sure what is going on. Put that Voltaren on my ankles and I am supposed to be doing that 3 times a day so we will see how that works. I think I want to wait on any kind of bracing until after I see the orthopedist.    Initial:}    5/10Post Session:        /10  Medications Last Reviewed:  10/21/2022  Updated Objective Findings:   Has not been called for ortho consult  Treatment   THERAPEUTIC EXERCISE: (45 minutes):    Exercises per grid below to improve mobility, strength, balance, and coordination. Required minimal visual, verbal, and manual cues to promote proper body alignment and promote proper body posture. THERAPEUTIC ACTIVITY: (0 minutes): Therapeutic activities per grid below to improve mobility, strength, balance, and coordination. Required minimal visual, verbal, and manual cues to promote static and dynamic balance in standing and promote coordination of bilateral, upper extremity(s), lower extremity(s)      Date:  10/1/2022 Date:  10/10/2022 Date:  10-12-22 Date:  10-17-22 Date:  10/21/2022   Activity/Exercise Parameters Parameters Parameters     Nu Step      Large one  Level 1  8 minutes   Forward step over 2 stacked blue foam stones     3 X10 each leg in //  Audible grinding crepitus in ankles and knees. Forward and back step     3 x 10 each leg. Trying to have pt not hold on. Light hand held assist.   Walking no hands or light tapping in // bars     4 x 15'     Semi sit to stand with chair IN FRONT X5 with elevated mat. Grinding and creaking in right knee and ankle. No pain. X5 with ant lean and good stretch felt in calves. X5 with ant lean 10 sec holds stretching calves and hamstrings. Bridge X15 cues to slow down and increase power  X15    2 x 10     Simulated car/walker into car transfer  Set up for passenger behind . Open car door, fold walker, slide into car, walk to  seat and back out entire process. Heel Slides   2 x 10 ea  2 x 10 ea     SLR   2 x 10 ea  2 x 15 ea    Clamshell   2 x 10 ea  2 x 10 ea     Ankle Pumps   2 x 10 ea  2 x 10 ea     Sit-Stand    10x with cues to descend slowly and controlled   5x with green wedge pad underneath left foot to increase weight bearing on right LE     Timed Static Standing Balance   45 secs 1x  25 secs 1x  1 min 20sec 1x 1 x 30 sec  2 x 1 min  In // bar 30 seconds easy   Standing Marches    In // bar  10x ea    Ambulation     Around gym 150 ft. SBA with emphasis on increasing step length       EDUCATION:  Instructed in above exercises and home program.  HEP:  Written HEP with images given to patient. Treatment/Session Summary:    Treatment Assessment:  working to the best of her ability. Motivated and does not want to sit at home and do nothing. Wants to wait until orhto consult befroe deciding about orhtoitic consult. Audible grinding crepitus with almost all activities. Luckily it is not too painful. Communication/Consultation:   None  Equipment provided today:  None  Recommendations/Intent for next treatment session: Next visit will focus on strengthening and static/dynamic standing balance activities in // bar.      Total Treatment Billable Duration:  45 minutes  Time In: 0110  Time Out: [address], [place]       Charge Capture  }Post Session Pain  PT Visit Info  [address] [facility] [address] Portal  MD Guidelines  Scanned Media  Benefits  MyChart    Future Appointments   Date Time Provider [facility]   10[facility]/2022 10[facility]10 [facility]   10/24/2022 10:30 AM Amber Jackson MD [facility]   10/25/2022 11:00 AM Sree Salinas PT [facility]   10/28/2022 11:00 AM Sree Salinas PT [facility]   11/29/2022 11:45 AM Kristie Lee DO [facility]

## 2022-10-24 ENCOUNTER — OFFICE VISIT (OUTPATIENT)
Dept: ONCOLOGY | Age: 82
End: 2022-10-24
Payer: MEDICARE

## 2022-10-24 ENCOUNTER — HOSPITAL ENCOUNTER (OUTPATIENT)
Dept: LAB | Age: 82
Discharge: HOME | End: 2022-10-27
Payer: MEDICARE

## 2022-10-24 VITALS
SYSTOLIC BLOOD PRESSURE: 172 MMHG | TEMPERATURE: 98.7 F | RESPIRATION RATE: 16 BRPM | WEIGHT: 162.7 LBS | DIASTOLIC BLOOD PRESSURE: 74 MMHG | HEART RATE: 69 BPM | OXYGEN SATURATION: 99 % | HEIGHT: 65 IN | BODY MASS INDEX: 27.11 KG/M2

## 2022-10-24 DIAGNOSIS — D53.9 MACROCYTIC ANEMIA: ICD-10-CM

## 2022-10-24 DIAGNOSIS — Z15.89 JAK-2 GENE MUTATION: Primary | ICD-10-CM

## 2022-10-24 DIAGNOSIS — D72.9 NEUTROPHILIC LEUKOCYTOSIS: ICD-10-CM

## 2022-10-24 DIAGNOSIS — D46.9 MDS (MYELODYSPLASTIC SYNDROME) (HCC): ICD-10-CM

## 2022-10-24 LAB
ALBUMIN SERPL-MCNC: 3.8 G/DL (ref 3.2–4.6)
ALBUMIN/GLOB SERPL: 1.3 {RATIO} (ref 0.4–1.6)
ALP SERPL-CCNC: 98 U/L (ref 50–136)
ALT SERPL-CCNC: 64 U/L (ref 12–65)
ANION GAP SERPL CALC-SCNC: 9 MMOL/L (ref 2–11)
AST SERPL-CCNC: 41 U/L (ref 15–37)
BASOPHILS # BLD: 0.2 K/UL (ref 0–0.2)
BASOPHILS NFR BLD: 1 % (ref 0–2)
BILIRUB SERPL-MCNC: 1.3 MG/DL (ref 0.2–1.1)
BUN SERPL-MCNC: 25 MG/DL (ref 8–23)
CALCIUM SERPL-MCNC: 9 MG/DL (ref 8.3–10.4)
CHLORIDE SERPL-SCNC: 98 MMOL/L (ref 101–110)
CO2 SERPL-SCNC: 28 MMOL/L (ref 21–32)
CREAT SERPL-MCNC: 1.3 MG/DL (ref 0.6–1)
DIFFERENTIAL METHOD BLD: ABNORMAL
EOSINOPHIL # BLD: 0.2 K/UL (ref 0–0.8)
EOSINOPHIL NFR BLD: 1 % (ref 0.5–7.8)
ERYTHROCYTE [DISTWIDTH] IN BLOOD BY AUTOMATED COUNT: 17.6 % (ref 11.9–14.6)
GLOBULIN SER CALC-MCNC: 2.9 G/DL (ref 2.8–4.5)
GLUCOSE SERPL-MCNC: 112 MG/DL (ref 65–100)
HCT VFR BLD AUTO: 34.5 % (ref 35.8–46.3)
HGB BLD-MCNC: 11.6 G/DL (ref 11.7–15.4)
IMM GRANULOCYTES # BLD AUTO: 0.4 K/UL (ref 0–0.5)
IMM GRANULOCYTES NFR BLD AUTO: 2 % (ref 0–5)
LYMPHOCYTES # BLD: 1 K/UL (ref 0.5–4.6)
LYMPHOCYTES NFR BLD: 6 % (ref 13–44)
MCH RBC QN AUTO: 34.9 PG (ref 26.1–32.9)
MCHC RBC AUTO-ENTMCNC: 33.6 G/DL (ref 31.4–35)
MCV RBC AUTO: 103.9 FL (ref 82–102)
MONOCYTES # BLD: 1.2 K/UL (ref 0.1–1.3)
MONOCYTES NFR BLD: 7 % (ref 4–12)
NEUTS SEG # BLD: 13.5 K/UL (ref 1.7–8.2)
NEUTS SEG NFR BLD: 82 % (ref 43–78)
NRBC # BLD: 0 K/UL (ref 0–0.2)
PLATELET # BLD AUTO: 569 K/UL (ref 150–450)
PMV BLD AUTO: 10.2 FL (ref 9.4–12.3)
POTASSIUM SERPL-SCNC: 3.8 MMOL/L (ref 3.5–5.1)
PROT SERPL-MCNC: 6.7 G/DL (ref 6.3–8.2)
RBC # BLD AUTO: 3.32 M/UL (ref 4.05–5.2)
SODIUM SERPL-SCNC: 135 MMOL/L (ref 133–143)
WBC # BLD AUTO: 16.4 K/UL (ref 4.3–11.1)

## 2022-10-24 PROCEDURE — G8400 PT W/DXA NO RESULTS DOC: HCPCS | Performed by: INTERNAL MEDICINE

## 2022-10-24 PROCEDURE — G8427 DOCREV CUR MEDS BY ELIG CLIN: HCPCS | Performed by: INTERNAL MEDICINE

## 2022-10-24 PROCEDURE — G8417 CALC BMI ABV UP PARAM F/U: HCPCS | Performed by: INTERNAL MEDICINE

## 2022-10-24 PROCEDURE — 85025 COMPLETE CBC W/AUTO DIFF WBC: CPT

## 2022-10-24 PROCEDURE — 1123F ACP DISCUSS/DSCN MKR DOCD: CPT | Performed by: INTERNAL MEDICINE

## 2022-10-24 PROCEDURE — 82668 ASSAY OF ERYTHROPOIETIN: CPT

## 2022-10-24 PROCEDURE — G8484 FLU IMMUNIZE NO ADMIN: HCPCS | Performed by: INTERNAL MEDICINE

## 2022-10-24 PROCEDURE — 1036F TOBACCO NON-USER: CPT | Performed by: INTERNAL MEDICINE

## 2022-10-24 PROCEDURE — 99214 OFFICE O/P EST MOD 30 MIN: CPT | Performed by: INTERNAL MEDICINE

## 2022-10-24 PROCEDURE — 36415 COLL VENOUS BLD VENIPUNCTURE: CPT

## 2022-10-24 PROCEDURE — 1090F PRES/ABSN URINE INCON ASSESS: CPT | Performed by: INTERNAL MEDICINE

## 2022-10-24 PROCEDURE — 80053 COMPREHEN METABOLIC PANEL: CPT

## 2022-10-24 RX ORDER — LEVOTHYROXINE SODIUM 0.1 MG/1
TABLET ORAL
COMMUNITY
Start: 2022-10-11

## 2022-10-24 RX ORDER — HYDROXYUREA 500 MG/1
CAPSULE ORAL
Qty: 30 CAPSULE | Refills: 0 | Status: SHIPPED | OUTPATIENT
Start: 2022-10-24 | End: 2022-10-24 | Stop reason: SINTOL

## 2022-10-24 ASSESSMENT — PATIENT HEALTH QUESTIONNAIRE - PHQ9
SUM OF ALL RESPONSES TO PHQ QUESTIONS 1-9: 2
SUM OF ALL RESPONSES TO PHQ QUESTIONS 1-9: 2
1. LITTLE INTEREST OR PLEASURE IN DOING THINGS: 0
SUM OF ALL RESPONSES TO PHQ QUESTIONS 1-9: 2
2. FEELING DOWN, DEPRESSED OR HOPELESS: 2
SUM OF ALL RESPONSES TO PHQ9 QUESTIONS 1 & 2: 2
SUM OF ALL RESPONSES TO PHQ QUESTIONS 1-9: 2

## 2022-10-24 NOTE — PATIENT INSTRUCTIONS
Patient Instructions from Today's Visit    Reason for Visit:  Follow up - MDS (myelodysplasia)/ESAU 2    Diagnosis Information:  https://www.[employer]/. 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 Continue taking aspirin daily. We are also going to send in a prescription for Hydrea (500mg) - you should take this every Monday, Wednesday, and Friday. You would need to let us know if she has had any abnormal bleeding, increased bruising, neutropenia (monitored with lab work), unexplained fevers of 100.4 or greater. The bone marrow biopsy also showed MDS. We will need to monitor your labs every two weeks. Please call us if you have any questions or concerns before your next visit. Follow Up: Follow up in 4 weeks with Dr. Lindsay Ellis, with labs prior.      Recent Lab Results:  Hospital Outpatient Visit on 10/24/2022   Component Date Value Ref Range Status    WBC 10/24/2022 16.4 (A)  4.3 - 11.1 K/uL Final    RBC 10/24/2022 3.32 (A)  4.05 - 5.2 M/uL Final    Hemoglobin 10/24/2022 11.6 (A)  11.7 - 15.4 g/dL Final    Hematocrit 10/24/2022 34.5 (A)  35.8 - 46.3 % Final    MCV 10/24/2022 103.9 (A)  82.0 - 102.0 FL Final    MCH 10/24/2022 34.9 (A)  26.1 - 32.9 PG Final    MCHC 10/24/2022 33.6  31.4 - 35.0 g/dL Final    RDW 10/24/2022 17.6 (A)  11.9 - 14.6 % Final    Platelets 97/76/5033 569 (A)  150 - 450 K/uL Final    MPV 10/24/2022 10.2  9.4 - 12.3 FL Final    nRBC 10/24/2022 0.00  0.0 - 0.2 K/uL Final    **Note: Absolute NRBC parameter is now reported with Hemogram**    Differential Type 10/24/2022 AUTOMATED    Final    Seg Neutrophils 10/24/2022 82 (A)  43 - 78 % Final    Lymphocytes 10/24/2022 6 (A)  13 - 44 % Final    Monocytes 10/24/2022 7  4.0 - 12.0 % Final    Eosinophils % 10/24/2022 1  0.5 - 7.8 % Final    Basophils 10/24/2022 1  0.0 - 2.0 % Final Immature Granulocytes 10/24/2022 2  0.0 - 5.0 % Final    Segs Absolute 10/24/2022 13.5 (A)  1.7 - 8.2 K/UL Final    Absolute Lymph # 10/24/2022 1.0  0.5 - 4.6 K/UL Final    Absolute Mono # 10/24/2022 1.2  0.1 - 1.3 K/UL Final    Absolute Eos # 10/24/2022 0.2  0.0 - 0.8 K/UL Final    Basophils Absolute 10/24/2022 0.2  0.0 - 0.2 K/UL Final    Absolute Immature Granulocyte 10/24/2022 0.4  0.0 - 0.5 K/UL Final    Sodium 10/24/2022 135  133 - 143 mmol/L Final    Potassium 10/24/2022 3.8  3.5 - 5.1 mmol/L Final    Chloride 10/24/2022 98 (A)  101 - 110 mmol/L Final    CO2 10/24/2022 28  21 - 32 mmol/L Final    Anion Gap 10/24/2022 9  2 - 11 mmol/L Final    Glucose 10/24/2022 112 (A)  65 - 100 mg/dL Final    BUN 10/24/2022 25 (A)  8 - 23 MG/DL Final    Creatinine 10/24/2022 1.30 (A)  0.6 - 1.0 MG/DL Final    Est, Glom Filt Rate 10/24/2022 41 (A)  >60 ml/min/1.73m2 Final    Comment:      Pediatric calculator link: [employer].at. org/professionals/kdoqi/gfr_calculatorped       Effective Oct 3, 2022       These results are not intended for use in patients <25years of age. eGFR results are calculated without a race factor using  the 2021 CKD-EPI equation. Careful clinical correlation is recommended, particularly when comparing to results calculated using previous equations. The CKD-EPI equation is less accurate in patients with extremes of muscle mass, extra-renal metabolism of creatinine, excessive creatine ingestion, or following therapy that affects renal tubular secretion.       Calcium 10/24/2022 9.0  8.3 - 10.4 MG/DL Final    Total Bilirubin 10/24/2022 1.3 (A)  0.2 - 1.1 MG/DL Final    ALT 10/24/2022 64  12 - 65 U/L Final    AST 10/24/2022 41 (A)  15 - 37 U/L Final    Alk Phosphatase 10/24/2022 98  50 - 136 U/L Final    Total Protein 10/24/2022 6.7  6.3 - 8.2 g/dL Final    Albumin 10/24/2022 3.8  3.2 - 4.6 g/dL Final    Globulin 10/24/2022 2.9  2.8 - 4.5 g/dL Final    Albumin/Globulin Ratio 10/24/2022 1.3  0.4 - 1.6   Final         Treatment Summary has been discussed and given to patient: N/A        -------------------------------------------------------------------------------------------------------------------  Please call our office at [phone] if you have any  of the following symptoms:   Fever of 100.5 or greater  Chills  Shortness of breath  Swelling or pain in one leg    After office hours an answering service is available and will contact a provider for emergencies or if you are experiencing any of the above symptoms. Patient does express an interest in My Chart. My Chart log in information explained on the after visit summary printout at the [facility] desk.     Cheikh Gallagher RN

## 2022-10-24 NOTE — PROGRESS NOTES
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 Stephanie Bautista is a pleasant 78years old female patient with multiple medical problems including anxiety, atrial fibrillation status post watchman, status post aortic valve replacement, breast cancer (ER/OR positive, HER2/lloyd positive, diagnosed in 2005), cerebrovascular disease, TIA, among others who has been referred for evaluation and management of macrocytic anemia, thrombocytosis and neutrophilic leukocytosis. She presented to ED in August 2022 with complaints of malaise, generalized weakness and progressively worsening fatigue. On 8/15/2022, labs showed WBC count of 15.4, MCV of 104, and platelet count of 042. Hemoglobin 12.9. She was admitted with a diagnosis of urinary tract infection. Based on her labs, Synthroid dose was increased. Lab work at discharge has been summarized below. Oncology/hematology overview:    Diagnosis: RARS-T  Date of diagnosis:  Stage at diagnosis:  Molecular studies:  JAK2 V617F; SF3B1 N075C; TJ4Y8 [id number]*81 DETECTED  Treatment intent:  Disease status:  Work up/treatment summary:  10/4/2022: Bone marrow biopsy showed marrow cellularity of 70 to 80% with trilineage hematopoietic maturation, atypical melanocytic hyperplasia and ring sideroblasts. Bone marrow is hypercellular for age with changes consistent with MDS/MPN. Flow cytometry showed no definitive diagnostic immunophenotypic abnormalities. MDS FISH panel negative, karyotype 55 XX (20)  NO alterations detected in the following genes:   FLT3, IDH1, IDH2, NPM1   SNV/INDEL: JAK2 V617F; SF3B1 A036L; SH2B3 [id number]*45 DETECTED      Interval history:   BMBx done on 10/04/22, results noted below  Has been working with PT for chronic debility  Reports persisting fatigue and malaise  No B symptoms, abnormal bleeding, takes ASA daily      Review of Systems:  14 point ROS was negative except as per HPI      ECOG PERFORMANCE STATUS - 1- Restricted in physically strenuous activity but ambulatory and able to carry out work of a light or sedentary nature such as light house work, office work. Pain - /10. Mild to moderate pain, requiring medication - see MAR     Fatigue - No flowsheet data found. Distress - No flowsheet data found. Reviewed and updated this visit by provider:  Tobacco  Allergies  Meds  Problems  Med Hx  Surg Hx  Fam Hx          Current Outpatient Medications   Medication Sig Dispense Refill    hydroxyurea (HYDREA) 500 MG chemo capsule Take every Monday, Wednesday, and Friday 30 capsule 0    diclofenac (CATAFLAM) 50 MG tablet TAKE 1 TABLET (50 MG) BY MOUTH 2 (TWO) TIMES A DAY AS NEEDED (ARTHRITIS PAIN)      sertraline (ZOLOFT) 25 MG tablet TAKE 1 TABLET (25 MG) BY MOUTH DAILY      famotidine (PEPCID) 20 MG tablet Take 20 mg by mouth as needed (acid reflux) once daily as needed      loratadine (CLARITIN) 10 MG tablet Take 10 mg by mouth daily.       melatonin 3 MG TABS tablet Take 1 tablet by mouth nightly as needed (insomnia) 30 tablet 0    traMADol (ULTRAM) 50 MG tablet Take 50 mg by mouth every 6 hours as needed for Pain.      magnesium oxide (MAG-OX) 400 MG tablet Take 400 mg by mouth daily      amiodarone (CORDARONE) 200 MG tablet Take 200 mg by mouth daily      simvastatin (ZOCOR) 20 MG tablet Take 20 mg by mouth nightly      metoprolol succinate (TOPROL XL) 25 MG extended release tablet Take 25 mg by mouth daily      hydroCHLOROthiazide (HYDRODIURIL) 12.5 MG tablet Take 12.5 mg by mouth daily      Multiple Vitamins-Minerals (THERAPEUTIC MULTIVITAMIN-MINERALS) tablet Take 1 tablet by mouth daily      polyethylene glycol (GLYCOLAX) 17 g packet Take 17 g by mouth daily as needed for Constipation      acetaminophen (TYLENOL) 325 mg tablet Take 325 mg by mouth every 4 hours as needed for Pain (breakthrough pain)      aspirin 81 MG EC tablet Take 81 mg by mouth daily      calcium-vitamin D (OSCAL) 250-125 MG-UNIT per tablet Take 1 tablet by mouth daily      levothyroxine (SYNTHROID) 100 MCG tablet TAKE 1 TABLET (100 MCG) BY MOUTH DAILY THIS REPLACES 88MCG DOSE.      docusate sodium (COLACE, DULCOLAX) 100 MG CAPS Take 100 mg by mouth 2 times daily (Patient not taking: Reported on 10/24/2022) 100 capsule 0    levothyroxine (SYNTHROID) 88 MCG tablet Take 1 tablet by mouth Daily (Patient not taking: Reported on 10/24/2022) 30 tablet 0    LORazepam (ATIVAN) 1 MG tablet Take 1 mg by mouth every 4 hours as needed for Anxiety. (Patient not taking: Reported on 10/24/2022)       No current facility-administered medications for this visit. OBJECTIVE:  BP (!) 172/74   Pulse 69   Temp 98.7 °F (37.1 °C) (Oral)   Resp 16   Ht 5' 5\" (1.651 m)   Wt 162 lb 11.2 oz (73.8 kg)   SpO2 99%   BMI 27.07 kg/m²   Wt Readings from Last 3 Encounters:   10/24/22 162 lb 11.2 oz (73.8 kg)   09/30/22 159 lb (72.1 kg)   08/29/22 156 lb 3.2 oz (70.9 kg)       Physical Exam:  Patient alert and oriented x 3, in no acute distress  Integumentary: No Pallor, no icterus  HEENT: Moist mucous membranes, normal oropharynx  Lymph nodes: No cervical residual lymphadenopathy is palpable. Cardiovascular:RRR, S1, S2 present  Lungs: Clear to auscultation, no rales or wheezing, no accessory muscle use  Abdomen: Soft, and non-tender on palpation, no organomegaly, bowel sounds audible  Extremities: Pitting bilateral lower extremity edema is unchanged. No calf tenderness.   Neurological: patient can follow commands and move all extremities    Labs:  Lab Results   Component Value Date    WBC 16.4 (H) 10/24/2022    HGB 11.6 (L) 10/24/2022    HCT 34.5 (L) 10/24/2022    .9 (H) 10/24/2022     (H) 10/24/2022     Lab Results   Component Value Date    NEUTROABS 12.4 (H) 04/18/2022    LYMPHOPCT 6 (L) 10/24/2022    LYMPHSABS 1.0 10/24/2022    MONOPCT 7 10/24/2022    MONOSABS 1.2 10/24/2022    EOSABS 0.2 10/24/2022    BASOPCT 1 10/24/2022     Lab Results   Component Value Date     10/24/2022    K 3.8 10/24/2022    CL 98 (L) 10/24/2022    CO2 28 10/24/2022    BUN 25 (H) 10/24/2022    CREATININE 1.30 (H) 10/24/2022    GLUCOSE 112 (H) 10/24/2022    CALCIUM 9.0 10/24/2022    PROT 6.7 10/24/2022    LABALBU 3.8 10/24/2022    BILITOT 1.3 (H) 10/24/2022    ALKPHOS 98 10/24/2022    AST 41 (H) 10/24/2022    ALT 64 10/24/2022    LABGLOM 41 (L) 10/24/2022    GFRAA 43 (L) 09/30/2022    AGRATIO 1.2 11/30/2021    GLOB 2.9 10/24/2022               Imaging:  CT BONE MARROW BIOPSY AND ASPIRATION    Result Date: 17/1/0095  Uncomplicated CT guided bone marrow biopsy. Plan:  Bedrest observation for a minimum of 30 minutes. Problems:  1. 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  5/10 nodes positive, status post left mastectomy; received 4 cycles of AC followed by 4 cycles of Taxol, radiation, 1 year of Herceptin and 7 years of Femara.  -Hypothyroidism  -Atrial fibrillation status post watchman, Plavix as well as Eliquis discontinued due to extensive hematoma of left hip x2, oral bleeding, nosebleeding  -Hypertension  -Probably calcified 2.7 cm meningioma near left posterior fossa incidentally seen on CT scan done in March and May 2022. No mass-effect.  -Depression/anxiety  -Stage III CKD           PLAN:   I had an extensive discussion with the patient and her family. Reviewed results of recent work-up in detail. Discussed that RARS-T is a relatively new entity per WHO classification and that in general, the data guiding optimal management of this condition is lacking.   Some of the agents that have been used based on case series include hydroxyurea for cytoreduction (poses a risk for anemia and thus worsening fatigue), lenalidomide, low-dose hypomethylating agent and intervention. Discussed rationale/risk/logistics/side effects of different treatment options. Start low-dose Hydrea 500 mg q. Monday Wednesday Friday. If poorly tolerated due to cytopenias, we shall plan to initiate lenalidomide. Case will be discussed in hematopathology tumor board. Check erythropoietin level. Patient/family verbalized understanding of the management plan. All questions were answered to the best of my abilities. [email or web address]        Therese Borja MD  [facility] Hematology and Oncology  [address] [place] [address]  Office : [phone]  Fax : [phone]    Elements of this note have been dictated using speech recognition software. As a result, errors of speech recognition may have occurred.

## 2022-10-25 ENCOUNTER — TELEPHONE (OUTPATIENT)
Dept: ONCOLOGY | Age: 82
End: 2022-10-25

## 2022-10-25 ENCOUNTER — HOSPITAL ENCOUNTER (OUTPATIENT)
Dept: PHYSICAL THERAPY | Age: 82
Setting detail: RECURRING SERIES
End: 2022-10-25
Payer: MEDICARE

## 2022-10-25 DIAGNOSIS — D46.9 MDS (MYELODYSPLASTIC SYNDROME) (HCC): Primary | ICD-10-CM

## 2022-10-25 DIAGNOSIS — Z15.89 JAK-2 GENE MUTATION: ICD-10-CM

## 2022-10-25 RX ORDER — LENALIDOMIDE 5 MG/1
5 CAPSULE ORAL DAILY
Qty: 28 CAPSULE | Refills: 3 | Status: SHIPPED | OUTPATIENT
Start: 2022-10-25 | End: 2022-11-29 | Stop reason: SDUPTHER

## 2022-10-25 NOTE — THERAPY DISCHARGE
Lindsay Solano  : 1940  Primary: Medicare Part A And B  Secondary: [address] @ [address] [place] [address]  Phone: [phone]  Fax: [phone] Plan Frequency: 2x per week  Plan of Care/Certification Expiration Date: 22      PT Visit Info: Total # of Visits Approved: 99  Total # of Visits to Date: 6  Progress Note Counter: 6  Progress Note Due Date: 10/20/22  Canceled Appointment: 1      Visit Count:  7    OUTPATIENT PHYSICAL THERAPY:OP NOTE TYPE: Discontinuation Summary 10/25/2022               Episode  Appt Desk         Treatment Diagnosis:  Difficulty in walking, not elsewhere classified (R26.2); Unsteadiness on feet (R26.81); Pain in left ankle and joints of left foot (M25.572); Pain in right ankle and joints of right foot (M25.571); Other instability, left ankle (M25.372); Other instability, right ankle (M25.371); Other specified arthritis, right knee (S46.201); pes cavus right foot, pes cavus left foot; Repeated falls (R29.6); Muscle weakness (generalized) (M62.81)    Medical/Referring Diagnosis:  Other abnormalities of gait and mobility [R26.89]  Referring Physician:  Hilda Buchanan MD MD Orders:  PT Eval and Treat   Return MD Appt:  unknown  Date of Onset:  Onset Date: 22     Allergies:  Penicillin g, Loracarbef, Penicillins, Adhesive tape, Cephalexin, and Cephalosporins  Restrictions/Precautions:    Restrictions/Precautions: Fall Risk      Medications Last Reviewed:  10/25/2022     SUBJECTIVE   History of Injury/Illness (Reason for Referral):  78 year right handed white female lives alone brought here by her neighbor Rojelio Balderas. Dtr in law helps her every Tuesday. Lots of falls. Just finished HHPT. Most recent fall last Saturday:  \"I don't know what I did. I was going to put plate on table and looked out the window and I went down. The right knee buckled.  Like in the morning and then the fall in the pm.\"  No injury except may have twistid right nakle. Burising and some discomfort in her right toes. .    Using cane 6 months. Another fall x4 weeks ago:  Down the valera I decided to go in the bathroom may have misjudged the door. Don't know how did it happed. Valve replacement x 6 years; 16 years from breast CA radiation and chemo. 8- 10 years TIA; L TKA x 2. I can only walk a few steps at home alone. Use the walker and cane at home. Shashi Dengwood mother's chair here today. Using an old rollator - can get to the door of the bathroom with the rollator but can't get it in. I ususally sit the cane at the doorway. Does not really see how deficit she is. Have the old walker that fits in the bathroom and could put that in there. Smart watch. Ankle problems for years. Swollen, deformed, high arch. 15 years Dr. Celestina Pitt kept suggesting ankle replacelment. Never did. [employer] shoes for comfort. Watchman put in heart to come off blood thinners. Takes voltaren. Ankles feel sore. Walking hurts. Twisted ankle. 7 - 8/10 sore: 4. Knee valgus. Patient Stated Goal(s):  \"I want to be fixed up enough that I can get out and not be a burden. \"  Initial:      /10 Post Session:      /10  Past Medical History/Comorbidities:   Ms. Teresa Darnell  has a past medical history of Anxiety, Aortic aneurysm ([address] Aortic stenosis, Arthritis, Atrial fibrillation and flutter ([address] Atrial flutter ([address] Basal cell carcinoma, Breast cancer ([address] Carotid stenosis, symptomatic, with infarction ([address] Chronic pain, CKD (chronic kidney disease) stage 3, GFR 30-59 ml/min ([address] History of breast cancer, HTN (hypertension), Hyperlipidemia, Hypertension, Hypocalcemia, Nonrheumatic aortic valve stenosis, Osteoarthritis, Osteopenia, S/P aortic valve replacement, Shortness of breath, Squamous cell carcinoma, Stroke ([address] Transient ischemic attack, and Ventricular hypertrophy.   Ms. Teresa Darnell  has a past surgical history that includes Appendectomy; skin biopsy (N/A, 2017); Aortic valve replacement (11/10/15); Knee arthroscopy; Hysterectomy; Total knee arthroplasty (Left); Mastectomy; Ovary removal; and Cataract removal (Bilateral). Social History/Living Environment: grab bar in shower    Lives With: Alone  Type of Home: House  Home Layout: Two level; Able to Live on Main level with bedroom/bathroom (goes downstairs 1x/week for laundry)  Entrance Stairs - Number of Steps: 2  Bathroom Shower/Tub: Walk-in shower  Bathroom Toilet: Handicap height  Bathroom Equipment: Shower chair  Home Equipment: James Felix; Walker, rolling     Prior Level of Function/Work/Activity:   Occupation: Retired  Receives Help From: Family  ADL Assistance: Independent  Homemaking Assistance: Independent  Ambulation Assistance: Independent  Transfer Assistance: Independent  Active : Yes  Mode of Transportation: Car      Learning:   Does the patient/guardian have any barriers to learning?: No barriers  Will there be a co-learner?: No  What is the preferred language of the patient/guardian?: English  Is an  required?: No  How does the patient/guardian prefer to learn new concepts?: Listening; Reading; Demonstration     Fall Risk Scale: Donnelly Total Score: 65  Donnelly Fall Risk: High (45 and higher)           OBJECTIVE   Pt is very unsteady with her gait. Uses cane with steppage on the left and decent heel strike on the right. Her right knee has creaking vs crackling severe creptitus to the point the the joint sometimes gets locked up. Left ankle through range is 3 into relative inv. High arch. Bulging lateral malleolus. Relative inversion. Inversion after placement 3+ with multiple cracks and full snaps. Very little eversion 3- with crack. Left worse than right. Right: Almost no eversion barely able to hold. DF about 3. Inv 3. Creaking right knee.     Other leg musculature about 4/5  ASSESSMENT   Jae Barger has been seen in physical therapy from 9/21/2022 to 10/21/2022 for   attended visits (  no shows, Canceled Appointment: 1 same-day cancellations). Pt's daughter called to cancel all remaining appts without a reason given. Pt has had some bone marrow testing and I see in her chart that she has been diagnosed with RARS-T. This may be why they have cancelled. Treatment has been discontinued at this time due to  patients daughter's request .    Thank you for this referral.    Initial Assessment:  78year old right female with rapid decline per her report and multiple falls. Pt has severe bone on bone arthritis with severe creaking vs cracling crepitus in the right knee to the point of locking up which is likely causing her falls. In addition her ankle appear to be severely arthritic as well with the same kind of crepitus and popping with weakness, swelling, high arch and decreased range of motion. The left foot basically is a foot drop with deformity. Pt will benefit from physical therapy for strengthening to tolerance, gait and balance refinement but will need orthotic assessment as soon as possible for bracing and support of her ankles. Pt needs to use a walker at all times at this time. Pt may also benefit from hyaluronic injection to the right knee to hopefully decrease her joint from locking up. Problem List: (Impacting functional limitations): Body Structures, Functions, Activity Limitations Requiring Skilled Therapeutic Intervention: Decreased functional mobility ; Decreased ADL status; Decreased ROM; Decreased body mechanics; Decreased strength; Decreased sensation; Decreased balance; Decreased coordination;  Increased pain     Theragpy Prognosis:   Therapy Prognosis: Good     Assessment Complexity:      PLAN   Effective Dates: 9/21/2022 TO Plan of Care/Certification Expiration Date: 12/20/22     Frequency/Duration: Plan Frequency: 2x per week     Interventions Planned (Treatment may consist of any combination of the following):    Current Treatment Recommendations: ROM; Strengthening; Balance training; Functional mobility training; Gait training; Neuromuscular re-education; Manual Therapy - Soft Tissue Mobilization; Pain management; Home exercise program; Safety education & training; Equipment evaluation, education, & procurement; Therapeutic activities (orthotic consult)     GOALS: (Goals have been discussed and agreed upon with patient.)  Long Term Functional Goals: Time Frame: 16 visits  Pt will be independent with range of motion, strength and balance home exercise program.  Pt will decrease TUG score by 10 seconds indicating more normalized gait pattern and decrease risk for falls. Pt will increase Cantu Balance Scale to 34/56 indicating increased balance and decreased risk for falls. Pt will improve the FAAM by 10 points indicating improved ankle pain and strength to increase gait balance and decreased risk for falls. Outcome Measure: Tool Used: Cantu Balance Scale  Score:  Initial: 24/56 Most Recent: X/56 (Date: -- )   Interpretation of Score: Each section is scored on a 0-4 scale, 0 representing the patients inability to perform the task and 4 representing independence. The scores of each section are added together for a total score of 56. The higher the patients score, the more independent the patient is. Any score below 45 indicates increased risk for falls. Tool Used: FOOT AND ANKLE ABILITY MEASURE  Score:  Initial: 22 Most Recent: X (Date: -- )   Interpretation of Score: For the \"Activities of Daily Living\", there are 21 questions each scored on a 5 point scale with 0 representing \"Unable to do\" and 4 representing \"No difficulty\". The lower the score, the greater the functional disability. 84/84 represents no disability. Minimal detectable change is 5.7 points.   With the addition of the 8 questions in the \"Sports Subscale,\" there are 29 questions, each scored on a 5 point scale with 0 representing \"Unable to do\" and 4 representing \"No difficulty\". The lower the score, the greater the functional disability. 116/116 represents no disability. Minimal detectable change is 12.3 points. Tool Used: Timed Up and Go (TUG)  Score:  Initial: 41.61 seconds Most Recent: X seconds (Date: -- )   Interpretation of Score: The test measures, in seconds, the time taken by an individual to stand up from a standard arm chair (seat height 46 cm [18 in], arm height 65 cm [25.6 in]), walk a distance of 3 meters (118 in, approx 10 ft), turn, walk back to the chair and sit down. If the individual takes longer than 14 seconds to complete TUG, this indicates risk for falls. MEDICAL NECESSITY:   Patient is expected to demonstrate progress in strength, range of motion, balance, coordination, and functional technique to improve safety during all activities. REASON FOR SERVICES/OTHER COMMENTS:  Patient continues to require skilled intervention due to severe arthritis, ankle pain, weakness and balance deficit. Total Duration:  NO PT TODAY. D/C PAPERWORK ONLY       Regarding Ilir Flannery's therapy, I certify that the treatment plan above will be carried out by a therapist or under their direction.   Thank you for this referral,  Penny Ramirez, PT     Referring Physician Signature: Jean-Paul Mcqueen MD                    Post Session Pain  Charge Capture  PT Visit Info MD Adam Zhu

## 2022-10-25 NOTE — PROGRESS NOTES
Sharmila Samaniego  : 1940  Primary: Medicare Part A And B  Secondary: [address] @ [address] [place] [address]  Phone: [phone]  Fax: [phone] Plan Frequency: 2x per week    Plan of Care/Certification Expiration Date: 22      PT Visit Info: Total # of Visits Approved: 99  Total # of Visits to Date: 6  Progress Note Counter: 6  Progress Note Due Date: 10/20/22  Canceled Appointment: 1         OUTPATIENT PHYSICAL THERAPY 10/25/2022     Appt Desk   Episode   MyChart      Ms. Shelton Setting was a same-day cancellation for today's appointment. See discharge summary.     Cancellation Number: 1     Valdemar Levine, PT    Future Appointments   Date Time Provider Salty Herzog   10/31/2022  8:00 AM CAMILLE Lan [facility]   10/31/2022  9:00 AM MONIKA PEÑA FINANCIAL COUNSELOR [facility]   2022 12:00 PM [address] [place] [address]   2022  8:50 AM [facility] [employer] [facility] [address] [place] [address]   2022  9:15 AM Kirk Garcia MD [facility]   2022 11:45 AM Timmy Mireles DO [facility]

## 2022-10-25 NOTE — TELEPHONE ENCOUNTER
Attempted to contact at both numbers listed in the chart. No answer, LVM. Per msg from Dr. Cat Calle, pt to NOT start Hydrea at this time d/t concern for worsening anemia/fatigue with medication. Dr. Cat Calle would like to switch to Lenalidomide (Revlimid) - this will require chemo education, financial counseling, and rx will need to be sent to specialty pharmacy.

## 2022-10-25 NOTE — TELEPHONE ENCOUNTER
Call back received from [address] [place] [address] [place] to clarify message from [employer]. Patient is not to start the Hydrea, but she will be placed on revlimid instead. Notified that she will need chemotherapy teaching, financial counseling. Notified Reza Morales that son had called back and   We reviewed the message.  They can go ahead and order the revlimid and get patient set up for appointments

## 2022-10-26 ENCOUNTER — TELEPHONE (OUTPATIENT)
Dept: ONCOLOGY | Age: 82
End: 2022-10-26

## 2022-10-26 LAB — EPO SERPL-ACNC: 16.8 MIU/ML (ref 2.6–18.5)

## 2022-10-26 NOTE — PROGRESS NOTES
Received notification from [facility]  that Revlimid auth denied , sent info to nurse and awaiting response to appeal.

## 2022-10-26 NOTE — PROGRESS NOTES
Faxed appeal to [address] [place] [address] at  [phone]  For Revlimid ,awaiting determination.

## 2022-10-26 NOTE — TELEPHONE ENCOUNTER
Hoping pt can be admitted to [facility] on [address]. However, she has chemo Ed on Monday for Revlimid. Can she (dtr in law ) sign consents for pts treatment? Or does it need to be postponed? Dr Niko Mercado nurse Karis Cohen) called them to change the treatment plan yesterday.

## 2022-10-26 NOTE — TELEPHONE ENCOUNTER
Message sent to Dr. Jaziel Clemente team   Call back to [facility] to let her know that patient will need to be present to sign the chemo consent. [facility] states that they need to get Michaela Zacarias into this rehab facility since they have a bed available, but once she is there they will not let family pick them up for a doctor appointment. She is wondering if the education can be done virtually on Monday, otherwise her treatment may be delayed until she gets out. Call back to [facility]. She had just gotten to the rehab facility and was getting patient checked in. I asked if she could speak with someone there in regards to whether they could set up transport for the patient. She asked and was told that yes patients can go to their doctor appointments.  [facility] states that in that case they will be here on Monday morning, unless someone else tells her differently

## 2022-10-27 ENCOUNTER — TELEPHONE (OUTPATIENT)
Dept: ONCOLOGY | Age: 82
End: 2022-10-27

## 2022-10-27 NOTE — TELEPHONE ENCOUNTER
Received phone call from insurance stating Revlimid has been approved from 10.26.22 - 12.31.23 - auth#[id number]. CODY Cisneros) and pharmacy notified.

## 2022-10-28 ENCOUNTER — APPOINTMENT (OUTPATIENT)
Dept: PHYSICAL THERAPY | Age: 82
End: 2022-10-28
Payer: MEDICARE

## 2022-10-28 ENCOUNTER — TELEPHONE (OUTPATIENT)
Dept: CARDIOLOGY CLINIC | Age: 82
End: 2022-10-28

## 2022-10-28 NOTE — TELEPHONE ENCOUNTER
Daughter-n-law Mariano Rodriguez has called and needs a current list of prescribed medications sent to [facility]. Their fax is [phone]. The phone number to [facility] is [phone]. Mariano Rodriguez said to include patients room #126. Please call Mariano Rodriguez for with questions [phone].

## 2022-10-31 ENCOUNTER — OFFICE VISIT (OUTPATIENT)
Dept: ONCOLOGY | Age: 82
End: 2022-10-31
Payer: MEDICARE

## 2022-10-31 ENCOUNTER — CLINICAL DOCUMENTATION (OUTPATIENT)
Dept: ONCOLOGY | Age: 82
End: 2022-10-31

## 2022-10-31 VITALS
SYSTOLIC BLOOD PRESSURE: 142 MMHG | HEIGHT: 65 IN | BODY MASS INDEX: 26.82 KG/M2 | TEMPERATURE: 98 F | OXYGEN SATURATION: 98 % | DIASTOLIC BLOOD PRESSURE: 71 MMHG | RESPIRATION RATE: 17 BRPM | WEIGHT: 161 LBS | HEART RATE: 65 BPM

## 2022-10-31 DIAGNOSIS — D46.9 MDS (MYELODYSPLASTIC SYNDROME) (HCC): Primary | ICD-10-CM

## 2022-10-31 DIAGNOSIS — T45.1X5A CHEMOTHERAPY-INDUCED NAUSEA: ICD-10-CM

## 2022-10-31 DIAGNOSIS — R11.0 CHEMOTHERAPY-INDUCED NAUSEA: ICD-10-CM

## 2022-10-31 DIAGNOSIS — Z71.9 ENCOUNTER FOR EDUCATION: ICD-10-CM

## 2022-10-31 PROCEDURE — G8400 PT W/DXA NO RESULTS DOC: HCPCS | Performed by: NURSE PRACTITIONER

## 2022-10-31 PROCEDURE — 3078F DIAST BP <80 MM HG: CPT | Performed by: NURSE PRACTITIONER

## 2022-10-31 PROCEDURE — 3074F SYST BP LT 130 MM HG: CPT | Performed by: NURSE PRACTITIONER

## 2022-10-31 PROCEDURE — G8428 CUR MEDS NOT DOCUMENT: HCPCS | Performed by: NURSE PRACTITIONER

## 2022-10-31 PROCEDURE — 1036F TOBACCO NON-USER: CPT | Performed by: NURSE PRACTITIONER

## 2022-10-31 PROCEDURE — G8417 CALC BMI ABV UP PARAM F/U: HCPCS | Performed by: NURSE PRACTITIONER

## 2022-10-31 PROCEDURE — 99214 OFFICE O/P EST MOD 30 MIN: CPT | Performed by: NURSE PRACTITIONER

## 2022-10-31 PROCEDURE — G8484 FLU IMMUNIZE NO ADMIN: HCPCS | Performed by: NURSE PRACTITIONER

## 2022-10-31 PROCEDURE — 1090F PRES/ABSN URINE INCON ASSESS: CPT | Performed by: NURSE PRACTITIONER

## 2022-10-31 PROCEDURE — 1123F ACP DISCUSS/DSCN MKR DOCD: CPT | Performed by: NURSE PRACTITIONER

## 2022-10-31 RX ORDER — ONDANSETRON HYDROCHLORIDE 8 MG/1
8 TABLET, FILM COATED ORAL EVERY 8 HOURS PRN
Qty: 60 TABLET | Refills: 1 | Status: SHIPPED | OUTPATIENT
Start: 2022-10-31

## 2022-10-31 ASSESSMENT — PATIENT HEALTH QUESTIONNAIRE - PHQ9
SUM OF ALL RESPONSES TO PHQ QUESTIONS 1-9: 2
2. FEELING DOWN, DEPRESSED OR HOPELESS: 1
SUM OF ALL RESPONSES TO PHQ9 QUESTIONS 1 & 2: 2
SUM OF ALL RESPONSES TO PHQ QUESTIONS 1-9: 2
1. LITTLE INTEREST OR PLEASURE IN DOING THINGS: 1

## 2022-10-31 NOTE — PROGRESS NOTES
IV Chemotherapy/Biotherapy Education:  Diaz Dupree  is a 78y.o. year old female with MDS/MPN cancer who presents for chemotherapy education for the following medication(s): Revlimid  Schedule and frequency of chemotherapy were discussed with the patient and her son. The patient was given handouts published by the [facility] titled \"Chemotherapy and You\" and \"Eating Hints Before, During, and After Cancer Treatment\" for reference. Medication specific information was printed from [employer] and distributed to the patient. Self-care guidelines were distributed and discussed with the patient and included the followin) Potential long term and short term side effects of therapy including fertility risks for appropriate patients    2) Symptoms and side effects that require the patient to contact [address] [place] [address] or require immediate attention    3) Symptoms or events that require immediate discontinuation of oral or self-administered treatments    4) Procedures for handling medications at home, including storage, safe handling, and management of unused medications    5) Procedures for handling bodily fluids and waste in the home    6) The [address] [place] [address]'s contact information with availability and instructions on who and when to call    7) The [address] [place] [address] appointment policy and expectations for rescheduling or canceling    Patient denies any needs or referrals at this time. Prescriptions for Zofran has been sent electronically to the local pharmacy. Proper use and frequency of these meds were reviewed with patient and patient verbalized understanding of the treatment recommendations. Patient asked appropriate questions and was very involved and engaged during the educational session. There were no barriers to learning that were observed or demonstrated during the encounter.   Preference in learning style assessed as visual, written and verbal.  Patient acknowledged her readiness to learn by responding appropriately to open ended questions about chemo education, disease process, treatment plan and possible side effects, etc.  Patient offered feedback on learning and the educational encounter. She acknowledges the importance of and agrees to adhering to the treatment plan regimen. Upcoming appointments for her were reviewed with the patient. Time was provided for the patient to ask questions. Jae Barger verbalized understanding of the treatment plan. Vitals:    Vitals:    10/31/22 0802   BP: (!) 142/71   Pulse: 65   Resp: 17   Temp: 98 °F (36.7 °C)   SpO2: 98%       Informed Consent for Administration of Chemotherapy or Biotherapy for Jae Barger was signed and scanned into Epic under the Media tab. Total time spent for chemo education/counselin minutes, 100% in direct counseling.

## 2022-11-01 NOTE — PROGRESS NOTES
I spoke with Bahrati Camara via the telephone regarding her current Medicare Part A&B and [employer]. Insurance Coverage, potential oral medication authorizations, enrollment in the [facility] ([facility]) and the [address] [place] [address] ([facility]), and assistance organization resource sheet. Next, I spoke with Bharati Camara regarding her insurance questions. I answered questions about medical treatments and services. Next, I spoke with Bharati Camara regarding the Oncology Care Model Notification Letter. I answered questions regarding the costs associated with Medicare Benefits. I explained to Bharati Camara the estimated cost of treatment and services for six months under the Medicare billing policies. Bharati Camara was advised to contact Medicare or their healthcare provider for questions or concerns related to service of care. The Oncology Care Model provides different options of contact for Bharati Camara regarding concerns and complaints of treatments and services. The cost of 6 months of medical treatments and services can be estimated to be $8,530.00. Next, I spoke with Bharati Camara regarding her Prescription Drug Benefits. Next, I spoke with Titielisa Camara about billing questions and treatment services. We discussed copayments, deductibles, and out of pocket maximums. We discussed cost associated with Revlimid medication. Next, I spoke with Titielisa Camara regarding the Limited Power of Note document. After reading the form over the telephone to Bharati Medleyvic the patient gave verbal consent to the [address] form. Next, I spoke with Bharati Camara regarding potential oral medication authorizations. I told her that if she ever had any problems getting her oral medications filled, to give the dedicated [facility]  a call. Most of the time, it is simply an authorization that needs to be done with the insurance company.   Lastly, I spoke with Bharati Camara regarding a form with various resource organizations that could assist with specific needs (example:  transportation, lodging, preparing meals, home cleaning)  Douglas Stanford will receive a folder with contact information, phone numbers. cancer programs, LPOA, and cancer brochures to review. Douglas Stanford expressed understanding of the information above and all questions were answered to her satisfaction.

## 2022-11-02 ENCOUNTER — TELEPHONE (OUTPATIENT)
Dept: ONCOLOGY | Age: 82
End: 2022-11-02

## 2022-11-02 NOTE — PROGRESS NOTES
Received faxed notification from [address] [place] [address] that pt has been approved for $10,000.00 for PAF. (pt's copay is $3k)  Called pt's son Germain Angel made him aware of the approval and also sent info to [facility] for dispense.

## 2022-11-02 NOTE — TELEPHONE ENCOUNTER
Called pt's son Angelo Rosales [facility]) to discuss completing application for fund finder , he gave me permission to complete and sign his name. Submitted and awaiting determination . In the meantime I will submit application to [employer]a [employer] for the help as well . Pt's copay is 3k a month for Revlimid .

## 2022-11-07 ENCOUNTER — HOSPITAL ENCOUNTER (OUTPATIENT)
Dept: LAB | Age: 82
Discharge: HOME | End: 2022-11-10
Payer: MEDICARE

## 2022-11-07 DIAGNOSIS — Z15.89 JAK-2 GENE MUTATION: ICD-10-CM

## 2022-11-07 DIAGNOSIS — D46.9 MDS (MYELODYSPLASTIC SYNDROME) (HCC): ICD-10-CM

## 2022-11-07 LAB
ALBUMIN SERPL-MCNC: 3.7 G/DL (ref 3.2–4.6)
ALBUMIN/GLOB SERPL: 1.3 {RATIO} (ref 0.4–1.6)
ALP SERPL-CCNC: 80 U/L (ref 50–136)
ALT SERPL-CCNC: 59 U/L (ref 12–65)
ANION GAP SERPL CALC-SCNC: 7 MMOL/L (ref 2–11)
AST SERPL-CCNC: 37 U/L (ref 15–37)
BASOPHILS # BLD: 0.2 K/UL (ref 0–0.2)
BASOPHILS NFR BLD: 2 % (ref 0–2)
BILIRUB SERPL-MCNC: 0.8 MG/DL (ref 0.2–1.1)
BUN SERPL-MCNC: 30 MG/DL (ref 8–23)
CALCIUM SERPL-MCNC: 8.5 MG/DL (ref 8.3–10.4)
CHLORIDE SERPL-SCNC: 98 MMOL/L (ref 101–110)
CO2 SERPL-SCNC: 26 MMOL/L (ref 21–32)
CREAT SERPL-MCNC: 1.2 MG/DL (ref 0.6–1)
DIFFERENTIAL METHOD BLD: ABNORMAL
EOSINOPHIL # BLD: 0.5 K/UL (ref 0–0.8)
EOSINOPHIL NFR BLD: 3 % (ref 0.5–7.8)
ERYTHROCYTE [DISTWIDTH] IN BLOOD BY AUTOMATED COUNT: 16.8 % (ref 11.9–14.6)
GLOBULIN SER CALC-MCNC: 2.9 G/DL (ref 2.8–4.5)
GLUCOSE SERPL-MCNC: 90 MG/DL (ref 65–100)
HCT VFR BLD AUTO: 34.8 % (ref 35.8–46.3)
HGB BLD-MCNC: 11.7 G/DL (ref 11.7–15.4)
IMM GRANULOCYTES # BLD AUTO: 0.6 K/UL (ref 0–0.5)
IMM GRANULOCYTES NFR BLD AUTO: 4 % (ref 0–5)
LYMPHOCYTES # BLD: 1.2 K/UL (ref 0.5–4.6)
LYMPHOCYTES NFR BLD: 8 % (ref 13–44)
MCH RBC QN AUTO: 34.4 PG (ref 26.1–32.9)
MCHC RBC AUTO-ENTMCNC: 33.6 G/DL (ref 31.4–35)
MCV RBC AUTO: 102.4 FL (ref 82–102)
MONOCYTES # BLD: 1.2 K/UL (ref 0.1–1.3)
MONOCYTES NFR BLD: 8 % (ref 4–12)
NEUTS SEG # BLD: 10.6 K/UL (ref 1.7–8.2)
NEUTS SEG NFR BLD: 75 % (ref 43–78)
NRBC # BLD: 0 K/UL (ref 0–0.2)
PLATELET # BLD AUTO: 716 K/UL (ref 150–450)
PMV BLD AUTO: 10.1 FL (ref 9.4–12.3)
POTASSIUM SERPL-SCNC: 3.8 MMOL/L (ref 3.5–5.1)
PROT SERPL-MCNC: 6.6 G/DL (ref 6.3–8.2)
RBC # BLD AUTO: 3.4 M/UL (ref 4.05–5.2)
SODIUM SERPL-SCNC: 131 MMOL/L (ref 133–143)
WBC # BLD AUTO: 14.3 K/UL (ref 4.3–11.1)

## 2022-11-07 PROCEDURE — 36415 COLL VENOUS BLD VENIPUNCTURE: CPT

## 2022-11-07 PROCEDURE — 80053 COMPREHEN METABOLIC PANEL: CPT

## 2022-11-07 PROCEDURE — 85025 COMPLETE CBC W/AUTO DIFF WBC: CPT

## 2022-11-08 ENCOUNTER — TELEPHONE (OUTPATIENT)
Dept: ONCOLOGY | Age: 82
End: 2022-11-08

## 2022-11-08 NOTE — TELEPHONE ENCOUNTER
Checked in w/ pt - started taking Revlimid on Saturday (11/6) - states that she was able to get a 3 mo supply. Informed her that labs are stable - will FU as scheduled. Notified pt of next apt date/time. Pt verbalized understanding.    ----- Message from Libby Rivas MD sent at 11/7/2022  3:26 PM EST -----  Reviewed lab results. CBC overall stable. We shall follow for response to lenalidomide treatment.

## 2022-11-22 DIAGNOSIS — D46.1 RARS (REFRACTORY ANEMIA WITH RINGED SIDEROBLASTS) (HCC): ICD-10-CM

## 2022-11-22 DIAGNOSIS — D46.9 MDS (MYELODYSPLASTIC SYNDROME) (HCC): Primary | ICD-10-CM

## 2022-11-22 NOTE — PROGRESS NOTES
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 Yolande Kemp is a pleasant 78years old female patient with multiple medical problems including anxiety, atrial fibrillation status post watchman, status post aortic valve replacement, breast cancer (ER/OR positive, HER2/lloyd positive, diagnosed in 2005), cerebrovascular disease, TIA, among others who has been referred for evaluation and management of macrocytic anemia, thrombocytosis and neutrophilic leukocytosis. She presented to ED in August 2022 with complaints of malaise, generalized weakness and progressively worsening fatigue. On 8/15/2022, labs showed WBC count of 15.4, MCV of 104, and platelet count of 482. Hemoglobin 12.9. She was admitted with a diagnosis of urinary tract infection. Based on her labs, Synthroid dose was increased. Lab work at discharge has been summarized below. Oncology/hematology overview:    Diagnosis: RARS-T  Date of diagnosis: 10/04/2022  Molecular studies:  JAK2 V617F; SF3B1 G993O; SH2B3 [id number]*95 DETECTED  Treatment intent: palliative  Disease status:measurable disease  Work up/treatment summary:  10/4/2022: Bone marrow biopsy showed marrow cellularity of 70 to 80% with trilineage hematopoietic maturation, atypical melanocytic hyperplasia and ring sideroblasts. Bone marrow is hypercellular for age with changes consistent with MDS/MPN. Flow cytometry showed no definitive diagnostic immunophenotypic abnormalities. MDS FISH panel negative, karyotype 55 XX (20)  NO alterations detected in the following genes:   FLT3, IDH1, IDH2, NPM1   SNV/INDEL: JAK2 V617F; SF3B1 R367R; SH2B3 [id number]*06 DETECTED    Started lenalidomide 5 mg PO daily on 11/3/2022      Interval history:  Fatigue has remained largely unchanged. She has been tolerating lenalidomide without any major problems.   He has been working with physical therapy. Denies fever, mouth sores, nausea, vomiting, abdominal pain. No weight loss. Easy bruisability and bilateral lower extremity edema has remained unchanged. She is accompanied by her son during this visit. Review of Systems:  14 point ROS was negative except as per HPI      ECOG PERFORMANCE STATUS - 1- Restricted in physically strenuous activity but ambulatory and able to carry out work of a light or sedentary nature such as light house work, office work. Pain - /10. Mild to moderate pain, requiring medication - see MAR     Fatigue - No flowsheet data found. Distress - No flowsheet data found. Reviewed and updated this visit by provider:  Tobacco  Allergies  Meds  Problems  Med Hx  Surg Hx  Fam Hx          Current Outpatient Medications   Medication Sig Dispense Refill    ondansetron (ZOFRAN) 8 MG tablet Take 1 tablet by mouth every 8 hours as needed for Nausea or Vomiting 60 tablet 1    lenalidomide (REVLIMID) 5 MG chemo capsule Take 1 capsule by mouth daily 28 capsule 3    levothyroxine (SYNTHROID) 100 MCG tablet TAKE 1 TABLET (100 MCG) BY MOUTH DAILY THIS REPLACES 88MCG DOSE.      diclofenac (CATAFLAM) 50 MG tablet TAKE 1 TABLET (50 MG) BY MOUTH 2 (TWO) TIMES A DAY AS NEEDED (ARTHRITIS PAIN)      sertraline (ZOLOFT) 25 MG tablet TAKE 1 TABLET (25 MG) BY MOUTH DAILY      famotidine (PEPCID) 20 MG tablet Take 20 mg by mouth as needed (acid reflux) once daily as needed      loratadine (CLARITIN) 10 MG tablet Take 10 mg by mouth daily.       docusate sodium (COLACE, DULCOLAX) 100 MG CAPS Take 100 mg by mouth 2 times daily 100 capsule 0    melatonin 3 MG TABS tablet Take 1 tablet by mouth nightly as needed (insomnia) 30 tablet 0    levothyroxine (SYNTHROID) 88 MCG tablet Take 1 tablet by mouth Daily 30 tablet 0    traMADol (ULTRAM) 50 MG tablet Take 50 mg by mouth every 6 hours as needed for Pain.      magnesium oxide (MAG-OX) 400 MG tablet Take 400 mg by mouth daily      amiodarone (CORDARONE) 200 MG tablet Take 200 mg by mouth daily      LORazepam (ATIVAN) 1 MG tablet Take 1 mg by mouth every 4 hours as needed for Anxiety. simvastatin (ZOCOR) 20 MG tablet Take 20 mg by mouth nightly      metoprolol succinate (TOPROL XL) 25 MG extended release tablet Take 25 mg by mouth daily      hydroCHLOROthiazide (HYDRODIURIL) 12.5 MG tablet Take 12.5 mg by mouth daily      Multiple Vitamins-Minerals (THERAPEUTIC MULTIVITAMIN-MINERALS) tablet Take 1 tablet by mouth daily      polyethylene glycol (GLYCOLAX) 17 g packet Take 17 g by mouth daily as needed for Constipation      acetaminophen (TYLENOL) 325 mg tablet Take 325 mg by mouth every 4 hours as needed for Pain (breakthrough pain)      aspirin 81 MG EC tablet Take 81 mg by mouth daily      calcium-vitamin D (OSCAL) 250-125 MG-UNIT per tablet Take 1 tablet by mouth daily       No current facility-administered medications for this visit. OBJECTIVE:  /61 (Site: Right Upper Arm, Position: Sitting, Cuff Size: Large Adult)   Pulse 63   Temp 97 °F (36.1 °C)   Resp 20   Ht 5' 5\" (1.651 m)   Wt 165 lb 4.8 oz (75 kg)   SpO2 98%   BMI 27.51 kg/m²   Wt Readings from Last 3 Encounters:   11/23/22 165 lb 4.8 oz (75 kg)   10/31/22 161 lb (73 kg)   10/24/22 162 lb 11.2 oz (73.8 kg)       Physical Exam:  Patient alert and oriented x 3, in no acute distress  Integumentary: No Pallor, no icterus, scattered bruising is noted on bilateral upper extremities. HEENT: Moist mucous membranes, normal oropharynx  Lymph nodes: No cervical  lymphadenopathy is palpable. Cardiovascular:RRR, S1, S2 present  Lungs: Clear to auscultation, no rales or wheezing, no accessory muscle use  Abdomen: Soft, and non-tender on palpation, no organomegaly, bowel sounds audible  Extremities: Pitting bilateral lower extremity edema is unchanged. No calf tenderness.   Neurological: patient can follow commands and move all extremities    Labs:  Lab Results   Component Value Date    WBC 14.4 (H) 11/23/2022    HGB 10.2 (L) 11/23/2022    HCT 30.0 (L) 11/23/2022    .4 (H) 11/23/2022     11/23/2022     Lab Results   Component Value Date    NEUTROABS 12.4 (H) 04/18/2022    LYMPHOPCT 7 (L) 11/23/2022    LYMPHSABS 1.0 11/23/2022    MONOPCT 12 11/23/2022    MONOSABS 1.8 (H) 11/23/2022    EOSABS 0.7 11/23/2022    BASOPCT 1 11/23/2022     Lab Results   Component Value Date     11/23/2022    K 3.6 11/23/2022     11/23/2022    CO2 29 11/23/2022    BUN 22 11/23/2022    CREATININE 1.10 (H) 11/23/2022    GLUCOSE 99 11/23/2022    CALCIUM 8.5 11/23/2022    PROT 6.2 (L) 11/23/2022    LABALBU 3.2 11/23/2022    BILITOT 1.1 11/23/2022    ALKPHOS 102 11/23/2022    AST 29 11/23/2022    ALT 64 11/23/2022    LABGLOM 50 (L) 11/23/2022    GFRAA 43 (L) 09/30/2022    AGRATIO 1.2 11/30/2021    GLOB 3.0 11/23/2022               Imaging:  CT BONE MARROW BIOPSY AND ASPIRATION    Result Date: 58/4/1740  Uncomplicated CT guided bone marrow biopsy. Plan:  Bedrest observation for a minimum of 30 minutes. Problems:  1. 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  5/10 nodes positive, status post left mastectomy; received 4 cycles of AC followed by 4 cycles of Taxol, radiation, 1 year of Herceptin and 7 years of Femara.  -Hypothyroidism  -Atrial fibrillation status post watchman, Plavix as well as Eliquis discontinued due to extensive hematoma of left hip x2, oral bleeding, nosebleeding  -Hypertension  -Probably calcified 2.7 cm meningioma near left posterior fossa incidentally seen on CT scan done in March and May 2022.   No mass-effect.  -Depression/anxiety  -Stage III CKD           PLAN:  I have previously discussed with him that RARS-T is a relatively new entity per [facility] classification and that in general, the data guiding optimal management of this condition is lacking. Some of the agents that have been used based on case series include hydroxyurea for cytoreduction (poses a risk for anemia and thus worsening fatigue), lenalidomide, low-dose hypomethylating agent and intervention. After a thorough discussion of risk versus benefit, patient was started on lenalidomide 5 mg daily. Reviewed labs. Platelet count has improved. We shall continue to monitor CBCs every 2 weeks. Continue with aspirin 81 mg. She will continue taking levothyroxine and receiving medications. All questions were answered to the best of my abilities. [email or web address]        Courtney Lucia MD  [facility] Hematology and Oncology  [address] [place] [address]  Office : [phone]  Fax : [phone]    Elements of this note have been dictated using speech recognition software. As a result, errors of speech recognition may have occurred.

## 2022-11-23 ENCOUNTER — HOSPITAL ENCOUNTER (OUTPATIENT)
Dept: LAB | Age: 82
Discharge: HOME | End: 2022-11-26
Payer: MEDICARE

## 2022-11-23 ENCOUNTER — OFFICE VISIT (OUTPATIENT)
Dept: ONCOLOGY | Age: 82
End: 2022-11-23
Payer: MEDICARE

## 2022-11-23 VITALS
BODY MASS INDEX: 27.54 KG/M2 | TEMPERATURE: 97 F | WEIGHT: 165.3 LBS | DIASTOLIC BLOOD PRESSURE: 61 MMHG | SYSTOLIC BLOOD PRESSURE: 138 MMHG | OXYGEN SATURATION: 98 % | HEART RATE: 63 BPM | RESPIRATION RATE: 20 BRPM | HEIGHT: 65 IN

## 2022-11-23 DIAGNOSIS — D46.1 RARS (REFRACTORY ANEMIA WITH RINGED SIDEROBLASTS) (HCC): Primary | ICD-10-CM

## 2022-11-23 DIAGNOSIS — D46.9 MDS (MYELODYSPLASTIC SYNDROME) (HCC): ICD-10-CM

## 2022-11-23 DIAGNOSIS — D46.1 RARS (REFRACTORY ANEMIA WITH RINGED SIDEROBLASTS) (HCC): ICD-10-CM

## 2022-11-23 LAB
ALBUMIN SERPL-MCNC: 3.2 G/DL (ref 3.2–4.6)
ALBUMIN/GLOB SERPL: 1.1 {RATIO} (ref 0.4–1.6)
ALP SERPL-CCNC: 102 U/L (ref 50–136)
ALT SERPL-CCNC: 64 U/L (ref 12–65)
ANION GAP SERPL CALC-SCNC: 5 MMOL/L (ref 2–11)
AST SERPL-CCNC: 29 U/L (ref 15–37)
BASOPHILS # BLD: 0.1 K/UL (ref 0–0.2)
BASOPHILS NFR BLD: 1 % (ref 0–2)
BILIRUB SERPL-MCNC: 1.1 MG/DL (ref 0.2–1.1)
BUN SERPL-MCNC: 22 MG/DL (ref 8–23)
CALCIUM SERPL-MCNC: 8.5 MG/DL (ref 8.3–10.4)
CHLORIDE SERPL-SCNC: 101 MMOL/L (ref 101–110)
CO2 SERPL-SCNC: 29 MMOL/L (ref 21–32)
CREAT SERPL-MCNC: 1.1 MG/DL (ref 0.6–1)
DIFFERENTIAL METHOD BLD: ABNORMAL
EOSINOPHIL # BLD: 0.7 K/UL (ref 0–0.8)
EOSINOPHIL NFR BLD: 5 % (ref 0.5–7.8)
ERYTHROCYTE [DISTWIDTH] IN BLOOD BY AUTOMATED COUNT: 17.2 % (ref 11.9–14.6)
GLOBULIN SER CALC-MCNC: 3 G/DL (ref 2.8–4.5)
GLUCOSE SERPL-MCNC: 99 MG/DL (ref 65–100)
HCT VFR BLD AUTO: 30 % (ref 35.8–46.3)
HGB BLD-MCNC: 10.2 G/DL (ref 11.7–15.4)
IMM GRANULOCYTES # BLD AUTO: 0.6 K/UL (ref 0–0.5)
IMM GRANULOCYTES NFR BLD AUTO: 4 % (ref 0–5)
LYMPHOCYTES # BLD: 1 K/UL (ref 0.5–4.6)
LYMPHOCYTES NFR BLD: 7 % (ref 13–44)
MCH RBC QN AUTO: 35.2 PG (ref 26.1–32.9)
MCHC RBC AUTO-ENTMCNC: 34 G/DL (ref 31.4–35)
MCV RBC AUTO: 103.4 FL (ref 82–102)
MONOCYTES # BLD: 1.8 K/UL (ref 0.1–1.3)
MONOCYTES NFR BLD: 12 % (ref 4–12)
NEUTS SEG # BLD: 10.2 K/UL (ref 1.7–8.2)
NEUTS SEG NFR BLD: 71 % (ref 43–78)
NRBC # BLD: 0 K/UL (ref 0–0.2)
PLATELET # BLD AUTO: 388 K/UL (ref 150–450)
PMV BLD AUTO: 9.8 FL (ref 9.4–12.3)
POTASSIUM SERPL-SCNC: 3.6 MMOL/L (ref 3.5–5.1)
PROT SERPL-MCNC: 6.2 G/DL (ref 6.3–8.2)
RBC # BLD AUTO: 2.9 M/UL (ref 4.05–5.2)
SODIUM SERPL-SCNC: 135 MMOL/L (ref 133–143)
WBC # BLD AUTO: 14.4 K/UL (ref 4.3–11.1)

## 2022-11-23 PROCEDURE — 1123F ACP DISCUSS/DSCN MKR DOCD: CPT | Performed by: INTERNAL MEDICINE

## 2022-11-23 PROCEDURE — 36415 COLL VENOUS BLD VENIPUNCTURE: CPT

## 2022-11-23 PROCEDURE — 80053 COMPREHEN METABOLIC PANEL: CPT

## 2022-11-23 PROCEDURE — 3074F SYST BP LT 130 MM HG: CPT | Performed by: INTERNAL MEDICINE

## 2022-11-23 PROCEDURE — 3078F DIAST BP <80 MM HG: CPT | Performed by: INTERNAL MEDICINE

## 2022-11-23 PROCEDURE — 99214 OFFICE O/P EST MOD 30 MIN: CPT | Performed by: INTERNAL MEDICINE

## 2022-11-23 PROCEDURE — 85025 COMPLETE CBC W/AUTO DIFF WBC: CPT

## 2022-11-23 PROCEDURE — G8400 PT W/DXA NO RESULTS DOC: HCPCS | Performed by: INTERNAL MEDICINE

## 2022-11-23 PROCEDURE — G8484 FLU IMMUNIZE NO ADMIN: HCPCS | Performed by: INTERNAL MEDICINE

## 2022-11-23 PROCEDURE — G8427 DOCREV CUR MEDS BY ELIG CLIN: HCPCS | Performed by: INTERNAL MEDICINE

## 2022-11-23 PROCEDURE — G8417 CALC BMI ABV UP PARAM F/U: HCPCS | Performed by: INTERNAL MEDICINE

## 2022-11-23 PROCEDURE — 1036F TOBACCO NON-USER: CPT | Performed by: INTERNAL MEDICINE

## 2022-11-23 PROCEDURE — 1090F PRES/ABSN URINE INCON ASSESS: CPT | Performed by: INTERNAL MEDICINE

## 2022-11-23 ASSESSMENT — PATIENT HEALTH QUESTIONNAIRE - PHQ9
SUM OF ALL RESPONSES TO PHQ QUESTIONS 1-9: 2
SUM OF ALL RESPONSES TO PHQ QUESTIONS 1-9: 2
SUM OF ALL RESPONSES TO PHQ9 QUESTIONS 1 & 2: 2
2. FEELING DOWN, DEPRESSED OR HOPELESS: 1
1. LITTLE INTEREST OR PLEASURE IN DOING THINGS: 1
SUM OF ALL RESPONSES TO PHQ QUESTIONS 1-9: 2
SUM OF ALL RESPONSES TO PHQ QUESTIONS 1-9: 2

## 2022-11-23 NOTE — PATIENT INSTRUCTIONS
Patient Instructions from Today's Visit    Reason for Visit:  Follow up - RARS-T/MDS    Diagnosis Information:  https://www.[employer]/. net/about-us/asco-answers-patient-education-materials/piqv-tsrptjn-sfkd-sheets    Plan:  Continue Revlimid. Labs improving. We will continue checking labs every 2 weeks. Please call us if you have any questions or concerns before your next visit. Follow Up: Follow up in 1 month with Dr. Edy Alexander, with labs prior.      Recent Lab Results:  Hospital Outpatient Visit on 11/23/2022   Component Date Value Ref Range Status    WBC 11/23/2022 14.4 (A)  4.3 - 11.1 K/uL Final    RBC 11/23/2022 2.90 (A)  4.05 - 5.2 M/uL Final    Hemoglobin 11/23/2022 10.2 (A)  11.7 - 15.4 g/dL Final    Hematocrit 11/23/2022 30.0 (A)  35.8 - 46.3 % Final    MCV 11/23/2022 103.4 (A)  82.0 - 102.0 FL Final    MCH 11/23/2022 35.2 (A)  26.1 - 32.9 PG Final    MCHC 11/23/2022 34.0  31.4 - 35.0 g/dL Final    RDW 11/23/2022 17.2 (A)  11.9 - 14.6 % Final    Platelets 62/24/4756 388  150 - 450 K/uL Final    MPV 11/23/2022 9.8  9.4 - 12.3 FL Final    nRBC 11/23/2022 0.00  0.0 - 0.2 K/uL Final    **Note: Absolute NRBC parameter is now reported with Hemogram**    Seg Neutrophils 11/23/2022 71  43 - 78 % Final    Lymphocytes 11/23/2022 7 (A)  13 - 44 % Final    Monocytes 11/23/2022 12  4.0 - 12.0 % Final    Eosinophils % 11/23/2022 5  0.5 - 7.8 % Final    Basophils 11/23/2022 1  0.0 - 2.0 % Final    Immature Granulocytes 11/23/2022 4  0.0 - 5.0 % Final    Segs Absolute 11/23/2022 10.2 (A)  1.7 - 8.2 K/UL Final    Absolute Lymph # 11/23/2022 1.0  0.5 - 4.6 K/UL Final    Absolute Mono # 11/23/2022 1.8 (A)  0.1 - 1.3 K/UL Final    Absolute Eos # 11/23/2022 0.7  0.0 - 0.8 K/UL Final    Basophils Absolute 11/23/2022 0.1  0.0 - 0.2 K/UL Final    Absolute Immature Granulocyte 11/23/2022 0.6 (A)  0.0 - 0.5 K/UL Final    Differential Type 11/23/2022 AUTOMATED    Final    Sodium 11/23/2022 135  133 - 143 mmol/L Final    Potassium 11/23/2022 3.6  3.5 - 5.1 mmol/L Final    Chloride 11/23/2022 101  101 - 110 mmol/L Final    CO2 11/23/2022 29  21 - 32 mmol/L Final    Anion Gap 11/23/2022 5  2 - 11 mmol/L Final    Glucose 11/23/2022 99  65 - 100 mg/dL Final    BUN 11/23/2022 22  8 - 23 MG/DL Final    Creatinine 11/23/2022 1.10 (A)  0.6 - 1.0 MG/DL Final    Est, Glom Filt Rate 11/23/2022 50 (A)  >60 ml/min/1.73m2 Final    Comment:      Pediatric calculator link: [employer]. org/professionals/kdoqi/gfr_calculatorped       Effective Oct 3, 2022       These results are not intended for use in patients <25years of age. eGFR results are calculated without a race factor using  the 2021 CKD-EPI equation. Careful clinical correlation is recommended, particularly when comparing to results calculated using previous equations. The CKD-EPI equation is less accurate in patients with extremes of muscle mass, extra-renal metabolism of creatinine, excessive creatine ingestion, or following therapy that affects renal tubular secretion.       Calcium 11/23/2022 8.5  8.3 - 10.4 MG/DL Final    Total Bilirubin 11/23/2022 1.1  0.2 - 1.1 MG/DL Final    ALT 11/23/2022 64  12 - 65 U/L Final    AST 11/23/2022 29  15 - 37 U/L Final    Alk Phosphatase 11/23/2022 102  50 - 136 U/L Final    Total Protein 11/23/2022 6.2 (A)  6.3 - 8.2 g/dL Final    Albumin 11/23/2022 3.2  3.2 - 4.6 g/dL Final    Globulin 11/23/2022 3.0  2.8 - 4.5 g/dL Final    Albumin/Globulin Ratio 11/23/2022 1.1  0.4 - 1.6   Final         Treatment Summary has been discussed and given to patient: N/A        -------------------------------------------------------------------------------------------------------------------  Please call our office at [phone] if you have any  of the following symptoms:   Fever of 100.5 or greater  Chills  Shortness of breath  Swelling or pain in one leg    After office hours an answering service is available and will contact a provider for emergencies or if you are experiencing any of the above symptoms. Patient does express an interest in My Chart. My Chart log in information explained on the after visit summary printout at the . Jake Grossman [address] desk.     Olegario Jenkins RN

## 2022-11-25 DIAGNOSIS — Z15.89 JAK-2 GENE MUTATION: ICD-10-CM

## 2022-11-25 DIAGNOSIS — D46.9 MDS (MYELODYSPLASTIC SYNDROME) (HCC): ICD-10-CM

## 2022-11-29 ENCOUNTER — TELEPHONE (OUTPATIENT)
Dept: ONCOLOGY | Age: 82
End: 2022-11-29

## 2022-11-29 DIAGNOSIS — D46.9 MDS (MYELODYSPLASTIC SYNDROME) (HCC): ICD-10-CM

## 2022-11-29 DIAGNOSIS — Z15.89 JAK-2 GENE MUTATION: ICD-10-CM

## 2022-11-29 RX ORDER — LENALIDOMIDE 5 MG/1
5 CAPSULE ORAL DAILY
Qty: 30 CAPSULE | Refills: 0 | Status: SHIPPED | OUTPATIENT
Start: 2022-11-29

## 2022-11-29 NOTE — TELEPHONE ENCOUNTER
Pt's son calling requesting refill on Revlimid   States he reached out to [facility]. and was told they were waiting on us for refill. Chart reviewed.   Msg sent to .

## 2022-12-07 ENCOUNTER — TELEPHONE (OUTPATIENT)
Dept: ONCOLOGY | Age: 82
End: 2022-12-07

## 2022-12-07 ENCOUNTER — HOSPITAL ENCOUNTER (OUTPATIENT)
Dept: LAB | Age: 82
Discharge: HOME | End: 2022-12-10
Payer: MEDICARE

## 2022-12-07 DIAGNOSIS — D46.9 MDS (MYELODYSPLASTIC SYNDROME) (HCC): ICD-10-CM

## 2022-12-07 DIAGNOSIS — D46.1 RARS (REFRACTORY ANEMIA WITH RINGED SIDEROBLASTS) (HCC): ICD-10-CM

## 2022-12-07 LAB
ALBUMIN SERPL-MCNC: 3.3 G/DL (ref 3.2–4.6)
ALBUMIN/GLOB SERPL: 1.1 {RATIO} (ref 0.4–1.6)
ALP SERPL-CCNC: 94 U/L (ref 50–136)
ALT SERPL-CCNC: 44 U/L (ref 12–65)
ANION GAP SERPL CALC-SCNC: 7 MMOL/L (ref 2–11)
AST SERPL-CCNC: 33 U/L (ref 15–37)
BASOPHILS # BLD: 0.2 K/UL (ref 0–0.2)
BASOPHILS NFR BLD: 2 % (ref 0–2)
BILIRUB SERPL-MCNC: 0.6 MG/DL (ref 0.2–1.1)
BUN SERPL-MCNC: 25 MG/DL (ref 8–23)
CALCIUM SERPL-MCNC: 8.3 MG/DL (ref 8.3–10.4)
CHLORIDE SERPL-SCNC: 101 MMOL/L (ref 101–110)
CO2 SERPL-SCNC: 27 MMOL/L (ref 21–32)
CREAT SERPL-MCNC: 1.3 MG/DL (ref 0.6–1)
DIFFERENTIAL METHOD BLD: ABNORMAL
EOSINOPHIL # BLD: 1 K/UL (ref 0–0.8)
EOSINOPHIL NFR BLD: 8 % (ref 0.5–7.8)
ERYTHROCYTE [DISTWIDTH] IN BLOOD BY AUTOMATED COUNT: 17.2 % (ref 11.9–14.6)
GLOBULIN SER CALC-MCNC: 3 G/DL (ref 2.8–4.5)
GLUCOSE SERPL-MCNC: 125 MG/DL (ref 65–100)
HCT VFR BLD AUTO: 35.1 % (ref 35.8–46.3)
HGB BLD-MCNC: 11.6 G/DL (ref 11.7–15.4)
IMM GRANULOCYTES # BLD AUTO: 0.7 K/UL (ref 0–0.5)
IMM GRANULOCYTES NFR BLD AUTO: 5 % (ref 0–5)
LYMPHOCYTES # BLD: 1.1 K/UL (ref 0.5–4.6)
LYMPHOCYTES NFR BLD: 8 % (ref 13–44)
MCH RBC QN AUTO: 33.8 PG (ref 26.1–32.9)
MCHC RBC AUTO-ENTMCNC: 33 G/DL (ref 31.4–35)
MCV RBC AUTO: 102.3 FL (ref 82–102)
MONOCYTES # BLD: 1.4 K/UL (ref 0.1–1.3)
MONOCYTES NFR BLD: 10 % (ref 4–12)
NEUTS SEG # BLD: 9.4 K/UL (ref 1.7–8.2)
NEUTS SEG NFR BLD: 68 % (ref 43–78)
NRBC # BLD: 0 K/UL (ref 0–0.2)
PLATELET # BLD AUTO: 412 K/UL (ref 150–450)
PMV BLD AUTO: 10.6 FL (ref 9.4–12.3)
POTASSIUM SERPL-SCNC: 3.7 MMOL/L (ref 3.5–5.1)
PROT SERPL-MCNC: 6.3 G/DL (ref 6.3–8.2)
RBC # BLD AUTO: 3.43 M/UL (ref 4.05–5.2)
SODIUM SERPL-SCNC: 135 MMOL/L (ref 133–143)
WBC # BLD AUTO: 13.8 K/UL (ref 4.3–11.1)

## 2022-12-07 PROCEDURE — 85025 COMPLETE CBC W/AUTO DIFF WBC: CPT

## 2022-12-07 PROCEDURE — 80053 COMPREHEN METABOLIC PANEL: CPT

## 2022-12-07 PROCEDURE — 36415 COLL VENOUS BLD VENIPUNCTURE: CPT

## 2022-12-07 NOTE — TELEPHONE ENCOUNTER
Pt notified and verbalized understanding.     ----- Message from Edita Foster MD sent at 12/7/2022 12:13 PM EST -----  Labs stable.  Continue with present mgmt

## 2022-12-11 NOTE — PROGRESS NOTES
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 Cat Braxton is a pleasant 78years old female patient with multiple medical problems including anxiety, atrial fibrillation status post watchman, status post aortic valve replacement, breast cancer (ER/[place] positive, HER2/lloyd positive, diagnosed in 2005), cerebrovascular disease, TIA, among others who has been referred for evaluation and management of macrocytic anemia, thrombocytosis and neutrophilic leukocytosis. She presented to ED in August 2022 with complaints of malaise, generalized weakness and progressively worsening fatigue. On 8/15/2022, labs showed WBC count of 15.4, MCV of 104, and platelet count of 660. Hemoglobin 12.9. She was admitted with a diagnosis of urinary tract infection. Based on her labs, Synthroid dose was increased. Lab work at discharge has been summarized below. Oncology/hematology overview:    Diagnosis: RARS-T  Date of diagnosis: 10/04/2022  Molecular studies:  JAK2 V617F; SF3B1 N940R; SH2B3 [id number]*46 DETECTED  Treatment intent: palliative  Disease status:measurable disease  Work up/treatment summary:  10/4/2022: Bone marrow biopsy showed marrow cellularity of 70 to 80% with trilineage hematopoietic maturation, atypical melanocytic hyperplasia and ring sideroblasts. Bone marrow is hypercellular for age with changes consistent with MDS/MPN. Flow cytometry showed no definitive diagnostic immunophenotypic abnormalities. MDS FISH panel negative, karyotype 55 XX (20)  NO alterations detected in the following genes:   FLT3, IDH1, IDH2, NPM1   SNV/INDEL: JAK2 V617F; SF3B1 E009H; SH2B3 [id number]*99 DETECTED    Started lenalidomide 5 mg PO daily on 11/3/2022      Interval history:  Patient is accompanied by her son during this visit.   She reports improvement in fatigue and her exercise tolerance. She has been able to work better with physical therapist.  She has been tolerating lenalidomide without any major problems. She has lost about 8 pounds but attributes it to change in dietary habits and increased physical activity. She denies fevers, mouth sores, nausea, vomiting, abdominal pain,  night sweats, abnormal bleeding or bruising. Review of Systems:  14 point ROS was negative except as per HPI      ECOG PERFORMANCE STATUS - 1- Restricted in physically strenuous activity but ambulatory and able to carry out work of a light or sedentary nature such as light house work, office work. Pain - /10. Mild to moderate pain, requiring medication - see MAR     Fatigue - No flowsheet data found. Distress - No flowsheet data found. Reviewed and updated this visit by provider:  Tobacco  Allergies  Meds  Problems  Med Hx  Surg Hx  Fam Hx          Current Outpatient Medications   Medication Sig Dispense Refill    lenalidomide (REVLIMID) 5 MG chemo capsule Take 1 capsule by mouth daily 30 capsule 0    levothyroxine (SYNTHROID) 100 MCG tablet TAKE 1 TABLET (100 MCG) BY MOUTH DAILY THIS REPLACES 88MCG DOSE.      diclofenac (CATAFLAM) 50 MG tablet TAKE 1 TABLET (50 MG) BY MOUTH 2 (TWO) TIMES A DAY AS NEEDED (ARTHRITIS PAIN)      sertraline (ZOLOFT) 25 MG tablet TAKE 1 TABLET (25 MG) BY MOUTH DAILY      loratadine (CLARITIN) 10 MG tablet Take 10 mg by mouth daily. docusate sodium (COLACE, DULCOLAX) 100 MG CAPS Take 100 mg by mouth 2 times daily 100 capsule 0    melatonin 3 MG TABS tablet Take 1 tablet by mouth nightly as needed (insomnia) 30 tablet 0    magnesium oxide (MAG-OX) 400 MG tablet Take 400 mg by mouth daily      amiodarone (CORDARONE) 200 MG tablet Take 200 mg by mouth daily      LORazepam (ATIVAN) 1 MG tablet Take 1 mg by mouth every 4 hours as needed for Anxiety.       simvastatin (ZOCOR) 20 MG tablet Take 20 mg by mouth nightly      metoprolol succinate (TOPROL XL) 25 MG extended release tablet Take 25 mg by mouth daily      hydroCHLOROthiazide (HYDRODIURIL) 12.5 MG tablet Take 12.5 mg by mouth daily      Multiple Vitamins-Minerals (THERAPEUTIC MULTIVITAMIN-MINERALS) tablet Take 1 tablet by mouth daily      acetaminophen (TYLENOL) 325 mg tablet Take 325 mg by mouth every 4 hours as needed for Pain (breakthrough pain)      aspirin 81 MG EC tablet Take 81 mg by mouth daily      calcium-vitamin D (OSCAL) 250-125 MG-UNIT per tablet Take 1 tablet by mouth daily      ondansetron (ZOFRAN) 8 MG tablet Take 1 tablet by mouth every 8 hours as needed for Nausea or Vomiting (Patient not taking: Reported on 12/21/2022) 60 tablet 1    famotidine (PEPCID) 20 MG tablet Take 20 mg by mouth as needed (acid reflux) once daily as needed (Patient not taking: Reported on 12/21/2022)      levothyroxine (SYNTHROID) 88 MCG tablet Take 1 tablet by mouth Daily (Patient not taking: Reported on 12/21/2022) 30 tablet 0    traMADol (ULTRAM) 50 MG tablet Take 50 mg by mouth every 6 hours as needed for Pain.      polyethylene glycol (GLYCOLAX) 17 g packet Take 17 g by mouth daily as needed for Constipation (Patient not taking: Reported on 12/21/2022)       No current facility-administered medications for this visit. OBJECTIVE:  BP (!) 144/62 (Site: Right Upper Arm, Position: Standing)   Pulse 57   Temp 97.6 °F (36.4 °C)   Resp 18   Ht 5' 5\" (1.651 m)   Wt 157 lb 12.8 oz (71.6 kg)   SpO2 98%   BMI 26.26 kg/m²   Wt Readings from Last 3 Encounters:   12/21/22 157 lb 12.8 oz (71.6 kg)   11/23/22 165 lb 4.8 oz (75 kg)   10/31/22 161 lb (73 kg)       Physical Exam:  Patient alert and oriented x 3, in no acute distress  Integumentary: No Pallor, no icterus, scattered bruising is noted on bilateral upper extremities. HEENT: Moist mucous membranes, normal oropharynx  Lymph nodes: No cervical  lymphadenopathy is palpable.   Cardiovascular:RRR, S1, S2 present, systolic murmur is audible  Lungs: Clear to auscultation, no rales or wheezing, no accessory muscle use  Abdomen: Soft, and non-tender on palpation, no organomegaly, bowel sounds audible  Extremities: Pitting bilateral lower extremity edema is unchanged. No calf tenderness. Neurological: patient can follow commands and move all extremities    Labs:  Lab Results   Component Value Date    WBC 10.7 12/21/2022    HGB 12.1 12/21/2022    HCT 36.1 12/21/2022    .6 12/21/2022     12/21/2022     Lab Results   Component Value Date    NEUTROABS 12.4 (H) 04/18/2022    LYMPHOPCT 10 (L) 12/21/2022    LYMPHSABS 1.1 12/21/2022    MONOPCT 11 12/21/2022    MONOSABS 1.2 12/21/2022    EOSABS 1.3 (H) 12/21/2022    BASOPCT 1 12/21/2022     Lab Results   Component Value Date     12/21/2022    K 3.4 (L) 12/21/2022     12/21/2022    CO2 29 12/21/2022    BUN 24 (H) 12/21/2022    CREATININE 1.20 (H) 12/21/2022    GLUCOSE 102 (H) 12/21/2022    CALCIUM 8.9 12/21/2022    PROT 6.4 12/21/2022    LABALBU 3.5 12/21/2022    BILITOT 0.9 12/21/2022    ALKPHOS 79 12/21/2022    AST 23 12/21/2022    ALT 36 12/21/2022    LABGLOM 45 (L) 12/21/2022    GFRAA 43 (L) 09/30/2022    AGRATIO 1.2 11/30/2021    GLOB 2.9 12/21/2022               Imaging:  CT BONE MARROW BIOPSY AND ASPIRATION    Result Date: 70/6/1651  Uncomplicated CT guided bone marrow biopsy. Plan:  Bedrest observation for a minimum of 30 minutes. Problems:  1. 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  5/10 nodes positive, status post left mastectomy; received 4 cycles of AC followed by 4 cycles of Taxol, radiation, 1 year of Herceptin and 7 years of Femara.  -Hypothyroidism  -Atrial fibrillation status post watchman, Plavix as well as Eliquis discontinued due to extensive hematoma of left hip x2, oral bleeding, nosebleeding  -Hypertension  -Probably calcified 2.7 cm meningioma near left posterior fossa incidentally seen on CT scan done in March and May 2022. No mass-effect.  -Depression/anxiety  -Stage III CKD           PLAN:  I have previously discussed with him that RARS-T is a relatively new entity per WHO classification and that in general, the data guiding optimal management of this condition is lacking. Some of the agents that have been used based on case series include hydroxyurea for cytoreduction (poses a risk for anemia and thus worsening fatigue), lenalidomide, low-dose hypomethylating agent and intervention. I reviewed the results of most recent labs with the patient and her son. CBC shows excellent hematologic response with normalization of cell counts. Patient reports improvement in energy levels and exercise tolerance. She has been tolerating lenalidomide without any major side effects. Continue with lenalidomide 5 mg p.o. daily. She will continue taking levothyroxine, amiodarone, simvastatin, Toprol-XL, hydrochlorothiazide, aspirin 81 mg daily      Return office visit in 1 month with labs prior. All questions were answered to the best of my abilities. [email or web address]        Bairon Pina MD  [facility]  [address] [place] [address]  Office : [phone]  Fax : [phone]    Elements of this note have been dictated using speech recognition software. As a result, errors of speech recognition may have occurred. ASHLEY, RN

## 2022-12-15 DIAGNOSIS — D46.1 RARS (REFRACTORY ANEMIA WITH RINGED SIDEROBLASTS) (HCC): ICD-10-CM

## 2022-12-15 DIAGNOSIS — D46.9 MDS (MYELODYSPLASTIC SYNDROME) (HCC): Primary | ICD-10-CM

## 2022-12-21 ENCOUNTER — HOSPITAL ENCOUNTER (OUTPATIENT)
Dept: LAB | Age: 82
Discharge: HOME | End: 2022-12-24
Payer: MEDICARE

## 2022-12-21 ENCOUNTER — OFFICE VISIT (OUTPATIENT)
Dept: ONCOLOGY | Age: 82
End: 2022-12-21
Payer: MEDICARE

## 2022-12-21 VITALS
RESPIRATION RATE: 18 BRPM | TEMPERATURE: 97.6 F | SYSTOLIC BLOOD PRESSURE: 144 MMHG | HEIGHT: 65 IN | HEART RATE: 57 BPM | WEIGHT: 157.8 LBS | DIASTOLIC BLOOD PRESSURE: 62 MMHG | OXYGEN SATURATION: 98 % | BODY MASS INDEX: 26.29 KG/M2

## 2022-12-21 DIAGNOSIS — Z15.89 JAK-2 GENE MUTATION: ICD-10-CM

## 2022-12-21 DIAGNOSIS — D46.9 MDS (MYELODYSPLASTIC SYNDROME) (HCC): ICD-10-CM

## 2022-12-21 DIAGNOSIS — D46.1 RARS (REFRACTORY ANEMIA WITH RINGED SIDEROBLASTS) (HCC): ICD-10-CM

## 2022-12-21 LAB
ALBUMIN SERPL-MCNC: 3.5 G/DL (ref 3.2–4.6)
ALBUMIN/GLOB SERPL: 1.2 {RATIO} (ref 0.4–1.6)
ALP SERPL-CCNC: 79 U/L (ref 50–136)
ALT SERPL-CCNC: 36 U/L (ref 12–65)
ANION GAP SERPL CALC-SCNC: 4 MMOL/L (ref 2–11)
AST SERPL-CCNC: 23 U/L (ref 15–37)
BASOPHILS # BLD: 0.1 K/UL (ref 0–0.2)
BASOPHILS NFR BLD: 1 % (ref 0–2)
BILIRUB SERPL-MCNC: 0.9 MG/DL (ref 0.2–1.1)
BUN SERPL-MCNC: 24 MG/DL (ref 8–23)
CALCIUM SERPL-MCNC: 8.9 MG/DL (ref 8.3–10.4)
CHLORIDE SERPL-SCNC: 101 MMOL/L (ref 101–110)
CO2 SERPL-SCNC: 29 MMOL/L (ref 21–32)
CREAT SERPL-MCNC: 1.2 MG/DL (ref 0.6–1)
DIFFERENTIAL METHOD BLD: ABNORMAL
EOSINOPHIL # BLD: 1.3 K/UL (ref 0–0.8)
EOSINOPHIL NFR BLD: 12 % (ref 0.5–7.8)
ERYTHROCYTE [DISTWIDTH] IN BLOOD BY AUTOMATED COUNT: 17.1 % (ref 11.9–14.6)
GLOBULIN SER CALC-MCNC: 2.9 G/DL (ref 2.8–4.5)
GLUCOSE SERPL-MCNC: 102 MG/DL (ref 65–100)
HCT VFR BLD AUTO: 36.1 % (ref 35.8–46.3)
HGB BLD-MCNC: 12.1 G/DL (ref 11.7–15.4)
IMM GRANULOCYTES # BLD AUTO: 0.2 K/UL (ref 0–0.5)
IMM GRANULOCYTES NFR BLD AUTO: 2 % (ref 0–5)
LYMPHOCYTES # BLD: 1.1 K/UL (ref 0.5–4.6)
LYMPHOCYTES NFR BLD: 10 % (ref 13–44)
MCH RBC QN AUTO: 33.7 PG (ref 26.1–32.9)
MCHC RBC AUTO-ENTMCNC: 33.5 G/DL (ref 31.4–35)
MCV RBC AUTO: 100.6 FL (ref 82–102)
MONOCYTES # BLD: 1.2 K/UL (ref 0.1–1.3)
MONOCYTES NFR BLD: 11 % (ref 4–12)
NEUTS SEG # BLD: 6.8 K/UL (ref 1.7–8.2)
NEUTS SEG NFR BLD: 64 % (ref 43–78)
NRBC # BLD: 0 K/UL (ref 0–0.2)
PLATELET # BLD AUTO: 239 K/UL (ref 150–450)
PMV BLD AUTO: 9.6 FL (ref 9.4–12.3)
POTASSIUM SERPL-SCNC: 3.4 MMOL/L (ref 3.5–5.1)
PROT SERPL-MCNC: 6.4 G/DL (ref 6.3–8.2)
RBC # BLD AUTO: 3.59 M/UL (ref 4.05–5.2)
SODIUM SERPL-SCNC: 134 MMOL/L (ref 133–143)
WBC # BLD AUTO: 10.7 K/UL (ref 4.3–11.1)

## 2022-12-21 PROCEDURE — G8417 CALC BMI ABV UP PARAM F/U: HCPCS | Performed by: INTERNAL MEDICINE

## 2022-12-21 PROCEDURE — 99214 OFFICE O/P EST MOD 30 MIN: CPT | Performed by: INTERNAL MEDICINE

## 2022-12-21 PROCEDURE — 80053 COMPREHEN METABOLIC PANEL: CPT

## 2022-12-21 PROCEDURE — 1036F TOBACCO NON-USER: CPT | Performed by: INTERNAL MEDICINE

## 2022-12-21 PROCEDURE — 1090F PRES/ABSN URINE INCON ASSESS: CPT | Performed by: INTERNAL MEDICINE

## 2022-12-21 PROCEDURE — 3074F SYST BP LT 130 MM HG: CPT | Performed by: INTERNAL MEDICINE

## 2022-12-21 PROCEDURE — 3078F DIAST BP <80 MM HG: CPT | Performed by: INTERNAL MEDICINE

## 2022-12-21 PROCEDURE — 36415 COLL VENOUS BLD VENIPUNCTURE: CPT

## 2022-12-21 PROCEDURE — 85025 COMPLETE CBC W/AUTO DIFF WBC: CPT

## 2022-12-21 PROCEDURE — G8484 FLU IMMUNIZE NO ADMIN: HCPCS | Performed by: INTERNAL MEDICINE

## 2022-12-21 PROCEDURE — G8400 PT W/DXA NO RESULTS DOC: HCPCS | Performed by: INTERNAL MEDICINE

## 2022-12-21 PROCEDURE — G8427 DOCREV CUR MEDS BY ELIG CLIN: HCPCS | Performed by: INTERNAL MEDICINE

## 2022-12-21 PROCEDURE — 1123F ACP DISCUSS/DSCN MKR DOCD: CPT | Performed by: INTERNAL MEDICINE

## 2022-12-21 RX ORDER — LENALIDOMIDE 5 MG/1
5 CAPSULE ORAL DAILY
Qty: 30 CAPSULE | Refills: 0 | Status: SHIPPED | OUTPATIENT
Start: 2022-12-21 | End: 2022-12-22 | Stop reason: SDUPTHER

## 2022-12-21 ASSESSMENT — PATIENT HEALTH QUESTIONNAIRE - PHQ9
SUM OF ALL RESPONSES TO PHQ QUESTIONS 1-9: 0
SUM OF ALL RESPONSES TO PHQ QUESTIONS 1-9: 0
1. LITTLE INTEREST OR PLEASURE IN DOING THINGS: 0
2. FEELING DOWN, DEPRESSED OR HOPELESS: 0
SUM OF ALL RESPONSES TO PHQ9 QUESTIONS 1 & 2: 0
SUM OF ALL RESPONSES TO PHQ QUESTIONS 1-9: 0
SUM OF ALL RESPONSES TO PHQ QUESTIONS 1-9: 0

## 2022-12-21 NOTE — PATIENT INSTRUCTIONS
Patient Instructions from Today's Visit    Reason for Visit:  Follow up - MDS/RARS-T    Diagnosis Information:  [email or web address]    Plan:  Labs have improved. Continue Revlimid. Please call us if you have any questions or concerns before your next visit. Follow Up: Follow up in 4 weeks with Dr. Felicitas Lorenzana, with labs prior.      Recent Lab Results:  Hospital Outpatient Visit on 12/21/2022   Component Date Value Ref Range Status    WBC 12/21/2022 10.7  4.3 - 11.1 K/uL Final    RBC 12/21/2022 3.59 (A)  4.05 - 5.2 M/uL Final    Hemoglobin 12/21/2022 12.1  11.7 - 15.4 g/dL Final    Hematocrit 12/21/2022 36.1  35.8 - 46.3 % Final    MCV 12/21/2022 100.6  82.0 - 102.0 FL Final    MCH 12/21/2022 33.7 (A)  26.1 - 32.9 PG Final    MCHC 12/21/2022 33.5  31.4 - 35.0 g/dL Final    RDW 12/21/2022 17.1 (A)  11.9 - 14.6 % Final    Platelets 39/27/4431 239  150 - 450 K/uL Final    MPV 12/21/2022 9.6  9.4 - 12.3 FL Final    nRBC 12/21/2022 0.00  0.0 - 0.2 K/uL Final    **Note: Absolute NRBC parameter is now reported with Hemogram**    Seg Neutrophils 12/21/2022 64  43 - 78 % Final    Lymphocytes 12/21/2022 10 (A)  13 - 44 % Final    Monocytes 12/21/2022 11  4.0 - 12.0 % Final    Eosinophils % 12/21/2022 12 (A)  0.5 - 7.8 % Final    Basophils 12/21/2022 1  0.0 - 2.0 % Final    Immature Granulocytes 12/21/2022 2  0.0 - 5.0 % Final    Segs Absolute 12/21/2022 6.8  1.7 - 8.2 K/UL Final    Absolute Lymph # 12/21/2022 1.1  0.5 - 4.6 K/UL Final    Absolute Mono # 12/21/2022 1.2  0.1 - 1.3 K/UL Final    Absolute Eos # 12/21/2022 1.3 (A)  0.0 - 0.8 K/UL Final    Basophils Absolute 12/21/2022 0.1  0.0 - 0.2 K/UL Final    Absolute Immature Granulocyte 12/21/2022 0.2  0.0 - 0.5 K/UL Final    Differential Type 12/21/2022 AUTOMATED    Final    Sodium 12/21/2022 134  133 - 143 mmol/L Final    Potassium 12/21/2022 3.4 (A)  3.5 - 5.1 mmol/L Final    Chloride 12/21/2022 101  101 - 110 mmol/L Final    CO2 12/21/2022 29  21 - 32 mmol/L Final    Anion Gap 12/21/2022 4  2 - 11 mmol/L Final    Glucose 12/21/2022 102 (A)  65 - 100 mg/dL Final    BUN 12/21/2022 24 (A)  8 - 23 MG/DL Final    Creatinine 12/21/2022 1.20 (A)  0.6 - 1.0 MG/DL Final    Est, Glom Filt Rate 12/21/2022 45 (A)  >60 ml/min/1.73m2 Final    Comment:      Pediatric calculator link: [email or web address]at. org/professionals/kdoqi/gfr_calculatorped       These results are not intended for use in patients <25years of age. eGFR results are calculated without a race factor using  the 2021 CKD-EPI equation. Careful clinical correlation is recommended, particularly when comparing to results calculated using previous equations. The CKD-EPI equation is less accurate in patients with extremes of muscle mass, extra-renal metabolism of creatinine, excessive creatine ingestion, or following therapy that affects renal tubular secretion. Calcium 12/21/2022 8.9  8.3 - 10.4 MG/DL Final    Total Bilirubin 12/21/2022 0.9  0.2 - 1.1 MG/DL Final    ALT 12/21/2022 36  12 - 65 U/L Final    AST 12/21/2022 23  15 - 37 U/L Final    Alk Phosphatase 12/21/2022 79  50 - 136 U/L Final    Total Protein 12/21/2022 6.4  6.3 - 8.2 g/dL Final    Albumin 12/21/2022 3.5  3.2 - 4.6 g/dL Final    Globulin 12/21/2022 2.9  2.8 - 4.5 g/dL Final    Albumin/Globulin Ratio 12/21/2022 1.2  0.4 - 1.6   Final         Treatment Summary has been discussed and given to patient: N/A        -------------------------------------------------------------------------------------------------------------------  Please call our office at [phone] if you have any  of the following symptoms:   Fever of 100.5 or greater  Chills  Shortness of breath  Swelling or pain in one leg    After office hours an answering service is available and will contact a provider for emergencies or if you are experiencing any of the above symptoms.     Patient does express an interest in My Chart. My Chart log in information explained on the after visit summary printout at the [facility] Jake Grossman [address] desk.     Axel Zimmerman RN

## 2022-12-22 DIAGNOSIS — Z15.89 JAK-2 GENE MUTATION: ICD-10-CM

## 2022-12-22 DIAGNOSIS — D46.9 MDS (MYELODYSPLASTIC SYNDROME) (HCC): ICD-10-CM

## 2022-12-22 RX ORDER — LENALIDOMIDE 5 MG/1
5 CAPSULE ORAL DAILY
Qty: 28 CAPSULE | Refills: 0 | Status: SHIPPED | OUTPATIENT
Start: 2022-12-22

## 2022-12-22 NOTE — PROGRESS NOTES
Call from [facility] 360 they need the quanity of capsules to be 28 and then they need the celgene #. Script corrected and msg to [facility].

## 2023-01-16 DIAGNOSIS — D46.9 MDS (MYELODYSPLASTIC SYNDROME) (HCC): Primary | ICD-10-CM

## 2023-01-16 DIAGNOSIS — D46.1 RARS (REFRACTORY ANEMIA WITH RINGED SIDEROBLASTS) (HCC): ICD-10-CM

## 2023-01-17 NOTE — PROGRESS NOTES
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 Rod Mckeon is a pleasant 78years old female patient with multiple medical problems including anxiety, atrial fibrillation status post watchman, status post aortic valve replacement, breast cancer (ER/[place] positive, HER2/lloyd positive, diagnosed in 2005), cerebrovascular disease, TIA, among others who has been referred for evaluation and management of macrocytic anemia, thrombocytosis and neutrophilic leukocytosis. She presented to ED in August 2022 with complaints of malaise, generalized weakness and progressively worsening fatigue. On 8/15/2022, labs showed WBC count of 15.4, MCV of 104, and platelet count of 188. Hemoglobin 12.9. She was admitted with a diagnosis of urinary tract infection. Based on her labs, Synthroid dose was increased. Lab work at discharge has been summarized below. Oncology/hematology overview:    Diagnosis: RARS-T  Date of diagnosis: 10/04/2022  Molecular studies:  JAK2 V617F; SF3B1 N420H; SH2B3 [id number]*35 DETECTED  Treatment intent: palliative  Disease status:measurable disease  Work up/treatment summary:  10/4/2022: Bone marrow biopsy showed marrow cellularity of 70 to 80% with trilineage hematopoietic maturation, atypical melanocytic hyperplasia and ring sideroblasts. Bone marrow is hypercellular for age with changes consistent with MDS/MPN. Flow cytometry showed no definitive diagnostic immunophenotypic abnormalities. MDS FISH panel negative, karyotype 55 XX (20)  NO alterations detected in the following genes:   FLT3, IDH1, IDH2, NPM1   SNV/INDEL: JAK2 V617F; SF3B1 Z473G; SH2B3 R132CWT*21 DETECTED    Started lenalidomide 5 mg PO daily on 11/3/2022      Interval history:  Patient is accompanied by her son during today's visit.   She was sick with COVID a few weeks ago with resultant decline in appetite and a few pounds weight loss. She seems to be slowly recovering and has been able to work with physical therapy. She denies unexplained fevers, night sweats, mouth sores, nausea, vomiting, abdominal pain, night sweats, abnormal bleeding or bruising. She has been tolerating lenalidomide without any major problems. Review of Systems:  14 point ROS was negative except as per HPI      ECOG PERFORMANCE STATUS - 1- Restricted in physically strenuous activity but ambulatory and able to carry out work of a light or sedentary nature such as light house work, office work. Pain - /10. Mild to moderate pain, requiring medication - see MAR     Fatigue - No flowsheet data found. Distress - No flowsheet data found. Reviewed and updated this visit by provider:  Tobacco  Allergies  Meds  Problems  Med Hx  Surg Hx  Fam Hx          Current Outpatient Medications   Medication Sig Dispense Refill    lenalidomide (REVLIMID) 5 MG chemo capsule Take 1 capsule by mouth daily 28 capsule 0    levothyroxine (SYNTHROID) 100 MCG tablet TAKE 1 TABLET (100 MCG) BY MOUTH DAILY THIS REPLACES 88MCG DOSE.      diclofenac (CATAFLAM) 50 MG tablet TAKE 1 TABLET (50 MG) BY MOUTH 2 (TWO) TIMES A DAY AS NEEDED (ARTHRITIS PAIN)      sertraline (ZOLOFT) 25 MG tablet TAKE 1 TABLET (25 MG) BY MOUTH DAILY      loratadine (CLARITIN) 10 MG tablet Take 10 mg by mouth daily. docusate sodium (COLACE, DULCOLAX) 100 MG CAPS Take 100 mg by mouth 2 times daily 100 capsule 0    traMADol (ULTRAM) 50 MG tablet Take 50 mg by mouth every 6 hours as needed for Pain.      magnesium oxide (MAG-OX) 400 MG tablet Take 400 mg by mouth daily      amiodarone (CORDARONE) 200 MG tablet Take 200 mg by mouth daily      LORazepam (ATIVAN) 1 MG tablet Take 1 mg by mouth every 4 hours as needed for Anxiety.       simvastatin (ZOCOR) 20 MG tablet Take 20 mg by mouth nightly      metoprolol succinate (TOPROL XL) 25 MG extended release tablet Take 25 mg by mouth daily      hydroCHLOROthiazide (HYDRODIURIL) 12.5 MG tablet Take 12.5 mg by mouth daily      Multiple Vitamins-Minerals (THERAPEUTIC MULTIVITAMIN-MINERALS) tablet Take 1 tablet by mouth daily      acetaminophen (TYLENOL) 325 mg tablet Take 325 mg by mouth every 4 hours as needed for Pain (breakthrough pain)      aspirin 81 MG EC tablet Take 81 mg by mouth daily      calcium-vitamin D (OSCAL) 250-125 MG-UNIT per tablet Take 1 tablet by mouth daily      ondansetron (ZOFRAN) 8 MG tablet Take 1 tablet by mouth every 8 hours as needed for Nausea or Vomiting (Patient not taking: No sig reported) 60 tablet 1    famotidine (PEPCID) 20 MG tablet Take 20 mg by mouth as needed (acid reflux) once daily as needed (Patient not taking: No sig reported)      melatonin 3 MG TABS tablet Take 1 tablet by mouth nightly as needed (insomnia) (Patient not taking: Reported on 1/18/2023) 30 tablet 0    levothyroxine (SYNTHROID) 88 MCG tablet Take 1 tablet by mouth Daily (Patient not taking: No sig reported) 30 tablet 0    polyethylene glycol (GLYCOLAX) 17 g packet Take 17 g by mouth daily as needed for Constipation (Patient not taking: No sig reported)       No current facility-administered medications for this visit. OBJECTIVE:  /60   Pulse 60   Temp 98.1 °F (36.7 °C) (Oral)   Resp 16   Ht 5' 5\" (1.651 m)   Wt 154 lb 1.6 oz (69.9 kg)   SpO2 96%   BMI 25.64 kg/m²   Wt Readings from Last 3 Encounters:   01/18/23 154 lb 1.6 oz (69.9 kg)   12/21/22 157 lb 12.8 oz (71.6 kg)   11/23/22 165 lb 4.8 oz (75 kg)       Physical Exam:  Patient alert and oriented x 3, in no acute distress  Integumentary: No Pallor, no icterus, scattered bruising is noted on bilateral upper extremities. HEENT: Moist mucous membranes, normal oropharynx  Lymph nodes: No cervical  lymphadenopathy is palpable.   Cardiovascular:RRR, S1, S2 present, systolic murmur is audible  Lungs: Clear to auscultation, no rales or wheezing, no accessory muscle use  Abdomen: Soft, and non-tender on palpation, no organomegaly, bowel sounds audible  Extremities: Pitting bilateral lower extremity edema is unchanged. No calf tenderness. Neurological: patient can follow commands and move all extremities    Labs:  Lab Results   Component Value Date    WBC 9.1 01/18/2023    HGB 11.2 (L) 01/18/2023    HCT 33.9 (L) 01/18/2023    .8 01/18/2023     01/18/2023     Lab Results   Component Value Date    NEUTROABS 12.4 (H) 04/18/2022    LYMPHOPCT 12 (L) 01/18/2023    LYMPHSABS 1.1 01/18/2023    MONOPCT 10 01/18/2023    MONOSABS 0.9 01/18/2023    EOSABS 0.7 01/18/2023    BASOPCT 1 01/18/2023     Lab Results   Component Value Date     01/18/2023    K 3.8 01/18/2023     01/18/2023    CO2 30 01/18/2023    BUN 23 01/18/2023    CREATININE 1.30 (H) 01/18/2023    GLUCOSE 101 (H) 01/18/2023    CALCIUM 8.8 01/18/2023    PROT 6.2 (L) 01/18/2023    LABALBU 3.3 01/18/2023    BILITOT 0.7 01/18/2023    ALKPHOS 69 01/18/2023    AST 19 01/18/2023    ALT 29 01/18/2023    LABGLOM 41 (L) 01/18/2023    GFRAA 43 (L) 09/30/2022    AGRATIO 1.2 11/30/2021    GLOB 2.9 01/18/2023               Imaging:  CT BONE MARROW BIOPSY AND ASPIRATION    Result Date: 58/6/0626  Uncomplicated CT guided bone marrow biopsy. Plan:  Bedrest observation for a minimum of 30 minutes. Problems:  1. 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  5/10 nodes positive, status post left mastectomy; received 4 cycles of AC followed by 4 cycles of Taxol, radiation, 1 year of Herceptin and 7 years of Femara.  -Hypothyroidism  -Atrial fibrillation status post watchman, Plavix as well as Eliquis discontinued due to extensive hematoma of left hip x2, oral bleeding, nosebleeding  -Hypertension  -Probably calcified 2.7 cm meningioma near left posterior fossa incidentally seen on CT scan done in March and May 2022. No mass-effect.  -Depression/anxiety  -Stage III CKD           PLAN:  I have previously discussed with him that RARS-T is a relatively new entity per WHO classification and that in general, the data guiding optimal management of this condition is lacking. Some of the agents that have been used based on case series include hydroxyurea for cytoreduction (poses a risk for anemia and thus worsening fatigue), lenalidomide, low-dose hypomethylating agent and intervention. I reviewed the results of most recent labs with the patient and her son. CBC shows excellent hematologic response with near-normalization of cell counts. Patient reports improvement in energy levels and exercise tolerance. She has been tolerating lenalidomide without any major side effects. Patient is tolerating lenalidomide 5 mg p.o. daily. Hemogram stable. This will be continued. She will continue taking levothyroxine, amiodarone, simvastatin, Toprol-XL, hydrochlorothiazide, aspirin 81 mg p.o. daily. Return office visit in 1 month with labs prior. If stable at that point, we shall space out follow-up visits further. [email or web address]    All questions were answered to the best of my abilities. Tyra Dalton MD  [facility] [place] Hematology and Oncology  [address]  Office : [phone]  Fax : [phone]    Elements of this note have been dictated using speech recognition software. As a result, errors of speech recognition may have occurred.

## 2023-01-18 ENCOUNTER — HOSPITAL ENCOUNTER (OUTPATIENT)
Dept: LAB | Age: 83
Discharge: HOME | End: 2023-01-21
Payer: MEDICARE

## 2023-01-18 ENCOUNTER — OFFICE VISIT (OUTPATIENT)
Dept: ONCOLOGY | Age: 83
End: 2023-01-18
Payer: MEDICARE

## 2023-01-18 VITALS
OXYGEN SATURATION: 96 % | DIASTOLIC BLOOD PRESSURE: 60 MMHG | WEIGHT: 154.1 LBS | SYSTOLIC BLOOD PRESSURE: 118 MMHG | HEIGHT: 65 IN | BODY MASS INDEX: 25.67 KG/M2 | RESPIRATION RATE: 16 BRPM | HEART RATE: 60 BPM | TEMPERATURE: 98.1 F

## 2023-01-18 DIAGNOSIS — Z15.89 JAK-2 GENE MUTATION: ICD-10-CM

## 2023-01-18 DIAGNOSIS — D46.9 MDS (MYELODYSPLASTIC SYNDROME) (HCC): ICD-10-CM

## 2023-01-18 DIAGNOSIS — D46.1 RARS (REFRACTORY ANEMIA WITH RINGED SIDEROBLASTS) (HCC): ICD-10-CM

## 2023-01-18 LAB
ALBUMIN SERPL-MCNC: 3.3 G/DL (ref 3.2–4.6)
ALBUMIN/GLOB SERPL: 1.1 (ref 0.4–1.6)
ALP SERPL-CCNC: 69 U/L (ref 50–136)
ALT SERPL-CCNC: 29 U/L (ref 12–65)
ANION GAP SERPL CALC-SCNC: 3 MMOL/L (ref 2–11)
AST SERPL-CCNC: 19 U/L (ref 15–37)
BASOPHILS # BLD: 0.1 K/UL (ref 0–0.2)
BASOPHILS NFR BLD: 1 % (ref 0–2)
BILIRUB SERPL-MCNC: 0.7 MG/DL (ref 0.2–1.1)
BUN SERPL-MCNC: 23 MG/DL (ref 8–23)
CALCIUM SERPL-MCNC: 8.8 MG/DL (ref 8.3–10.4)
CHLORIDE SERPL-SCNC: 103 MMOL/L (ref 101–110)
CO2 SERPL-SCNC: 30 MMOL/L (ref 21–32)
CREAT SERPL-MCNC: 1.3 MG/DL (ref 0.6–1)
DIFFERENTIAL METHOD BLD: ABNORMAL
EOSINOPHIL # BLD: 0.7 K/UL (ref 0–0.8)
EOSINOPHIL NFR BLD: 8 % (ref 0.5–7.8)
ERYTHROCYTE [DISTWIDTH] IN BLOOD BY AUTOMATED COUNT: 18.1 % (ref 11.9–14.6)
GLOBULIN SER CALC-MCNC: 2.9 G/DL (ref 2.8–4.5)
GLUCOSE SERPL-MCNC: 101 MG/DL (ref 65–100)
HCT VFR BLD AUTO: 33.9 % (ref 35.8–46.3)
HGB BLD-MCNC: 11.2 G/DL (ref 11.7–15.4)
IMM GRANULOCYTES # BLD AUTO: 0.2 K/UL (ref 0–0.5)
IMM GRANULOCYTES NFR BLD AUTO: 3 % (ref 0–5)
LYMPHOCYTES # BLD: 1.1 K/UL (ref 0.5–4.6)
LYMPHOCYTES NFR BLD: 12 % (ref 13–44)
MCH RBC QN AUTO: 33.6 PG (ref 26.1–32.9)
MCHC RBC AUTO-ENTMCNC: 33 G/DL (ref 31.4–35)
MCV RBC AUTO: 101.8 FL (ref 82–102)
MONOCYTES # BLD: 0.9 K/UL (ref 0.1–1.3)
MONOCYTES NFR BLD: 10 % (ref 4–12)
NEUTS SEG # BLD: 6.1 K/UL (ref 1.7–8.2)
NEUTS SEG NFR BLD: 66 % (ref 43–78)
NRBC # BLD: 0 K/UL (ref 0–0.2)
PLATELET # BLD AUTO: 281 K/UL (ref 150–450)
PMV BLD AUTO: 10 FL (ref 9.4–12.3)
POTASSIUM SERPL-SCNC: 3.8 MMOL/L (ref 3.5–5.1)
PROT SERPL-MCNC: 6.2 G/DL (ref 6.3–8.2)
RBC # BLD AUTO: 3.33 M/UL (ref 4.05–5.2)
SODIUM SERPL-SCNC: 136 MMOL/L (ref 133–143)
WBC # BLD AUTO: 9.1 K/UL (ref 4.3–11.1)

## 2023-01-18 PROCEDURE — 80053 COMPREHEN METABOLIC PANEL: CPT

## 2023-01-18 PROCEDURE — 85025 COMPLETE CBC W/AUTO DIFF WBC: CPT

## 2023-01-18 PROCEDURE — G8427 DOCREV CUR MEDS BY ELIG CLIN: HCPCS | Performed by: INTERNAL MEDICINE

## 2023-01-18 PROCEDURE — 36415 COLL VENOUS BLD VENIPUNCTURE: CPT

## 2023-01-18 PROCEDURE — 99214 OFFICE O/P EST MOD 30 MIN: CPT | Performed by: INTERNAL MEDICINE

## 2023-01-18 PROCEDURE — G8400 PT W/DXA NO RESULTS DOC: HCPCS | Performed by: INTERNAL MEDICINE

## 2023-01-18 PROCEDURE — G8484 FLU IMMUNIZE NO ADMIN: HCPCS | Performed by: INTERNAL MEDICINE

## 2023-01-18 PROCEDURE — 1123F ACP DISCUSS/DSCN MKR DOCD: CPT | Performed by: INTERNAL MEDICINE

## 2023-01-18 PROCEDURE — 3074F SYST BP LT 130 MM HG: CPT | Performed by: INTERNAL MEDICINE

## 2023-01-18 PROCEDURE — 1090F PRES/ABSN URINE INCON ASSESS: CPT | Performed by: INTERNAL MEDICINE

## 2023-01-18 PROCEDURE — 3078F DIAST BP <80 MM HG: CPT | Performed by: INTERNAL MEDICINE

## 2023-01-18 PROCEDURE — 1036F TOBACCO NON-USER: CPT | Performed by: INTERNAL MEDICINE

## 2023-01-18 PROCEDURE — G8417 CALC BMI ABV UP PARAM F/U: HCPCS | Performed by: INTERNAL MEDICINE

## 2023-01-18 RX ORDER — LENALIDOMIDE 5 MG/1
5 CAPSULE ORAL DAILY
Qty: 28 CAPSULE | Refills: 0 | Status: SHIPPED | OUTPATIENT
Start: 2023-01-18

## 2023-01-18 ASSESSMENT — PATIENT HEALTH QUESTIONNAIRE - PHQ9
SUM OF ALL RESPONSES TO PHQ QUESTIONS 1-9: 2
SUM OF ALL RESPONSES TO PHQ QUESTIONS 1-9: 2
2. FEELING DOWN, DEPRESSED OR HOPELESS: 2
SUM OF ALL RESPONSES TO PHQ9 QUESTIONS 1 & 2: 2
SUM OF ALL RESPONSES TO PHQ QUESTIONS 1-9: 2
1. LITTLE INTEREST OR PLEASURE IN DOING THINGS: 0
SUM OF ALL RESPONSES TO PHQ QUESTIONS 1-9: 2

## 2023-01-18 NOTE — PATIENT INSTRUCTIONS
Patient Instructions from Today's Visit    Reason for Visit:  Follow up - MDS/RARS-T    Diagnosis Information:  [email or web address] net/about-us/asco-answers-patient-education-materials/hwdm-xnzpsxi-hwlu-sheets    Plan:  Labs are stable. Continue Revlimid. Please call us if you have any questions or concerns before your next visit. Follow Up: Follow up in 1 month with Dr. Danny Coleman, with labs prior.      Recent Lab Results:  Hospital Outpatient Visit on 01/18/2023   Component Date Value Ref Range Status    WBC 01/18/2023 9.1  4.3 - 11.1 K/uL Final    RBC 01/18/2023 3.33 (A)  4.05 - 5.2 M/uL Final    Hemoglobin 01/18/2023 11.2 (A)  11.7 - 15.4 g/dL Final    Hematocrit 01/18/2023 33.9 (A)  35.8 - 46.3 % Final    MCV 01/18/2023 101.8  82.0 - 102.0 FL Final    MCH 01/18/2023 33.6 (A)  26.1 - 32.9 PG Final    MCHC 01/18/2023 33.0  31.4 - 35.0 g/dL Final    RDW 01/18/2023 18.1 (A)  11.9 - 14.6 % Final    Platelets 41/27/0921 281  150 - 450 K/uL Final    MPV 01/18/2023 10.0  9.4 - 12.3 FL Final    nRBC 01/18/2023 0.00  0.0 - 0.2 K/uL Final    **Note: Absolute NRBC parameter is now reported with Hemogram**    Seg Neutrophils 01/18/2023 66  43 - 78 % Final    Lymphocytes 01/18/2023 12 (A)  13 - 44 % Final    Monocytes 01/18/2023 10  4.0 - 12.0 % Final    Eosinophils % 01/18/2023 8 (A)  0.5 - 7.8 % Final    Basophils 01/18/2023 1  0.0 - 2.0 % Final    Immature Granulocytes 01/18/2023 3  0.0 - 5.0 % Final    Segs Absolute 01/18/2023 6.1  1.7 - 8.2 K/UL Final    Absolute Lymph # 01/18/2023 1.1  0.5 - 4.6 K/UL Final    Absolute Mono # 01/18/2023 0.9  0.1 - 1.3 K/UL Final    Absolute Eos # 01/18/2023 0.7  0.0 - 0.8 K/UL Final    Basophils Absolute 01/18/2023 0.1  0.0 - 0.2 K/UL Final    Absolute Immature Granulocyte 01/18/2023 0.2  0.0 - 0.5 K/UL Final    Differential Type 01/18/2023 AUTOMATED    Final    Sodium 01/18/2023 136  133 - 143 mmol/L Final    Potassium 01/18/2023 3.8  3.5 - 5.1 mmol/L Final    Chloride 01/18/2023 103 101 - 110 mmol/L Final    CO2 01/18/2023 30  21 - 32 mmol/L Final    Anion Gap 01/18/2023 3  2 - 11 mmol/L Final    Glucose 01/18/2023 101 (A)  65 - 100 mg/dL Final    BUN 01/18/2023 23  8 - 23 MG/DL Final    Creatinine 01/18/2023 1.30 (A)  0.6 - 1.0 MG/DL Final    Est, Glom Filt Rate 01/18/2023 41 (A)  >60 ml/min/1.73m2 Final    Comment:      Pediatric calculator link: Harsh.[email or web address] org/professionals/kdoqi/gfr_calculatorped       These results are not intended for use in patients <25years of age. eGFR results are calculated without a race factor using  the 2021 CKD-EPI equation. Careful clinical correlation is recommended, particularly when comparing to results calculated using previous equations. The CKD-EPI equation is less accurate in patients with extremes of muscle mass, extra-renal metabolism of creatinine, excessive creatine ingestion, or following therapy that affects renal tubular secretion. Calcium 01/18/2023 8.8  8.3 - 10.4 MG/DL Final    Total Bilirubin 01/18/2023 0.7  0.2 - 1.1 MG/DL Final    ALT 01/18/2023 29  12 - 65 U/L Final    AST 01/18/2023 19  15 - 37 U/L Final    Alk Phosphatase 01/18/2023 69  50 - 136 U/L Final    Total Protein 01/18/2023 6.2 (A)  6.3 - 8.2 g/dL Final    Albumin 01/18/2023 3.3  3.2 - 4.6 g/dL Final    Globulin 01/18/2023 2.9  2.8 - 4.5 g/dL Final    Albumin/Globulin Ratio 01/18/2023 1.1  0.4 - 1.6   Final         Treatment Summary has been discussed and given to patient: N/A        -------------------------------------------------------------------------------------------------------------------  Please call our office at [phone] if you have any  of the following symptoms:   Fever of 100.5 or greater  Chills  Shortness of breath  Swelling or pain in one leg    After office hours an answering service is available and will contact a provider for emergencies or if you are experiencing any of the above symptoms.     Patient does express an interest in My Chart.  My Chart log in information explained on the after visit summary printout at the [facility] desk.     Lisa Recinos RN

## 2023-01-19 ENCOUNTER — TELEPHONE (OUTPATIENT)
Dept: ONCOLOGY | Age: 83
End: 2023-01-19

## 2023-01-19 NOTE — TELEPHONE ENCOUNTER
Received call from [facility] with [facility] & they need [employer] for Lenalidomide (Revlimid) 5mg Chemo Capsule .  Pls contact Daniella @ [phone]

## 2023-02-15 DIAGNOSIS — D46.9 MDS (MYELODYSPLASTIC SYNDROME) (HCC): Primary | ICD-10-CM

## 2023-02-20 NOTE — PROGRESS NOTES
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 Shruthi Byrne is a pleasant 78years old female patient with multiple medical problems including anxiety, atrial fibrillation status post watchman, status post aortic valve replacement, breast cancer (ER/WA positive, HER2/lloyd positive, diagnosed in 2005), cerebrovascular disease, TIA, among others who has been referred for evaluation and management of macrocytic anemia, thrombocytosis and neutrophilic leukocytosis. She presented to ED in August 2022 with complaints of malaise, generalized weakness and progressively worsening fatigue. On 8/15/2022, labs showed WBC count of 15.4, MCV of 104, and platelet count of 968. Hemoglobin 12.9. She was admitted with a diagnosis of urinary tract infection. Based on her labs, Synthroid dose was increased. Oncology/hematology overview:    Diagnosis: RARS-T  Date of diagnosis: 10/04/2022  Molecular studies:  JAK2 V617F; SF3B1 W415P; SH2B3 [id number] DETECTED  Treatment intent: palliative  Disease status:measurable disease  Work up/treatment summary:  10/4/2022: Bone marrow biopsy showed marrow cellularity of 70 to 80% with trilineage hematopoietic maturation, atypical melanocytic hyperplasia and ring sideroblasts. Bone marrow is hypercellular for age with changes consistent with MDS/MPN. Flow cytometry showed no definitive diagnostic immunophenotypic abnormalities. MDS FISH panel negative, karyotype 55 XX (20)  NO alterations detected in the following genes:   FLT3, IDH1, IDH2, NPM1   SNV/INDEL: JAK2 V617F; SF3B1 [id number]; SH2B3 [id number] DETECTED    Started lenalidomide 5 mg PO daily on 11/3/2022    1/18/2023:Patient is accompanied by her son during today's visit. She was sick with COVID a few weeks ago with resultant decline in appetite and a few pounds weight loss.   She seems to be slowly recovering and has been able to work with physical therapy. She denies unexplained fevers, night sweats, mouth sores, nausea, vomiting, abdominal pain, night sweats, abnormal bleeding or bruising. She has been tolerating lenalidomide without any major problems. Interval history:  Patient returns for follow-up. Since last visit, she has felt well in general outside modest fatigue which has remained largely unchanged. She denies unexplained fevers, drenching night sweats, mouth sores, nausea, vomiting, abdominal pain or abnormal bleeding. She has been taking lenalidomide compliantly. She has been able to work with physical therapy and notes a general improvement in her health since starting this therapy. She is in no pain today. Review of Systems:  14 point ROS was negative except as per HPI      ECOG PERFORMANCE STATUS - 1- Restricted in physically strenuous activity but ambulatory and able to carry out work of a light or sedentary nature such as light house work, office work. Pain - /10. Mild to moderate pain, requiring medication - see MAR     Fatigue - No flowsheet data found. Distress - No flowsheet data found.          Reviewed and updated this visit by provider:  Tobacco  Allergies  Meds  Problems  Med Hx  Surg Hx  Fam Hx          Current Outpatient Medications   Medication Sig Dispense Refill    levothyroxine (SYNTHROID) 112 MCG tablet       LORazepam (ATIVAN) 0.5 MG tablet       lenalidomide (REVLIMID) 5 MG chemo capsule Take 1 capsule by mouth daily 28 capsule 0    diclofenac (CATAFLAM) 50 MG tablet TAKE 1 TABLET (50 MG) BY MOUTH 2 (TWO) TIMES A DAY AS NEEDED (ARTHRITIS PAIN)      sertraline (ZOLOFT) 25 MG tablet TAKE 1 TABLET (25 MG) BY MOUTH DAILY      loratadine (CLARITIN) 10 MG tablet Take 10 mg by mouth daily PRN      docusate sodium (COLACE, DULCOLAX) 100 MG CAPS Take 100 mg by mouth 2 times daily 100 capsule 0    melatonin 3 MG TABS tablet Take 1 tablet by mouth nightly as needed (insomnia) 30 tablet 0    traMADol (ULTRAM) 50 MG tablet Take 50 mg by mouth every 6 hours as needed for Pain.      magnesium oxide (MAG-OX) 400 MG tablet Take 400 mg by mouth daily      amiodarone (CORDARONE) 200 MG tablet Take 200 mg by mouth daily      simvastatin (ZOCOR) 20 MG tablet Take 20 mg by mouth nightly      metoprolol succinate (TOPROL XL) 25 MG extended release tablet Take 25 mg by mouth daily      hydroCHLOROthiazide (HYDRODIURIL) 12.5 MG tablet Take 12.5 mg by mouth daily      Multiple Vitamins-Minerals (THERAPEUTIC MULTIVITAMIN-MINERALS) tablet Take 1 tablet by mouth daily      acetaminophen (TYLENOL) 325 mg tablet Take 325 mg by mouth every 4 hours as needed for Pain (breakthrough pain)      aspirin 81 MG EC tablet Take 81 mg by mouth daily      calcium-vitamin D (OSCAL) 250-125 MG-UNIT per tablet Take 1 tablet by mouth daily      ondansetron (ZOFRAN) 8 MG tablet Take 1 tablet by mouth every 8 hours as needed for Nausea or Vomiting (Patient not taking: No sig reported) 60 tablet 1    famotidine (PEPCID) 20 MG tablet Take 20 mg by mouth as needed (acid reflux) once daily as needed (Patient not taking: No sig reported)      polyethylene glycol (GLYCOLAX) 17 g packet Take 17 g by mouth daily as needed for Constipation (Patient not taking: No sig reported)       No current facility-administered medications for this visit. OBJECTIVE:  /65 (Site: Right Upper Arm, Position: Standing)   Pulse 62   Temp 97.9 °F (36.6 °C)   Resp 16   Ht 5' 5\" (1.651 m)   Wt 154 lb (69.9 kg)   SpO2 98%   BMI 25.63 kg/m²   Wt Readings from Last 3 Encounters:   02/22/23 154 lb (69.9 kg)   01/18/23 154 lb 1.6 oz (69.9 kg)   12/21/22 157 lb 12.8 oz (71.6 kg)       Physical Exam:  Patient alert and oriented x 3, in no acute distress  Integumentary: No Pallor, no icterus, scattered bruising is noted on bilateral upper extremities.   HEENT: Moist mucous membranes, normal oropharynx  Lymph nodes: No cervical  lymphadenopathy is palpable. Cardiovascular:RRR, S1, S2 present, systolic murmur is audible  Lungs: Clear to auscultation, no rales or wheezing, no accessory muscle use  Abdomen: Soft, and non-tender on palpation, no organomegaly, bowel sounds audible  Extremities: Pitting bilateral lower extremity edema is unchanged. No calf tenderness. Neurological: patient can follow commands and move all extremities    Labs:  Lab Results   Component Value Date    WBC 7.9 02/22/2023    HGB 10.2 (L) 02/22/2023    HCT 31.4 (L) 02/22/2023    .2 (H) 02/22/2023     02/22/2023     Lab Results   Component Value Date    NEUTROABS 12.4 (H) 04/18/2022    LYMPHOPCT 15 02/22/2023    LYMPHSABS 1.2 02/22/2023    MONOPCT 10 02/22/2023    MONOSABS 0.8 02/22/2023    EOSABS 0.5 02/22/2023    BASOPCT 1 02/22/2023     Lab Results   Component Value Date     02/22/2023    K 3.6 02/22/2023     02/22/2023    CO2 27 02/22/2023    BUN 25 (H) 02/22/2023    CREATININE 1.40 (H) 02/22/2023    GLUCOSE 152 (H) 02/22/2023    CALCIUM 8.2 (L) 02/22/2023    PROT 6.3 02/22/2023    LABALBU 3.1 (L) 02/22/2023    BILITOT 0.8 02/22/2023    ALKPHOS 74 02/22/2023    AST 21 02/22/2023    ALT 27 02/22/2023    LABGLOM 37 (L) 02/22/2023    GFRAA 43 (L) 09/30/2022    AGRATIO 1.2 11/30/2021    GLOB 3.2 02/22/2023               Imaging:  CT BONE MARROW BIOPSY AND ASPIRATION    Result Date: 00/2/5089  Uncomplicated CT guided bone marrow biopsy. Plan:  Bedrest observation for a minimum of 30 minutes. Problems:  1. 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  5/10 nodes positive, status post left mastectomy; received 4 cycles of AC followed by 4 cycles of Taxol, radiation, 1 year of Herceptin and 7 years of Femara.  -Hypothyroidism  -Atrial fibrillation status post watchman, Plavix as well as Eliquis discontinued due to extensive hematoma of left hip x2, oral bleeding, nosebleeding  -Hypertension  -Probably calcified 2.7 cm meningioma near left posterior fossa incidentally seen on CT scan done in March and May 2022. No mass-effect.  -Depression/anxiety  -Stage III CKD           PLAN:  I have previously discussed with him that RARS-T is a relatively new entity per WHO classification and that in general, the data guiding optimal management of this condition is lacking. Some of the agents that have been used based on case series include hydroxyurea for cytoreduction (poses a risk for anemia and thus worsening fatigue), lenalidomide, low-dose hypomethylating agent and intervention. 1/18/23:I reviewed the results of most recent labs with the patient and her son. CBC shows excellent hematologic response with near-normalization of cell counts. Patient reports improvement in energy levels and exercise tolerance. She has been tolerating lenalidomide without any major side effects. 2/22/2023: Labs were reviewed with the patient and her daughter-in-law. WBC and platelet count remain within normal range. Mild anemia (10.2) and macrocytosis () is noted. Patient has had mild macrocytosis intermittently on previous labs done within the last 12 months. Her blood will be tested for evidence of nutritional deficiencies (iron stores, vitamin B12, folate) and peripheral smear will be sent for pathologist review. Patient is clinically stable and tolerating lenalidomide fairly well. This will therefore be continued. If significant cytopenias become an issue in the future, we shall consider decreasing the dose of lenalidomide to 5 out of 7 days/week initially and titrate based on response.   She will continue taking Levothyroxine, amiodarone, simvastatin, Toprol-XL, hydrochlorothiazide and aspirin      [email or web address]    All questions were answered to the best of my abilities. Bairon Pina MD  [facility] Hematology and Oncology  [address] [place] [address]  Office : [phone]  Fax : [phone]    Elements of this note have been dictated using speech recognition software. As a result, errors of speech recognition may have occurred.

## 2023-02-22 ENCOUNTER — HOSPITAL ENCOUNTER (OUTPATIENT)
Dept: LAB | Age: 83
Discharge: HOME | End: 2023-02-25
Payer: MEDICARE

## 2023-02-22 ENCOUNTER — OFFICE VISIT (OUTPATIENT)
Dept: ONCOLOGY | Age: 83
End: 2023-02-22
Payer: MEDICARE

## 2023-02-22 VITALS
TEMPERATURE: 97.9 F | BODY MASS INDEX: 25.66 KG/M2 | OXYGEN SATURATION: 98 % | HEART RATE: 62 BPM | SYSTOLIC BLOOD PRESSURE: 126 MMHG | RESPIRATION RATE: 16 BRPM | WEIGHT: 154 LBS | DIASTOLIC BLOOD PRESSURE: 65 MMHG | HEIGHT: 65 IN

## 2023-02-22 DIAGNOSIS — R68.89 OTHER GENERAL SYMPTOMS AND SIGNS: ICD-10-CM

## 2023-02-22 DIAGNOSIS — D46.9 MDS (MYELODYSPLASTIC SYNDROME) (HCC): ICD-10-CM

## 2023-02-22 DIAGNOSIS — D46.9 MDS (MYELODYSPLASTIC SYNDROME) (HCC): Primary | ICD-10-CM

## 2023-02-22 DIAGNOSIS — D46.1 RARS (REFRACTORY ANEMIA WITH RINGED SIDEROBLASTS) (HCC): ICD-10-CM

## 2023-02-22 DIAGNOSIS — Z15.89 JAK-2 GENE MUTATION: ICD-10-CM

## 2023-02-22 LAB
ALBUMIN SERPL-MCNC: 3.1 G/DL (ref 3.2–4.6)
ALBUMIN/GLOB SERPL: 1 (ref 0.4–1.6)
ALP SERPL-CCNC: 74 U/L (ref 50–136)
ALT SERPL-CCNC: 27 U/L (ref 12–65)
ANION GAP SERPL CALC-SCNC: 8 MMOL/L (ref 2–11)
AST SERPL-CCNC: 21 U/L (ref 15–37)
BASOPHILS # BLD: 0 K/UL (ref 0–0.2)
BASOPHILS NFR BLD: 1 % (ref 0–2)
BILIRUB SERPL-MCNC: 0.8 MG/DL (ref 0.2–1.1)
BUN SERPL-MCNC: 25 MG/DL (ref 8–23)
CALCIUM SERPL-MCNC: 8.2 MG/DL (ref 8.3–10.4)
CHLORIDE SERPL-SCNC: 102 MMOL/L (ref 101–110)
CO2 SERPL-SCNC: 27 MMOL/L (ref 21–32)
CREAT SERPL-MCNC: 1.4 MG/DL (ref 0.6–1)
DIFFERENTIAL METHOD BLD: ABNORMAL
EOSINOPHIL # BLD: 0.5 K/UL (ref 0–0.8)
EOSINOPHIL NFR BLD: 7 % (ref 0.5–7.8)
ERYTHROCYTE [DISTWIDTH] IN BLOOD BY AUTOMATED COUNT: 20 % (ref 11.9–14.6)
FERRITIN SERPL-MCNC: 165 NG/ML (ref 8–388)
FOLATE SERPL-MCNC: 61.8 NG/ML (ref 3.1–17.5)
GLOBULIN SER CALC-MCNC: 3.2 G/DL (ref 2.8–4.5)
GLUCOSE SERPL-MCNC: 152 MG/DL (ref 65–100)
HCT VFR BLD AUTO: 31.4 % (ref 35.8–46.3)
HGB BLD-MCNC: 10.2 G/DL (ref 11.7–15.4)
IMM GRANULOCYTES # BLD AUTO: 0.1 K/UL (ref 0–0.5)
IMM GRANULOCYTES NFR BLD AUTO: 1 % (ref 0–5)
IRON SATN MFR SERPL: 28 %
IRON SERPL-MCNC: 81 UG/DL (ref 35–150)
LYMPHOCYTES # BLD: 1.2 K/UL (ref 0.5–4.6)
LYMPHOCYTES NFR BLD: 15 % (ref 13–44)
MCH RBC QN AUTO: 34.8 PG (ref 26.1–32.9)
MCHC RBC AUTO-ENTMCNC: 32.5 G/DL (ref 31.4–35)
MCV RBC AUTO: 107.2 FL (ref 82–102)
MONOCYTES # BLD: 0.8 K/UL (ref 0.1–1.3)
MONOCYTES NFR BLD: 10 % (ref 4–12)
NEUTS SEG # BLD: 5.2 K/UL (ref 1.7–8.2)
NEUTS SEG NFR BLD: 66 % (ref 43–78)
NRBC # BLD: 0 K/UL (ref 0–0.2)
PLATELET # BLD AUTO: 246 K/UL (ref 150–450)
PMV BLD AUTO: 10 FL (ref 9.4–12.3)
POTASSIUM SERPL-SCNC: 3.6 MMOL/L (ref 3.5–5.1)
PROT SERPL-MCNC: 6.3 G/DL (ref 6.3–8.2)
RBC # BLD AUTO: 2.93 M/UL (ref 4.05–5.2)
SODIUM SERPL-SCNC: 137 MMOL/L (ref 133–143)
TIBC SERPL-MCNC: 291 UG/DL (ref 250–450)
VIT B12 SERPL-MCNC: 777 PG/ML (ref 193–986)
WBC # BLD AUTO: 7.9 K/UL (ref 4.3–11.1)

## 2023-02-22 PROCEDURE — 1036F TOBACCO NON-USER: CPT | Performed by: INTERNAL MEDICINE

## 2023-02-22 PROCEDURE — 83550 IRON BINDING TEST: CPT

## 2023-02-22 PROCEDURE — 3074F SYST BP LT 130 MM HG: CPT | Performed by: INTERNAL MEDICINE

## 2023-02-22 PROCEDURE — 82728 ASSAY OF FERRITIN: CPT

## 2023-02-22 PROCEDURE — G8484 FLU IMMUNIZE NO ADMIN: HCPCS | Performed by: INTERNAL MEDICINE

## 2023-02-22 PROCEDURE — 82607 VITAMIN B-12: CPT

## 2023-02-22 PROCEDURE — 1090F PRES/ABSN URINE INCON ASSESS: CPT | Performed by: INTERNAL MEDICINE

## 2023-02-22 PROCEDURE — 1123F ACP DISCUSS/DSCN MKR DOCD: CPT | Performed by: INTERNAL MEDICINE

## 2023-02-22 PROCEDURE — 99214 OFFICE O/P EST MOD 30 MIN: CPT | Performed by: INTERNAL MEDICINE

## 2023-02-22 PROCEDURE — 82746 ASSAY OF FOLIC ACID SERUM: CPT

## 2023-02-22 PROCEDURE — G8417 CALC BMI ABV UP PARAM F/U: HCPCS | Performed by: INTERNAL MEDICINE

## 2023-02-22 PROCEDURE — 85025 COMPLETE CBC W/AUTO DIFF WBC: CPT

## 2023-02-22 PROCEDURE — 80053 COMPREHEN METABOLIC PANEL: CPT

## 2023-02-22 PROCEDURE — 36415 COLL VENOUS BLD VENIPUNCTURE: CPT

## 2023-02-22 PROCEDURE — 3078F DIAST BP <80 MM HG: CPT | Performed by: INTERNAL MEDICINE

## 2023-02-22 PROCEDURE — G8400 PT W/DXA NO RESULTS DOC: HCPCS | Performed by: INTERNAL MEDICINE

## 2023-02-22 PROCEDURE — G8427 DOCREV CUR MEDS BY ELIG CLIN: HCPCS | Performed by: INTERNAL MEDICINE

## 2023-02-22 RX ORDER — LORAZEPAM 0.5 MG/1
TABLET ORAL
COMMUNITY
Start: 2023-02-09

## 2023-02-22 RX ORDER — LEVOTHYROXINE SODIUM 112 UG/1
TABLET ORAL
COMMUNITY
Start: 2023-02-20

## 2023-02-22 RX ORDER — LENALIDOMIDE 5 MG/1
5 CAPSULE ORAL DAILY
Qty: 28 CAPSULE | Refills: 0 | Status: SHIPPED | OUTPATIENT
Start: 2023-02-22

## 2023-02-22 ASSESSMENT — PATIENT HEALTH QUESTIONNAIRE - PHQ9
SUM OF ALL RESPONSES TO PHQ QUESTIONS 1-9: 1
SUM OF ALL RESPONSES TO PHQ QUESTIONS 1-9: 1
2. FEELING DOWN, DEPRESSED OR HOPELESS: 1
SUM OF ALL RESPONSES TO PHQ QUESTIONS 1-9: 1
SUM OF ALL RESPONSES TO PHQ QUESTIONS 1-9: 1

## 2023-02-22 NOTE — PATIENT INSTRUCTIONS
Patient Instructions from Today's Visit    Reason for Visit:  Follow up - MDS/RARS-T    Diagnosis Information:  [email or web address]/. net/about-us/asco-answers-patient-education-materials/ogwn-wueadaf-jmdy-sheets    Plan: Your hemoglobin and red blood cell count has come down a little - we will add iron studies to your labs today to determine if this is related iron deficiency. We may need to consider reducing your dose to 5 days on, two days off (take weekends off). We will let you know if we need to proceed with this dose reduction. Please call us if you have any questions or concerns before your next visit. Follow Up: Follow up in 1 month with Dr. Seferino Lara, with labs prior.      Recent Lab Results:  Hospital Outpatient Visit on 02/22/2023   Component Date Value Ref Range Status    WBC 02/22/2023 7.9  4.3 - 11.1 K/uL Final    RBC 02/22/2023 2.93 (A)  4.05 - 5.2 M/uL Final    Hemoglobin 02/22/2023 10.2 (A)  11.7 - 15.4 g/dL Final    Hematocrit 02/22/2023 31.4 (A)  35.8 - 46.3 % Final    MCV 02/22/2023 107.2 (A)  82.0 - 102.0 FL Final    MCH 02/22/2023 34.8 (A)  26.1 - 32.9 PG Final    MCHC 02/22/2023 32.5  31.4 - 35.0 g/dL Final    RDW 02/22/2023 20.0 (A)  11.9 - 14.6 % Final    Platelets 85/30/0415 246  150 - 450 K/uL Final    MPV 02/22/2023 10.0  9.4 - 12.3 FL Final    nRBC 02/22/2023 0.00  0.0 - 0.2 K/uL Final    **Note: Absolute NRBC parameter is now reported with Hemogram**    Differential Type 02/22/2023 AUTOMATED    Final    Seg Neutrophils 02/22/2023 66  43 - 78 % Final    Lymphocytes 02/22/2023 15  13 - 44 % Final    Monocytes 02/22/2023 10  4.0 - 12.0 % Final    Eosinophils % 02/22/2023 7  0.5 - 7.8 % Final    Basophils 02/22/2023 1  0.0 - 2.0 % Final    Immature Granulocytes 02/22/2023 1  0.0 - 5.0 % Final    Segs Absolute 02/22/2023 5.2  1.7 - 8.2 K/UL Final    Absolute Lymph # 02/22/2023 1.2  0.5 - 4.6 K/UL Final    Absolute Mono # 02/22/2023 0.8  0.1 - 1.3 K/UL Final    Absolute Eos # 02/22/2023 0.5 0.0 - 0.8 K/UL Final    Basophils Absolute 02/22/2023 0.0  0.0 - 0.2 K/UL Final    Absolute Immature Granulocyte 02/22/2023 0.1  0.0 - 0.5 K/UL Final    Sodium 02/22/2023 137  133 - 143 mmol/L Final    Potassium 02/22/2023 3.6  3.5 - 5.1 mmol/L Final    Chloride 02/22/2023 102  101 - 110 mmol/L Final    CO2 02/22/2023 27  21 - 32 mmol/L Final    Anion Gap 02/22/2023 8  2 - 11 mmol/L Final    Glucose 02/22/2023 152 (A)  65 - 100 mg/dL Final    BUN 02/22/2023 25 (A)  8 - 23 MG/DL Final    Creatinine 02/22/2023 1.40 (A)  0.6 - 1.0 MG/DL Final    Est, Glom Filt Rate 02/22/2023 37 (A)  >60 ml/min/1.73m2 Final    Comment:      Pediatric calculator link: [email or web address].[email or web address] org/professionals/kdoqi/gfr_calculatorped       These results are not intended for use in patients <25years of age. eGFR results are calculated without a race factor using  the 2021 CKD-EPI equation. Careful clinical correlation is recommended, particularly when comparing to results calculated using previous equations. The CKD-EPI equation is less accurate in patients with extremes of muscle mass, extra-renal metabolism of creatinine, excessive creatine ingestion, or following therapy that affects renal tubular secretion.       Calcium 02/22/2023 8.2 (A)  8.3 - 10.4 MG/DL Final    Total Bilirubin 02/22/2023 0.8  0.2 - 1.1 MG/DL Final    ALT 02/22/2023 27  12 - 65 U/L Final    AST 02/22/2023 21  15 - 37 U/L Final    Alk Phosphatase 02/22/2023 74  50 - 136 U/L Final    Total Protein 02/22/2023 6.3  6.3 - 8.2 g/dL Final    Albumin 02/22/2023 3.1 (A)  3.2 - 4.6 g/dL Final    Globulin 02/22/2023 3.2  2.8 - 4.5 g/dL Final    Albumin/Globulin Ratio 02/22/2023 1.0  0.4 - 1.6   Final         Treatment Summary has been discussed and given to patient: N/A        -------------------------------------------------------------------------------------------------------------------  Please call our office at [phone] if you have any  of the following symptoms:   Fever of 100.5 or greater  Chills  Shortness of breath  Swelling or pain in one leg    After office hours an answering service is available and will contact a provider for emergencies or if you are experiencing any of the above symptoms. Patient does express an interest in My Chart. My Chart log in information explained on the after visit summary printout at the [facility] [address] desk.     Shilpi Jeffrey RN

## 2023-02-23 ENCOUNTER — TELEPHONE (OUTPATIENT)
Dept: CARDIOLOGY CLINIC | Age: 83
End: 2023-02-23

## 2023-02-23 LAB — PATH REV BLD -IMP: NORMAL

## 2023-02-23 NOTE — TELEPHONE ENCOUNTER
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 She had an episode af few months ago when HR went to 101 while sitting in chair after dinner and then another episode last night while doing nothing. 2 events of slightly elevated HR. I made next available as she is due for a 6 month check up. I also told her to make sure she is getting adequate intake of water. She will do this and call back PRN.

## 2023-02-23 NOTE — TELEPHONE ENCOUNTER
Pt states that she has had a couple of episodes of fast  last night back at regular rhythm today. Pt denies SOB, CP, and dizziness. Pt would appreciate a call back.

## 2023-03-15 DIAGNOSIS — D46.9 MDS (MYELODYSPLASTIC SYNDROME) (HCC): Primary | ICD-10-CM

## 2023-03-15 DIAGNOSIS — Z15.89 JAK-2 GENE MUTATION: ICD-10-CM

## 2023-03-16 ENCOUNTER — TELEPHONE (OUTPATIENT)
Dept: ONCOLOGY | Age: 83
End: 2023-03-16

## 2023-03-16 NOTE — TELEPHONE ENCOUNTER
Pt Son state pt had to go to ER @ [facility] a  couple days ago but since pt she left hospital she has been feeling very weak,was diagnosed UTI, & has numbness in both hands .

## 2023-03-16 NOTE — TELEPHONE ENCOUNTER
I reviewed the chart and the pt fell off a chair and was in the ER. Results and notes in Care  Everywhere.    Msg will be sent to the MD in the am.

## 2023-03-17 NOTE — TELEPHONE ENCOUNTER
Msg was sent to the NP Pool. NP   states for the patient to keep the 3/22/23 appt with Dr Wilber Young. If she becomes worse go to Urgent care or ER. Call to Rosaura who is on the [facility]. His mom is very weak and she has stopped her Revlimid. Although her labs were about the same in the ER. He will make sure she is at the appt next week.   He Verbalizes understanding of instructions

## 2023-03-21 NOTE — PROGRESS NOTES
(Patient not taking: Reported on 3/22/2023) 30 tablet 0     No current facility-administered medications for this visit. OBJECTIVE:  BP (!) 122/54 (Site: Right Upper Arm, Position: Sitting, Cuff Size: Medium Adult)   Pulse 54   Temp 98 °F (36.7 °C) (Oral)   Resp 24   Ht 5' 5\" (1.651 m)   Wt 157 lb 3.2 oz (71.3 kg)   SpO2 98%   BMI 26.16 kg/m²   Wt Readings from Last 3 Encounters:   03/22/23 157 lb 3.2 oz (71.3 kg)   02/22/23 154 lb (69.9 kg)   01/18/23 154 lb 1.6 oz (69.9 kg)       Physical Exam:  Patient alert and oriented x 3, in no acute distress  Integumentary: No Pallor, no icterus, scattered bruising is noted on bilateral upper extremities. HEENT: Moist mucous membranes, normal oropharynx  Lymph nodes: No cervical  lymphadenopathy is palpable. Cardiovascular:RRR, S1, S2 present, systolic murmur is audible  Lungs: Clear to auscultation, no rales or wheezing, no accessory muscle use  Abdomen: Soft, and non-tender on palpation, no organomegaly, bowel sounds audible  Extremities: Pitting bilateral lower extremity edema is unchanged. No calf tenderness.   Neurological: patient can follow commands and move all extremities    Labs:  Lab Results   Component Value Date    WBC 7.1 03/22/2023    HGB 8.6 (L) 03/22/2023    HCT 26.2 (L) 03/22/2023    .8 (H) 03/22/2023     03/22/2023     Lab Results   Component Value Date    NEUTROABS 12.4 (H) 04/18/2022    LYMPHOPCT 15 03/22/2023    LYMPHSABS 1.0 03/22/2023    MONOPCT 12 03/22/2023    MONOSABS 0.9 03/22/2023    EOSABS 0.3 03/22/2023    BASOPCT 1 03/22/2023     Lab Results   Component Value Date     03/22/2023    K 4.1 03/22/2023     03/22/2023    CO2 25 03/22/2023    BUN 24 (H) 03/22/2023    CREATININE 1.40 (H) 03/22/2023    GLUCOSE 103 (H) 03/22/2023    CALCIUM 8.3 03/22/2023    PROT 6.1 (L) 03/22/2023    LABALBU 2.8 (L) 03/22/2023    BILITOT 0.5 03/22/2023    ALKPHOS 88 03/22/2023    AST 39 (H) 03/22/2023    ALT 55 03/22/2023

## 2023-03-22 ENCOUNTER — OFFICE VISIT (OUTPATIENT)
Dept: ONCOLOGY | Age: 83
End: 2023-03-22
Payer: MEDICARE

## 2023-03-22 ENCOUNTER — HOSPITAL ENCOUNTER (OUTPATIENT)
Dept: LAB | Age: 83
Discharge: HOME | End: 2023-03-25
Payer: MEDICARE

## 2023-03-22 VITALS
RESPIRATION RATE: 24 BRPM | WEIGHT: 157.2 LBS | DIASTOLIC BLOOD PRESSURE: 54 MMHG | HEART RATE: 54 BPM | OXYGEN SATURATION: 98 % | HEIGHT: 65 IN | BODY MASS INDEX: 26.19 KG/M2 | SYSTOLIC BLOOD PRESSURE: 122 MMHG | TEMPERATURE: 98 F

## 2023-03-22 DIAGNOSIS — D46.9 MDS (MYELODYSPLASTIC SYNDROME) (HCC): ICD-10-CM

## 2023-03-22 DIAGNOSIS — D46.9 MDS (MYELODYSPLASTIC SYNDROME) (HCC): Primary | ICD-10-CM

## 2023-03-22 DIAGNOSIS — R20.2 NUMBNESS AND TINGLING IN BOTH HANDS: ICD-10-CM

## 2023-03-22 DIAGNOSIS — Z15.89 JAK-2 GENE MUTATION: ICD-10-CM

## 2023-03-22 DIAGNOSIS — R20.0 NUMBNESS AND TINGLING IN BOTH HANDS: ICD-10-CM

## 2023-03-22 LAB
ALBUMIN SERPL-MCNC: 2.8 G/DL (ref 3.2–4.6)
ALBUMIN/GLOB SERPL: 0.8 (ref 0.4–1.6)
ALP SERPL-CCNC: 88 U/L (ref 50–136)
ALT SERPL-CCNC: 55 U/L (ref 12–65)
ANION GAP SERPL CALC-SCNC: 6 MMOL/L (ref 2–11)
AST SERPL-CCNC: 39 U/L (ref 15–37)
BASOPHILS # BLD: 0 K/UL (ref 0–0.2)
BASOPHILS NFR BLD: 1 % (ref 0–2)
BILIRUB SERPL-MCNC: 0.5 MG/DL (ref 0.2–1.1)
BUN SERPL-MCNC: 24 MG/DL (ref 8–23)
CALCIUM SERPL-MCNC: 8.3 MG/DL (ref 8.3–10.4)
CHLORIDE SERPL-SCNC: 103 MMOL/L (ref 101–110)
CO2 SERPL-SCNC: 25 MMOL/L (ref 21–32)
CREAT SERPL-MCNC: 1.4 MG/DL (ref 0.6–1)
DIFFERENTIAL METHOD BLD: ABNORMAL
EOSINOPHIL # BLD: 0.3 K/UL (ref 0–0.8)
EOSINOPHIL NFR BLD: 4 % (ref 0.5–7.8)
ERYTHROCYTE [DISTWIDTH] IN BLOOD BY AUTOMATED COUNT: 17.4 % (ref 11.9–14.6)
GLOBULIN SER CALC-MCNC: 3.3 G/DL (ref 2.8–4.5)
GLUCOSE SERPL-MCNC: 103 MG/DL (ref 65–100)
HCT VFR BLD AUTO: 26.2 % (ref 35.8–46.3)
HGB BLD-MCNC: 8.6 G/DL (ref 11.7–15.4)
IMM GRANULOCYTES # BLD AUTO: 0.1 K/UL (ref 0–0.5)
IMM GRANULOCYTES NFR BLD AUTO: 1 % (ref 0–5)
LYMPHOCYTES # BLD: 1 K/UL (ref 0.5–4.6)
LYMPHOCYTES NFR BLD: 15 % (ref 13–44)
MCH RBC QN AUTO: 35.4 PG (ref 26.1–32.9)
MCHC RBC AUTO-ENTMCNC: 32.8 G/DL (ref 31.4–35)
MCV RBC AUTO: 107.8 FL (ref 82–102)
MONOCYTES # BLD: 0.9 K/UL (ref 0.1–1.3)
MONOCYTES NFR BLD: 12 % (ref 4–12)
NEUTS SEG # BLD: 4.8 K/UL (ref 1.7–8.2)
NEUTS SEG NFR BLD: 67 % (ref 43–78)
NRBC # BLD: 0 K/UL (ref 0–0.2)
PLATELET # BLD AUTO: 297 K/UL (ref 150–450)
PMV BLD AUTO: 9.3 FL (ref 9.4–12.3)
POTASSIUM SERPL-SCNC: 4.1 MMOL/L (ref 3.5–5.1)
PROT SERPL-MCNC: 6.1 G/DL (ref 6.3–8.2)
RBC # BLD AUTO: 2.43 M/UL (ref 4.05–5.2)
SODIUM SERPL-SCNC: 134 MMOL/L (ref 133–143)
WBC # BLD AUTO: 7.1 K/UL (ref 4.3–11.1)

## 2023-03-22 PROCEDURE — 80053 COMPREHEN METABOLIC PANEL: CPT

## 2023-03-22 PROCEDURE — 1123F ACP DISCUSS/DSCN MKR DOCD: CPT | Performed by: INTERNAL MEDICINE

## 2023-03-22 PROCEDURE — G8427 DOCREV CUR MEDS BY ELIG CLIN: HCPCS | Performed by: INTERNAL MEDICINE

## 2023-03-22 PROCEDURE — 99215 OFFICE O/P EST HI 40 MIN: CPT | Performed by: INTERNAL MEDICINE

## 2023-03-22 PROCEDURE — G8484 FLU IMMUNIZE NO ADMIN: HCPCS | Performed by: INTERNAL MEDICINE

## 2023-03-22 PROCEDURE — G8400 PT W/DXA NO RESULTS DOC: HCPCS | Performed by: INTERNAL MEDICINE

## 2023-03-22 PROCEDURE — 3074F SYST BP LT 130 MM HG: CPT | Performed by: INTERNAL MEDICINE

## 2023-03-22 PROCEDURE — 1090F PRES/ABSN URINE INCON ASSESS: CPT | Performed by: INTERNAL MEDICINE

## 2023-03-22 PROCEDURE — 85025 COMPLETE CBC W/AUTO DIFF WBC: CPT

## 2023-03-22 PROCEDURE — G8417 CALC BMI ABV UP PARAM F/U: HCPCS | Performed by: INTERNAL MEDICINE

## 2023-03-22 PROCEDURE — 3078F DIAST BP <80 MM HG: CPT | Performed by: INTERNAL MEDICINE

## 2023-03-22 PROCEDURE — 82668 ASSAY OF ERYTHROPOIETIN: CPT

## 2023-03-22 PROCEDURE — 1036F TOBACCO NON-USER: CPT | Performed by: INTERNAL MEDICINE

## 2023-03-22 PROCEDURE — 36415 COLL VENOUS BLD VENIPUNCTURE: CPT

## 2023-03-22 RX ORDER — SULFAMETHOXAZOLE AND TRIMETHOPRIM 800; 160 MG/1; MG/1
TABLET ORAL
COMMUNITY
Start: 2023-03-20

## 2023-03-22 ASSESSMENT — PATIENT HEALTH QUESTIONNAIRE - PHQ9
SUM OF ALL RESPONSES TO PHQ9 QUESTIONS 1 & 2: 2
SUM OF ALL RESPONSES TO PHQ QUESTIONS 1-9: 2
SUM OF ALL RESPONSES TO PHQ QUESTIONS 1-9: 2
1. LITTLE INTEREST OR PLEASURE IN DOING THINGS: 1
2. FEELING DOWN, DEPRESSED OR HOPELESS: 1
SUM OF ALL RESPONSES TO PHQ QUESTIONS 1-9: 2
SUM OF ALL RESPONSES TO PHQ QUESTIONS 1-9: 2

## 2023-03-22 NOTE — PATIENT INSTRUCTIONS
Patient Instructions from Today's Visit    Reason for Visit:  Follow up - MDS/RARS-T    Diagnosis Information:  https://www.[employer].com/. net/about-us/asco-answers-patient-education-materials/mubc-kpmnmsa-rchu-sheets    Plan: Will add epo level to labs today. Depending on results, we may arrange Retacrit/Procrit injections. It is recommended that you restart the Revlimid once you have finished your antibiotic. Please call us if you have any questions or concerns before your next visit. Follow Up:  1 month with Dr. Osmin Wei, with labs prior. Recent Lab Results:  No visits with results within 3 Day(s) from this visit.    Latest known visit with results is:   Hospital Outpatient Visit on 02/22/2023   Component Date Value Ref Range Status    WBC 02/22/2023 7.9  4.3 - 11.1 K/uL Final    RBC 02/22/2023 2.93 (A)  4.05 - 5.2 M/uL Final    Hemoglobin 02/22/2023 10.2 (A)  11.7 - 15.4 g/dL Final    Hematocrit 02/22/2023 31.4 (A)  35.8 - 46.3 % Final    MCV 02/22/2023 107.2 (A)  82.0 - 102.0 FL Final    MCH 02/22/2023 34.8 (A)  26.1 - 32.9 PG Final    MCHC 02/22/2023 32.5  31.4 - 35.0 g/dL Final    RDW 02/22/2023 20.0 (A)  11.9 - 14.6 % Final    Platelets 21/39/0464 246  150 - 450 K/uL Final    MPV 02/22/2023 10.0  9.4 - 12.3 FL Final    nRBC 02/22/2023 0.00  0.0 - 0.2 K/uL Final    **Note: Absolute NRBC parameter is now reported with Hemogram**    Differential Type 02/22/2023 AUTOMATED    Final    Seg Neutrophils 02/22/2023 66  43 - 78 % Final    Lymphocytes 02/22/2023 15  13 - 44 % Final    Monocytes 02/22/2023 10  4.0 - 12.0 % Final    Eosinophils % 02/22/2023 7  0.5 - 7.8 % Final    Basophils 02/22/2023 1  0.0 - 2.0 % Final    Immature Granulocytes 02/22/2023 1  0.0 - 5.0 % Final    Segs Absolute 02/22/2023 5.2  1.7 - 8.2 K/UL Final    Absolute Lymph # 02/22/2023 1.2  0.5 - 4.6 K/UL Final    Absolute Mono # 02/22/2023 0.8  0.1 - 1.3 K/UL Final    Absolute Eos # 02/22/2023 0.5  0.0 - 0.8 K/UL Final    Basophils Absolute

## 2023-03-23 DIAGNOSIS — N18.32 CHRONIC KIDNEY DISEASE, STAGE 3B (HCC): Primary | ICD-10-CM

## 2023-03-23 DIAGNOSIS — N18.30 STAGE 3 CHRONIC KIDNEY DISEASE, UNSPECIFIED WHETHER STAGE 3A OR 3B CKD (HCC): ICD-10-CM

## 2023-03-23 LAB — EPO SERPL-ACNC: 40.6 MIU/ML (ref 2.6–18.5)

## 2023-03-23 RX ORDER — FAMOTIDINE 10 MG/ML
20 INJECTION, SOLUTION INTRAVENOUS
OUTPATIENT
Start: 2023-03-30

## 2023-03-23 RX ORDER — EPINEPHRINE 1 MG/ML
0.3 INJECTION, SOLUTION, CONCENTRATE INTRAVENOUS PRN
OUTPATIENT
Start: 2023-03-30

## 2023-03-23 RX ORDER — DIPHENHYDRAMINE HYDROCHLORIDE 50 MG/ML
50 INJECTION INTRAMUSCULAR; INTRAVENOUS
OUTPATIENT
Start: 2023-03-30

## 2023-03-23 RX ORDER — ACETAMINOPHEN 325 MG/1
650 TABLET ORAL
OUTPATIENT
Start: 2023-03-30

## 2023-03-23 RX ORDER — ONDANSETRON 2 MG/ML
8 INJECTION INTRAMUSCULAR; INTRAVENOUS
OUTPATIENT
Start: 2023-03-30

## 2023-03-23 RX ORDER — SODIUM CHLORIDE 9 MG/ML
INJECTION, SOLUTION INTRAVENOUS CONTINUOUS
OUTPATIENT
Start: 2023-03-30

## 2023-03-23 RX ORDER — ALBUTEROL SULFATE 90 UG/1
4 AEROSOL, METERED RESPIRATORY (INHALATION) PRN
OUTPATIENT
Start: 2023-03-30

## 2023-03-27 DIAGNOSIS — Z15.89 JAK-2 GENE MUTATION: ICD-10-CM

## 2023-03-27 DIAGNOSIS — D46.9 MDS (MYELODYSPLASTIC SYNDROME) (HCC): ICD-10-CM

## 2023-03-29 ENCOUNTER — HOSPITAL ENCOUNTER (OUTPATIENT)
Dept: INFUSION THERAPY | Age: 83
Discharge: HOME | End: 2023-03-29
Payer: MEDICARE

## 2023-03-29 ENCOUNTER — HOSPITAL ENCOUNTER (OUTPATIENT)
Dept: LAB | Age: 83
Discharge: HOME | End: 2023-04-01

## 2023-03-29 VITALS
SYSTOLIC BLOOD PRESSURE: 124 MMHG | HEART RATE: 56 BPM | TEMPERATURE: 98.1 F | RESPIRATION RATE: 18 BRPM | DIASTOLIC BLOOD PRESSURE: 64 MMHG | OXYGEN SATURATION: 97 %

## 2023-03-29 DIAGNOSIS — N18.30 STAGE 3 CHRONIC KIDNEY DISEASE, UNSPECIFIED WHETHER STAGE 3A OR 3B CKD (HCC): ICD-10-CM

## 2023-03-29 DIAGNOSIS — N18.32 CHRONIC KIDNEY DISEASE, STAGE 3B (HCC): Primary | ICD-10-CM

## 2023-03-29 PROCEDURE — 6360000002 HC RX W HCPCS: Performed by: INTERNAL MEDICINE

## 2023-03-29 PROCEDURE — 96372 THER/PROPH/DIAG INJ SC/IM: CPT

## 2023-03-29 RX ORDER — ACETAMINOPHEN 325 MG/1
650 TABLET ORAL
Status: CANCELLED | OUTPATIENT
Start: 2023-04-05

## 2023-03-29 RX ORDER — ALBUTEROL SULFATE 90 UG/1
4 AEROSOL, METERED RESPIRATORY (INHALATION) PRN
Status: CANCELLED | OUTPATIENT
Start: 2023-04-05

## 2023-03-29 RX ORDER — DIPHENHYDRAMINE HYDROCHLORIDE 50 MG/ML
50 INJECTION INTRAMUSCULAR; INTRAVENOUS
Status: CANCELLED | OUTPATIENT
Start: 2023-04-05

## 2023-03-29 RX ORDER — SODIUM CHLORIDE 9 MG/ML
INJECTION, SOLUTION INTRAVENOUS CONTINUOUS
Status: CANCELLED | OUTPATIENT
Start: 2023-04-05

## 2023-03-29 RX ORDER — ONDANSETRON 2 MG/ML
8 INJECTION INTRAMUSCULAR; INTRAVENOUS
Status: CANCELLED | OUTPATIENT
Start: 2023-04-05

## 2023-03-29 RX ORDER — EPINEPHRINE 1 MG/ML
0.3 INJECTION, SOLUTION, CONCENTRATE INTRAVENOUS PRN
Status: CANCELLED | OUTPATIENT
Start: 2023-04-05

## 2023-03-29 RX ADMIN — EPOETIN ALFA-EPBX 20000 UNITS: 20000 INJECTION, SOLUTION INTRAVENOUS; SUBCUTANEOUS at 12:11

## 2023-03-29 NOTE — PROGRESS NOTES
Arrived to the [facility]. Retacrit completed. Patient tolerated well. Any issues or concerns during appointment: none. Patient instructed to call provider with temperature of 100.4 or greater or nausea/vomiting/diarrhea or pain not controlled by medications. Discharged via w/c.

## 2023-03-31 DIAGNOSIS — D46.9 MDS (MYELODYSPLASTIC SYNDROME) (HCC): ICD-10-CM

## 2023-03-31 DIAGNOSIS — Z15.89 JAK-2 GENE MUTATION: ICD-10-CM

## 2023-03-31 RX ORDER — LENALIDOMIDE 5 MG/1
5 CAPSULE ORAL DAILY
Qty: 30 CAPSULE | Refills: 5 | Status: SHIPPED | OUTPATIENT
Start: 2023-03-31

## 2023-04-05 ENCOUNTER — HOSPITAL ENCOUNTER (OUTPATIENT)
Dept: INFUSION THERAPY | Age: 83
Discharge: HOME | End: 2023-04-05
Payer: MEDICARE

## 2023-04-05 VITALS — OXYGEN SATURATION: 100 % | RESPIRATION RATE: 16 BRPM | TEMPERATURE: 97.9 F | HEART RATE: 102 BPM

## 2023-04-05 DIAGNOSIS — N18.30 STAGE 3 CHRONIC KIDNEY DISEASE, UNSPECIFIED WHETHER STAGE 3A OR 3B CKD (HCC): ICD-10-CM

## 2023-04-05 DIAGNOSIS — N18.32 CHRONIC KIDNEY DISEASE, STAGE 3B (HCC): Primary | ICD-10-CM

## 2023-04-05 LAB
BASOPHILS # BLD: 0.1 K/UL (ref 0–0.2)
BASOPHILS NFR BLD: 1 % (ref 0–2)
DIFFERENTIAL METHOD BLD: ABNORMAL
EOSINOPHIL # BLD: 0.4 K/UL (ref 0–0.8)
EOSINOPHIL NFR BLD: 4 % (ref 0.5–7.8)
ERYTHROCYTE [DISTWIDTH] IN BLOOD BY AUTOMATED COUNT: 17.7 % (ref 11.9–14.6)
HCT VFR BLD AUTO: 32.4 % (ref 35.8–46.3)
HGB BLD-MCNC: 10.7 G/DL (ref 11.7–15.4)
IMM GRANULOCYTES # BLD AUTO: 0.2 K/UL (ref 0–0.5)
IMM GRANULOCYTES NFR BLD AUTO: 2 % (ref 0–5)
LYMPHOCYTES # BLD: 1.3 K/UL (ref 0.5–4.6)
LYMPHOCYTES NFR BLD: 12 % (ref 13–44)
MCH RBC QN AUTO: 35.8 PG (ref 26.1–32.9)
MCHC RBC AUTO-ENTMCNC: 33 G/DL (ref 31.4–35)
MCV RBC AUTO: 108.4 FL (ref 82–102)
MONOCYTES # BLD: 1 K/UL (ref 0.1–1.3)
MONOCYTES NFR BLD: 10 % (ref 4–12)
NEUTS SEG # BLD: 7.6 K/UL (ref 1.7–8.2)
NEUTS SEG NFR BLD: 71 % (ref 43–78)
NRBC # BLD: 0 K/UL (ref 0–0.2)
PLATELET # BLD AUTO: 299 K/UL (ref 150–450)
PMV BLD AUTO: 9.7 FL (ref 9.4–12.3)
RBC # BLD AUTO: 2.99 M/UL (ref 4.05–5.2)
WBC # BLD AUTO: 10.7 K/UL (ref 4.3–11.1)

## 2023-04-05 PROCEDURE — 99211 OFF/OP EST MAY X REQ PHY/QHP: CPT

## 2023-04-05 PROCEDURE — 85025 COMPLETE CBC W/AUTO DIFF WBC: CPT

## 2023-04-05 RX ORDER — ACETAMINOPHEN 325 MG/1
650 TABLET ORAL
OUTPATIENT
Start: 2023-04-12

## 2023-04-05 RX ORDER — ALBUTEROL SULFATE 90 UG/1
4 AEROSOL, METERED RESPIRATORY (INHALATION) PRN
OUTPATIENT
Start: 2023-04-12

## 2023-04-05 RX ORDER — ONDANSETRON 2 MG/ML
8 INJECTION INTRAMUSCULAR; INTRAVENOUS
OUTPATIENT
Start: 2023-04-12

## 2023-04-05 RX ORDER — DIPHENHYDRAMINE HYDROCHLORIDE 50 MG/ML
50 INJECTION INTRAMUSCULAR; INTRAVENOUS
OUTPATIENT
Start: 2023-04-12

## 2023-04-05 RX ORDER — SODIUM CHLORIDE 9 MG/ML
INJECTION, SOLUTION INTRAVENOUS CONTINUOUS
OUTPATIENT
Start: 2023-04-12

## 2023-04-05 RX ORDER — EPINEPHRINE 1 MG/ML
0.3 INJECTION, SOLUTION, CONCENTRATE INTRAVENOUS PRN
OUTPATIENT
Start: 2023-04-12

## 2023-04-05 NOTE — PROGRESS NOTES
Arrived to the [facility]. Labs drawn peripherally in dept. Hgb 10.7 today. Retacrit injection held as per order for Hgb greater than 10.0   Any issues or concerns during appointment: None. Patient aware of next infusion appointment on 04/12/2023 (date) at 1330 (time). Patient aware of next lab on 04/12/2023 (date) at 0 with lab (time). Patient instructed to call provider with temperature of 100.4 or greater or nausea/vomiting/ diarrhea or pain not controlled by medications  Discharged per w/c.

## 2023-04-10 PROBLEM — G56.20 ULNAR NEUROPATHY: Status: ACTIVE | Noted: 2023-04-10

## 2023-04-10 PROBLEM — G81.91 RIGHT HEMIPARESIS (HCC): Status: ACTIVE | Noted: 2023-04-10

## 2023-04-10 PROBLEM — G56.03 BILATERAL CARPAL TUNNEL SYNDROME: Status: ACTIVE | Noted: 2023-04-10

## 2023-04-12 ENCOUNTER — TELEPHONE (OUTPATIENT)
Dept: ONCOLOGY | Age: 83
End: 2023-04-12

## 2023-04-12 ENCOUNTER — HOSPITAL ENCOUNTER (OUTPATIENT)
Dept: INFUSION THERAPY | Age: 83
Discharge: HOME | End: 2023-04-12
Payer: MEDICARE

## 2023-04-12 VITALS
DIASTOLIC BLOOD PRESSURE: 70 MMHG | RESPIRATION RATE: 16 BRPM | HEART RATE: 59 BPM | TEMPERATURE: 98.6 F | SYSTOLIC BLOOD PRESSURE: 114 MMHG | OXYGEN SATURATION: 96 %

## 2023-04-12 DIAGNOSIS — N18.32 CHRONIC KIDNEY DISEASE, STAGE 3B (HCC): ICD-10-CM

## 2023-04-12 DIAGNOSIS — N18.30 STAGE 3 CHRONIC KIDNEY DISEASE, UNSPECIFIED WHETHER STAGE 3A OR 3B CKD (HCC): ICD-10-CM

## 2023-04-12 DIAGNOSIS — N18.32 CHRONIC KIDNEY DISEASE, STAGE 3B (HCC): Primary | ICD-10-CM

## 2023-04-12 LAB
ERYTHROCYTE [DISTWIDTH] IN BLOOD BY AUTOMATED COUNT: 17.4 % (ref 11.9–14.6)
HCT VFR BLD AUTO: 31.3 % (ref 35.8–46.3)
HGB BLD-MCNC: 10.2 G/DL (ref 11.7–15.4)
MCH RBC QN AUTO: 35.7 PG (ref 26.1–32.9)
MCHC RBC AUTO-ENTMCNC: 32.6 G/DL (ref 31.4–35)
MCV RBC AUTO: 109.4 FL (ref 82–102)
NRBC # BLD: 0 K/UL (ref 0–0.2)
PLATELET # BLD AUTO: 142 K/UL (ref 150–450)
PMV BLD AUTO: 9.8 FL (ref 9.4–12.3)
RBC # BLD AUTO: 2.86 M/UL (ref 4.05–5.2)
WBC # BLD AUTO: 8.3 K/UL (ref 4.3–11.1)

## 2023-04-12 PROCEDURE — 85027 COMPLETE CBC AUTOMATED: CPT

## 2023-04-12 PROCEDURE — 36415 COLL VENOUS BLD VENIPUNCTURE: CPT

## 2023-04-12 PROCEDURE — 6360000002 HC RX W HCPCS: Performed by: INTERNAL MEDICINE

## 2023-04-12 PROCEDURE — 96372 THER/PROPH/DIAG INJ SC/IM: CPT

## 2023-04-12 RX ORDER — ALBUTEROL SULFATE 90 UG/1
4 AEROSOL, METERED RESPIRATORY (INHALATION) PRN
OUTPATIENT
Start: 2023-04-19

## 2023-04-12 RX ORDER — EPINEPHRINE 1 MG/ML
0.3 INJECTION, SOLUTION, CONCENTRATE INTRAVENOUS PRN
OUTPATIENT
Start: 2023-04-19

## 2023-04-12 RX ORDER — ACETAMINOPHEN 325 MG/1
650 TABLET ORAL
OUTPATIENT
Start: 2023-04-19

## 2023-04-12 RX ORDER — SODIUM CHLORIDE 9 MG/ML
INJECTION, SOLUTION INTRAVENOUS CONTINUOUS
OUTPATIENT
Start: 2023-04-19

## 2023-04-12 RX ORDER — DIPHENHYDRAMINE HYDROCHLORIDE 50 MG/ML
50 INJECTION INTRAMUSCULAR; INTRAVENOUS
OUTPATIENT
Start: 2023-04-19

## 2023-04-12 RX ORDER — ONDANSETRON 2 MG/ML
8 INJECTION INTRAMUSCULAR; INTRAVENOUS
OUTPATIENT
Start: 2023-04-19

## 2023-04-12 RX ADMIN — EPOETIN ALFA-EPBX 20000 UNITS: 20000 INJECTION, SOLUTION INTRAVENOUS; SUBCUTANEOUS at 14:21

## 2023-04-12 NOTE — PROGRESS NOTES
Arrived to the [facility]. retacrit completed. Provided education on injection    Patient instructed to report any side affects to ordering provider. Patient tolerated well. Any issues or concerns during appointment: none. Patient aware of next infusion appointment on 4/27/2023 (date) at 1330 (time). Discharged in wheelchair.

## 2023-04-18 ENCOUNTER — HOSPITAL ENCOUNTER (EMERGENCY)
Age: 83
Discharge: HOME | End: 2023-04-18
Attending: EMERGENCY MEDICINE
Payer: MEDICARE

## 2023-04-18 ENCOUNTER — APPOINTMENT (OUTPATIENT)
Dept: GENERAL RADIOLOGY | Age: 83
End: 2023-04-18
Payer: MEDICARE

## 2023-04-18 VITALS
RESPIRATION RATE: 15 BRPM | HEIGHT: 65 IN | BODY MASS INDEX: 25.66 KG/M2 | SYSTOLIC BLOOD PRESSURE: 156 MMHG | TEMPERATURE: 98.1 F | WEIGHT: 154 LBS | OXYGEN SATURATION: 97 % | DIASTOLIC BLOOD PRESSURE: 76 MMHG | HEART RATE: 70 BPM

## 2023-04-18 DIAGNOSIS — R23.2 ABNORMAL FLUSHING AND SWEATING: Primary | ICD-10-CM

## 2023-04-18 DIAGNOSIS — R61 ABNORMAL FLUSHING AND SWEATING: Primary | ICD-10-CM

## 2023-04-18 LAB
ALBUMIN SERPL-MCNC: 3.4 G/DL (ref 3.2–4.6)
ALBUMIN/GLOB SERPL: 1.2 (ref 0.4–1.6)
ALP SERPL-CCNC: 81 U/L (ref 50–136)
ALT SERPL-CCNC: 31 U/L (ref 12–65)
ANION GAP SERPL CALC-SCNC: 6 MMOL/L (ref 2–11)
APPEARANCE UR: NORMAL
AST SERPL-CCNC: 35 U/L (ref 15–37)
BASOPHILS # BLD: 0.1 K/UL (ref 0–0.2)
BASOPHILS NFR BLD: 1 % (ref 0–2)
BILIRUB SERPL-MCNC: 1 MG/DL (ref 0.2–1.1)
BILIRUB UR QL: NEGATIVE
BUN SERPL-MCNC: 17 MG/DL (ref 8–23)
CALCIUM SERPL-MCNC: 8.6 MG/DL (ref 8.3–10.4)
CHLORIDE SERPL-SCNC: 102 MMOL/L (ref 101–110)
CO2 SERPL-SCNC: 24 MMOL/L (ref 21–32)
COLOR UR: NORMAL
CREAT SERPL-MCNC: 1.1 MG/DL (ref 0.6–1)
DIFFERENTIAL METHOD BLD: ABNORMAL
EOSINOPHIL # BLD: 0 K/UL (ref 0–0.8)
EOSINOPHIL NFR BLD: 0 % (ref 0.5–7.8)
ERYTHROCYTE [DISTWIDTH] IN BLOOD BY AUTOMATED COUNT: 18 % (ref 11.9–14.6)
GLOBULIN SER CALC-MCNC: 2.8 G/DL (ref 2.8–4.5)
GLUCOSE SERPL-MCNC: 99 MG/DL (ref 65–100)
GLUCOSE UR STRIP.AUTO-MCNC: NEGATIVE MG/DL
HCT VFR BLD AUTO: 31.5 % (ref 35.8–46.3)
HGB BLD-MCNC: 10.8 G/DL (ref 11.7–15.4)
HGB UR QL STRIP: NEGATIVE
IMM GRANULOCYTES # BLD AUTO: 0.1 K/UL (ref 0–0.5)
IMM GRANULOCYTES NFR BLD AUTO: 1 % (ref 0–5)
KETONES UR QL STRIP.AUTO: NEGATIVE MG/DL
LACTATE SERPL-SCNC: 1.2 MMOL/L (ref 0.4–2)
LEUKOCYTE ESTERASE UR QL STRIP.AUTO: NEGATIVE
LYMPHOCYTES # BLD: 1.1 K/UL (ref 0.5–4.6)
LYMPHOCYTES NFR BLD: 11 % (ref 13–44)
MCH RBC QN AUTO: 37.2 PG (ref 26.1–32.9)
MCHC RBC AUTO-ENTMCNC: 34.3 G/DL (ref 31.4–35)
MCV RBC AUTO: 108.6 FL (ref 82–102)
MONOCYTES # BLD: 1.5 K/UL (ref 0.1–1.3)
MONOCYTES NFR BLD: 15 % (ref 4–12)
NEUTS SEG # BLD: 7.5 K/UL (ref 1.7–8.2)
NEUTS SEG NFR BLD: 73 % (ref 43–78)
NITRITE UR QL STRIP.AUTO: NEGATIVE
NRBC # BLD: 0 K/UL (ref 0–0.2)
PH UR STRIP: 7.5 (ref 5–9)
PLATELET # BLD AUTO: 371 K/UL (ref 150–450)
PMV BLD AUTO: 9.8 FL (ref 9.4–12.3)
POTASSIUM SERPL-SCNC: 3.4 MMOL/L (ref 3.5–5.1)
PROCALCITONIN SERPL-MCNC: <0.05 NG/ML (ref 0–0.49)
PROT SERPL-MCNC: 6.2 G/DL (ref 6.3–8.2)
PROT UR STRIP-MCNC: NEGATIVE MG/DL
RBC # BLD AUTO: 2.9 M/UL (ref 4.05–5.2)
SODIUM SERPL-SCNC: 132 MMOL/L (ref 133–143)
SP GR UR REFRACTOMETRY: 1.01 (ref 1–1.02)
TSH W FREE THYROID IF ABNORMAL: 1.84 UIU/ML (ref 0.36–3.74)
UROBILINOGEN UR QL STRIP.AUTO: 0.2 EU/DL (ref 0.2–1)
WBC # BLD AUTO: 10.3 K/UL (ref 4.3–11.1)

## 2023-04-18 PROCEDURE — 99284 EMERGENCY DEPT VISIT MOD MDM: CPT

## 2023-04-18 PROCEDURE — 80053 COMPREHEN METABOLIC PANEL: CPT

## 2023-04-18 PROCEDURE — 84443 ASSAY THYROID STIM HORMONE: CPT

## 2023-04-18 PROCEDURE — 71045 X-RAY EXAM CHEST 1 VIEW: CPT

## 2023-04-18 PROCEDURE — 84145 PROCALCITONIN (PCT): CPT

## 2023-04-18 PROCEDURE — 6370000000 HC RX 637 (ALT 250 FOR IP): Performed by: EMERGENCY MEDICINE

## 2023-04-18 PROCEDURE — 81003 URINALYSIS AUTO W/O SCOPE: CPT

## 2023-04-18 PROCEDURE — 83605 ASSAY OF LACTIC ACID: CPT

## 2023-04-18 PROCEDURE — 85025 COMPLETE CBC W/AUTO DIFF WBC: CPT

## 2023-04-18 RX ORDER — ACETAMINOPHEN 500 MG
1000 TABLET ORAL
Status: COMPLETED | OUTPATIENT
Start: 2023-04-18 | End: 2023-04-18

## 2023-04-18 RX ORDER — POTASSIUM CHLORIDE 20 MEQ/1
40 TABLET, EXTENDED RELEASE ORAL
Status: COMPLETED | OUTPATIENT
Start: 2023-04-18 | End: 2023-04-18

## 2023-04-18 RX ADMIN — ACETAMINOPHEN 1000 MG: 500 TABLET, FILM COATED ORAL at 10:58

## 2023-04-18 RX ADMIN — POTASSIUM CHLORIDE 40 MEQ: 1500 TABLET, EXTENDED RELEASE ORAL at 14:42

## 2023-04-18 ASSESSMENT — PAIN DESCRIPTION - LOCATION
LOCATION: SHOULDER
LOCATION: SHOULDER

## 2023-04-18 ASSESSMENT — PAIN SCALES - GENERAL
PAINLEVEL_OUTOF10: 8
PAINLEVEL_OUTOF10: 0
PAINLEVEL_OUTOF10: 0
PAINLEVEL_OUTOF10: 8

## 2023-04-18 ASSESSMENT — LIFESTYLE VARIABLES
HOW OFTEN DO YOU HAVE A DRINK CONTAINING ALCOHOL: NEVER
HOW MANY STANDARD DRINKS CONTAINING ALCOHOL DO YOU HAVE ON A TYPICAL DAY: PATIENT DOES NOT DRINK

## 2023-04-18 ASSESSMENT — PAIN - FUNCTIONAL ASSESSMENT
PAIN_FUNCTIONAL_ASSESSMENT: 0-10
PAIN_FUNCTIONAL_ASSESSMENT: 0-10

## 2023-04-18 ASSESSMENT — PAIN DESCRIPTION - ORIENTATION: ORIENTATION: RIGHT

## 2023-04-18 NOTE — ED NOTES
MD Villa notified pt reports taking medication prescribed by MD Prachi Gonzáles r/t myelodysplastic syndrome.       Samaria Gr RN  04/18/23 2969

## 2023-04-18 NOTE — ED NOTES
I have reviewed discharge instructions with the patient. The patient verbalized understanding. Patient left ED via Discharge Method: stretcher to Nursing Home with transport. Opportunity for questions and clarification provided. Patient given 0 scripts. To continue your aftercare when you leave the hospital, you may receive an automated call from our care team to check in on how you are doing. This is a free service and part of our promise to provide the best care and service to meet your aftercare needs.  If you have questions, or wish to unsubscribe from this service please call [phone]. Thank you for Choosing our [facility] Emergency Department.        Rosa Pang RN  04/18/23 7659

## 2023-04-18 NOTE — ED PROVIDER NOTES
Emergency Department Provider Note       PCP: Jonathon Lloyd MD   Age: 78 y.o. Sex: female     DISPOSITION Decision To Discharge 04/18/2023 02:20:49 PM       ICD-10-CM    1. Abnormal flushing and sweating  R23.2     R61           Medical Decision Making     Complexity of Problems Addressed:  1 or more acute illnesses that pose a threat to life or bodily function. Data Reviewed and Analyzed:  Category 1:   I independently ordered and reviewed each unique test.  I reviewed external records: ED visit note from an outside group. I reviewed external records: provider visit note from outside specialist.  I reviewed external records: previous lab results from outside ED. The patients assessment required an independent historian: Patient's son who I spoke to on the phone who is a Pharm. D. The reason they were needed is important historical information not provided by the patient. Category 2:     I independently interpreted the cardiac monitor rhythm strip Sinus rhythm. I interpreted the X-rays normal xray. Category 3: Discussion of management or test interpretation. Patient looks well she has stable vital signs and I reviewed her labs. Slight decrease in her sodium and potassium. No signs of infection at this time. Long session of the phone with her family and they are okay with her going back to assisted living with return precautions if symptoms get worse and they will try to limit free water and watch her thyroid. Risk of Complications and/or Morbidity of Patient Management:  Patient was discharged risks and benefits of hospitalization were considered. Shared medical decision making was utilized in creating the patients health plan today. Is this patient to be included in the SEP-1 core measure due to severe sepsis or septic shock? No Exclusion criteria - the patient is NOT to be included for SEP-1 Core Measure due to:  Infection is not suspected      History      Jadaaletha Monse is a 78

## 2023-04-18 NOTE — ED TRIAGE NOTES
Perfuse sweating for past 2 days. Pt states she has a UTI, and has finished two rounds of antibiotics. No treatment by EMS.   Per EMS:   Temp 98.1  BP: 147/70  HR: 68  Resp: 14  BGL: 115  A+Ox4

## 2023-04-18 NOTE — ED NOTES
Calling for pt transport home      [facility], [address] [facility]  04/18/23 0422

## 2023-04-20 DIAGNOSIS — D46.9 MDS (MYELODYSPLASTIC SYNDROME) (HCC): Primary | ICD-10-CM

## 2023-04-20 DIAGNOSIS — Z15.89 JAK-2 GENE MUTATION: ICD-10-CM

## 2023-04-21 ENCOUNTER — TELEPHONE (OUTPATIENT)
Dept: ONCOLOGY | Age: 83
End: 2023-04-21

## 2023-04-25 ENCOUNTER — TELEPHONE (OUTPATIENT)
Dept: ONCOLOGY | Age: 83
End: 2023-04-25

## 2023-04-25 NOTE — TELEPHONE ENCOUNTER
Received call from pt's son Collette Click stating that he will bring pt's income in on [employer] while [facility]. As soon as I get income documents I will forward to [facility].

## 2023-04-26 ENCOUNTER — TELEPHONE (OUTPATIENT)
Dept: ONCOLOGY | Age: 83
End: 2023-04-26

## 2023-04-26 NOTE — PROGRESS NOTES
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 Lindsey Alvarado is a pleasant 78years old female patient with multiple medical problems including anxiety, atrial fibrillation status post watchman, status post aortic valve replacement, breast cancer (ER/VA positive, HER2/lloyd positive, diagnosed in 2005, received chemo with AC + T and radiation for LN +ve disease), cerebrovascular disease, TIA, among others who has been referred for evaluation and management of macrocytic anemia, thrombocytosis and neutrophilic leukocytosis. She presented to ED in August 2022 with complaints of malaise, generalized weakness and progressively worsening fatigue. On 8/15/2022, labs showed WBC count of 15.4, MCV of 104, and platelet count of 979. Hemoglobin 12.9. She was admitted with a diagnosis of urinary tract infection. Based on her labs, Synthroid dose was increased. Oncology/hematology overview:    Diagnosis: RARS-T  Date of diagnosis: 10/04/2022  Molecular studies:  JAK2 V617F; SF3B1 S543I; SH2B3 C514IIV*70 DETECTED  Treatment intent: palliative  Disease status:measurable disease  Work up/treatment summary:  10/4/2022: Bone marrow biopsy showed marrow cellularity of 70 to 80% with trilineage hematopoietic maturation, atypical melanocytic hyperplasia and ring sideroblasts. Bone marrow is hypercellular for age with changes consistent with MDS/MPN. Flow cytometry showed no definitive diagnostic immunophenotypic abnormalities. MDS FISH panel negative, karyotype 55 XX (20)  NO alterations detected in the following genes:   FLT3, IDH1, IDH2, NPM1   SNV/INDEL: JAK2 V617F; SF3B1 T300F; SH2B3 N061OZC*38 DETECTED    Started lenalidomide 5 mg PO daily on 11/3/2022    1/18/2023:Patient is accompanied by her son during today's visit.   She was sick with COVID a few weeks ago with resultant

## 2023-04-26 NOTE — TELEPHONE ENCOUNTER
PT has been approved for BMSPAF , [facility] will notify and dispense mediation. Made pt's son Allison Soliz aware.

## 2023-04-27 ENCOUNTER — HOSPITAL ENCOUNTER (OUTPATIENT)
Dept: LAB | Age: 83
Discharge: HOME | End: 2023-04-27
Payer: MEDICARE

## 2023-04-27 ENCOUNTER — OFFICE VISIT (OUTPATIENT)
Dept: ONCOLOGY | Age: 83
End: 2023-04-27
Payer: MEDICARE

## 2023-04-27 ENCOUNTER — HOSPITAL ENCOUNTER (OUTPATIENT)
Dept: INFUSION THERAPY | Age: 83
Discharge: HOME | End: 2023-04-27

## 2023-04-27 ENCOUNTER — TELEPHONE (OUTPATIENT)
Dept: ONCOLOGY | Age: 83
End: 2023-04-27

## 2023-04-27 VITALS
RESPIRATION RATE: 12 BRPM | HEART RATE: 67 BPM | TEMPERATURE: 98.1 F | HEIGHT: 65 IN | DIASTOLIC BLOOD PRESSURE: 59 MMHG | SYSTOLIC BLOOD PRESSURE: 128 MMHG | WEIGHT: 151.4 LBS | OXYGEN SATURATION: 97 % | BODY MASS INDEX: 25.22 KG/M2

## 2023-04-27 DIAGNOSIS — Z15.89 JAK-2 GENE MUTATION: ICD-10-CM

## 2023-04-27 DIAGNOSIS — D46.9 MDS (MYELODYSPLASTIC SYNDROME) (HCC): ICD-10-CM

## 2023-04-27 LAB
ALBUMIN SERPL-MCNC: 3.1 G/DL (ref 3.2–4.6)
ALBUMIN/GLOB SERPL: 0.9 (ref 0.4–1.6)
ALP SERPL-CCNC: 76 U/L (ref 50–136)
ALT SERPL-CCNC: 33 U/L (ref 12–65)
ANION GAP SERPL CALC-SCNC: 7 MMOL/L (ref 2–11)
AST SERPL-CCNC: 31 U/L (ref 15–37)
BASOPHILS # BLD: 0.1 K/UL (ref 0–0.2)
BASOPHILS NFR BLD: 1 % (ref 0–2)
BILIRUB SERPL-MCNC: 0.9 MG/DL (ref 0.2–1.1)
BUN SERPL-MCNC: 26 MG/DL (ref 8–23)
CALCIUM SERPL-MCNC: 8.9 MG/DL (ref 8.3–10.4)
CHLORIDE SERPL-SCNC: 103 MMOL/L (ref 101–110)
CO2 SERPL-SCNC: 26 MMOL/L (ref 21–32)
CREAT SERPL-MCNC: 1.1 MG/DL (ref 0.6–1)
DIFFERENTIAL METHOD BLD: ABNORMAL
EOSINOPHIL # BLD: 0.1 K/UL (ref 0–0.8)
EOSINOPHIL NFR BLD: 1 % (ref 0.5–7.8)
ERYTHROCYTE [DISTWIDTH] IN BLOOD BY AUTOMATED COUNT: 17.5 % (ref 11.9–14.6)
GLOBULIN SER CALC-MCNC: 3.4 G/DL (ref 2.8–4.5)
GLUCOSE SERPL-MCNC: 105 MG/DL (ref 65–100)
HCT VFR BLD AUTO: 31.9 % (ref 35.8–46.3)
HGB BLD-MCNC: 10.6 G/DL (ref 11.7–15.4)
IMM GRANULOCYTES # BLD AUTO: 0.1 K/UL (ref 0–0.5)
IMM GRANULOCYTES NFR BLD AUTO: 1 % (ref 0–5)
LYMPHOCYTES # BLD: 1.2 K/UL (ref 0.5–4.6)
LYMPHOCYTES NFR BLD: 10 % (ref 13–44)
MCH RBC QN AUTO: 35.8 PG (ref 26.1–32.9)
MCHC RBC AUTO-ENTMCNC: 33.2 G/DL (ref 31.4–35)
MCV RBC AUTO: 107.8 FL (ref 82–102)
MONOCYTES # BLD: 1.3 K/UL (ref 0.1–1.3)
MONOCYTES NFR BLD: 10 % (ref 4–12)
NEUTS SEG # BLD: 9.9 K/UL (ref 1.7–8.2)
NEUTS SEG NFR BLD: 77 % (ref 43–78)
NRBC # BLD: 0 K/UL (ref 0–0.2)
PLATELET # BLD AUTO: 350 K/UL (ref 150–450)
PMV BLD AUTO: 9.9 FL (ref 9.4–12.3)
POTASSIUM SERPL-SCNC: 3.5 MMOL/L (ref 3.5–5.1)
PROT SERPL-MCNC: 6.5 G/DL (ref 6.3–8.2)
RBC # BLD AUTO: 2.96 M/UL (ref 4.05–5.2)
SODIUM SERPL-SCNC: 136 MMOL/L (ref 133–143)
WBC # BLD AUTO: 12.7 K/UL (ref 4.3–11.1)

## 2023-04-27 PROCEDURE — 36415 COLL VENOUS BLD VENIPUNCTURE: CPT

## 2023-04-27 PROCEDURE — 80053 COMPREHEN METABOLIC PANEL: CPT

## 2023-04-27 PROCEDURE — 85025 COMPLETE CBC W/AUTO DIFF WBC: CPT

## 2023-04-27 PROCEDURE — 3078F DIAST BP <80 MM HG: CPT | Performed by: INTERNAL MEDICINE

## 2023-04-27 PROCEDURE — 1090F PRES/ABSN URINE INCON ASSESS: CPT | Performed by: INTERNAL MEDICINE

## 2023-04-27 PROCEDURE — 3074F SYST BP LT 130 MM HG: CPT | Performed by: INTERNAL MEDICINE

## 2023-04-27 PROCEDURE — 99215 OFFICE O/P EST HI 40 MIN: CPT | Performed by: INTERNAL MEDICINE

## 2023-04-27 PROCEDURE — G8400 PT W/DXA NO RESULTS DOC: HCPCS | Performed by: INTERNAL MEDICINE

## 2023-04-27 PROCEDURE — G8417 CALC BMI ABV UP PARAM F/U: HCPCS | Performed by: INTERNAL MEDICINE

## 2023-04-27 PROCEDURE — G8427 DOCREV CUR MEDS BY ELIG CLIN: HCPCS | Performed by: INTERNAL MEDICINE

## 2023-04-27 PROCEDURE — 1036F TOBACCO NON-USER: CPT | Performed by: INTERNAL MEDICINE

## 2023-04-27 PROCEDURE — 1123F ACP DISCUSS/DSCN MKR DOCD: CPT | Performed by: INTERNAL MEDICINE

## 2023-04-27 RX ORDER — LENALIDOMIDE 5 MG/1
5 CAPSULE ORAL DAILY
Qty: 30 CAPSULE | Refills: 0 | Status: SHIPPED | OUTPATIENT
Start: 2023-04-27 | End: 2023-04-27 | Stop reason: SDUPTHER

## 2023-04-27 RX ORDER — LENALIDOMIDE 5 MG/1
5 CAPSULE ORAL DAILY
Qty: 30 CAPSULE | Refills: 5 | Status: SHIPPED | OUTPATIENT
Start: 2023-04-27

## 2023-04-27 NOTE — TELEPHONE ENCOUNTER
----- Message from [facility] sent at 4/27/2023  3:22 PM EDT -----  Regarding: RE: Gelacio Schilder, so the copay was extremely hIgh and I got her assistance through [facility] . She was approved yesterday. Her new RX needs to go to [facility] . I apologize that I did not notify you .     ----- Message -----  From: Shilpi Jeffrey RN  Sent: 4/27/2023  12:28 PM EDT  To: [facility]  Subject: [id number]                          Refill has been sent to pharmacy on Revlimid - they have not heard from pharmacy regarding shipment. Can you follow up on this? Thanks,  -Clotilde SIBLEY

## 2023-04-27 NOTE — PATIENT INSTRUCTIONS
Patient Instructions from Today's Visit    Reason for Visit:  Follow up - MDS    Diagnosis Information:  [email or web address]://[email or web address] net/about-us/asco-answers-patient-education-materials/hehx-kfddrxd-lzma-sheets    Plan:  Labs reviewed. Hemoglobin stable. White blood cell count is elevated - start Revlimid as soon as you get it. No Retacrit today - will space injections out to monthly. Please call us if you have any questions or concerns before your next visit. Follow Up: Follow up in 1 month with Dr. Sanjay Mendoza, with labs prior  Infusion after office visit for Retacrit.      Recent Lab Results:  Hospital Outpatient Visit on 04/27/2023   Component Date Value Ref Range Status    WBC 04/27/2023 12.7 (H)  4.3 - 11.1 K/uL Final    RBC 04/27/2023 2.96 (L)  4.05 - 5.2 M/uL Final    Hemoglobin 04/27/2023 10.6 (L)  11.7 - 15.4 g/dL Final    Hematocrit 04/27/2023 31.9 (L)  35.8 - 46.3 % Final    MCV 04/27/2023 107.8 (H)  82.0 - 102.0 FL Final    MCH 04/27/2023 35.8 (H)  26.1 - 32.9 PG Final    MCHC 04/27/2023 33.2  31.4 - 35.0 g/dL Final    RDW 04/27/2023 17.5 (H)  11.9 - 14.6 % Final    Platelets 71/11/3503 350  150 - 450 K/uL Final    MPV 04/27/2023 9.9  9.4 - 12.3 FL Final    nRBC 04/27/2023 0.00  0.0 - 0.2 K/uL Final    **Note: Absolute NRBC parameter is now reported with Hemogram**    Seg Neutrophils 04/27/2023 77  43 - 78 % Final    Lymphocytes 04/27/2023 10 (L)  13 - 44 % Final    Monocytes 04/27/2023 10  4.0 - 12.0 % Final    Eosinophils % 04/27/2023 1  0.5 - 7.8 % Final    Basophils 04/27/2023 1  0.0 - 2.0 % Final    Immature Granulocytes 04/27/2023 1  0.0 - 5.0 % Final    Segs Absolute 04/27/2023 9.9 (H)  1.7 - 8.2 K/UL Final    Absolute Lymph # 04/27/2023 1.2  0.5 - 4.6 K/UL Final    Absolute Mono # 04/27/2023 1.3  0.1 - 1.3 K/UL Final    Absolute Eos # 04/27/2023 0.1  0.0 - 0.8 K/UL Final    Basophils Absolute 04/27/2023 0.1  0.0 - 0.2 K/UL Final    Absolute Immature Granulocyte 04/27/2023 0.1  0.0 - 0.5 K/UL Final

## 2023-05-22 ENCOUNTER — HOSPITAL ENCOUNTER (OUTPATIENT)
Dept: MRI IMAGING | Age: 83
Discharge: HOME | End: 2023-05-25
Payer: MEDICARE

## 2023-05-22 DIAGNOSIS — G81.91 RIGHT HEMIPARESIS (HCC): ICD-10-CM

## 2023-05-22 PROCEDURE — 72141 MRI NECK SPINE W/O DYE: CPT

## 2023-05-22 PROCEDURE — 70551 MRI BRAIN STEM W/O DYE: CPT

## 2023-05-24 ENCOUNTER — TELEPHONE (OUTPATIENT)
Dept: ONCOLOGY | Age: 83
End: 2023-05-24

## 2023-05-24 DIAGNOSIS — D46.9 MDS (MYELODYSPLASTIC SYNDROME) (HCC): Primary | ICD-10-CM

## 2023-05-25 ENCOUNTER — HOSPITAL ENCOUNTER (OUTPATIENT)
Dept: INFUSION THERAPY | Age: 83
End: 2023-05-25

## 2023-06-21 DIAGNOSIS — D46.9 MDS (MYELODYSPLASTIC SYNDROME) (HCC): Primary | ICD-10-CM

## 2023-06-22 ENCOUNTER — HOSPITAL ENCOUNTER (OUTPATIENT)
Dept: INFUSION THERAPY | Age: 83
Discharge: HOME | End: 2023-06-22

## 2023-06-22 ENCOUNTER — TELEPHONE (OUTPATIENT)
Dept: ONCOLOGY | Age: 83
End: 2023-06-22

## 2023-06-22 ENCOUNTER — OFFICE VISIT (OUTPATIENT)
Dept: ONCOLOGY | Age: 83
End: 2023-06-22
Payer: MEDICARE

## 2023-06-22 ENCOUNTER — HOSPITAL ENCOUNTER (OUTPATIENT)
Dept: LAB | Age: 83
Discharge: HOME | End: 2023-06-22
Payer: MEDICARE

## 2023-06-22 VITALS
SYSTOLIC BLOOD PRESSURE: 106 MMHG | TEMPERATURE: 98 F | OXYGEN SATURATION: 97 % | WEIGHT: 145 LBS | HEART RATE: 56 BPM | HEIGHT: 65 IN | BODY MASS INDEX: 24.16 KG/M2 | DIASTOLIC BLOOD PRESSURE: 47 MMHG | RESPIRATION RATE: 16 BRPM

## 2023-06-22 DIAGNOSIS — D46.9 MDS (MYELODYSPLASTIC SYNDROME) (HCC): ICD-10-CM

## 2023-06-22 DIAGNOSIS — N18.30 STAGE 3 CHRONIC KIDNEY DISEASE, UNSPECIFIED WHETHER STAGE 3A OR 3B CKD (HCC): ICD-10-CM

## 2023-06-22 DIAGNOSIS — D46.9 MDS (MYELODYSPLASTIC SYNDROME) (HCC): Primary | ICD-10-CM

## 2023-06-22 DIAGNOSIS — N18.32 CHRONIC KIDNEY DISEASE, STAGE 3B (HCC): ICD-10-CM

## 2023-06-22 LAB
ALBUMIN SERPL-MCNC: 3.4 G/DL (ref 3.2–4.6)
ALBUMIN/GLOB SERPL: 1.1 (ref 0.4–1.6)
ALP SERPL-CCNC: 65 U/L (ref 50–136)
ALT SERPL-CCNC: 30 U/L (ref 12–65)
ANION GAP SERPL CALC-SCNC: 7 MMOL/L (ref 2–11)
AST SERPL-CCNC: 23 U/L (ref 15–37)
BILIRUB SERPL-MCNC: 0.6 MG/DL (ref 0.2–1.1)
BUN SERPL-MCNC: 22 MG/DL (ref 8–23)
CALCIUM SERPL-MCNC: 9 MG/DL (ref 8.3–10.4)
CHLORIDE SERPL-SCNC: 105 MMOL/L (ref 101–110)
CO2 SERPL-SCNC: 28 MMOL/L (ref 21–32)
CREAT SERPL-MCNC: 1.2 MG/DL (ref 0.6–1)
ERYTHROCYTE [DISTWIDTH] IN BLOOD BY AUTOMATED COUNT: 17.9 % (ref 11.9–14.6)
GLOBULIN SER CALC-MCNC: 3.1 G/DL (ref 2.8–4.5)
GLUCOSE SERPL-MCNC: 131 MG/DL (ref 65–100)
HCT VFR BLD AUTO: 33.8 % (ref 35.8–46.3)
HGB BLD-MCNC: 11.1 G/DL (ref 11.7–15.4)
MCH RBC QN AUTO: 35.1 PG (ref 26.1–32.9)
MCHC RBC AUTO-ENTMCNC: 32.8 G/DL (ref 31.4–35)
MCV RBC AUTO: 107 FL (ref 82–102)
NRBC # BLD: 0 K/UL (ref 0–0.2)
PLATELET # BLD AUTO: 344 K/UL (ref 150–450)
PMV BLD AUTO: 10.4 FL (ref 9.4–12.3)
POTASSIUM SERPL-SCNC: 3.6 MMOL/L (ref 3.5–5.1)
PROT SERPL-MCNC: 6.5 G/DL (ref 6.3–8.2)
RBC # BLD AUTO: 3.16 M/UL (ref 4.05–5.2)
SODIUM SERPL-SCNC: 140 MMOL/L (ref 133–143)
WBC # BLD AUTO: 12.2 K/UL (ref 4.3–11.1)

## 2023-06-22 PROCEDURE — 80053 COMPREHEN METABOLIC PANEL: CPT

## 2023-06-22 PROCEDURE — 3074F SYST BP LT 130 MM HG: CPT | Performed by: INTERNAL MEDICINE

## 2023-06-22 PROCEDURE — 99214 OFFICE O/P EST MOD 30 MIN: CPT | Performed by: INTERNAL MEDICINE

## 2023-06-22 PROCEDURE — 1036F TOBACCO NON-USER: CPT | Performed by: INTERNAL MEDICINE

## 2023-06-22 PROCEDURE — 3078F DIAST BP <80 MM HG: CPT | Performed by: INTERNAL MEDICINE

## 2023-06-22 PROCEDURE — 1123F ACP DISCUSS/DSCN MKR DOCD: CPT | Performed by: INTERNAL MEDICINE

## 2023-06-22 PROCEDURE — 85027 COMPLETE CBC AUTOMATED: CPT

## 2023-06-22 PROCEDURE — 36415 COLL VENOUS BLD VENIPUNCTURE: CPT

## 2023-06-22 PROCEDURE — G8427 DOCREV CUR MEDS BY ELIG CLIN: HCPCS | Performed by: INTERNAL MEDICINE

## 2023-06-22 PROCEDURE — 1090F PRES/ABSN URINE INCON ASSESS: CPT | Performed by: INTERNAL MEDICINE

## 2023-06-22 PROCEDURE — G8420 CALC BMI NORM PARAMETERS: HCPCS | Performed by: INTERNAL MEDICINE

## 2023-06-22 PROCEDURE — G8399 PT W/DXA RESULTS DOCUMENT: HCPCS | Performed by: INTERNAL MEDICINE

## 2023-06-22 NOTE — PATIENT INSTRUCTIONS
Patient Instructions from Today's Visit    Reason for Visit:  Follow up-MDS    Diagnosis Information:  https://[email or web address] net/about-us/asco-answers-patient-education-materials/gvtq-wfpncvw-qaoo-sheets    Plan: We will cancel treatment today. Follow Up:  Dr Ty Arauz in 1 month    Recent Lab Results:  Hospital Outpatient Visit on 06/22/2023   Component Date Value Ref Range Status    WBC 06/22/2023 12.2 (H)  4.3 - 11.1 K/uL Final    RBC 06/22/2023 3.16 (L)  4.05 - 5.2 M/uL Final    Hemoglobin 06/22/2023 11.1 (L)  11.7 - 15.4 g/dL Final    Hematocrit 06/22/2023 33.8 (L)  35.8 - 46.3 % Final    MCV 06/22/2023 107.0 (H)  82.0 - 102.0 FL Final    MCH 06/22/2023 35.1 (H)  26.1 - 32.9 PG Final    MCHC 06/22/2023 32.8  31.4 - 35.0 g/dL Final    RDW 06/22/2023 17.9 (H)  11.9 - 14.6 % Final    Platelets 04/10/8941 344  150 - 450 K/uL Final    MPV 06/22/2023 10.4  9.4 - 12.3 FL Final    nRBC 06/22/2023 0.00  0.0 - 0.2 K/uL Final    **Note: Absolute NRBC parameter is now reported with Hemogram**    Sodium 06/22/2023 140  133 - 143 mmol/L Final    Potassium 06/22/2023 3.6  3.5 - 5.1 mmol/L Final    Chloride 06/22/2023 105  101 - 110 mmol/L Final    CO2 06/22/2023 28  21 - 32 mmol/L Final    Anion Gap 06/22/2023 7  2 - 11 mmol/L Final    Glucose 06/22/2023 131 (H)  65 - 100 mg/dL Final    BUN 06/22/2023 22  8 - 23 MG/DL Final    Creatinine 06/22/2023 1.20 (H)  0.6 - 1.0 MG/DL Final    Est, Glom Filt Rate 06/22/2023 45 (L)  >60 ml/min/1.73m2 Final    Comment:      Pediatric calculator link: [employer]. org/professionals/kdoqi/gfr_calculatorped       These results are not intended for use in patients <25years of age. eGFR results are calculated without a race factor using  the 2021 CKD-EPI equation. Careful clinical correlation is recommended, particularly when comparing to results calculated using previous equations.   The CKD-EPI equation is less accurate in patients with extremes of muscle mass, extra-renal

## 2023-06-22 NOTE — PROGRESS NOTES
questions were answered to the best of my abilities. Total visit time 33 minutes. Krystal Mora MD  [facility] Hematology and Oncology  [address] [place] [address]  Office : [phone]  Fax : [phone]    Elements of this note have been dictated using speech recognition software. As a result, errors of speech recognition may have occurred.         Chemotherapy requires close monitoring for toxicity

## 2023-07-10 ENCOUNTER — CLINICAL DOCUMENTATION (OUTPATIENT)
Dept: NEUROLOGY | Age: 83
End: 2023-07-10

## 2023-07-10 NOTE — PROGRESS NOTES
Called patient to go over virtual rooming criteria. Patient answered and hung up. Unable to verify any information.

## 2023-07-11 ENCOUNTER — TELEMEDICINE (OUTPATIENT)
Dept: NEUROLOGY | Age: 83
End: 2023-07-11
Payer: MEDICARE

## 2023-07-11 DIAGNOSIS — M47.12 CERVICAL SPONDYLOSIS WITH MYELOPATHY: Primary | ICD-10-CM

## 2023-07-11 PROCEDURE — 99442 PR PHYS/QHP TELEPHONE EVALUATION 11-20 MIN: CPT | Performed by: PSYCHIATRY & NEUROLOGY

## 2023-07-11 NOTE — PROGRESS NOTES
Fariba Bledsoe (:  1940) is a Established patient, here for evaluation of the following:    Assessment & Plan   Below is the assessment and plan developed based on review of pertinent history, physical exam, labs, studies, and medications. 1. Cervical spondylosis with myelopathy    No follow-ups on file. Subjective   In assistant living then transferred to skilled nursing in mid-May due to falling, and requires help to transfer, in wheelchair, weakness and joint pain. Discussed current condition with her son, conservative management, on PT OT, oral pain relievers, patient's condition stable at this point. No further neurological treatment needed. Sign off. Review of Systems   Musculoskeletal:  Positive for joint pain. Neurological:  Positive for weakness. Objective   Patient-Reported Vitals  No data recorded     Physical Exam  [INSTRUCTIONS:  \"[x]\" Indicates a positive item  \"[]\" Indicates a negative item  -- DELETE ALL ITEMS NOT EXAMINED]               On this date 2023 I have spent 15 minutes reviewing previous notes, test results and face to face (virtual) with the patient discussing the diagnosis and importance of compliance with the treatment plan as well as documenting on the day of the visit. Fariba Bledsoe, was evaluated through a synchronous (real-time) audio-video encounter. The patient (or guardian if applicable) is aware that this is a billable service, which includes applicable co-pays. This Virtual Visit was conducted with patient's (and/or legal guardian's) consent. Patient identification was verified, and a caregiver was present when appropriate.    The patient was located at Other: with pt's son  Provider was located at Home ([address]): Adrián Reyes MD

## 2023-07-19 ENCOUNTER — TELEPHONE (OUTPATIENT)
Dept: ONCOLOGY | Age: 83
End: 2023-07-19

## 2023-07-19 NOTE — TELEPHONE ENCOUNTER
Contacted [facility] regarding Revlimid to determine if there were any issues w/ RX. [facility] has attempted to contact pt on 6/22, 6/23 and 6/28. They were finally able to reach pt on 7/12 - pt instructed pharmacy to contact her son to arrange shipment. Pharmacy attempted to contact son on 7/12- no answer, LVM. They have not received return call. DANA form has been mailed to address on file to continue communication w/ son. Inquired about co-pay for RX - they stated there is no co-pay for prescription; they are just awaiting return call to arrange shipment. Will send [employer] message to pt.

## 2023-07-25 ENCOUNTER — TELEPHONE (OUTPATIENT)
Dept: ONCOLOGY | Age: 83
End: 2023-07-25

## 2023-07-25 NOTE — TELEPHONE ENCOUNTER
Physician provider: Xiang Dillon MD  Reason for today's call: Medication Issue  Last office visit:N/A    Patient notified that their information will be routed to the [facility] clinical triage team for review. Patient is advised that they will receive a phone call from the triage department. If symptoms worsen before receiving a call back, the patient has been advised to proceed to the nearest ED. Gin from [facility] called requesting that a new prescription be sent in for :    lenalidomide (REVLIMID) 5 MG chemo capsule     As the previous prescription is no longer usable / out of date   Pharmacy also requested help getting in touch with patient as they can not get a hold of her and need to talk to her regarding scheduling delivery of medication.

## 2023-07-26 NOTE — TELEPHONE ENCOUNTER
Spoke w/ pt's son - Lou Lopez    Per son, pt has been refusing Revlimid rx - she would prefer not to take it at this time and just monitor her blood counts. Son also states that pharmacy will not speak w/ him regarding shipment of medication - they require him to physically be present w/ her at time of call so she can authorize her son speaking with them regarding RX (as noted in my previous TE, Marianne sent an DANA in the mail to pt so this will not be an issue in the future). Pt is in an assisted living facility and the son has recently been out of town and unable to physically be present with her. However, pt does not wish to continue RX at this time. Will notify [facility]    Son also mentioned that they would not be able to make it to scheduled apt tmrw and requesting to reschedule. Would like to push apt out a couple weeks and recheck blood counts at that time. Msg sent to [facility].

## 2023-08-08 ENCOUNTER — TELEPHONE (OUTPATIENT)
Dept: ONCOLOGY | Age: 83
End: 2023-08-08

## 2023-10-17 NOTE — ED NOTES
Attempted to call report at this time. Lanette Giraldo states that that nurse is currently in a room and will need to call back.       Zain Crawford RN  08/16/22 9117
Difficult stick team at bedside.      Jim Dominguez RN  08/16/22 2356
Pt ambulated to restroom with assistance daughter in law. Dr. Kristine Sadler at bedside at this time.      David Solorzano RN  08/16/22 5054
Pt to ED with c/o \"general ill\" feeling. Pt states that since she had her last PROSPER here and was changed to plavix she hasn't felt right. Pt unable to elaborate on sxs. Pt states burping today. Pt states increase in bruising on arms. Pt alert ambulatory and in NAD at this time.      Prudence Shea RN  08/16/22 1935
Pt to [place] via [facility] at this time.      Zain Crawford RN  08/16/22 7554
TRANSFER - OUT REPORT:    Verbal report given to [address] on Inell Hashimoto  being transferred to [address] [place] [address] for routine progression of patient care       Report consisted of patient's Situation, Background, Assessment and   Recommendations(SBAR). Information from the following report(s) ED SBAR was reviewed with the receiving nurse. Lines:   Peripheral IV 08/16/22 Right Forearm (Active)   Site Assessment Clean, dry & intact; Intact; Clean;Dry 08/16/22 1601   Line Status Blood return noted; Flushed 08/16/22 1601   Phlebitis Assessment No symptoms 08/16/22 1601   Infiltration Assessment 0 08/16/22 1601   Alcohol Cap Used No 08/16/22 1601   Dressing Status Clean, dry & intact 08/16/22 1601   Dressing Type Transparent 08/16/22 1601        Opportunity for questions and clarification was provided.       Patient transported with:  [facility]       Sushil Collier RN  08/16/22 0385
Yes

## 2023-10-26 RX ORDER — LENALIDOMIDE 5 MG/1
CAPSULE ORAL
Qty: 28 CAPSULE | Refills: 0 | OUTPATIENT
Start: 2023-10-26

## 2023-10-26 RX ORDER — LENALIDOMIDE 5 MG/1
CAPSULE ORAL
Qty: 28 CAPSULE | Refills: 3 | OUTPATIENT
Start: 2023-10-26

## 2024-10-24 NOTE — ED NOTES
Pt reports feeling hot and diaphoretic, blanket changed to sheet, temperature adjusted, pt given ice pack. MD Villa notified pt reports taking synthroid, plan to check TSH level.       Humble Rapp RN  04/18/23 1300 [Medication Risks Reviewed] : Medication risks reviewed [de-identified] : reviewed the case and the imaging with the patient  neck pain with spasm discussion of the condition and treatment options cautions discussed questions answered discussion of natural history of the condition and what the next step would be TPi which she was tolerated well  PT script  referral to pain

## (undated) DEVICE — INTRODUCER TRANSSEPTAL GUID 8.5FR L63CM DIL 8.5FR L67CM

## (undated) DEVICE — Device: Brand: NRG TRANSSEPTAL NEEDLE

## (undated) DEVICE — ACCESS SHEATH WITH DILATOR: Brand: WATCHMAN™ TRUSEAL™ ACCESS SYSTEM

## (undated) DEVICE — 18G NG KIT WITH 96IN PROBE COVER (10 PK): Brand: SITE-RITE

## (undated) DEVICE — CATHETER ULTRASOUND NAVIGATIONAL 10 FRX90 CM VIVID I ACUNAV

## (undated) DEVICE — CATHETER DIAG AD 6FR L110CM 0.038IN COR POLYUR PGTL W/ 6

## (undated) DEVICE — CHECK-FLO PERFORMER INTRODUCER: Brand: PERFORMER

## (undated) DEVICE — PINNACLE INTRODUCER SHEATH: Brand: PINNACLE

## (undated) DEVICE — GUIDEWIRE VASC L260CM DIA0.035IN L7CM DIA3MM J TIP PTFE S

## (undated) DEVICE — SYSTEM CLOSURE 6-12 FR VEN VASC VASCADE MVP

## (undated) DEVICE — STERILE (15.2 TAPERED TO 7.6 X 183CM) POLYETHYLENE ACCORDION-FOLDED COVER FOR USE WITH SIEMENS ACUNAV ULTRASOUND CATHETER FAMILY CONNECTOR: Brand: SWIFTLINK TRANSDUCER COVER

## (undated) DEVICE — PERCLOSE™ PROSTYLE™ SUTURE-MEDIATED CLOSURE AND REPAIR SYSTEM: Brand: PERCLOSE™ PROSTYLE™

## (undated) DEVICE — INTRODUCER SHTH STD 8 FRX11 CM FLX KINK RESIST CANN AVNT +